# Patient Record
Sex: FEMALE | Race: OTHER | HISPANIC OR LATINO
[De-identification: names, ages, dates, MRNs, and addresses within clinical notes are randomized per-mention and may not be internally consistent; named-entity substitution may affect disease eponyms.]

---

## 2016-06-06 RX ORDER — LEVOTHYROXINE SODIUM 125 MCG
1 TABLET ORAL
Qty: 0 | Refills: 0 | DISCHARGE
Start: 2016-06-06

## 2017-03-02 ENCOUNTER — OTHER (OUTPATIENT)
Age: 77
End: 2017-03-02

## 2017-03-07 ENCOUNTER — APPOINTMENT (OUTPATIENT)
Dept: INTERNAL MEDICINE | Facility: CLINIC | Age: 77
End: 2017-03-07

## 2017-03-07 ENCOUNTER — LABORATORY RESULT (OUTPATIENT)
Age: 77
End: 2017-03-07

## 2017-03-07 VITALS
DIASTOLIC BLOOD PRESSURE: 73 MMHG | TEMPERATURE: 97.6 F | HEIGHT: 57 IN | OXYGEN SATURATION: 96 % | WEIGHT: 118 LBS | RESPIRATION RATE: 14 BRPM | HEART RATE: 68 BPM | BODY MASS INDEX: 25.46 KG/M2 | SYSTOLIC BLOOD PRESSURE: 129 MMHG

## 2017-03-07 DIAGNOSIS — Z23 ENCOUNTER FOR IMMUNIZATION: ICD-10-CM

## 2017-03-08 ENCOUNTER — OTHER (OUTPATIENT)
Age: 77
End: 2017-03-08

## 2017-03-08 LAB
CHOLEST SERPL-MCNC: 250 MG/DL
CHOLEST/HDLC SERPL: 4 RATIO
HDLC SERPL-MCNC: 63 MG/DL
LDLC SERPL CALC-MCNC: 159 MG/DL
TRIGL SERPL-MCNC: 142 MG/DL
TSH SERPL-ACNC: 4.77 UIU/ML

## 2017-03-21 ENCOUNTER — OTHER (OUTPATIENT)
Age: 77
End: 2017-03-21

## 2017-04-04 ENCOUNTER — OTHER (OUTPATIENT)
Age: 77
End: 2017-04-04

## 2017-05-09 ENCOUNTER — RX RENEWAL (OUTPATIENT)
Age: 77
End: 2017-05-09

## 2017-06-12 ENCOUNTER — APPOINTMENT (OUTPATIENT)
Dept: INTERNAL MEDICINE | Facility: CLINIC | Age: 77
End: 2017-06-12

## 2017-06-12 ENCOUNTER — LABORATORY RESULT (OUTPATIENT)
Age: 77
End: 2017-06-12

## 2017-06-12 ENCOUNTER — OTHER (OUTPATIENT)
Age: 77
End: 2017-06-12

## 2017-06-12 VITALS
TEMPERATURE: 97.2 F | BODY MASS INDEX: 26.32 KG/M2 | DIASTOLIC BLOOD PRESSURE: 71 MMHG | HEART RATE: 68 BPM | RESPIRATION RATE: 14 BRPM | OXYGEN SATURATION: 97 % | SYSTOLIC BLOOD PRESSURE: 114 MMHG | HEIGHT: 57 IN | WEIGHT: 122 LBS

## 2017-06-12 DIAGNOSIS — G47.00 INSOMNIA, UNSPECIFIED: ICD-10-CM

## 2017-06-14 LAB — TSH SERPL-ACNC: 7.5 UIU/ML

## 2017-11-21 ENCOUNTER — OTHER (OUTPATIENT)
Age: 77
End: 2017-11-21

## 2017-12-19 ENCOUNTER — RX RENEWAL (OUTPATIENT)
Age: 77
End: 2017-12-19

## 2018-04-18 ENCOUNTER — RX RENEWAL (OUTPATIENT)
Age: 78
End: 2018-04-18

## 2018-04-18 ENCOUNTER — MOBILE ON CALL (OUTPATIENT)
Age: 78
End: 2018-04-18

## 2018-05-22 ENCOUNTER — OTHER (OUTPATIENT)
Age: 78
End: 2018-05-22

## 2018-05-30 ENCOUNTER — APPOINTMENT (OUTPATIENT)
Dept: INTERNAL MEDICINE | Facility: CLINIC | Age: 78
End: 2018-05-30
Payer: MEDICARE

## 2018-05-30 VITALS — HEART RATE: 84 BPM | DIASTOLIC BLOOD PRESSURE: 70 MMHG | RESPIRATION RATE: 14 BRPM | SYSTOLIC BLOOD PRESSURE: 122 MMHG

## 2018-05-30 PROCEDURE — 99214 OFFICE O/P EST MOD 30 MIN: CPT | Mod: 25

## 2018-05-30 PROCEDURE — 36415 COLL VENOUS BLD VENIPUNCTURE: CPT

## 2018-05-31 ENCOUNTER — OTHER (OUTPATIENT)
Age: 78
End: 2018-05-31

## 2018-05-31 LAB
ANION GAP SERPL CALC-SCNC: 14 MMOL/L
BUN SERPL-MCNC: 15 MG/DL
CALCIUM SERPL-MCNC: 9.6 MG/DL
CHLORIDE SERPL-SCNC: 102 MMOL/L
CHOLEST SERPL-MCNC: 253 MG/DL
CHOLEST/HDLC SERPL: 3.7 RATIO
CO2 SERPL-SCNC: 24 MMOL/L
CREAT SERPL-MCNC: 0.55 MG/DL
GLUCOSE SERPL-MCNC: 94 MG/DL
HDLC SERPL-MCNC: 69 MG/DL
LDLC SERPL CALC-MCNC: 155 MG/DL
POTASSIUM SERPL-SCNC: 4.2 MMOL/L
SODIUM SERPL-SCNC: 140 MMOL/L
TRIGL SERPL-MCNC: 143 MG/DL
TSH SERPL-ACNC: 3.68 UIU/ML

## 2018-06-08 ENCOUNTER — RX RENEWAL (OUTPATIENT)
Age: 78
End: 2018-06-08

## 2018-07-19 ENCOUNTER — OTHER (OUTPATIENT)
Age: 78
End: 2018-07-19

## 2018-09-10 ENCOUNTER — RX RENEWAL (OUTPATIENT)
Age: 78
End: 2018-09-10

## 2018-10-04 ENCOUNTER — APPOINTMENT (OUTPATIENT)
Dept: INTERNAL MEDICINE | Facility: CLINIC | Age: 78
End: 2018-10-04
Payer: MEDICARE

## 2018-10-04 VITALS
SYSTOLIC BLOOD PRESSURE: 105 MMHG | BODY MASS INDEX: 26.32 KG/M2 | WEIGHT: 122 LBS | HEART RATE: 68 BPM | TEMPERATURE: 98 F | OXYGEN SATURATION: 94 % | DIASTOLIC BLOOD PRESSURE: 65 MMHG | HEIGHT: 57 IN

## 2018-10-04 PROCEDURE — 90662 IIV NO PRSV INCREASED AG IM: CPT

## 2018-10-04 PROCEDURE — 99214 OFFICE O/P EST MOD 30 MIN: CPT | Mod: 25

## 2018-10-04 PROCEDURE — 36415 COLL VENOUS BLD VENIPUNCTURE: CPT

## 2018-10-04 PROCEDURE — G0008: CPT

## 2018-10-05 LAB
ALBUMIN SERPL ELPH-MCNC: 4.4 G/DL
ALP BLD-CCNC: 69 U/L
ALT SERPL-CCNC: 19 U/L
ANION GAP SERPL CALC-SCNC: 14 MMOL/L
AST SERPL-CCNC: 21 U/L
BILIRUB SERPL-MCNC: 0.4 MG/DL
BUN SERPL-MCNC: 11 MG/DL
CALCIUM SERPL-MCNC: 10.1 MG/DL
CHLORIDE SERPL-SCNC: 101 MMOL/L
CK SERPL-CCNC: 41 U/L
CO2 SERPL-SCNC: 25 MMOL/L
CREAT SERPL-MCNC: 0.66 MG/DL
GLUCOSE SERPL-MCNC: 91 MG/DL
HBA1C MFR BLD HPLC: 5.6 %
MAGNESIUM SERPL-MCNC: 2.4 MG/DL
POTASSIUM SERPL-SCNC: 4.6 MMOL/L
PROT SERPL-MCNC: 6.4 G/DL
SODIUM SERPL-SCNC: 140 MMOL/L

## 2018-10-09 ENCOUNTER — OTHER (OUTPATIENT)
Age: 78
End: 2018-10-09

## 2018-11-30 ENCOUNTER — RX RENEWAL (OUTPATIENT)
Age: 78
End: 2018-11-30

## 2019-01-07 ENCOUNTER — APPOINTMENT (OUTPATIENT)
Dept: INTERNAL MEDICINE | Facility: CLINIC | Age: 79
End: 2019-01-07
Payer: MEDICARE

## 2019-02-08 ENCOUNTER — RX RENEWAL (OUTPATIENT)
Age: 79
End: 2019-02-08

## 2019-02-25 ENCOUNTER — APPOINTMENT (OUTPATIENT)
Dept: INTERNAL MEDICINE | Facility: CLINIC | Age: 79
End: 2019-02-25
Payer: MEDICARE

## 2019-02-25 ENCOUNTER — RX RENEWAL (OUTPATIENT)
Age: 79
End: 2019-02-25

## 2019-02-25 VITALS
SYSTOLIC BLOOD PRESSURE: 133 MMHG | HEART RATE: 78 BPM | OXYGEN SATURATION: 95 % | DIASTOLIC BLOOD PRESSURE: 71 MMHG | TEMPERATURE: 98 F | BODY MASS INDEX: 26.37 KG/M2 | WEIGHT: 122.25 LBS | HEIGHT: 57 IN

## 2019-02-25 PROCEDURE — 99214 OFFICE O/P EST MOD 30 MIN: CPT

## 2019-03-17 ENCOUNTER — FORM ENCOUNTER (OUTPATIENT)
Age: 79
End: 2019-03-17

## 2019-03-18 ENCOUNTER — OUTPATIENT (OUTPATIENT)
Dept: OUTPATIENT SERVICES | Facility: HOSPITAL | Age: 79
LOS: 1 days | End: 2019-03-18
Payer: MEDICARE

## 2019-03-18 ENCOUNTER — APPOINTMENT (OUTPATIENT)
Dept: MRI IMAGING | Facility: HOSPITAL | Age: 79
End: 2019-03-18
Payer: MEDICARE

## 2019-03-18 DIAGNOSIS — Z96.649 PRESENCE OF UNSPECIFIED ARTIFICIAL HIP JOINT: Chronic | ICD-10-CM

## 2019-03-18 PROCEDURE — 72148 MRI LUMBAR SPINE W/O DYE: CPT

## 2019-03-18 PROCEDURE — 72148 MRI LUMBAR SPINE W/O DYE: CPT | Mod: 26

## 2019-03-26 ENCOUNTER — APPOINTMENT (OUTPATIENT)
Dept: HEART AND VASCULAR | Facility: CLINIC | Age: 79
End: 2019-03-26
Payer: MEDICARE

## 2019-03-26 VITALS
DIASTOLIC BLOOD PRESSURE: 76 MMHG | BODY MASS INDEX: 25.89 KG/M2 | HEIGHT: 57 IN | SYSTOLIC BLOOD PRESSURE: 120 MMHG | HEART RATE: 77 BPM | WEIGHT: 120 LBS

## 2019-03-26 VITALS — DIASTOLIC BLOOD PRESSURE: 80 MMHG | SYSTOLIC BLOOD PRESSURE: 120 MMHG

## 2019-03-26 PROCEDURE — 93000 ELECTROCARDIOGRAM COMPLETE: CPT

## 2019-03-26 PROCEDURE — 99204 OFFICE O/P NEW MOD 45 MIN: CPT | Mod: 25

## 2019-03-26 PROCEDURE — 93306 TTE W/DOPPLER COMPLETE: CPT

## 2019-03-28 ENCOUNTER — FORM ENCOUNTER (OUTPATIENT)
Age: 79
End: 2019-03-28

## 2019-03-29 ENCOUNTER — APPOINTMENT (OUTPATIENT)
Dept: MAMMOGRAPHY | Facility: HOSPITAL | Age: 79
End: 2019-03-29
Payer: MEDICARE

## 2019-03-29 ENCOUNTER — OUTPATIENT (OUTPATIENT)
Dept: OUTPATIENT SERVICES | Facility: HOSPITAL | Age: 79
LOS: 1 days | End: 2019-03-29
Payer: MEDICARE

## 2019-03-29 DIAGNOSIS — Z96.649 PRESENCE OF UNSPECIFIED ARTIFICIAL HIP JOINT: Chronic | ICD-10-CM

## 2019-03-29 PROCEDURE — 77063 BREAST TOMOSYNTHESIS BI: CPT | Mod: 26

## 2019-03-29 PROCEDURE — 77063 BREAST TOMOSYNTHESIS BI: CPT

## 2019-03-29 PROCEDURE — 77067 SCR MAMMO BI INCL CAD: CPT

## 2019-03-29 PROCEDURE — 77067 SCR MAMMO BI INCL CAD: CPT | Mod: 26

## 2019-04-10 ENCOUNTER — APPOINTMENT (OUTPATIENT)
Dept: HEART AND VASCULAR | Facility: CLINIC | Age: 79
End: 2019-04-10
Payer: MEDICARE

## 2019-04-10 VITALS
WEIGHT: 120 LBS | SYSTOLIC BLOOD PRESSURE: 126 MMHG | BODY MASS INDEX: 25.89 KG/M2 | HEIGHT: 57 IN | HEART RATE: 78 BPM | DIASTOLIC BLOOD PRESSURE: 78 MMHG | OXYGEN SATURATION: 96 %

## 2019-04-10 DIAGNOSIS — R51 HEADACHE: ICD-10-CM

## 2019-04-10 PROCEDURE — 93924 LWR XTR VASC STDY BILAT: CPT

## 2019-04-10 PROCEDURE — 99214 OFFICE O/P EST MOD 30 MIN: CPT | Mod: 25

## 2019-04-15 ENCOUNTER — FORM ENCOUNTER (OUTPATIENT)
Age: 79
End: 2019-04-15

## 2019-04-16 ENCOUNTER — APPOINTMENT (OUTPATIENT)
Dept: ULTRASOUND IMAGING | Facility: HOSPITAL | Age: 79
End: 2019-04-16
Payer: MEDICARE

## 2019-04-16 ENCOUNTER — OUTPATIENT (OUTPATIENT)
Dept: OUTPATIENT SERVICES | Facility: HOSPITAL | Age: 79
LOS: 1 days | End: 2019-04-16
Payer: COMMERCIAL

## 2019-04-16 DIAGNOSIS — Z12.31 ENCOUNTER FOR SCREENING MAMMOGRAM FOR MALIGNANT NEOPLASM OF BREAST: ICD-10-CM

## 2019-04-16 DIAGNOSIS — Z96.649 PRESENCE OF UNSPECIFIED ARTIFICIAL HIP JOINT: Chronic | ICD-10-CM

## 2019-04-16 PROCEDURE — 76641 ULTRASOUND BREAST COMPLETE: CPT | Mod: 26,50

## 2019-04-16 PROCEDURE — 76641 ULTRASOUND BREAST COMPLETE: CPT

## 2019-04-22 ENCOUNTER — RX RENEWAL (OUTPATIENT)
Age: 79
End: 2019-04-22

## 2019-05-01 ENCOUNTER — APPOINTMENT (OUTPATIENT)
Dept: INTERNAL MEDICINE | Facility: CLINIC | Age: 79
End: 2019-05-01
Payer: MEDICARE

## 2019-05-01 VITALS
HEIGHT: 57 IN | OXYGEN SATURATION: 95 % | HEART RATE: 76 BPM | WEIGHT: 120 LBS | TEMPERATURE: 98 F | DIASTOLIC BLOOD PRESSURE: 71 MMHG | SYSTOLIC BLOOD PRESSURE: 116 MMHG | BODY MASS INDEX: 25.89 KG/M2

## 2019-05-01 DIAGNOSIS — R74.8 ABNORMAL LEVELS OF OTHER SERUM ENZYMES: ICD-10-CM

## 2019-05-01 PROCEDURE — 99214 OFFICE O/P EST MOD 30 MIN: CPT

## 2019-05-20 ENCOUNTER — RX RENEWAL (OUTPATIENT)
Age: 79
End: 2019-05-20

## 2019-05-22 ENCOUNTER — FORM ENCOUNTER (OUTPATIENT)
Age: 79
End: 2019-05-22

## 2019-05-23 ENCOUNTER — OUTPATIENT (OUTPATIENT)
Dept: OUTPATIENT SERVICES | Facility: HOSPITAL | Age: 79
LOS: 1 days | End: 2019-05-23
Payer: MEDICARE

## 2019-05-23 ENCOUNTER — APPOINTMENT (OUTPATIENT)
Dept: NEUROSURGERY | Facility: CLINIC | Age: 79
End: 2019-05-23
Payer: MEDICARE

## 2019-05-23 VITALS
BODY MASS INDEX: 25.89 KG/M2 | HEART RATE: 76 BPM | SYSTOLIC BLOOD PRESSURE: 115 MMHG | HEIGHT: 57 IN | RESPIRATION RATE: 16 BRPM | WEIGHT: 120 LBS | OXYGEN SATURATION: 95 % | DIASTOLIC BLOOD PRESSURE: 73 MMHG

## 2019-05-23 DIAGNOSIS — Z96.649 PRESENCE OF UNSPECIFIED ARTIFICIAL HIP JOINT: Chronic | ICD-10-CM

## 2019-05-23 PROCEDURE — 99204 OFFICE O/P NEW MOD 45 MIN: CPT

## 2019-05-23 PROCEDURE — 72100 X-RAY EXAM L-S SPINE 2/3 VWS: CPT | Mod: 26

## 2019-05-23 PROCEDURE — 72100 X-RAY EXAM L-S SPINE 2/3 VWS: CPT

## 2019-05-29 NOTE — HISTORY OF PRESENT ILLNESS
[> 3 months] : more  than 3 months [FreeTextEntry1] : Chronic Lowerback and bilateral leg pain [de-identified] : Patient is being seen for a consult visit for c/o chronic radiating lower back pain down both legs to her feet L > R. Per patient, this pain has been persistent over the last year and has progressively gotten worse to the point where she can only ambulate 1-2 blocks with use of cane before needing to sit. She states she is starting PT next week, but has not seen anyone for pain management. She is being managed by her PCP, Dr. Anglin, and is only taking Tylenol 1000mg and Naproxen 500mg both PRN. Patient denies numbness/tingling in feet or toes, also denies B&B incontinence. Her hx is positive for Lumbar Radiculopathy, she has had a right hip revision for a fracture a few years ago as well as left TKR over 20yrs ago. Her L/S MRI done 3/18 shows multiple disc protrusions from L1-S1 with central canal stenosis at L3-4 and L4-5.\par \par She presents today to discuss surgical options.

## 2019-05-29 NOTE — DATA REVIEWED
[de-identified] : Patient Name: EMILY DORADO   Report Date: 19-Mar-2019 15:08.00 \par Patient ID: [id number] (00), [id number] (EPI)  Accession No.: [id number] \par Patient Birth Date: 1940  Report Status: F \par Referring Physician: [id number] RITESH RICHARDS   Reason For Study: lwr ext pain  \par \par \par \par \par \par \par EXAM: MR SPINE LUMBAR \par \par PROCEDURE DATE: 03/18/2019 \par \par \par \par INTERPRETATION: MRI OF THE LUMBAR SPINE WITHOUT CONTRAST \par \par HISTORY: Lumbar back pain radiating to lower extremity. \par \par TECHNIQUE: \par \par Sagittal T1, sagittal T2, sagittal STIR, axial T1, and axial T2 weighted \par imaging of the lumbar spine was obtained. No contrast was given. \par \par COMPARISON: None. \par \par FINDINGS: \par \par On the coronal localizer, there is blooming, ferromagnetic artifact likely \par related to right hip arthroplasty. There is curvature in the lumbar spine, \par convex to the left, apex at approximately the L3-L4 level. On the sagittal \par acquisitions, there is minimal anterolisthesis of L4 with respect to L5 and \par minimal anterolisthesis of L2 with respect to L1. No pars defects are seen. \par \par There is heterogeneity of marrow signal intensity on the short TR scans \par consistent with osteopenia on a background of predominant fatty replacement, \par which is within normal limits for patient age. No acute edema pattern is \par seen within the lumbar vertebral bodies. Conus medullaris ends normally at \par the T12-L1 level. Cauda equina is unremarkable. No spinal canal mass is seen. \par \par Multilevel degenerative disc disease is present. Findings include loss of \par normal disc space signal on the long TR scans and loss of disc space height. \par Multilevel degenerative osteoarthritis is present. Findings include \par ligamentum flavum thickening, facet arthropathy, and marginal endplate \par osteophytes. \par \par At the L1-L2 level, diffuse disc bulge is present. There is foraminal \par narrowing bilaterally and lateral recess stenosis bilaterally. No central \par canal stenosis is present. \par \par At the L2-L3 level, diffuse disc bulge is present with tiny right \par paracentral disc protrusion with cephalad extrusion seen behind L2 lumbar \par vertebral body. There is mild thecal sac effacement. There is no central \par canal stenosis. There is foraminal narrowing, right more than left, and \par lateral recess stenosis, right more than left. \par \par At the L3-L4 level, diffuse disc bulge is present eccentric to the right. \par There is moderate foraminal narrowing, right more than left, lateral recess \par stenosis, right more than left. There is mild central canal stenosis. \par \par At the L4-L5 level, on background of diffuse disc bulge, there is a left \par paracentral disc protrusion with cephalad extrusion of disc material seen \par behind L4 lumbar vertebral body. There is foraminal narrowing, left more \par than right, and lateral recess stenosis, left more than right, which may be \par affecting the traversing left L5 and/or foraminal left L4 nerve roots. There \par is thecal sac effacement and central canal stenosis related to ligamentum \par flavum thickening and facet degenerative change. \par \par At the L5-S1 level, diffuse disc bulge is present. There is no central canal \par stenosis. There is mild foraminal narrowing bilaterally. \par \par Incidental note is made of foci of hypointensity within the uterine \par myometrium consistent with fibroids, largest of which measures 1.7cm in \par length. \par \par IMPRESSION: \par \par 1. L4-L5 left paracentral disc protrusion with cephalad extrusion of disc \par material and associated left foraminal narrowing which may be affecting the \par traversing left L5 and/or foraminal left L4 nerve roots. \par 2. Tiny right paracentral disc protrusion L2-L3 level and diffuse disc \par bulges L1-L2, L3-L4, and L5-S1 levels. \par 3. Central canal stenosis L3-L4 and L4-L5 levels related to acquired \par etiologies from disc disease and facet arthropathy. \par 4. Multilevel foraminal narrowing due to facet arthropathy, as above. \par 5. levocurvature. \par \par \par \par \par \par "Thank you for the opportunity to participate in the care of this patient." \par \par \par \par BERTHA GAY M.D., ATTENDING RADIOLOGIST \par This document has been electronically signed. Mar 19 2019 3:08PM \par \par \par \par  \par

## 2019-05-29 NOTE — ASSESSMENT
[FreeTextEntry1] : No surgery recommended at this time.\par Pain Management referral recommended\par Cervical Xray Flex Ext.\par Education provided regarding plan of care.\par

## 2019-05-29 NOTE — ADDENDUM
[FreeTextEntry1] : May 23, 2019\par \par Jennyfer AnglinDO\par [address]\[place] [address]\par \par Re:	Rut Larkin \par \par Dear Jennyfer:\par \par I saw Rut in the office today.  As you know, she is a 79-year-old female who presents with severe low back and leg pain for over a year with inability to walk long distances.  Ms. Larkin reports that this pain has been ongoing and is worse in her back than in her legs.  She reports the lack of bowel or bladder incontinence but does have bilateral leg and feet pain, left greater than right.  She has had minimal treatments with no PT or pain management treatment.\par \par Ms. Larkin has a history of anxiety, osteopenia, hyperlipidemia, hypothyroidism, and prediabetes.  She has had a prior hip replacement following a fracture, as well as a total knee on the left.  She does not drink or smoke and she is a retired hotel worker.  Her current medications include Tylenol, levothyroxine, trazadone, and naproxen.  Review of systems is notable for difficulty walking and her pain but no real neurological deficit.  Additionally, she denies any cardiovascular, respiratory, or GI complaints. \par \par Here in the office, she is walking with a cane but has no gross neurological deficit.  Her MRI shows diffuse discogenic disease particularly on the left at L4-5 with a small amount of extruded material as well as a tiny paracentral disc and diffuse disc bulging at multiple levels.  There is no significant canal stenosis or lateral recess stenosis.  Overall, her MR looks nonoperative to me.\par \par At this point, I asked Ms. Larkin to meet with our pain group with the hopes of getting her by without any aggressive surgery.  I do not see an operation as a good solution for her and told her so.  I have asked her to see Dr. Nevarez, who is part of our orthopedic pain management team, and hopefully this will improve her significantly.\par \par Thanks so much for allowing me to contribute, and I very much appreciate your kind referral.\par \par Sincerely,\par \par \par \par Andrea Monsivais MD\par \par DL/ag [facility] #[id number]\par \par \par

## 2019-05-29 NOTE — PHYSICAL EXAM
[Oriented To Time, Place, And Person] : oriented to person, place, and time [Impaired Insight] : insight and judgment were intact [I: Normal Smell] : smell intact bilaterally [Cranial Nerves Optic (II)] : visual acuity intact bilaterally,  pupils equal round and reactive to light [Cranial Nerves Oculomotor (III)] : extraocular motion intact [Cranial Nerves Trigeminal (V)] : facial sensation intact symmetrically [Cranial Nerves Facial (VII)] : face symmetrical [Cranial Nerves Vestibulocochlear (VIII)] : hearing was intact bilaterally [Cranial Nerves Glossopharyngeal (IX)] : tongue and palate midline [Cranial Nerves Accessory (XI - Cranial And Spinal)] : head turning and shoulder shrug symmetric [Cranial Nerves Hypoglossal (XII)] : there was no tongue deviation with protrusion [Motor Tone] : muscle tone was normal in all four extremities [Motor Strength] : muscle strength was normal in all four extremities [Outer Ear] : the ears and nose were normal in appearance [Neck Appearance] : the appearance of the neck was normal [] : no respiratory distress [Exaggerated Use Of Accessory Muscles For Inspiration] : no accessory muscle use [Heart Rate And Rhythm] : heart rate was normal and rhythm regular [Abdomen Soft] : soft [Abnormal Walk] : normal gait [Skin Color & Pigmentation] : normal skin color and pigmentation [General Appearance - Alert] : alert [General Appearance - In No Acute Distress] : in no acute distress

## 2019-06-10 ENCOUNTER — APPOINTMENT (OUTPATIENT)
Dept: PHYSICAL MEDICINE AND REHAB | Facility: CLINIC | Age: 79
End: 2019-06-10
Payer: MEDICARE

## 2019-06-10 PROCEDURE — 99204 OFFICE O/P NEW MOD 45 MIN: CPT

## 2019-06-24 ENCOUNTER — RX RENEWAL (OUTPATIENT)
Age: 79
End: 2019-06-24

## 2019-06-26 ENCOUNTER — APPOINTMENT (OUTPATIENT)
Dept: INTERNAL MEDICINE | Facility: CLINIC | Age: 79
End: 2019-06-26

## 2019-08-15 ENCOUNTER — RX RENEWAL (OUTPATIENT)
Age: 79
End: 2019-08-15

## 2019-09-03 ENCOUNTER — RX RENEWAL (OUTPATIENT)
Age: 79
End: 2019-09-03

## 2019-09-10 ENCOUNTER — APPOINTMENT (OUTPATIENT)
Dept: HEART AND VASCULAR | Facility: CLINIC | Age: 79
End: 2019-09-10
Payer: MEDICARE

## 2019-09-10 ENCOUNTER — NON-APPOINTMENT (OUTPATIENT)
Age: 79
End: 2019-09-10

## 2019-09-10 VITALS
HEART RATE: 75 BPM | HEIGHT: 57 IN | WEIGHT: 125 LBS | OXYGEN SATURATION: 95 % | DIASTOLIC BLOOD PRESSURE: 74 MMHG | SYSTOLIC BLOOD PRESSURE: 122 MMHG | BODY MASS INDEX: 26.97 KG/M2

## 2019-09-10 VITALS — SYSTOLIC BLOOD PRESSURE: 134 MMHG | DIASTOLIC BLOOD PRESSURE: 78 MMHG

## 2019-09-10 PROCEDURE — 99214 OFFICE O/P EST MOD 30 MIN: CPT | Mod: 25

## 2019-09-10 PROCEDURE — 93000 ELECTROCARDIOGRAM COMPLETE: CPT

## 2019-09-24 ENCOUNTER — OTHER (OUTPATIENT)
Age: 79
End: 2019-09-24

## 2019-10-21 ENCOUNTER — APPOINTMENT (OUTPATIENT)
Dept: INTERNAL MEDICINE | Facility: CLINIC | Age: 79
End: 2019-10-21
Payer: MEDICARE

## 2019-10-21 VITALS
HEIGHT: 57 IN | HEART RATE: 78 BPM | DIASTOLIC BLOOD PRESSURE: 81 MMHG | WEIGHT: 125 LBS | OXYGEN SATURATION: 93 % | BODY MASS INDEX: 26.97 KG/M2 | TEMPERATURE: 97.5 F | SYSTOLIC BLOOD PRESSURE: 133 MMHG

## 2019-10-21 PROCEDURE — 36415 COLL VENOUS BLD VENIPUNCTURE: CPT

## 2019-10-21 PROCEDURE — 99214 OFFICE O/P EST MOD 30 MIN: CPT | Mod: 25

## 2019-10-21 NOTE — REVIEW OF SYSTEMS
[Negative] : Cardiovascular [Chest Pain] : no chest pain [Shortness Of Breath] : no shortness of breath [FreeTextEntry9] : see HPI

## 2019-10-21 NOTE — PHYSICAL EXAM
[Normal Sclera/Conjunctiva] : normal sclera/conjunctiva [No Acute Distress] : no acute distress [Well-Appearing] : well-appearing [EOMI] : extraocular movements intact [Normal Outer Ear/Nose] : the outer ears and nose were normal in appearance [Supple] : supple [No JVD] : no jugular venous distention [No Accessory Muscle Use] : no accessory muscle use [Clear to Auscultation] : lungs were clear to auscultation bilaterally [No Respiratory Distress] : no respiratory distress  [Normal Rate] : normal rate  [Regular Rhythm] : with a regular rhythm [Normal S1, S2] : normal S1 and S2 [No Edema] : there was no peripheral edema [Coordination Grossly Intact] : coordination grossly intact [No Rash] : no rash [Grossly Normal Strength/Tone] : grossly normal strength/tone [No Focal Deficits] : no focal deficits [Normal Affect] : the affect was normal [Normal Insight/Judgement] : insight and judgment were intact [Alert and Oriented x3] : oriented to person, place, and time

## 2019-10-21 NOTE — HISTORY OF PRESENT ILLNESS
[de-identified] : 80 yo female with hx hypothyroid, chronic back pain, HPL here for f/u.  REports she is feeling generally well.  Anxiety much improved as daughter and grandchildren have moved out of her home.  states she is compliant with all medication.  Reports back and leg pain improved on lyria, asking if she should increase to twice daily.  Reports physical therapy has been helping.   No other complaints.  Eating generally healthy balance diet.

## 2019-10-21 NOTE — ASSESSMENT
[FreeTextEntry1] : 78 yo female here for f/u\par \par 1) HPL - chronic, above goal, will repeat fasting today.  consider restart statin.  counseled diet/exercise\par 2) hypothyroid -c hronic, stable, reviewed labs from sept 2019, TSH normal, c/w synthroid 37 mcg daily\par 3) anxiety/depression -chronic, now improved with better living situation\par 4) back/leg pain - chronic, improved with lyrica, will increase to twice daily per instructions.  f/u pain management.

## 2019-10-24 ENCOUNTER — OTHER (OUTPATIENT)
Age: 79
End: 2019-10-24

## 2019-10-24 LAB
CHOLEST SERPL-MCNC: 273 MG/DL
CHOLEST/HDLC SERPL: 4.7 RATIO
HDLC SERPL-MCNC: 58 MG/DL
LDLC SERPL CALC-MCNC: 178 MG/DL
TRIGL SERPL-MCNC: 186 MG/DL

## 2019-11-04 ENCOUNTER — RX RENEWAL (OUTPATIENT)
Age: 79
End: 2019-11-04

## 2019-11-25 ENCOUNTER — OTHER (OUTPATIENT)
Age: 79
End: 2019-11-25

## 2019-11-26 ENCOUNTER — OTHER (OUTPATIENT)
Age: 79
End: 2019-11-26

## 2019-12-11 ENCOUNTER — APPOINTMENT (OUTPATIENT)
Dept: PHYSICAL MEDICINE AND REHAB | Facility: CLINIC | Age: 79
End: 2019-12-11
Payer: MEDICARE

## 2019-12-11 VITALS
WEIGHT: 125 LBS | OXYGEN SATURATION: 96 % | SYSTOLIC BLOOD PRESSURE: 130 MMHG | HEART RATE: 75 BPM | DIASTOLIC BLOOD PRESSURE: 70 MMHG | HEIGHT: 57 IN | BODY MASS INDEX: 26.97 KG/M2

## 2019-12-11 PROCEDURE — 99213 OFFICE O/P EST LOW 20 MIN: CPT

## 2019-12-11 NOTE — HISTORY OF PRESENT ILLNESS
[FreeTextEntry1] : Ms. ESTRADA DIAZ is a very pleasant 79 year female who comes in for follow up evaluation of back pain  after being referred to PT at her previous visit. The patient reports that since then she went for a second opinion and is now pursuing care with that physician. At present the symptoms are relatively unchanged. The pain is located primarily on her back radiating to bilateral legs intermittent in nature and described as sharp . The pain is rated as 8/10 during today's visit, and ranges from 7-9/10. The patient's symptoms are aggravated by walking or sitting for too long  and alleviated by ice/cold compress . The patient denies any night pain, numbness/tingling, weakness, or bowel/bladder dysfunction. The patient has no other complaints at this time.

## 2019-12-11 NOTE — PHYSICAL EXAM
[FreeTextEntry1] : GEN: AAOx3, NAD.\par PSYCH: Normal mood and affect. Responds appropriately to commands.\par EYES: Sclerae Anicteric. No discharge. EOMI.\par RESP: Breathing unlabored.\par CV: DP pulses 2+ and equal. No varicosities noted.\par EXT: No C/C/E.\par SKIN: No lesions noted.\par STRENGTH: 5/5 bilateral hip flexors, knee extensors, knee flexors, ankle dorsiflexors, long toe extensors, ankle plantar flexors, hip extensors, hip abductors.\par TONE: Normal, No clonus.\par REFLEXES: 2+ symmetric patella, medial hamstring, achilles. Plantars downgoing bilaterally.\par SENS: Grossly intact to light touch bilateral lower extremities.\par INSP: Spine alignment is midline, with no evidence of scoliosis.\par STANCE: Single leg stance deferred.\par GAIT: Non antalgic, normal reciprocating heel to toe\par LUMBAR ROM: Flexion, extension, side-bending, rotation, oblique extension all limited with axial LBP  \par HIP ROM: Full and pain free bilaterally.\par PALP: There is no tenderness over the midline spinous processes, paravertebral muscles, SIJ, or greater trochanters bilaterally.\par SPECIAL: SLR and Slump test negative bilaterally. FADIR, RIAN negative bilaterally.

## 2019-12-11 NOTE — DATA REVIEWED
[FreeTextEntry1] : MR LS 3/2019: L3-L4 diffuse disc bulge eccentric to the right with foraminal narrowing, right more than left, lateral recess stenosis, right more than left. L4-L5 diffuse disc bulge and left paracentral disc protrusion with cephalad extrusion of disc with foraminal narrowing, left more than right, and lateral recess stenosis, left more than right, affecting the left L5 and left L4 nerve roots. L5-S1 diffuse disc bulge. Multilevel bilateral facet arthrosis.

## 2019-12-11 NOTE — ASSESSMENT
[FreeTextEntry1] : Impression:\par 1. Lumbar Facet Disease\par \par Plan: Review of the history imaging and physical examination the patient's symptoms are consistent with Lumbar Facet Disease and underlying spinal stenosis. The imaging results and diagnosis were reviewed with the patient. We reviewed potential treatment options including physical therapy, oral medication, interventional spine procedures, and surgery; as well as alternative therapeutics such as acupuncture and massage. We also reviewed the importance of biomechanics, ergonomics, and posture in the long term management of the condition. The patient will continue her care with her new pain management physician and may return here as needed. The patient expressed verbal understanding and is in agreement with the plan of care. All of the patient's questions and concerns were addressed during today's visit.

## 2019-12-30 ENCOUNTER — RX RENEWAL (OUTPATIENT)
Age: 79
End: 2019-12-30

## 2020-01-13 ENCOUNTER — APPOINTMENT (OUTPATIENT)
Dept: INTERNAL MEDICINE | Facility: CLINIC | Age: 80
End: 2020-01-13
Payer: MEDICARE

## 2020-01-13 VITALS
HEIGHT: 57 IN | BODY MASS INDEX: 26.97 KG/M2 | TEMPERATURE: 96.4 F | OXYGEN SATURATION: 94 % | DIASTOLIC BLOOD PRESSURE: 71 MMHG | HEART RATE: 67 BPM | WEIGHT: 125 LBS | SYSTOLIC BLOOD PRESSURE: 125 MMHG

## 2020-01-13 PROCEDURE — 36415 COLL VENOUS BLD VENIPUNCTURE: CPT

## 2020-01-13 PROCEDURE — 99214 OFFICE O/P EST MOD 30 MIN: CPT | Mod: 25

## 2020-01-14 LAB
ALBUMIN SERPL ELPH-MCNC: 4.1 G/DL
ALP BLD-CCNC: 92 U/L
ALT SERPL-CCNC: 20 U/L
ANION GAP SERPL CALC-SCNC: 13 MMOL/L
AST SERPL-CCNC: 25 U/L
BASOPHILS # BLD AUTO: 0.03 K/UL
BASOPHILS NFR BLD AUTO: 0.6 %
BILIRUB SERPL-MCNC: 0.4 MG/DL
BUN SERPL-MCNC: 16 MG/DL
CALCIUM SERPL-MCNC: 9.7 MG/DL
CHLORIDE SERPL-SCNC: 105 MMOL/L
CHOLEST SERPL-MCNC: 175 MG/DL
CHOLEST/HDLC SERPL: 3 RATIO
CO2 SERPL-SCNC: 24 MMOL/L
CREAT SERPL-MCNC: 0.53 MG/DL
EOSINOPHIL # BLD AUTO: 0.02 K/UL
EOSINOPHIL NFR BLD AUTO: 0.4 %
GLUCOSE SERPL-MCNC: 89 MG/DL
HCT VFR BLD CALC: 38.5 %
HDLC SERPL-MCNC: 58 MG/DL
HGB BLD-MCNC: 12.1 G/DL
IMM GRANULOCYTES NFR BLD AUTO: 0.2 %
LDLC SERPL CALC-MCNC: 94 MG/DL
LYMPHOCYTES # BLD AUTO: 1.33 K/UL
LYMPHOCYTES NFR BLD AUTO: 28.7 %
MAN DIFF?: NORMAL
MCHC RBC-ENTMCNC: 29.9 PG
MCHC RBC-ENTMCNC: 31.4 GM/DL
MCV RBC AUTO: 95.1 FL
MONOCYTES # BLD AUTO: 0.29 K/UL
MONOCYTES NFR BLD AUTO: 6.3 %
NEUTROPHILS # BLD AUTO: 2.96 K/UL
NEUTROPHILS NFR BLD AUTO: 63.8 %
PLATELET # BLD AUTO: 235 K/UL
POTASSIUM SERPL-SCNC: 4 MMOL/L
PROT SERPL-MCNC: 6.5 G/DL
RBC # BLD: 4.05 M/UL
RBC # FLD: 13.6 %
SODIUM SERPL-SCNC: 142 MMOL/L
TRIGL SERPL-MCNC: 114 MG/DL
TSH SERPL-ACNC: 3.93 UIU/ML
VIT B12 SERPL-MCNC: 1142 PG/ML
WBC # FLD AUTO: 4.64 K/UL

## 2020-03-10 ENCOUNTER — APPOINTMENT (OUTPATIENT)
Dept: HEART AND VASCULAR | Facility: CLINIC | Age: 80
End: 2020-03-10
Payer: MEDICARE

## 2020-03-10 VITALS
DIASTOLIC BLOOD PRESSURE: 62 MMHG | HEIGHT: 57 IN | WEIGHT: 125 LBS | BODY MASS INDEX: 26.97 KG/M2 | SYSTOLIC BLOOD PRESSURE: 114 MMHG | HEART RATE: 71 BPM

## 2020-03-10 VITALS — DIASTOLIC BLOOD PRESSURE: 62 MMHG | SYSTOLIC BLOOD PRESSURE: 110 MMHG

## 2020-03-10 PROCEDURE — 99214 OFFICE O/P EST MOD 30 MIN: CPT | Mod: 25

## 2020-03-10 PROCEDURE — 93306 TTE W/DOPPLER COMPLETE: CPT

## 2020-03-16 ENCOUNTER — APPOINTMENT (OUTPATIENT)
Dept: INTERNAL MEDICINE | Facility: CLINIC | Age: 80
End: 2020-03-16
Payer: MEDICARE

## 2020-03-16 VITALS
DIASTOLIC BLOOD PRESSURE: 80 MMHG | OXYGEN SATURATION: 94 % | TEMPERATURE: 98 F | HEART RATE: 70 BPM | SYSTOLIC BLOOD PRESSURE: 132 MMHG | HEIGHT: 57 IN | WEIGHT: 125 LBS | BODY MASS INDEX: 26.97 KG/M2

## 2020-03-16 PROCEDURE — 99214 OFFICE O/P EST MOD 30 MIN: CPT

## 2020-05-25 ENCOUNTER — RX RENEWAL (OUTPATIENT)
Age: 80
End: 2020-05-25

## 2020-06-14 ENCOUNTER — RX RENEWAL (OUTPATIENT)
Age: 80
End: 2020-06-14

## 2020-06-17 ENCOUNTER — APPOINTMENT (OUTPATIENT)
Dept: INTERNAL MEDICINE | Facility: CLINIC | Age: 80
End: 2020-06-17
Payer: MEDICARE

## 2020-06-17 VITALS
BODY MASS INDEX: 28.91 KG/M2 | TEMPERATURE: 98 F | DIASTOLIC BLOOD PRESSURE: 73 MMHG | OXYGEN SATURATION: 96 % | WEIGHT: 134 LBS | HEART RATE: 69 BPM | SYSTOLIC BLOOD PRESSURE: 113 MMHG | HEIGHT: 57 IN

## 2020-06-17 DIAGNOSIS — M25.512 PAIN IN RIGHT SHOULDER: ICD-10-CM

## 2020-06-17 DIAGNOSIS — M25.511 PAIN IN RIGHT SHOULDER: ICD-10-CM

## 2020-06-17 PROCEDURE — 99214 OFFICE O/P EST MOD 30 MIN: CPT

## 2020-07-29 ENCOUNTER — APPOINTMENT (OUTPATIENT)
Dept: INTERNAL MEDICINE | Facility: CLINIC | Age: 80
End: 2020-07-29
Payer: MEDICARE

## 2020-07-29 VITALS
OXYGEN SATURATION: 95 % | TEMPERATURE: 97 F | WEIGHT: 134 LBS | SYSTOLIC BLOOD PRESSURE: 126 MMHG | DIASTOLIC BLOOD PRESSURE: 73 MMHG | HEIGHT: 57 IN | BODY MASS INDEX: 28.91 KG/M2 | HEART RATE: 72 BPM

## 2020-07-29 PROCEDURE — 99214 OFFICE O/P EST MOD 30 MIN: CPT

## 2020-08-13 ENCOUNTER — APPOINTMENT (OUTPATIENT)
Dept: SPINE | Facility: CLINIC | Age: 80
End: 2020-08-13
Payer: MEDICARE

## 2020-08-13 ENCOUNTER — OUTPATIENT (OUTPATIENT)
Dept: OUTPATIENT SERVICES | Facility: HOSPITAL | Age: 80
LOS: 1 days | End: 2020-08-13
Payer: MEDICARE

## 2020-08-13 ENCOUNTER — RESULT REVIEW (OUTPATIENT)
Age: 80
End: 2020-08-13

## 2020-08-13 VITALS
DIASTOLIC BLOOD PRESSURE: 80 MMHG | TEMPERATURE: 98.3 F | HEART RATE: 81 BPM | BODY MASS INDEX: 28.91 KG/M2 | HEIGHT: 57 IN | SYSTOLIC BLOOD PRESSURE: 120 MMHG | WEIGHT: 134 LBS | OXYGEN SATURATION: 98 % | RESPIRATION RATE: 18 BRPM

## 2020-08-13 DIAGNOSIS — Z86.39 PERSONAL HISTORY OF OTHER ENDOCRINE, NUTRITIONAL AND METABOLIC DISEASE: ICD-10-CM

## 2020-08-13 DIAGNOSIS — Z60.2 PROBLEMS RELATED TO LIVING ALONE: ICD-10-CM

## 2020-08-13 DIAGNOSIS — Z82.49 FAMILY HISTORY OF ISCHEMIC HEART DISEASE AND OTHER DISEASES OF THE CIRCULATORY SYSTEM: ICD-10-CM

## 2020-08-13 DIAGNOSIS — Z96.649 PRESENCE OF UNSPECIFIED ARTIFICIAL HIP JOINT: Chronic | ICD-10-CM

## 2020-08-13 DIAGNOSIS — Z72.3 LACK OF PHYSICAL EXERCISE: ICD-10-CM

## 2020-08-13 DIAGNOSIS — Q76.2 CONGENITAL SPONDYLOLISTHESIS: ICD-10-CM

## 2020-08-13 PROCEDURE — 99214 OFFICE O/P EST MOD 30 MIN: CPT

## 2020-08-13 PROCEDURE — 72114 X-RAY EXAM L-S SPINE BENDING: CPT | Mod: 26

## 2020-08-13 PROCEDURE — 72114 X-RAY EXAM L-S SPINE BENDING: CPT

## 2020-08-13 RX ORDER — IBUPROFEN 600 MG/1
600 TABLET, FILM COATED ORAL 3 TIMES DAILY
Qty: 90 | Refills: 1 | Status: COMPLETED | COMMUNITY
Start: 2019-06-10 | End: 2020-08-13

## 2020-08-13 RX ORDER — CYCLOBENZAPRINE HYDROCHLORIDE 5 MG/1
5 TABLET, FILM COATED ORAL 3 TIMES DAILY
Qty: 30 | Refills: 1 | Status: COMPLETED | COMMUNITY
Start: 2020-03-16 | End: 2020-08-13

## 2020-08-13 RX ORDER — TRAMADOL HYDROCHLORIDE 50 MG/1
50 TABLET, COATED ORAL
Qty: 60 | Refills: 0 | Status: COMPLETED | COMMUNITY
Start: 2019-09-10 | End: 2020-08-13

## 2020-08-13 SDOH — SOCIAL STABILITY - SOCIAL INSECURITY: PROBLEMS RELATED TO LIVING ALONE: Z60.2

## 2020-08-13 NOTE — ASSESSMENT
[FreeTextEntry1] : multilevel lumbar stenosis, spondylolisthesis, scoliosis. Patient is not interested in surgery. \par \par PLAN\par - continue to f/u with pain management\par - RTC for new/worsening symptoms

## 2020-08-13 NOTE — REASON FOR VISIT
[New Patient Visit] : a new patient visit [Referred By: _________] : Patient was referred by YOLANDA

## 2020-08-13 NOTE — END OF VISIT
[FreeTextEntry3] : It was wonderful meeting skyler today.  She is a patient of 1 of my colleagues in internal medicine.  Overall she is had some back pain and some radiating leg pain down her right leg.  I reviewed her MRI and x-rays which reveals several levels of degenerative disc disease but no overwhelming stenosis.  She has some nerve encroachment on the left-hand side but really the foramina on the right-hand side appear clear.  Overall she is adamantly opposed any surgical intervention and so I do not recommend proceeding with anything at this time.  I do recommend she follow-up with her hip surgeon as much of her pain is on the same side as her multiple and extensive hip surgeries.  I will remain available for her if there is any further question or concerns or any changes in her clinical condition moving forward.\par \michael Cota M.D., M.Sc.\michael \michael Department of Neurosurgery\par [facility]\par [facility]\par [facility]\par [place], [place]\michael [email or web address]\[email or web address] [phone] [>50% of the face to face encounter time was spent on counseling and/or coordination of care for ___] : Greater than 50% of the face to face encounter time was spent on counseling and/or coordination of care for [unfilled]

## 2020-08-13 NOTE — REVIEW OF SYSTEMS
[Difficulty Walking] : difficulty walking [As Noted in HPI] : as noted in HPI [Negative] : Endocrine

## 2020-08-13 NOTE — PHYSICAL EXAM
[General Appearance - Alert] : alert [General Appearance - In No Acute Distress] : in no acute distress [General Appearance - Well Nourished] : well nourished [General Appearance - Well-Appearing] : healthy appearing [Oriented To Time, Place, And Person] : oriented to person, place, and time [Impaired Insight] : insight and judgment were intact [Affect] : the affect was normal [Memory Recent] : recent memory was not impaired [Person] : oriented to person [Motor Tone] : muscle tone was normal in all four extremities [Time] : oriented to time [Place] : oriented to place [Motor Strength] : muscle strength was normal in all four extremities [Romberg's Sign] : a positive Romberg's sign was present [5] : S1 flexor hallucis longus 5/5 [Limited Balance] : the patient's balance was impaired [Abnormal Walk] : normal gait [4+] : Patella left 4+ [2+] : Ankle jerk left 2+ [Normal] : normal [FreeTextEntry1] : forgetful [Able to toe walk] : the patient was not able to toe walk [Able to heel walk] : the patient was not able to heel walk

## 2020-08-13 NOTE — HISTORY OF PRESENT ILLNESS
[de-identified] : Patient is a 79 yo female with PMH of osteopenia, HLD, depression presents neurosurgical evaluation.\par She reports chronic back pain to RLE pain for 2 years without significant injury/trauma. Pain got worsen for the past 2 months and describes as moderate intermittent dull/sharp pain on her right lower back radiating to RLE (Leg pain worse than back pain) without numbness/weakness. Pain is worsening by walking (cannot walk more than 2 blocks)/standing and alleviated by resting/lying down. She endorses balance problem and has been using cane for 2 years. Previous treatments include Flexeril/Naproxen/Tramadol with minimal relief and ESIs b5ryari (Dr. Davis) minimal to moderate relief. Currently she is taking Lyrica and going for physical therapy and reports moderate relief. She denies saddle anesthesia, BB dysfunction. \par Recent MRI L-spine was completed by PCP and referred to neurosx.

## 2020-09-08 ENCOUNTER — NON-APPOINTMENT (OUTPATIENT)
Age: 80
End: 2020-09-08

## 2020-09-08 ENCOUNTER — APPOINTMENT (OUTPATIENT)
Dept: HEART AND VASCULAR | Facility: CLINIC | Age: 80
End: 2020-09-08
Payer: MEDICARE

## 2020-09-08 VITALS — TEMPERATURE: 98.7 F

## 2020-09-08 VITALS
BODY MASS INDEX: 28.91 KG/M2 | SYSTOLIC BLOOD PRESSURE: 120 MMHG | HEIGHT: 57 IN | HEART RATE: 76 BPM | WEIGHT: 134 LBS | DIASTOLIC BLOOD PRESSURE: 80 MMHG

## 2020-09-08 VITALS — SYSTOLIC BLOOD PRESSURE: 120 MMHG | DIASTOLIC BLOOD PRESSURE: 70 MMHG

## 2020-09-08 DIAGNOSIS — Z91.81 HISTORY OF FALLING: ICD-10-CM

## 2020-09-08 PROCEDURE — 99214 OFFICE O/P EST MOD 30 MIN: CPT | Mod: 25

## 2020-09-08 PROCEDURE — 93000 ELECTROCARDIOGRAM COMPLETE: CPT

## 2020-09-09 ENCOUNTER — RX RENEWAL (OUTPATIENT)
Age: 80
End: 2020-09-09

## 2020-09-18 ENCOUNTER — EMERGENCY (EMERGENCY)
Facility: HOSPITAL | Age: 80
LOS: 1 days | Discharge: HOME | End: 2020-09-18
Attending: EMERGENCY MEDICINE | Admitting: EMERGENCY MEDICINE
Payer: MEDICARE

## 2020-09-18 VITALS
OXYGEN SATURATION: 91 % | WEIGHT: 130.07 LBS | RESPIRATION RATE: 19 BRPM | TEMPERATURE: 98 F | HEIGHT: 57 IN | SYSTOLIC BLOOD PRESSURE: 118 MMHG | DIASTOLIC BLOOD PRESSURE: 83 MMHG | HEART RATE: 82 BPM

## 2020-09-18 VITALS
RESPIRATION RATE: 18 BRPM | SYSTOLIC BLOOD PRESSURE: 138 MMHG | OXYGEN SATURATION: 96 % | DIASTOLIC BLOOD PRESSURE: 67 MMHG | TEMPERATURE: 98 F | HEART RATE: 97 BPM
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
## 2020-09-18 DIAGNOSIS — E78.5 HYPERLIPIDEMIA, UNSPECIFIED: ICD-10-CM

## 2020-09-18 DIAGNOSIS — E03.9 HYPOTHYROIDISM, UNSPECIFIED: ICD-10-CM

## 2020-09-18 DIAGNOSIS — R07.2 PRECORDIAL PAIN: ICD-10-CM
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
## 2020-09-18 DIAGNOSIS — S22.41XA MULTIPLE FRACTURES OF RIBS, RIGHT SIDE, INITIAL ENCOUNTER FOR CLOSED FRACTURE: ICD-10-CM

## 2020-09-18 DIAGNOSIS — Z79.891 LONG TERM (CURRENT) USE OF OPIATE ANALGESIC: ICD-10-CM

## 2020-09-18 DIAGNOSIS — Z79.82 LONG TERM (CURRENT) USE OF ASPIRIN: ICD-10-CM

## 2020-09-18 DIAGNOSIS — S22.20XA UNSPECIFIED FRACTURE OF STERNUM, INITIAL ENCOUNTER FOR CLOSED FRACTURE: ICD-10-CM
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
## 2020-09-18 DIAGNOSIS — Z79.899 OTHER LONG TERM (CURRENT) DRUG THERAPY: ICD-10-CM
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
## 2020-09-18 DIAGNOSIS — Z96.649 PRESENCE OF UNSPECIFIED ARTIFICIAL HIP JOINT: Chronic | ICD-10-CM

## 2020-09-18 DIAGNOSIS — Z20.828 CONTACT WITH AND (SUSPECTED) EXPOSURE TO OTHER VIRAL COMMUNICABLE DISEASES: ICD-10-CM

## 2020-09-18 LAB
ALBUMIN SERPL ELPH-MCNC: 4.4 G/DL — SIGNIFICANT CHANGE UP (ref 3.3–5)
ALP SERPL-CCNC: 153 U/L — HIGH (ref 40–120)
ALT FLD-CCNC: 32 U/L — SIGNIFICANT CHANGE UP (ref 10–45)
ANION GAP SERPL CALC-SCNC: 12 MMOL/L — SIGNIFICANT CHANGE UP (ref 5–17)
APTT BLD: 34.2 SEC — SIGNIFICANT CHANGE UP (ref 27.5–35.5)
AST SERPL-CCNC: 29 U/L — SIGNIFICANT CHANGE UP (ref 10–40)
BASOPHILS # BLD AUTO: 0.03 K/UL — SIGNIFICANT CHANGE UP (ref 0–0.2)
BASOPHILS NFR BLD AUTO: 0.5 % — SIGNIFICANT CHANGE UP (ref 0–2)
BILIRUB SERPL-MCNC: 0.5 MG/DL — SIGNIFICANT CHANGE UP (ref 0.2–1.2)
BUN SERPL-MCNC: 14 MG/DL — SIGNIFICANT CHANGE UP (ref 7–23)
CALCIUM SERPL-MCNC: 9.8 MG/DL — SIGNIFICANT CHANGE UP (ref 8.4–10.5)
CHLORIDE SERPL-SCNC: 103 MMOL/L — SIGNIFICANT CHANGE UP (ref 96–108)
CO2 SERPL-SCNC: 22 MMOL/L — SIGNIFICANT CHANGE UP (ref 22–31)
CREAT SERPL-MCNC: 0.51 MG/DL — SIGNIFICANT CHANGE UP (ref 0.5–1.3)
EOSINOPHIL # BLD AUTO: 0 K/UL — SIGNIFICANT CHANGE UP (ref 0–0.5)
EOSINOPHIL NFR BLD AUTO: 0 % — SIGNIFICANT CHANGE UP (ref 0–6)
GLUCOSE SERPL-MCNC: 110 MG/DL — HIGH (ref 70–99)
HCT VFR BLD CALC: 37.3 % — SIGNIFICANT CHANGE UP (ref 34.5–45)
HGB BLD-MCNC: 12.5 G/DL — SIGNIFICANT CHANGE UP (ref 11.5–15.5)
IMM GRANULOCYTES NFR BLD AUTO: 0.3 % — SIGNIFICANT CHANGE UP (ref 0–1.5)
INR BLD: 0.98 — SIGNIFICANT CHANGE UP (ref 0.88–1.16)
LYMPHOCYTES # BLD AUTO: 0.94 K/UL — LOW (ref 1–3.3)
LYMPHOCYTES # BLD AUTO: 14.8 % — SIGNIFICANT CHANGE UP (ref 13–44)
MCHC RBC-ENTMCNC: 30.3 PG — SIGNIFICANT CHANGE UP (ref 27–34)
MCHC RBC-ENTMCNC: 33.5 GM/DL — SIGNIFICANT CHANGE UP (ref 32–36)
MCV RBC AUTO: 90.5 FL — SIGNIFICANT CHANGE UP (ref 80–100)
MONOCYTES # BLD AUTO: 0.41 K/UL — SIGNIFICANT CHANGE UP (ref 0–0.9)
MONOCYTES NFR BLD AUTO: 6.5 % — SIGNIFICANT CHANGE UP (ref 2–14)
NEUTROPHILS # BLD AUTO: 4.93 K/UL — SIGNIFICANT CHANGE UP (ref 1.8–7.4)
NEUTROPHILS NFR BLD AUTO: 77.9 % — HIGH (ref 43–77)
NRBC # BLD: 0 /100 WBCS — SIGNIFICANT CHANGE UP (ref 0–0)
PLATELET # BLD AUTO: 302 K/UL — SIGNIFICANT CHANGE UP (ref 150–400)
POTASSIUM SERPL-MCNC: 4.5 MMOL/L — SIGNIFICANT CHANGE UP (ref 3.5–5.3)
POTASSIUM SERPL-SCNC: 4.5 MMOL/L — SIGNIFICANT CHANGE UP (ref 3.5–5.3)
PROT SERPL-MCNC: 7.1 G/DL — SIGNIFICANT CHANGE UP (ref 6–8.3)
PROTHROM AB SERPL-ACNC: 11.8 SEC — SIGNIFICANT CHANGE UP (ref 10.6–13.6)
RBC # BLD: 4.12 M/UL — SIGNIFICANT CHANGE UP (ref 3.8–5.2)
RBC # FLD: 13.3 % — SIGNIFICANT CHANGE UP (ref 10.3–14.5)
SARS-COV-2 RNA SPEC QL NAA+PROBE: SIGNIFICANT CHANGE UP
SODIUM SERPL-SCNC: 137 MMOL/L — SIGNIFICANT CHANGE UP (ref 135–145)
TROPONIN T SERPL-MCNC: <0.01 NG/ML — SIGNIFICANT CHANGE UP (ref 0–0.01)
WBC # BLD: 6.33 K/UL — SIGNIFICANT CHANGE UP (ref 3.8–10.5)
WBC # FLD AUTO: 6.33 K/UL — SIGNIFICANT CHANGE UP (ref 3.8–10.5)

## 2020-09-18 PROCEDURE — 36415 COLL VENOUS BLD VENIPUNCTURE: CPT

## 2020-09-18 PROCEDURE — 93010 ELECTROCARDIOGRAM REPORT: CPT

## 2020-09-18 PROCEDURE — 93005 ELECTROCARDIOGRAM TRACING: CPT

## 2020-09-18 PROCEDURE — 85610 PROTHROMBIN TIME: CPT

## 2020-09-18 PROCEDURE — 80053 COMPREHEN METABOLIC PANEL: CPT

## 2020-09-18 PROCEDURE — 99285 EMERGENCY DEPT VISIT HI MDM: CPT | Mod: 25

## 2020-09-18 PROCEDURE — 99285 EMERGENCY DEPT VISIT HI MDM: CPT

## 2020-09-18 PROCEDURE — 71045 X-RAY EXAM CHEST 1 VIEW: CPT

## 2020-09-18 PROCEDURE — U0003: CPT

## 2020-09-18 PROCEDURE — 71275 CT ANGIOGRAPHY CHEST: CPT | Mod: 26

## 2020-09-18 PROCEDURE — 83880 ASSAY OF NATRIURETIC PEPTIDE: CPT

## 2020-09-18 PROCEDURE — 85025 COMPLETE CBC W/AUTO DIFF WBC: CPT

## 2020-09-18 PROCEDURE — 71275 CT ANGIOGRAPHY CHEST: CPT

## 2020-09-18 PROCEDURE — 71045 X-RAY EXAM CHEST 1 VIEW: CPT | Mod: 26

## 2020-09-18 PROCEDURE — 85730 THROMBOPLASTIN TIME PARTIAL: CPT

## 2020-09-18 PROCEDURE — 84484 ASSAY OF TROPONIN QUANT: CPT

## 2020-09-18 RX ORDER — ACETAMINOPHEN 500 MG
650 TABLET ORAL ONCE
Refills: 0 | Status: COMPLETED | OUTPATIENT
Start: 2020-09-18 | End: 2020-09-18

## 2020-09-18 RX ADMIN — Medication 650 MILLIGRAM(S): at 18:18

## 2020-09-18 NOTE — ED PROVIDER NOTE - PHYSICAL EXAMINATION
GEN: Well appearing, well nourished, awake, alert, oriented to person, place, time/situation and in no apparent distress. NTAF  ENT: Airway patent, Nasal mucosa clear. Mouth with normal mucosa.  EYES: Clear bilaterally. PERRL, EOMI  RESPIRATORY: Breathing comfortably with normal RR. No W/C/R, no resp distress, O2 sat noted to be in high 80s, low 90s on room air at rest.   CARDIAC: Regular rate and rhythm, no M/R/G  ABDOMEN: Soft, nontender, +bowel sounds, no rebound, rigidity, or guarding.  MSK: Range of motion is not limited, no deformities noted.  NEURO: Alert and oriented, no focal deficits.  SKIN: Skin normal color for race, warm, dry and intact. No evidence of rash.  PSYCH: Alert and oriented to person, place, time/situation. normal mood and affect. no apparent risk to self or others. GEN: Well appearing, well nourished, awake, alert, oriented to person, place, time/situation and in no apparent distress. NTAF  ENT: Airway patent, Nasal mucosa clear. Mouth with normal mucosa.  EYES: Clear bilaterally. PERRL, EOMI  RESPIRATORY: Breathing comfortably with normal RR. No W/C/R, no resp distress, O2 sat noted to be in high 80s, low 90s on room air at rest.   CARDIAC: Regular rate and rhythm, no M/R/G  ABDOMEN: Soft, nontender, +bowel sounds, no rebound, rigidity, or guarding.  MSK: Range of motion is not limited, no deformities noted. Anterior chest wall TTP, no crepitus or flail, no chest wall contusion. Pain with ROM of right shoulder.  NEURO: Alert and oriented x 3. Cn 2-12 intact. Strength 5/5 and sensation intact in all 4 extremities.  Gait normal.   SKIN: Skin normal color for race, warm, dry and intact. No evidence of rash.  PSYCH: Alert and oriented to person, place, time/situation. normal mood and affect. no apparent risk to self or others.

## 2020-09-18 NOTE — ED PROVIDER NOTE - CARE PLAN
Principal Discharge DX:	Chest pain   Principal Discharge DX:	Chest pain  Secondary Diagnosis:	Rib fractures  Secondary Diagnosis:	Sternal fracture

## 2020-09-18 NOTE — ED ADULT NURSE NOTE - CHPI ED NUR SYMPTOMS NEG
no vomiting/no back pain/no syncope/no fever/no diaphoresis/no dizziness/no nausea/no congestion/no shortness of breath/no chills

## 2020-09-18 NOTE — ED ADULT NURSE NOTE - OBJECTIVE STATEMENT
80 y.o F A&ox4 walked in from triage c.o cp. x 1 day of midsternal CP, non- radiating. EKG complete. placed on CCM, NSR. saw cardiologist in May, had full workup with negative findings. reports   x 2 weeks ago and CP started after meeting with husbands friend yesterday. PMH of HTN, HLD, back pain. denies SOB, dizziness, n/v/d, cough,

## 2020-09-18 NOTE — ED PROVIDER NOTE - CLINICAL SUMMARY MEDICAL DECISION MAKING FREE TEXT BOX
80F with a h/o HLD, chronic msk pain, hypothyroidism, anxiety who p/w substernal chest pain radiating to her right arm since yesterday, started after she had to walk upstairs to her physical therapy appointment bc the elevator was broken. VS notable for hypoxia to high 80s, low 90s on RA at rest, otherwise VSS, pt well-appearing in no resp distress, lungs clear, EKG no ischemia, will check labs, CXR, CTA r/o PE, r/o acs. Pt placed on 3L NC with improvement of O2 sat. 80F with a h/o HLD, chronic msk pain, hypothyroidism, anxiety who p/w substernal chest pain radiating to her right arm since- patient admits that she fell 3 days ago after she went to physical therapy on tuesday. No prodromal symptoms, she had a mechanical fall forward hitting her left knee and chest, no head trauma or loc, no neck pain.   Labs and CT perform, +ribs and sternal fracture, no PTX, no ELLIOTT. Pt still requiring O2 for hypoxia, will require admission and will be transferred to [facility] for further trauma management. D/w trauma and ED attending, Dr Marquez who accepted the transfer.

## 2020-09-18 NOTE — ED PROVIDER NOTE - OBJECTIVE STATEMENT
80F with a h/o HLD, chronic msk pain, hypothyroidism, anxiety who p/w substernal chest pain radiating to her right arm since yesterday, started after she had to walk upstairs to her physical therapy appointment bc the elevator was broken. Pain has been constant, no relieved with aspirin taken at home PTA. No f/c, no sob, no n/v, no abd pain, no ha or neck pain, no dizziness or syncope, no n/t/w in ext, no leg swelling. Pt denies hx of VTE or CAD. No other complaints. 80F with a h/o HLD, chronic msk pain, hypothyroidism, anxiety who p/w substernal chest pain radiating to her right arm since yesterday, started after she had to walk upstairs to her physical therapy appointment bc the elevator was broken. Pain has been constant, no relieved with aspirin taken at home PTA. No f/c, no sob, no n/v, no abd pain, no ha or neck pain, no dizziness or syncope, no n/t/w in ext, no leg swelling. Pt denies hx of VTE or CAD. No other complaints.    Later the patient admits that she fell 3 days ago after she went to physical therapy on tuesday. No prodromal symptoms, she had a mechanical fall forward hitting her left knee and chest, no head trauma or loc, no neck pain.

## 2020-09-21 ENCOUNTER — APPOINTMENT (OUTPATIENT)
Dept: INTERNAL MEDICINE | Facility: CLINIC | Age: 80
End: 2020-09-21

## 2020-10-30 ENCOUNTER — RX RENEWAL (OUTPATIENT)
Age: 80
End: 2020-10-30

## 2020-11-09 ENCOUNTER — APPOINTMENT (OUTPATIENT)
Dept: INTERNAL MEDICINE | Facility: CLINIC | Age: 80
End: 2020-11-09
Payer: MEDICARE

## 2020-11-09 VITALS
BODY MASS INDEX: 28.05 KG/M2 | WEIGHT: 130 LBS | OXYGEN SATURATION: 97 % | SYSTOLIC BLOOD PRESSURE: 134 MMHG | TEMPERATURE: 97.3 F | HEART RATE: 70 BPM | HEIGHT: 57 IN | DIASTOLIC BLOOD PRESSURE: 77 MMHG

## 2020-11-09 DIAGNOSIS — M25.551 PAIN IN RIGHT HIP: ICD-10-CM

## 2020-11-09 DIAGNOSIS — S22.20XA UNSPECIFIED FRACTURE OF STERNUM, INITIAL ENCOUNTER FOR CLOSED FRACTURE: ICD-10-CM

## 2020-11-09 PROCEDURE — 99214 OFFICE O/P EST MOD 30 MIN: CPT

## 2020-11-30 ENCOUNTER — RX RENEWAL (OUTPATIENT)
Age: 80
End: 2020-11-30

## 2020-12-02 ENCOUNTER — APPOINTMENT (OUTPATIENT)
Dept: PHYSICAL MEDICINE AND REHAB | Facility: CLINIC | Age: 80
End: 2020-12-02
Payer: MEDICARE

## 2020-12-02 VITALS
TEMPERATURE: 97.3 F | OXYGEN SATURATION: 97 % | RESPIRATION RATE: 16 BRPM | BODY MASS INDEX: 28.05 KG/M2 | WEIGHT: 130 LBS | HEIGHT: 57 IN

## 2020-12-02 PROCEDURE — 99072 ADDL SUPL MATRL&STAF TM PHE: CPT

## 2020-12-02 PROCEDURE — 99213 OFFICE O/P EST LOW 20 MIN: CPT

## 2020-12-02 NOTE — DATA REVIEWED
[FreeTextEntry1] : MR LS 3/2020: L4-5 grade 1 spondylolisthesis and facet hypertrophy. Superimposed broad-based central disc herniation asymmetric to the left with left lateral recess stenosis and compression of the left L5 nerve root and right foraminal disc herniation with foraminal stenosis compressing the right L4 nerve root. L5/S1 Grade 1 spondylolisthesis facet hypertrophy and right facet joint effusion. Disc bulging asymmetric to the right compressing the right S1 nerve root and right foraminal stenosis including right foraminal disc herniation, compressing the right L5 nerve root.

## 2020-12-02 NOTE — HISTORY OF PRESENT ILLNESS
[FreeTextEntry1] : Ms. ESTRADA DIAZ is a very pleasant 79 year female who comes in for follow up evaluation of back pain  and now right leg pain. She has been managed by Dr. Davis pain management with spine injections, and seen by Dr. Cota for NeuroSx consultation which was deemed not indicated. At present the symptoms are back radiating to right leg intermittent in nature and described as sharp . The pain is rated as 8/10 during today's visit, and ranges from 7-9/10. The patient's symptoms are aggravated by walking or sitting for too long  and alleviated by ice/cold compress. The patient denies any night pain, numbness/tingling, weakness, or bowel/bladder dysfunction. The patient has no other complaints at this time.

## 2020-12-02 NOTE — ASSESSMENT
[FreeTextEntry1] : Impression:\par 1. Lumbar Facet Disease\par 2. Right Lumbar Radiculopathy\par \par Plan: Review of the history imaging and physical examination the patient's symptoms are consistent with Lumbar Facet Disease and Right L4/5 Radiculopathy. The imaging results and diagnosis were reviewed with the patient along with treatment options. Again the patient reports being pleased with her care with Dr. Davis and with regard to any further injections prefers to follow up with him. She may return here as needed. The patient expressed verbal understanding and is in agreement with the plan of care. All of the patient's questions and concerns were addressed during today's visit.

## 2020-12-02 NOTE — PHYSICAL EXAM
[FreeTextEntry1] : GEN: AAOx3, NAD.\par PSYCH: Normal mood and affect. Responds appropriately to commands.\par EYES: Sclerae Anicteric. No discharge. EOMI.\par RESP: Breathing unlabored.\par CV: DP pulses 2+ and equal. No varicosities noted.\par EXT: No C/C/E.\par SKIN: No lesions noted.\par STRENGTH: 5/5 bilateral hip flexors, knee extensors, knee flexors, ankle dorsiflexors, long toe extensors, ankle plantar flexors, hip extensors, hip abductors.\par TONE: Normal, No clonus.\par REFLEXES: Symmetric patella, achilles. Plantars downgoing bilaterally.\par SENS: Grossly intact to light touch bilateral lower extremities.\par INSP: Spine alignment is midline, with no evidence of scoliosis.\par STANCE: Single leg stance deferred.\par GAIT: (+) antalgic, normal reciprocating heel to toe\par LUMBAR ROM: Flexion limited (+) RLE Sx. Extension, side-bending, rotation, oblique extension limited (+) axial Sx.\par PALP: There is no tenderness over the midline spinous processes, paravertebral muscles, SIJ, or greater trochanters bilaterally.\par SPECIAL: SLR and Slump (+) Right.

## 2020-12-18 ENCOUNTER — RX RENEWAL (OUTPATIENT)
Age: 80
End: 2020-12-18

## 2021-01-25 ENCOUNTER — APPOINTMENT (OUTPATIENT)
Dept: INTERNAL MEDICINE | Facility: CLINIC | Age: 81
End: 2021-01-25
Payer: MEDICARE

## 2021-01-25 ENCOUNTER — LABORATORY RESULT (OUTPATIENT)
Age: 81
End: 2021-01-25

## 2021-01-25 VITALS
HEART RATE: 72 BPM | HEIGHT: 57 IN | DIASTOLIC BLOOD PRESSURE: 78 MMHG | BODY MASS INDEX: 28.05 KG/M2 | TEMPERATURE: 96.8 F | SYSTOLIC BLOOD PRESSURE: 142 MMHG | WEIGHT: 130 LBS | OXYGEN SATURATION: 94 %

## 2021-01-25 DIAGNOSIS — Z00.00 ENCOUNTER FOR GENERAL ADULT MEDICAL EXAMINATION W/OUT ABNORMAL FINDINGS: ICD-10-CM

## 2021-01-25 PROCEDURE — 99072 ADDL SUPL MATRL&STAF TM PHE: CPT

## 2021-01-25 PROCEDURE — 36415 COLL VENOUS BLD VENIPUNCTURE: CPT

## 2021-01-25 PROCEDURE — 99397 PER PM REEVAL EST PAT 65+ YR: CPT | Mod: 25

## 2021-01-26 DIAGNOSIS — R53.81 OTHER MALAISE: ICD-10-CM

## 2021-01-26 LAB
ALBUMIN SERPL ELPH-MCNC: 4.4 G/DL
ALP BLD-CCNC: 84 U/L
ALT SERPL-CCNC: 22 U/L
ANION GAP SERPL CALC-SCNC: 14 MMOL/L
AST SERPL-CCNC: 26 U/L
BASOPHILS # BLD AUTO: 0.02 K/UL
BASOPHILS NFR BLD AUTO: 0.5 %
BILIRUB SERPL-MCNC: 0.3 MG/DL
BUN SERPL-MCNC: 12 MG/DL
CALCIUM SERPL-MCNC: 9.6 MG/DL
CHLORIDE SERPL-SCNC: 99 MMOL/L
CHOLEST SERPL-MCNC: 179 MG/DL
CO2 SERPL-SCNC: 22 MMOL/L
CREAT SERPL-MCNC: 0.59 MG/DL
EOSINOPHIL # BLD AUTO: 0.04 K/UL
EOSINOPHIL NFR BLD AUTO: 0.9 %
ESTIMATED AVERAGE GLUCOSE: 111 MG/DL
GLUCOSE SERPL-MCNC: 85 MG/DL
HBA1C MFR BLD HPLC: 5.5 %
HCT VFR BLD CALC: 35.7 %
HDLC SERPL-MCNC: 52 MG/DL
HGB BLD-MCNC: 11.4 G/DL
IMM GRANULOCYTES NFR BLD AUTO: 0.2 %
LDLC SERPL CALC-MCNC: 93 MG/DL
LYMPHOCYTES # BLD AUTO: 0.84 K/UL
LYMPHOCYTES NFR BLD AUTO: 18.9 %
MAN DIFF?: NORMAL
MCHC RBC-ENTMCNC: 30.7 PG
MCHC RBC-ENTMCNC: 31.9 GM/DL
MCV RBC AUTO: 96.2 FL
MONOCYTES # BLD AUTO: 0.34 K/UL
MONOCYTES NFR BLD AUTO: 7.7 %
NEUTROPHILS # BLD AUTO: 3.19 K/UL
NEUTROPHILS NFR BLD AUTO: 71.8 %
NONHDLC SERPL-MCNC: 127 MG/DL
PLATELET # BLD AUTO: 271 K/UL
POTASSIUM SERPL-SCNC: 4.2 MMOL/L
PROT SERPL-MCNC: 6.5 G/DL
RBC # BLD: 3.71 M/UL
RBC # FLD: 13.6 %
SODIUM SERPL-SCNC: 135 MMOL/L
TRIGL SERPL-MCNC: 170 MG/DL
TSH SERPL-ACNC: 4.47 UIU/ML
WBC # FLD AUTO: 4.44 K/UL

## 2021-03-15 ENCOUNTER — RX RENEWAL (OUTPATIENT)
Age: 81
End: 2021-03-15

## 2021-03-16 ENCOUNTER — APPOINTMENT (OUTPATIENT)
Dept: HEART AND VASCULAR | Facility: CLINIC | Age: 81
End: 2021-03-16
Payer: MEDICARE

## 2021-03-16 ENCOUNTER — RX RENEWAL (OUTPATIENT)
Age: 81
End: 2021-03-16

## 2021-03-16 ENCOUNTER — NON-APPOINTMENT (OUTPATIENT)
Age: 81
End: 2021-03-16

## 2021-03-16 VITALS
HEART RATE: 71 BPM | WEIGHT: 140 LBS | DIASTOLIC BLOOD PRESSURE: 80 MMHG | SYSTOLIC BLOOD PRESSURE: 130 MMHG | HEIGHT: 57 IN | BODY MASS INDEX: 30.2 KG/M2

## 2021-03-16 VITALS — DIASTOLIC BLOOD PRESSURE: 70 MMHG | SYSTOLIC BLOOD PRESSURE: 120 MMHG

## 2021-03-16 VITALS — TEMPERATURE: 97.7 F

## 2021-03-16 DIAGNOSIS — F41.8 OTHER SPECIFIED ANXIETY DISORDERS: ICD-10-CM

## 2021-03-16 DIAGNOSIS — R73.03 PREDIABETES.: ICD-10-CM

## 2021-03-16 DIAGNOSIS — R32 UNSPECIFIED URINARY INCONTINENCE: ICD-10-CM

## 2021-03-16 PROCEDURE — 93000 ELECTROCARDIOGRAM COMPLETE: CPT

## 2021-03-16 PROCEDURE — 99072 ADDL SUPL MATRL&STAF TM PHE: CPT

## 2021-03-16 PROCEDURE — 99215 OFFICE O/P EST HI 40 MIN: CPT | Mod: 25

## 2021-03-16 PROCEDURE — 93306 TTE W/DOPPLER COMPLETE: CPT

## 2021-03-31 ENCOUNTER — APPOINTMENT (OUTPATIENT)
Dept: INTERNAL MEDICINE | Facility: CLINIC | Age: 81
End: 2021-03-31
Payer: MEDICARE

## 2021-03-31 VITALS
HEIGHT: 57 IN | BODY MASS INDEX: 29.12 KG/M2 | SYSTOLIC BLOOD PRESSURE: 133 MMHG | WEIGHT: 135 LBS | OXYGEN SATURATION: 96 % | TEMPERATURE: 97.6 F | DIASTOLIC BLOOD PRESSURE: 80 MMHG | HEART RATE: 70 BPM

## 2021-03-31 DIAGNOSIS — Z87.81 PERSONAL HISTORY OF (HEALED) TRAUMATIC FRACTURE: ICD-10-CM

## 2021-03-31 DIAGNOSIS — D64.9 ANEMIA, UNSPECIFIED: ICD-10-CM

## 2021-03-31 PROCEDURE — 36415 COLL VENOUS BLD VENIPUNCTURE: CPT

## 2021-03-31 PROCEDURE — 99072 ADDL SUPL MATRL&STAF TM PHE: CPT

## 2021-03-31 PROCEDURE — 99214 OFFICE O/P EST MOD 30 MIN: CPT | Mod: 25

## 2021-04-01 LAB
BASOPHILS # BLD AUTO: 0.02 K/UL
BASOPHILS NFR BLD AUTO: 0.4 %
EOSINOPHIL # BLD AUTO: 0 K/UL
EOSINOPHIL NFR BLD AUTO: 0 %
HCT VFR BLD CALC: 36 %
HGB BLD-MCNC: 11.6 G/DL
IMM GRANULOCYTES NFR BLD AUTO: 0.2 %
LYMPHOCYTES # BLD AUTO: 1 K/UL
LYMPHOCYTES NFR BLD AUTO: 18.5 %
MAN DIFF?: NORMAL
MCHC RBC-ENTMCNC: 30.4 PG
MCHC RBC-ENTMCNC: 32.2 GM/DL
MCV RBC AUTO: 94.2 FL
MONOCYTES # BLD AUTO: 0.34 K/UL
MONOCYTES NFR BLD AUTO: 6.3 %
NEUTROPHILS # BLD AUTO: 4.04 K/UL
NEUTROPHILS NFR BLD AUTO: 74.6 %
PLATELET # BLD AUTO: 351 K/UL
RBC # BLD: 3.82 M/UL
RBC # FLD: 13.1 %
TSH SERPL-ACNC: 2.81 UIU/ML
WBC # FLD AUTO: 5.41 K/UL

## 2021-04-22 ENCOUNTER — RX RENEWAL (OUTPATIENT)
Age: 81
End: 2021-04-22

## 2021-05-10 ENCOUNTER — RX RENEWAL (OUTPATIENT)
Age: 81
End: 2021-05-10

## 2021-06-10 ENCOUNTER — RX RENEWAL (OUTPATIENT)
Age: 81
End: 2021-06-10

## 2021-07-26 ENCOUNTER — APPOINTMENT (OUTPATIENT)
Dept: HEART AND VASCULAR | Facility: CLINIC | Age: 81
End: 2021-07-26

## 2021-08-02 ENCOUNTER — NON-APPOINTMENT (OUTPATIENT)
Age: 81
End: 2021-08-02

## 2021-10-05 ENCOUNTER — LABORATORY RESULT (OUTPATIENT)
Age: 81
End: 2021-10-05

## 2021-10-05 ENCOUNTER — APPOINTMENT (OUTPATIENT)
Dept: INTERNAL MEDICINE | Facility: CLINIC | Age: 81
End: 2021-10-05
Payer: MEDICARE

## 2021-10-05 VITALS
SYSTOLIC BLOOD PRESSURE: 112 MMHG | DIASTOLIC BLOOD PRESSURE: 56 MMHG | OXYGEN SATURATION: 98 % | HEART RATE: 75 BPM | HEIGHT: 57 IN | TEMPERATURE: 97.6 F

## 2021-10-05 DIAGNOSIS — I51.89 OTHER ILL-DEFINED HEART DISEASES: ICD-10-CM

## 2021-10-05 PROCEDURE — 99214 OFFICE O/P EST MOD 30 MIN: CPT | Mod: 25

## 2021-10-05 PROCEDURE — 36415 COLL VENOUS BLD VENIPUNCTURE: CPT

## 2021-10-07 ENCOUNTER — NON-APPOINTMENT (OUTPATIENT)
Age: 81
End: 2021-10-07

## 2021-10-07 LAB
ALBUMIN SERPL ELPH-MCNC: 4.3 G/DL
ALP BLD-CCNC: 115 U/L
ALT SERPL-CCNC: 17 U/L
ANION GAP SERPL CALC-SCNC: 11 MMOL/L
AST SERPL-CCNC: 24 U/L
BASOPHILS # BLD AUTO: 0.01 K/UL
BASOPHILS NFR BLD AUTO: 0.2 %
BILIRUB SERPL-MCNC: 0.2 MG/DL
BUN SERPL-MCNC: 13 MG/DL
CALCIUM SERPL-MCNC: 9.8 MG/DL
CHLORIDE SERPL-SCNC: 102 MMOL/L
CO2 SERPL-SCNC: 26 MMOL/L
CREAT SERPL-MCNC: 0.57 MG/DL
EOSINOPHIL # BLD AUTO: 0.12 K/UL
EOSINOPHIL NFR BLD AUTO: 2.8 %
GLUCOSE SERPL-MCNC: 90 MG/DL
HCT VFR BLD CALC: 35.3 %
HGB BLD-MCNC: 10.7 G/DL
IMM GRANULOCYTES NFR BLD AUTO: 0.2 %
LYMPHOCYTES # BLD AUTO: 0.88 K/UL
LYMPHOCYTES NFR BLD AUTO: 20.5 %
MAN DIFF?: NORMAL
MCHC RBC-ENTMCNC: 30.3 GM/DL
MCHC RBC-ENTMCNC: 30.5 PG
MCV RBC AUTO: 100.6 FL
MONOCYTES # BLD AUTO: 0.34 K/UL
MONOCYTES NFR BLD AUTO: 7.9 %
NEUTROPHILS # BLD AUTO: 2.94 K/UL
NEUTROPHILS NFR BLD AUTO: 68.4 %
PLATELET # BLD AUTO: 301 K/UL
POTASSIUM SERPL-SCNC: 4.5 MMOL/L
PROT SERPL-MCNC: 6.2 G/DL
RBC # BLD: 3.51 M/UL
RBC # FLD: 14.1 %
SODIUM SERPL-SCNC: 139 MMOL/L
TSH SERPL-ACNC: 1.38 UIU/ML
WBC # FLD AUTO: 4.3 K/UL

## 2021-10-11 ENCOUNTER — APPOINTMENT (OUTPATIENT)
Dept: INTERNAL MEDICINE | Facility: CLINIC | Age: 81
End: 2021-10-11

## 2021-10-25 ENCOUNTER — APPOINTMENT (OUTPATIENT)
Dept: INTERNAL MEDICINE | Facility: CLINIC | Age: 81
End: 2021-10-25
Payer: MEDICARE

## 2021-10-25 VITALS
DIASTOLIC BLOOD PRESSURE: 74 MMHG | OXYGEN SATURATION: 97 % | HEART RATE: 76 BPM | TEMPERATURE: 97.3 F | HEIGHT: 57 IN | BODY MASS INDEX: 25.89 KG/M2 | WEIGHT: 120 LBS | SYSTOLIC BLOOD PRESSURE: 114 MMHG

## 2021-10-25 PROCEDURE — 99213 OFFICE O/P EST LOW 20 MIN: CPT

## 2021-10-26 ENCOUNTER — APPOINTMENT (OUTPATIENT)
Dept: HEART AND VASCULAR | Facility: CLINIC | Age: 81
End: 2021-10-26
Payer: MEDICARE

## 2021-10-26 VITALS — SYSTOLIC BLOOD PRESSURE: 115 MMHG | DIASTOLIC BLOOD PRESSURE: 75 MMHG

## 2021-10-26 VITALS
HEIGHT: 57 IN | BODY MASS INDEX: 28.05 KG/M2 | TEMPERATURE: 98 F | DIASTOLIC BLOOD PRESSURE: 70 MMHG | HEART RATE: 64 BPM | WEIGHT: 130 LBS | SYSTOLIC BLOOD PRESSURE: 110 MMHG

## 2021-10-26 DIAGNOSIS — R09.02 HYPOXEMIA: ICD-10-CM

## 2021-10-26 PROCEDURE — 93000 ELECTROCARDIOGRAM COMPLETE: CPT

## 2021-10-26 PROCEDURE — 99215 OFFICE O/P EST HI 40 MIN: CPT | Mod: 25

## 2021-11-04 ENCOUNTER — NON-APPOINTMENT (OUTPATIENT)
Age: 81
End: 2021-11-04

## 2021-11-08 ENCOUNTER — RX RENEWAL (OUTPATIENT)
Age: 81
End: 2021-11-08

## 2021-11-09 ENCOUNTER — NON-APPOINTMENT (OUTPATIENT)
Age: 81
End: 2021-11-09

## 2021-11-09 ENCOUNTER — OUTPATIENT (OUTPATIENT)
Dept: OUTPATIENT SERVICES | Facility: HOSPITAL | Age: 81
LOS: 1 days | End: 2021-11-09
Payer: MEDICARE

## 2021-11-09 ENCOUNTER — RESULT REVIEW (OUTPATIENT)
Age: 81
End: 2021-11-09

## 2021-11-09 ENCOUNTER — APPOINTMENT (OUTPATIENT)
Dept: MAMMOGRAPHY | Facility: HOSPITAL | Age: 81
End: 2021-11-09
Payer: MEDICARE

## 2021-11-09 DIAGNOSIS — Z96.649 PRESENCE OF UNSPECIFIED ARTIFICIAL HIP JOINT: Chronic | ICD-10-CM

## 2021-11-09 PROCEDURE — 77063 BREAST TOMOSYNTHESIS BI: CPT

## 2021-11-09 PROCEDURE — 77067 SCR MAMMO BI INCL CAD: CPT

## 2021-11-09 PROCEDURE — 77067 SCR MAMMO BI INCL CAD: CPT | Mod: 26

## 2021-11-09 PROCEDURE — 77063 BREAST TOMOSYNTHESIS BI: CPT | Mod: 26

## 2021-11-24 ENCOUNTER — APPOINTMENT (OUTPATIENT)
Dept: INTERNAL MEDICINE | Facility: CLINIC | Age: 81
End: 2021-11-24
Payer: MEDICARE

## 2021-11-24 VITALS
HEIGHT: 57 IN | WEIGHT: 124 LBS | SYSTOLIC BLOOD PRESSURE: 112 MMHG | OXYGEN SATURATION: 96 % | BODY MASS INDEX: 26.75 KG/M2 | TEMPERATURE: 97.8 F | HEART RATE: 71 BPM | DIASTOLIC BLOOD PRESSURE: 79 MMHG

## 2021-11-24 DIAGNOSIS — S01.319A LACERATION W/OUT FOREIGN BODY OF UNSPECIFIED EAR, INITIAL ENCOUNTER: ICD-10-CM

## 2021-11-24 PROCEDURE — 99214 OFFICE O/P EST MOD 30 MIN: CPT

## 2021-12-20 ENCOUNTER — EMERGENCY (EMERGENCY)
Facility: HOSPITAL | Age: 81
LOS: 1 days | Discharge: HOME | End: 2021-12-20
Attending: EMERGENCY MEDICINE | Admitting: EMERGENCY MEDICINE
Payer: MEDICARE

## 2021-12-20 ENCOUNTER — APPOINTMENT (OUTPATIENT)
Dept: INTERNAL MEDICINE | Facility: CLINIC | Age: 81
End: 2021-12-20
Payer: MEDICARE

## 2021-12-20 VITALS
HEART RATE: 100 BPM | SYSTOLIC BLOOD PRESSURE: 106 MMHG | TEMPERATURE: 98 F | HEIGHT: 57 IN | RESPIRATION RATE: 18 BRPM | WEIGHT: 125 LBS | DIASTOLIC BLOOD PRESSURE: 70 MMHG | OXYGEN SATURATION: 95 %

## 2021-12-20 VITALS
SYSTOLIC BLOOD PRESSURE: 114 MMHG | TEMPERATURE: 97.3 F | OXYGEN SATURATION: 98 % | HEART RATE: 110 BPM | DIASTOLIC BLOOD PRESSURE: 66 MMHG

## 2021-12-20 DIAGNOSIS — Z79.82 LONG TERM (CURRENT) USE OF ASPIRIN: ICD-10-CM

## 2021-12-20 DIAGNOSIS — N39.0 URINARY TRACT INFECTION, SITE NOT SPECIFIED: ICD-10-CM

## 2021-12-20 DIAGNOSIS — R53.1 WEAKNESS: ICD-10-CM

## 2021-12-20 DIAGNOSIS — E07.9 DISORDER OF THYROID, UNSPECIFIED: ICD-10-CM

## 2021-12-20 DIAGNOSIS — Z96.649 PRESENCE OF UNSPECIFIED ARTIFICIAL HIP JOINT: Chronic | ICD-10-CM

## 2021-12-20 DIAGNOSIS — Z20.822 CONTACT WITH AND (SUSPECTED) EXPOSURE TO COVID-19: ICD-10-CM

## 2021-12-20 DIAGNOSIS — Z96.641 PRESENCE OF RIGHT ARTIFICIAL HIP JOINT: ICD-10-CM

## 2021-12-20 LAB
ALBUMIN SERPL ELPH-MCNC: 4.3 G/DL — SIGNIFICANT CHANGE UP (ref 3.3–5)
ALBUMIN SERPL ELPH-MCNC: 4.3 G/DL — SIGNIFICANT CHANGE UP (ref 3.3–5)
ALP SERPL-CCNC: 131 U/L — HIGH (ref 40–120)
ALP SERPL-CCNC: 132 U/L — HIGH (ref 40–120)
ALT FLD-CCNC: 40 U/L — SIGNIFICANT CHANGE UP (ref 10–45)
ALT FLD-CCNC: SIGNIFICANT CHANGE UP (ref 10–45)
ANION GAP SERPL CALC-SCNC: 11 MMOL/L — SIGNIFICANT CHANGE UP (ref 5–17)
ANION GAP SERPL CALC-SCNC: 11 MMOL/L — SIGNIFICANT CHANGE UP (ref 5–17)
APPEARANCE UR: ABNORMAL
APTT BLD: 39.6 SEC — HIGH (ref 27.5–35.5)
AST SERPL-CCNC: 45 U/L — HIGH (ref 10–40)
AST SERPL-CCNC: SIGNIFICANT CHANGE UP (ref 10–40)
BACTERIA # UR AUTO: PRESENT /HPF
BASE EXCESS BLDV CALC-SCNC: 0.6 MMOL/L — SIGNIFICANT CHANGE UP (ref -2–3)
BASOPHILS # BLD AUTO: 0.02 K/UL — SIGNIFICANT CHANGE UP (ref 0–0.2)
BASOPHILS NFR BLD AUTO: 0.3 % — SIGNIFICANT CHANGE UP (ref 0–2)
BILIRUB SERPL-MCNC: 0.3 MG/DL — SIGNIFICANT CHANGE UP (ref 0.2–1.2)
BILIRUB SERPL-MCNC: 0.4 MG/DL — SIGNIFICANT CHANGE UP (ref 0.2–1.2)
BILIRUB UR-MCNC: ABNORMAL
BUN SERPL-MCNC: 29 MG/DL — HIGH (ref 7–23)
BUN SERPL-MCNC: 31 MG/DL — HIGH (ref 7–23)
CA-I SERPL-SCNC: 1.35 MMOL/L — HIGH (ref 1.15–1.33)
CALCIUM SERPL-MCNC: 10 MG/DL — SIGNIFICANT CHANGE UP (ref 8.4–10.5)
CALCIUM SERPL-MCNC: 10.5 MG/DL — SIGNIFICANT CHANGE UP (ref 8.4–10.5)
CHLORIDE SERPL-SCNC: 102 MMOL/L — SIGNIFICANT CHANGE UP (ref 96–108)
CHLORIDE SERPL-SCNC: 106 MMOL/L — SIGNIFICANT CHANGE UP (ref 96–108)
CO2 BLDV-SCNC: 28 MMOL/L — HIGH (ref 22–26)
CO2 SERPL-SCNC: 22 MMOL/L — SIGNIFICANT CHANGE UP (ref 22–31)
CO2 SERPL-SCNC: 22 MMOL/L — SIGNIFICANT CHANGE UP (ref 22–31)
COLOR SPEC: YELLOW — SIGNIFICANT CHANGE UP
CREAT SERPL-MCNC: 0.71 MG/DL — SIGNIFICANT CHANGE UP (ref 0.5–1.3)
CREAT SERPL-MCNC: 0.74 MG/DL — SIGNIFICANT CHANGE UP (ref 0.5–1.3)
DIFF PNL FLD: ABNORMAL
EOSINOPHIL # BLD AUTO: 0 K/UL — SIGNIFICANT CHANGE UP (ref 0–0.5)
EOSINOPHIL NFR BLD AUTO: 0 % — SIGNIFICANT CHANGE UP (ref 0–6)
EPI CELLS # UR: SIGNIFICANT CHANGE UP /HPF (ref 0–5)
GAS PNL BLDV: 137 MMOL/L — SIGNIFICANT CHANGE UP (ref 136–145)
GAS PNL BLDV: SIGNIFICANT CHANGE UP
GAS PNL BLDV: SIGNIFICANT CHANGE UP
GLUCOSE SERPL-MCNC: 135 MG/DL — HIGH (ref 70–99)
GLUCOSE SERPL-MCNC: 136 MG/DL — HIGH (ref 70–99)
GLUCOSE UR QL: NEGATIVE — SIGNIFICANT CHANGE UP
HCO3 BLDV-SCNC: 27 MMOL/L — SIGNIFICANT CHANGE UP (ref 22–29)
HCT VFR BLD CALC: 37.1 % — SIGNIFICANT CHANGE UP (ref 34.5–45)
HGB BLD-MCNC: 12.2 G/DL — SIGNIFICANT CHANGE UP (ref 11.5–15.5)
IMM GRANULOCYTES NFR BLD AUTO: 0.4 % — SIGNIFICANT CHANGE UP (ref 0–1.5)
INR BLD: 1.18 — HIGH (ref 0.88–1.16)
KETONES UR-MCNC: ABNORMAL MG/DL
LACTATE SERPL-SCNC: 1.7 MMOL/L — SIGNIFICANT CHANGE UP (ref 0.5–2)
LEUKOCYTE ESTERASE UR-ACNC: ABNORMAL
LYMPHOCYTES # BLD AUTO: 0.85 K/UL — LOW (ref 1–3.3)
LYMPHOCYTES # BLD AUTO: 12.5 % — LOW (ref 13–44)
MAGNESIUM SERPL-MCNC: 2 MG/DL — SIGNIFICANT CHANGE UP (ref 1.6–2.6)
MCHC RBC-ENTMCNC: 29.3 PG — SIGNIFICANT CHANGE UP (ref 27–34)
MCHC RBC-ENTMCNC: 32.9 GM/DL — SIGNIFICANT CHANGE UP (ref 32–36)
MCV RBC AUTO: 89 FL — SIGNIFICANT CHANGE UP (ref 80–100)
MONOCYTES # BLD AUTO: 0.59 K/UL — SIGNIFICANT CHANGE UP (ref 0–0.9)
MONOCYTES NFR BLD AUTO: 8.7 % — SIGNIFICANT CHANGE UP (ref 2–14)
NEUTROPHILS # BLD AUTO: 5.33 K/UL — SIGNIFICANT CHANGE UP (ref 1.8–7.4)
NEUTROPHILS NFR BLD AUTO: 78.1 % — HIGH (ref 43–77)
NITRITE UR-MCNC: NEGATIVE — SIGNIFICANT CHANGE UP
NRBC # BLD: 0 /100 WBCS — SIGNIFICANT CHANGE UP (ref 0–0)
NT-PROBNP SERPL-SCNC: 194 PG/ML — SIGNIFICANT CHANGE UP (ref 0–300)
PCO2 BLDV: 47 MMHG — HIGH (ref 39–42)
PH BLDV: 7.36 — SIGNIFICANT CHANGE UP (ref 7.32–7.43)
PH UR: 6 — SIGNIFICANT CHANGE UP (ref 5–8)
PLATELET # BLD AUTO: 349 K/UL — SIGNIFICANT CHANGE UP (ref 150–400)
PO2 BLDV: <29 MMHG — LOW (ref 25–45)
POTASSIUM BLDV-SCNC: 3.3 MMOL/L — LOW (ref 3.5–5.1)
POTASSIUM SERPL-MCNC: 3.4 MMOL/L — LOW (ref 3.5–5.3)
POTASSIUM SERPL-MCNC: SIGNIFICANT CHANGE UP (ref 3.5–5.3)
POTASSIUM SERPL-SCNC: 3.4 MMOL/L — LOW (ref 3.5–5.3)
POTASSIUM SERPL-SCNC: SIGNIFICANT CHANGE UP (ref 3.5–5.3)
PROT SERPL-MCNC: 7 G/DL — SIGNIFICANT CHANGE UP (ref 6–8.3)
PROT SERPL-MCNC: 7.2 G/DL — SIGNIFICANT CHANGE UP (ref 6–8.3)
PROT UR-MCNC: 30 MG/DL
PROTHROM AB SERPL-ACNC: 14.1 SEC — HIGH (ref 10.6–13.6)
RBC # BLD: 4.17 M/UL — SIGNIFICANT CHANGE UP (ref 3.8–5.2)
RBC # FLD: 13.2 % — SIGNIFICANT CHANGE UP (ref 10.3–14.5)
RBC CASTS # UR COMP ASSIST: < 5 /HPF — SIGNIFICANT CHANGE UP
SAO2 % BLDV: 36.4 % — LOW (ref 67–88)
SARS-COV-2 RNA SPEC QL NAA+PROBE: NEGATIVE — SIGNIFICANT CHANGE UP
SODIUM SERPL-SCNC: 135 MMOL/L — SIGNIFICANT CHANGE UP (ref 135–145)
SODIUM SERPL-SCNC: 139 MMOL/L — SIGNIFICANT CHANGE UP (ref 135–145)
SP GR SPEC: 1.02 — SIGNIFICANT CHANGE UP (ref 1–1.03)
TROPONIN T SERPL-MCNC: <0.01 NG/ML — SIGNIFICANT CHANGE UP (ref 0–0.01)
UROBILINOGEN FLD QL: 0.2 E.U./DL — SIGNIFICANT CHANGE UP
WBC # BLD: 6.82 K/UL — SIGNIFICANT CHANGE UP (ref 3.8–10.5)
WBC # FLD AUTO: 6.82 K/UL — SIGNIFICANT CHANGE UP (ref 3.8–10.5)
WBC UR QL: ABNORMAL /HPF

## 2021-12-20 PROCEDURE — 99285 EMERGENCY DEPT VISIT HI MDM: CPT

## 2021-12-20 PROCEDURE — 71045 X-RAY EXAM CHEST 1 VIEW: CPT | Mod: 26

## 2021-12-20 PROCEDURE — 99215 OFFICE O/P EST HI 40 MIN: CPT

## 2021-12-20 PROCEDURE — 93010 ELECTROCARDIOGRAM REPORT: CPT

## 2021-12-20 RX ORDER — LIDOCAINE 5% 700 MG/1
5 PATCH TOPICAL
Qty: 1 | Refills: 2 | Status: DISCONTINUED | COMMUNITY
Start: 2020-11-09 | End: 2021-12-20

## 2021-12-20 RX ORDER — POTASSIUM CHLORIDE 20 MEQ
40 PACKET (EA) ORAL ONCE
Refills: 0 | Status: COMPLETED | OUTPATIENT
Start: 2021-12-20 | End: 2021-12-20

## 2021-12-20 RX ORDER — DICLOFENAC SODIUM 1% 10 MG/G
1 GEL TOPICAL DAILY
Qty: 1 | Refills: 0 | Status: DISCONTINUED | COMMUNITY
Start: 2021-03-16 | End: 2021-12-20

## 2021-12-20 RX ORDER — SODIUM CHLORIDE 9 MG/ML
500 INJECTION INTRAMUSCULAR; INTRAVENOUS; SUBCUTANEOUS ONCE
Refills: 0 | Status: COMPLETED | OUTPATIENT
Start: 2021-12-20 | End: 2021-12-20

## 2021-12-20 RX ORDER — CEFTRIAXONE 500 MG/1
1000 INJECTION, POWDER, FOR SOLUTION INTRAMUSCULAR; INTRAVENOUS ONCE
Refills: 0 | Status: COMPLETED | OUTPATIENT
Start: 2021-12-20 | End: 2021-12-20

## 2021-12-20 RX ADMIN — Medication 40 MILLIEQUIVALENT(S): at 23:20

## 2021-12-20 RX ADMIN — CEFTRIAXONE 100 MILLIGRAM(S): 500 INJECTION, POWDER, FOR SOLUTION INTRAMUSCULAR; INTRAVENOUS at 23:39

## 2021-12-20 RX ADMIN — SODIUM CHLORIDE 1000 MILLILITER(S): 9 INJECTION INTRAMUSCULAR; INTRAVENOUS; SUBCUTANEOUS at 23:39

## 2021-12-20 RX ADMIN — SODIUM CHLORIDE 1000 MILLILITER(S): 9 INJECTION INTRAMUSCULAR; INTRAVENOUS; SUBCUTANEOUS at 20:12

## 2021-12-20 NOTE — ED PROVIDER NOTE - PHYSICAL EXAMINATION
CONST: overweight nontoxic NAD speaking in full sentences  HEAD: atraumatic  EYES: conjunctivae clear, PERRL, EOMI  ENT: mmm  NECK: supple/FROM, nttp, no jvd  CARD: rrr no murmurs  CHEST: ctab no r/r/w, no stridor/retractions/tripoding  ABD: soft, nd, nttp, no rebound/guarding  EXT: FROM, symmetric distal pulses intact  SKIN: warm, dry, no rash, no pedal edema/ttp/rash, cap refill <2sec  NEURO: a+ox3, 5/5 strength x4, gross sensation intact x4

## 2021-12-20 NOTE — ED PROVIDER NOTE - OBJECTIVE STATEMENT
79F nonsmoker, overweight, afib, ?pfo s/p closure, hypothyroidism, sternal fx, referred in by dr james for evaluation. pt c/o 2w generalized weakness worsened in the past 3d after pt ran out of eliquis and started taking old amiodarone Rx instead. no fever/chills, no ha/dizziness, no uri/cough, no cp/sob, no abd pain/n/v, no diarrhea, no hematochezia/melena, no change in appetite/po intake, no dysuria, no pedal edema/pain/rash, no trauma, no etoh-dpt/ivdu.     at baseline, a+ox3, some ADLs, ambulates w/ walker, lives at home w/ hha.    pcp: jacob

## 2021-12-20 NOTE — ED ADULT NURSE NOTE - NSIMPLEMENTINTERV_GEN_ALL_ED
Implemented All Fall Risk Interventions:  [place] to call system. Call bell, personal items and telephone within reach. Instruct patient to call for assistance. Room bathroom lighting operational. Non-slip footwear when patient is off stretcher. Physically safe environment: no spills, clutter or unnecessary equipment. Stretcher in lowest position, wheels locked, appropriate side rails in place. Provide visual cue, wrist band, yellow gown, etc. Monitor gait and stability. Monitor for mental status changes and reorient to person, place, and time. Review medications for side effects contributing to fall risk. Reinforce activity limits and safety measures with patient and family.

## 2021-12-20 NOTE — ED PROVIDER NOTE - PATIENT PORTAL LINK FT
You can access the FollowMyHealth Patient Portal offered by [facility] by registering at the following website: [email or web address]. By joining [employer]’s FollowMyHealth portal, you will also be able to view your health information using other applications (apps) compatible with our system.

## 2021-12-20 NOTE — ED PROVIDER NOTE - NSFOLLOWUPINSTRUCTIONS_ED_ALL_ED_FT
PLEASE FOLLOW-UP WITH DR AWAD IN 1-2 DAYS.    CONTINUE ELIQUIS AS PRESCRIBED. DO NOT TAKE AMIODARONE. ANTIBIOTICS AS PRESCRIBED.    ANY XRAY IMAGING RESULTS GIVEN IN THE EMERGENCY DEPARTMENT ARE PRELIMINARY UNTIL FORMALLY READ BY A RADIOLOGIST. YOU WILL BE CONTACTED IF THERE ARE ANY SIGNIFICANT CHANGES IN THE FINAL READ.    PLEASE RETURN TO THE EMERGENCY DEPARTMENT IF GENERALIZED WEAKNESS, FEVER, CHEST PAIN, SHORTNESS OF BREATH, ABDOMINAL PAIN, VOMITING, OTHER CONCERNING SYMPTOMS.    PLEASE CONTACT SYLWIA KHOURY ([facility] EMERGENCY DEPARTMENT CLINICAL REFERRAL COORDINATOR) TO ASSIST IN SCHEDULING YOUR FOLLOW-UP APPOINTMENT.    Monday - Friday 11am-7pm  [phone]  [email or web address]

## 2021-12-20 NOTE — ED PROVIDER NOTE - QRS
Physical Exam:  Gen: ill appearing  Head: normal appearing  HEENT: normal conjunctiva, oral mucosa dry  Lung: tachypnea and moderate respiratory distress, speaking in partial sentences, bibasilar crackles  CV: tachycardic  Abd: soft, ND, NT  MSK: no visible deformities  Neuro: No focal deficits  Skin: Warm  Psych: anxious  ~Bebeto Marina D.O. -Resident 74

## 2021-12-20 NOTE — ED PROVIDER NOTE - CARE PLAN
Principal Discharge DX:	Weakness generalized  Secondary Diagnosis:	Acute UTI  Secondary Diagnosis:	Not taking medication as directed   1

## 2021-12-20 NOTE — ED PROVIDER NOTE - PROGRESS NOTE DETAILS
pt reports resolved weakness s/p ivf. ambulating at baseline w/ walker to bathroom. feels safe going home however states hha does not arrive until 8a this morning. silvia ram, niece, contacted ([phone]). updated and agrees w/ plan. states she usually cares for pt but had recent knee surgery and has been unable to do so which is why the medication was likely incorrectly taken. states hha instructed to come to ED at 8a this morning to  pt. also reports eliquis Rx has been picked up and will be administered correctly going forward. they have also removed amiodarone. pt reassessed. no interim change. food given per pt request. no sx complaint. pending arrival of hha in AM as arranged by maty. hha will have keys.

## 2021-12-20 NOTE — ED ADULT NURSE REASSESSMENT NOTE - NS ED NURSE REASSESS COMMENT FT1
pt not in any acute distress. receiving IVF and ABX as ordered. pt pending dispo. Care assumed by RN Iman, report given

## 2021-12-21 VITALS
HEART RATE: 73 BPM | DIASTOLIC BLOOD PRESSURE: 76 MMHG | OXYGEN SATURATION: 98 % | TEMPERATURE: 99 F | SYSTOLIC BLOOD PRESSURE: 156 MMHG | RESPIRATION RATE: 18 BRPM

## 2021-12-21 PROCEDURE — 81001 URINALYSIS AUTO W/SCOPE: CPT

## 2021-12-21 PROCEDURE — 71045 X-RAY EXAM CHEST 1 VIEW: CPT

## 2021-12-21 PROCEDURE — 96374 THER/PROPH/DIAG INJ IV PUSH: CPT

## 2021-12-21 PROCEDURE — 80053 COMPREHEN METABOLIC PANEL: CPT

## 2021-12-21 PROCEDURE — 84484 ASSAY OF TROPONIN QUANT: CPT

## 2021-12-21 PROCEDURE — 85730 THROMBOPLASTIN TIME PARTIAL: CPT

## 2021-12-21 PROCEDURE — 84132 ASSAY OF SERUM POTASSIUM: CPT

## 2021-12-21 PROCEDURE — 93005 ELECTROCARDIOGRAM TRACING: CPT

## 2021-12-21 PROCEDURE — 83605 ASSAY OF LACTIC ACID: CPT

## 2021-12-21 PROCEDURE — 83735 ASSAY OF MAGNESIUM: CPT

## 2021-12-21 PROCEDURE — 83880 ASSAY OF NATRIURETIC PEPTIDE: CPT

## 2021-12-21 PROCEDURE — 85025 COMPLETE CBC W/AUTO DIFF WBC: CPT

## 2021-12-21 PROCEDURE — 36415 COLL VENOUS BLD VENIPUNCTURE: CPT

## 2021-12-21 PROCEDURE — 84295 ASSAY OF SERUM SODIUM: CPT

## 2021-12-21 PROCEDURE — 82330 ASSAY OF CALCIUM: CPT

## 2021-12-21 PROCEDURE — 82803 BLOOD GASES ANY COMBINATION: CPT

## 2021-12-21 PROCEDURE — 99284 EMERGENCY DEPT VISIT MOD MDM: CPT | Mod: 25

## 2021-12-21 PROCEDURE — 87086 URINE CULTURE/COLONY COUNT: CPT

## 2021-12-21 PROCEDURE — 85610 PROTHROMBIN TIME: CPT

## 2021-12-21 PROCEDURE — 87635 SARS-COV-2 COVID-19 AMP PRB: CPT

## 2021-12-21 RX ORDER — APIXABAN 2.5 MG/1
5 TABLET, FILM COATED ORAL ONCE
Refills: 0 | Status: COMPLETED | OUTPATIENT
Start: 2021-12-21 | End: 2021-12-21

## 2021-12-21 RX ORDER — CEFPODOXIME PROXETIL 100 MG
1 TABLET ORAL
Qty: 14 | Refills: 0
Start: 2021-12-21 | End: 2021-12-27

## 2021-12-21 RX ADMIN — APIXABAN 5 MILLIGRAM(S): 2.5 TABLET, FILM COATED ORAL at 02:29

## 2021-12-23 LAB
CULTURE RESULTS: SIGNIFICANT CHANGE UP
SPECIMEN SOURCE: SIGNIFICANT CHANGE UP

## 2021-12-27 ENCOUNTER — APPOINTMENT (OUTPATIENT)
Dept: INTERNAL MEDICINE | Facility: CLINIC | Age: 81
End: 2021-12-27

## 2021-12-29 ENCOUNTER — APPOINTMENT (OUTPATIENT)
Dept: INTERNAL MEDICINE | Facility: CLINIC | Age: 81
End: 2021-12-29
Payer: MEDICARE

## 2021-12-29 VITALS
HEIGHT: 57 IN | WEIGHT: 132 LBS | HEART RATE: 72 BPM | OXYGEN SATURATION: 98 % | SYSTOLIC BLOOD PRESSURE: 132 MMHG | TEMPERATURE: 97.8 F | BODY MASS INDEX: 28.48 KG/M2 | DIASTOLIC BLOOD PRESSURE: 80 MMHG

## 2021-12-29 DIAGNOSIS — R00.0 TACHYCARDIA, UNSPECIFIED: ICD-10-CM

## 2021-12-29 DIAGNOSIS — N39.0 URINARY TRACT INFECTION, SITE NOT SPECIFIED: ICD-10-CM

## 2021-12-29 DIAGNOSIS — E87.6 HYPOKALEMIA: ICD-10-CM

## 2021-12-29 PROCEDURE — 99214 OFFICE O/P EST MOD 30 MIN: CPT

## 2022-01-18 ENCOUNTER — RX RENEWAL (OUTPATIENT)
Age: 82
End: 2022-01-18

## 2022-01-26 ENCOUNTER — RESULT REVIEW (OUTPATIENT)
Age: 82
End: 2022-01-26

## 2022-01-26 ENCOUNTER — APPOINTMENT (OUTPATIENT)
Dept: HEART AND VASCULAR | Facility: CLINIC | Age: 82
End: 2022-01-26
Payer: MEDICARE

## 2022-01-26 ENCOUNTER — APPOINTMENT (OUTPATIENT)
Dept: INTERNAL MEDICINE | Facility: CLINIC | Age: 82
End: 2022-01-26
Payer: MEDICARE

## 2022-01-26 ENCOUNTER — OUTPATIENT (OUTPATIENT)
Dept: OUTPATIENT SERVICES | Facility: HOSPITAL | Age: 82
LOS: 1 days | End: 2022-01-26
Payer: MEDICARE

## 2022-01-26 VITALS
BODY MASS INDEX: 28.48 KG/M2 | OXYGEN SATURATION: 95 % | TEMPERATURE: 98.3 F | HEART RATE: 79 BPM | WEIGHT: 132 LBS | HEIGHT: 57 IN | SYSTOLIC BLOOD PRESSURE: 123 MMHG | DIASTOLIC BLOOD PRESSURE: 80 MMHG

## 2022-01-26 VITALS
TEMPERATURE: 97.5 F | DIASTOLIC BLOOD PRESSURE: 70 MMHG | HEART RATE: 79 BPM | OXYGEN SATURATION: 94 % | WEIGHT: 125 LBS | HEIGHT: 57 IN | BODY MASS INDEX: 26.97 KG/M2 | SYSTOLIC BLOOD PRESSURE: 123 MMHG

## 2022-01-26 DIAGNOSIS — Z96.649 PRESENCE OF UNSPECIFIED ARTIFICIAL HIP JOINT: Chronic | ICD-10-CM

## 2022-01-26 DIAGNOSIS — Q24.8 OTHER SPECIFIED CONGENITAL MALFORMATIONS OF HEART: ICD-10-CM

## 2022-01-26 DIAGNOSIS — M25.511 PAIN IN RIGHT SHOULDER: ICD-10-CM
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
## 2022-01-26 DIAGNOSIS — R53.83 OTHER FATIGUE: ICD-10-CM

## 2022-01-26 DIAGNOSIS — R22.30 LOCALIZED SWELLING, MASS AND LUMP, UNSPECIFIED UPPER LIMB: ICD-10-CM

## 2022-01-26 DIAGNOSIS — Z87.74 PERSONAL HISTORY OF (CORRECTED) CONGENITAL MALFORMATIONS OF HEART AND CIRCULATORY SYSTEM: ICD-10-CM

## 2022-01-26 PROCEDURE — 93923 UPR/LXTR ART STDY 3+ LVLS: CPT

## 2022-01-26 PROCEDURE — 73010 X-RAY EXAM OF SHOULDER BLADE: CPT | Mod: 26,RT

## 2022-01-26 PROCEDURE — 76882 US LMTD JT/FCL EVL NVASC XTR: CPT | Mod: 26,RT

## 2022-01-26 PROCEDURE — 99214 OFFICE O/P EST MOD 30 MIN: CPT | Mod: 25

## 2022-01-26 PROCEDURE — 99214 OFFICE O/P EST MOD 30 MIN: CPT

## 2022-01-26 PROCEDURE — 73030 X-RAY EXAM OF SHOULDER: CPT

## 2022-01-26 PROCEDURE — 76882 US LMTD JT/FCL EVL NVASC XTR: CPT

## 2022-01-26 PROCEDURE — 73010 X-RAY EXAM OF SHOULDER BLADE: CPT

## 2022-01-26 PROCEDURE — 73030 X-RAY EXAM OF SHOULDER: CPT | Mod: 26,RT

## 2022-01-26 RX ORDER — MULTIVITAMIN
TABLET ORAL DAILY
Qty: 90 | Refills: 3 | Status: ACTIVE | COMMUNITY
Start: 2022-01-26 | End: 1900-01-01

## 2022-01-26 RX ORDER — DICLOFENAC SODIUM 1% 10 MG/G
1 GEL TOPICAL
Qty: 1 | Refills: 0 | Status: ACTIVE | COMMUNITY
Start: 2022-01-26 | End: 1900-01-01

## 2022-01-27 PROBLEM — Z87.74 STATUS POST DEVICE CLOSURE OF ASD: Status: ACTIVE | Noted: 2021-10-26

## 2022-01-27 PROBLEM — Q24.8 INTERATRIAL CARDIAC SHUNT: Status: ACTIVE | Noted: 2021-03-16
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
## 2022-01-27 PROBLEM — R53.83 FATIGUE: Status: ACTIVE | Noted: 2020-01-13

## 2022-01-28 ENCOUNTER — NON-APPOINTMENT (OUTPATIENT)
Age: 82
End: 2022-01-28

## 2022-01-31 ENCOUNTER — APPOINTMENT (OUTPATIENT)
Dept: INTERNAL MEDICINE | Facility: CLINIC | Age: 82
End: 2022-01-31

## 2022-04-18 ENCOUNTER — RX RENEWAL (OUTPATIENT)
Age: 82
End: 2022-04-18

## 2022-04-19 ENCOUNTER — RX RENEWAL (OUTPATIENT)
Age: 82
End: 2022-04-19

## 2022-05-11 NOTE — ED ADULT NURSE NOTE - CCCP TRG CHIEF CMPLNT
Subjective:       Patient ID: Rosie Garcia is a 24 y.o. female.    Chief Complaint: STD CHECK (Cramping after sex and spotting. )    HPI  Pt. Reports seeing some blood when she wipes and having some lower abdominal pain, denies dysuria or increased frequency.   She wants to get pregnant and it has been almost one year since the Nexplanon was removed.  She is HSV positive and has occasional outbreaks.   She is worried that she might have chlamydia.   Review of Systems   Constitutional: Negative.    HENT: Negative.    Respiratory: Negative.    Cardiovascular: Negative.    Gastrointestinal: Negative.    Endocrine: Negative.    Genitourinary: Positive for pelvic pain and vaginal bleeding. Negative for dysuria, flank pain and frequency.   Integumentary:  Negative.   Neurological: Negative.    Psychiatric/Behavioral: Negative.          Objective:      Physical Exam  Constitutional:       General: She is not in acute distress.     Appearance: She is normal weight.   HENT:      Head: Normocephalic.      Nose: Nose normal.   Eyes:      Extraocular Movements: Extraocular movements intact.   Cardiovascular:      Rate and Rhythm: Normal rate.   Pulmonary:      Effort: Pulmonary effort is normal.   Abdominal:      General: Abdomen is flat. There is no distension.      Palpations: Abdomen is soft. There is no mass.      Tenderness: There is no abdominal tenderness.   Genitourinary:     Comments: Affirm self collected.  Skin:     General: Skin is warm and dry.   Neurological:      Mental Status: She is alert and oriented to person, place, and time.   Psychiatric:         Mood and Affect: Mood normal.         Behavior: Behavior normal.         Assessment:       Problem List Items Addressed This Visit    None     Visit Diagnoses     Herpes simplex vulvovaginitis    -  Primary    Relevant Medications    valACYclovir (VALTREX) 500 MG tablet    High risk sexual behavior, unspecified type        Relevant Orders    Bacterial Vaginosis     Chlamydia/GC, PCR    Pelvic pain        Relevant Orders    POCT URINE PREGNANCY (Completed)          Plan:     Call result of testing    Buy ovulation predictor kit to time intercourse.    F/u six months.    Refill on Valtrex.    Start a prenatal vitamin with folic acid.       chest pain

## 2022-06-14 ENCOUNTER — RX RENEWAL (OUTPATIENT)
Age: 82
End: 2022-06-14

## 2022-07-20 ENCOUNTER — APPOINTMENT (OUTPATIENT)
Dept: INTERNAL MEDICINE | Facility: CLINIC | Age: 82
End: 2022-07-20

## 2022-07-20 VITALS
BODY MASS INDEX: 26.97 KG/M2 | DIASTOLIC BLOOD PRESSURE: 77 MMHG | SYSTOLIC BLOOD PRESSURE: 126 MMHG | TEMPERATURE: 97.3 F | WEIGHT: 125 LBS | HEIGHT: 57 IN | HEART RATE: 78 BPM | OXYGEN SATURATION: 96 %

## 2022-07-20 DIAGNOSIS — I63.9 CEREBRAL INFARCTION, UNSPECIFIED: ICD-10-CM

## 2022-07-20 PROCEDURE — 36415 COLL VENOUS BLD VENIPUNCTURE: CPT

## 2022-07-20 PROCEDURE — 99214 OFFICE O/P EST MOD 30 MIN: CPT | Mod: 25

## 2022-07-21 LAB
ALBUMIN SERPL ELPH-MCNC: 4.3 G/DL
ALP BLD-CCNC: 105 U/L
ALT SERPL-CCNC: 22 U/L
ANION GAP SERPL CALC-SCNC: 11 MMOL/L
AST SERPL-CCNC: 24 U/L
BASOPHILS # BLD AUTO: 0.01 K/UL
BASOPHILS NFR BLD AUTO: 0.2 %
BILIRUB SERPL-MCNC: 0.4 MG/DL
BUN SERPL-MCNC: 13 MG/DL
CALCIUM SERPL-MCNC: 10.2 MG/DL
CHLORIDE SERPL-SCNC: 100 MMOL/L
CO2 SERPL-SCNC: 24 MMOL/L
CREAT SERPL-MCNC: 0.47 MG/DL
EGFR: 96 ML/MIN/1.73M2
EOSINOPHIL # BLD AUTO: 0 K/UL
EOSINOPHIL NFR BLD AUTO: 0 %
GLUCOSE SERPL-MCNC: 97 MG/DL
HCT VFR BLD CALC: 36.6 %
HGB BLD-MCNC: 11 G/DL
IMM GRANULOCYTES NFR BLD AUTO: 0.4 %
LYMPHOCYTES # BLD AUTO: 1 K/UL
LYMPHOCYTES NFR BLD AUTO: 20.7 %
MAN DIFF?: NORMAL
MCHC RBC-ENTMCNC: 29.6 PG
MCHC RBC-ENTMCNC: 30.1 GM/DL
MCV RBC AUTO: 98.4 FL
MONOCYTES # BLD AUTO: 0.43 K/UL
MONOCYTES NFR BLD AUTO: 8.9 %
NEUTROPHILS # BLD AUTO: 3.37 K/UL
NEUTROPHILS NFR BLD AUTO: 69.8 %
PLATELET # BLD AUTO: 311 K/UL
POTASSIUM SERPL-SCNC: 5.2 MMOL/L
PROT SERPL-MCNC: 6.4 G/DL
RBC # BLD: 3.72 M/UL
RBC # FLD: 14.1 %
SODIUM SERPL-SCNC: 135 MMOL/L
TSH SERPL-ACNC: 0.91 UIU/ML
WBC # FLD AUTO: 4.83 K/UL

## 2022-08-04 ENCOUNTER — RX RENEWAL (OUTPATIENT)
Age: 82
End: 2022-08-04

## 2022-08-10 ENCOUNTER — APPOINTMENT (OUTPATIENT)
Dept: HEART AND VASCULAR | Facility: CLINIC | Age: 82
End: 2022-08-10

## 2022-08-10 VITALS
DIASTOLIC BLOOD PRESSURE: 66 MMHG | SYSTOLIC BLOOD PRESSURE: 124 MMHG | WEIGHT: 122 LBS | HEIGHT: 57 IN | BODY MASS INDEX: 26.32 KG/M2 | TEMPERATURE: 98.2 F | HEART RATE: 78 BPM

## 2022-08-10 DIAGNOSIS — I63.9 CEREBRAL INFARCTION, UNSPECIFIED: ICD-10-CM

## 2022-08-10 PROCEDURE — 99214 OFFICE O/P EST MOD 30 MIN: CPT | Mod: 25

## 2022-08-10 PROCEDURE — 93970 EXTREMITY STUDY: CPT

## 2022-08-17 ENCOUNTER — APPOINTMENT (OUTPATIENT)
Dept: HEART AND VASCULAR | Facility: CLINIC | Age: 82
End: 2022-08-17

## 2022-08-17 VITALS
DIASTOLIC BLOOD PRESSURE: 77 MMHG | HEART RATE: 73 BPM | HEIGHT: 57 IN | TEMPERATURE: 98 F | SYSTOLIC BLOOD PRESSURE: 138 MMHG | BODY MASS INDEX: 26.75 KG/M2 | WEIGHT: 124 LBS

## 2022-08-17 PROCEDURE — 93925 LOWER EXTREMITY STUDY: CPT

## 2022-08-17 PROCEDURE — 99212 OFFICE O/P EST SF 10 MIN: CPT | Mod: 25

## 2022-08-18 ENCOUNTER — APPOINTMENT (OUTPATIENT)
Dept: HEART AND VASCULAR | Facility: CLINIC | Age: 82
End: 2022-08-18

## 2022-08-18 PROCEDURE — 99443: CPT

## 2022-09-14 ENCOUNTER — APPOINTMENT (OUTPATIENT)
Dept: PHYSICAL MEDICINE AND REHAB | Facility: CLINIC | Age: 82
End: 2022-09-14

## 2022-09-14 VITALS — BODY MASS INDEX: 28.7 KG/M2 | WEIGHT: 124 LBS | HEIGHT: 55 IN

## 2022-09-14 PROCEDURE — 99213 OFFICE O/P EST LOW 20 MIN: CPT

## 2022-09-14 NOTE — DATA REVIEWED
[MRI] : MRI [FreeTextEntry1] : MR LS 3/2020: L4-5 grade 1 spondylolisthesis and facet hypertrophy. Superimposed broad-based central disc herniation asymmetric to the left with left lateral recess stenosis and compression of the left L5 nerve root and right foraminal disc herniation with foraminal stenosis compressing the right L4 nerve root. L5/S1 Grade 1 spondylolisthesis facet hypertrophy and right facet joint effusion. Disc bulging asymmetric to the right compressing the right S1 nerve root and right foraminal stenosis including right foraminal disc herniation, compressing the right L5 nerve root.

## 2022-09-14 NOTE — ASSESSMENT
[FreeTextEntry1] : Impression:\par 1. Lumbar Facet Disease\par 2. Right Lumbar Radiculopathy\par \par Plan: The history, physical examination and imaging were reviewed. The imaging findings and diagnosis were discussed with the patient along with treatment options including physical therapy, oral medication, interventional spine procedures, and surgery; as well as alternative therapeutics such as acupuncture and massage. The patient has had a myriad of spine injections none of which have provided relief. We discussed surgery as an option, however she has been evaluated by NeuroSx and deemed not a candidate and also herself does not wish to consider surgery. She is now inquiring about chronic pain medication. I explained to her that this is not the scope of my practice. I provided her with a referral to pain management for further evaluation and treatment. The patient expressed verbal understanding and is in agreement with the plan of care. All of the patient's questions and concerns were addressed during today's visit.

## 2022-09-14 NOTE — HISTORY OF PRESENT ILLNESS
[FreeTextEntry1] : Ms. ESTRADA DIAZ is a very pleasant 79 year female who comes in for follow up evaluation of back and right leg pain. The patient has been under the care of another pain management physician and has undergone several spine injections including CARLOS, Facet CSI/MBB/RFA, SIJ CSI, none of which have provided relief. At present the symptoms are back radiating to right leg intermittent in nature and described as sharp . The pain is rated as 8/10 during today's visit, and ranges from 7-9/10. The patient's symptoms are aggravated by walking or sitting for too long  and alleviated by ice/cold compress. The patient denies any night pain, numbness/tingling, weakness, or bowel/bladder dysfunction. The patient has no other complaints at this time.

## 2022-10-19 ENCOUNTER — APPOINTMENT (OUTPATIENT)
Dept: INTERNAL MEDICINE | Facility: CLINIC | Age: 82
End: 2022-10-19

## 2022-10-19 VITALS
TEMPERATURE: 98.2 F | SYSTOLIC BLOOD PRESSURE: 119 MMHG | OXYGEN SATURATION: 99 % | HEIGHT: 55 IN | DIASTOLIC BLOOD PRESSURE: 75 MMHG | WEIGHT: 119 LBS | HEART RATE: 66 BPM | BODY MASS INDEX: 27.54 KG/M2

## 2022-10-19 DIAGNOSIS — Z23 ENCOUNTER FOR IMMUNIZATION: ICD-10-CM

## 2022-10-19 PROCEDURE — 99214 OFFICE O/P EST MOD 30 MIN: CPT | Mod: 25

## 2022-10-19 PROCEDURE — 36415 COLL VENOUS BLD VENIPUNCTURE: CPT

## 2022-10-19 PROCEDURE — G0008: CPT

## 2022-10-19 PROCEDURE — 90662 IIV NO PRSV INCREASED AG IM: CPT

## 2022-10-20 ENCOUNTER — NON-APPOINTMENT (OUTPATIENT)
Age: 82
End: 2022-10-20

## 2022-10-20 ENCOUNTER — INPATIENT (INPATIENT)
Facility: HOSPITAL | Age: 82
LOS: 11 days | Discharge: NURSING, REHAB OR LONG-TERM CARE | DRG: 417 | End: 2022-11-01
Attending: SURGERY | Admitting: SURGERY
Payer: MEDICARE

## 2022-10-20 VITALS
HEIGHT: 57 IN | SYSTOLIC BLOOD PRESSURE: 136 MMHG | HEART RATE: 82 BPM | WEIGHT: 115.08 LBS | TEMPERATURE: 98 F | DIASTOLIC BLOOD PRESSURE: 77 MMHG | OXYGEN SATURATION: 96 % | RESPIRATION RATE: 18 BRPM

## 2022-10-20 DIAGNOSIS — Z96.649 PRESENCE OF UNSPECIFIED ARTIFICIAL HIP JOINT: Chronic | ICD-10-CM

## 2022-10-20 LAB
ALBUMIN SERPL ELPH-MCNC: 3.7 G/DL — SIGNIFICANT CHANGE UP (ref 3.3–5)
ALBUMIN SERPL ELPH-MCNC: 4.1 G/DL
ALP BLD-CCNC: 548 U/L
ALP SERPL-CCNC: 477 U/L — HIGH (ref 40–120)
ALT FLD-CCNC: 184 U/L — HIGH (ref 10–45)
ALT SERPL-CCNC: 314 U/L
ANION GAP SERPL CALC-SCNC: 13 MMOL/L
ANION GAP SERPL CALC-SCNC: 13 MMOL/L — SIGNIFICANT CHANGE UP (ref 5–17)
APPEARANCE UR: ABNORMAL
AST SERPL-CCNC: 252 U/L
AST SERPL-CCNC: 82 U/L — HIGH (ref 10–40)
BACTERIA # UR AUTO: PRESENT /HPF
BASOPHILS # BLD AUTO: 0.01 K/UL — SIGNIFICANT CHANGE UP (ref 0–0.2)
BASOPHILS # BLD AUTO: 0.02 K/UL
BASOPHILS NFR BLD AUTO: 0.2 %
BASOPHILS NFR BLD AUTO: 0.2 % — SIGNIFICANT CHANGE UP (ref 0–2)
BILIRUB SERPL-MCNC: 0.7 MG/DL — SIGNIFICANT CHANGE UP (ref 0.2–1.2)
BILIRUB SERPL-MCNC: 0.9 MG/DL
BILIRUB UR-MCNC: NEGATIVE — SIGNIFICANT CHANGE UP
BUN SERPL-MCNC: 12 MG/DL — SIGNIFICANT CHANGE UP (ref 7–23)
BUN SERPL-MCNC: 13 MG/DL
CALCIUM SERPL-MCNC: 9.6 MG/DL — SIGNIFICANT CHANGE UP (ref 8.4–10.5)
CALCIUM SERPL-MCNC: 9.8 MG/DL
CHLORIDE SERPL-SCNC: 102 MMOL/L
CHLORIDE SERPL-SCNC: 106 MMOL/L — SIGNIFICANT CHANGE UP (ref 96–108)
CO2 SERPL-SCNC: 22 MMOL/L — SIGNIFICANT CHANGE UP (ref 22–31)
CO2 SERPL-SCNC: 23 MMOL/L
COLOR SPEC: YELLOW — SIGNIFICANT CHANGE UP
CREAT SERPL-MCNC: 0.42 MG/DL — LOW (ref 0.5–1.3)
CREAT SERPL-MCNC: 0.47 MG/DL
DIFF PNL FLD: NEGATIVE — SIGNIFICANT CHANGE UP
EGFR: 96 ML/MIN/1.73M2
EGFR: 99 ML/MIN/1.73M2 — SIGNIFICANT CHANGE UP
EOSINOPHIL # BLD AUTO: 0 K/UL
EOSINOPHIL # BLD AUTO: 0 K/UL — SIGNIFICANT CHANGE UP (ref 0–0.5)
EOSINOPHIL NFR BLD AUTO: 0 %
EOSINOPHIL NFR BLD AUTO: 0 % — SIGNIFICANT CHANGE UP (ref 0–6)
EPI CELLS # UR: SIGNIFICANT CHANGE UP /HPF (ref 0–5)
GLUCOSE SERPL-MCNC: 106 MG/DL
GLUCOSE SERPL-MCNC: 97 MG/DL — SIGNIFICANT CHANGE UP (ref 70–99)
GLUCOSE UR QL: NEGATIVE — SIGNIFICANT CHANGE UP
HCT VFR BLD CALC: 32.5 % — LOW (ref 34.5–45)
HCT VFR BLD CALC: 34.9 %
HGB BLD-MCNC: 11 G/DL — LOW (ref 11.5–15.5)
HGB BLD-MCNC: 11.2 G/DL
IMM GRANULOCYTES NFR BLD AUTO: 0.2 %
IMM GRANULOCYTES NFR BLD AUTO: 0.5 % — SIGNIFICANT CHANGE UP (ref 0–0.9)
KETONES UR-MCNC: 15 MG/DL
LEUKOCYTE ESTERASE UR-ACNC: NEGATIVE — SIGNIFICANT CHANGE UP
LIDOCAIN IGE QN: 55 U/L — SIGNIFICANT CHANGE UP (ref 7–60)
LYMPHOCYTES # BLD AUTO: 0.73 K/UL
LYMPHOCYTES # BLD AUTO: 0.79 K/UL — LOW (ref 1–3.3)
LYMPHOCYTES # BLD AUTO: 12 % — LOW (ref 13–44)
LYMPHOCYTES NFR BLD AUTO: 8.9 %
MAN DIFF?: NORMAL
MCHC RBC-ENTMCNC: 30.6 PG
MCHC RBC-ENTMCNC: 30.9 PG — SIGNIFICANT CHANGE UP (ref 27–34)
MCHC RBC-ENTMCNC: 32.1 GM/DL
MCHC RBC-ENTMCNC: 33.8 GM/DL — SIGNIFICANT CHANGE UP (ref 32–36)
MCV RBC AUTO: 91.3 FL — SIGNIFICANT CHANGE UP (ref 80–100)
MCV RBC AUTO: 95.4 FL
MONOCYTES # BLD AUTO: 0.39 K/UL — SIGNIFICANT CHANGE UP (ref 0–0.9)
MONOCYTES # BLD AUTO: 0.47 K/UL
MONOCYTES NFR BLD AUTO: 5.7 %
MONOCYTES NFR BLD AUTO: 5.9 % — SIGNIFICANT CHANGE UP (ref 2–14)
NEUTROPHILS # BLD AUTO: 5.35 K/UL — SIGNIFICANT CHANGE UP (ref 1.8–7.4)
NEUTROPHILS # BLD AUTO: 6.97 K/UL
NEUTROPHILS NFR BLD AUTO: 81.4 % — HIGH (ref 43–77)
NEUTROPHILS NFR BLD AUTO: 85 %
NITRITE UR-MCNC: NEGATIVE — SIGNIFICANT CHANGE UP
NRBC # BLD: 0 /100 WBCS — SIGNIFICANT CHANGE UP (ref 0–0)
PH UR: 6.5 — SIGNIFICANT CHANGE UP (ref 5–8)
PLATELET # BLD AUTO: 282 K/UL — SIGNIFICANT CHANGE UP (ref 150–400)
PLATELET # BLD AUTO: 286 K/UL
POTASSIUM SERPL-MCNC: 4 MMOL/L — SIGNIFICANT CHANGE UP (ref 3.5–5.3)
POTASSIUM SERPL-SCNC: 3.8 MMOL/L
POTASSIUM SERPL-SCNC: 4 MMOL/L — SIGNIFICANT CHANGE UP (ref 3.5–5.3)
PROT SERPL-MCNC: 6.3 G/DL
PROT SERPL-MCNC: 6.8 G/DL — SIGNIFICANT CHANGE UP (ref 6–8.3)
PROT UR-MCNC: 30 MG/DL
RBC # BLD: 3.56 M/UL — LOW (ref 3.8–5.2)
RBC # BLD: 3.66 M/UL
RBC # FLD: 13.7 % — SIGNIFICANT CHANGE UP (ref 10.3–14.5)
RBC # FLD: 14.2 %
RBC CASTS # UR COMP ASSIST: < 5 /HPF — SIGNIFICANT CHANGE UP
SARS-COV-2 RNA SPEC QL NAA+PROBE: NEGATIVE — SIGNIFICANT CHANGE UP
SODIUM SERPL-SCNC: 137 MMOL/L
SODIUM SERPL-SCNC: 141 MMOL/L — SIGNIFICANT CHANGE UP (ref 135–145)
SP GR SPEC: 1.01 — SIGNIFICANT CHANGE UP (ref 1–1.03)
UROBILINOGEN FLD QL: 1 E.U./DL — SIGNIFICANT CHANGE UP
WBC # BLD: 6.57 K/UL — SIGNIFICANT CHANGE UP (ref 3.8–10.5)
WBC # FLD AUTO: 6.57 K/UL — SIGNIFICANT CHANGE UP (ref 3.8–10.5)
WBC # FLD AUTO: 8.21 K/UL
WBC UR QL: < 5 /HPF — SIGNIFICANT CHANGE UP

## 2022-10-20 PROCEDURE — 99285 EMERGENCY DEPT VISIT HI MDM: CPT

## 2022-10-20 PROCEDURE — 76705 ECHO EXAM OF ABDOMEN: CPT | Mod: 26

## 2022-10-20 PROCEDURE — G1004: CPT

## 2022-10-20 PROCEDURE — 74177 CT ABD & PELVIS W/CONTRAST: CPT | Mod: 26,ME

## 2022-10-20 RX ORDER — DIATRIZOATE MEGLUMINE 180 MG/ML
30 INJECTION, SOLUTION INTRAVESICAL ONCE
Refills: 0 | Status: COMPLETED | OUTPATIENT
Start: 2022-10-20 | End: 2022-10-20

## 2022-10-20 RX ORDER — CEFTRIAXONE 500 MG/1
1000 INJECTION, POWDER, FOR SOLUTION INTRAMUSCULAR; INTRAVENOUS EVERY 24 HOURS
Refills: 0 | Status: DISCONTINUED | OUTPATIENT
Start: 2022-10-21 | End: 2022-10-25

## 2022-10-20 RX ORDER — MORPHINE SULFATE 50 MG/1
4 CAPSULE, EXTENDED RELEASE ORAL ONCE
Refills: 0 | Status: DISCONTINUED | OUTPATIENT
Start: 2022-10-20 | End: 2022-10-20

## 2022-10-20 RX ORDER — LEVOTHYROXINE SODIUM 125 MCG
75 TABLET ORAL DAILY
Refills: 0 | Status: DISCONTINUED | OUTPATIENT
Start: 2022-10-20 | End: 2022-10-25

## 2022-10-20 RX ORDER — ONDANSETRON 8 MG/1
4 TABLET, FILM COATED ORAL EVERY 6 HOURS
Refills: 0 | Status: DISCONTINUED | OUTPATIENT
Start: 2022-10-20 | End: 2022-10-25

## 2022-10-20 RX ORDER — METOPROLOL TARTRATE 50 MG
25 TABLET ORAL DAILY
Refills: 0 | Status: DISCONTINUED | OUTPATIENT
Start: 2022-10-20 | End: 2022-10-25

## 2022-10-20 RX ORDER — ACETAMINOPHEN 500 MG
1000 TABLET ORAL ONCE
Refills: 0 | Status: COMPLETED | OUTPATIENT
Start: 2022-10-20 | End: 2022-10-20

## 2022-10-20 RX ORDER — SODIUM CHLORIDE 9 MG/ML
1000 INJECTION, SOLUTION INTRAVENOUS ONCE
Refills: 0 | Status: COMPLETED | OUTPATIENT
Start: 2022-10-20 | End: 2022-10-20

## 2022-10-20 RX ORDER — METRONIDAZOLE 500 MG
500 TABLET ORAL ONCE
Refills: 0 | Status: COMPLETED | OUTPATIENT
Start: 2022-10-20 | End: 2022-10-20

## 2022-10-20 RX ORDER — CEFTRIAXONE 500 MG/1
1000 INJECTION, POWDER, FOR SOLUTION INTRAMUSCULAR; INTRAVENOUS ONCE
Refills: 0 | Status: COMPLETED | OUTPATIENT
Start: 2022-10-20 | End: 2022-10-20

## 2022-10-20 RX ORDER — HEPARIN SODIUM 5000 [USP'U]/ML
5000 INJECTION INTRAVENOUS; SUBCUTANEOUS EVERY 8 HOURS
Refills: 0 | Status: DISCONTINUED | OUTPATIENT
Start: 2022-10-20 | End: 2022-10-21

## 2022-10-20 RX ORDER — METRONIDAZOLE 500 MG
500 TABLET ORAL EVERY 8 HOURS
Refills: 0 | Status: DISCONTINUED | OUTPATIENT
Start: 2022-10-21 | End: 2022-10-25

## 2022-10-20 RX ORDER — ONDANSETRON 8 MG/1
4 TABLET, FILM COATED ORAL ONCE
Refills: 0 | Status: COMPLETED | OUTPATIENT
Start: 2022-10-20 | End: 2022-10-20

## 2022-10-20 RX ORDER — ATORVASTATIN CALCIUM 80 MG/1
80 TABLET, FILM COATED ORAL AT BEDTIME
Refills: 0 | Status: DISCONTINUED | OUTPATIENT
Start: 2022-10-20 | End: 2022-10-25

## 2022-10-20 RX ORDER — SODIUM CHLORIDE 9 MG/ML
1000 INJECTION, SOLUTION INTRAVENOUS
Refills: 0 | Status: DISCONTINUED | OUTPATIENT
Start: 2022-10-20 | End: 2022-10-21

## 2022-10-20 RX ADMIN — Medication 1000 MILLIGRAM(S): at 19:17

## 2022-10-20 RX ADMIN — Medication 100 MILLIGRAM(S): at 19:23

## 2022-10-20 RX ADMIN — SODIUM CHLORIDE 1000 MILLILITER(S): 9 INJECTION, SOLUTION INTRAVENOUS at 14:56

## 2022-10-20 RX ADMIN — DIATRIZOATE MEGLUMINE 30 MILLILITER(S): 180 INJECTION, SOLUTION INTRAVESICAL at 14:57

## 2022-10-20 RX ADMIN — Medication 400 MILLIGRAM(S): at 14:57

## 2022-10-20 RX ADMIN — ONDANSETRON 4 MILLIGRAM(S): 8 TABLET, FILM COATED ORAL at 14:57

## 2022-10-20 RX ADMIN — CEFTRIAXONE 100 MILLIGRAM(S): 500 INJECTION, POWDER, FOR SOLUTION INTRAMUSCULAR; INTRAVENOUS at 16:45

## 2022-10-20 NOTE — ED PROVIDER NOTE - OBJECTIVE STATEMENT
81 yo female with a hx of depression, hypothyroidism, overweight, Afib on Eliquis, sternal fracture presenting with 2 days of abdominal pain, nausea and vomiting. Pt started having nausea and multiple episodes of NBNB emesis 2 days ago. Also developed LUQ and epigastric sharp, non radiating abdominal pain without aggravating/alleviating factors. No constipation or diarrhea- last BM this morning was normal and is passing gas. Pt was advised to go to the ED by her PCP but deferred ED visit. Has not vomited today but reports mild nausea and decreased appetite. Did not take medications for her pain. Blood work done as outpatient showed transaminitis with elevated ALP and positive UA. Denies fevers, chills, dysuria, hematuria, cough, rhinorrhea, congestion, chest pain, shortness of breath, dizziness, syncope, headache, BRBPR, urinary urgency/frequency.

## 2022-10-20 NOTE — ED PROVIDER NOTE - CLINICAL SUMMARY MEDICAL DECISION MAKING FREE TEXT BOX
83 yo female with a hx of depression, hypothyroidism, overweight, Afib on Eliquis, sternal fracture presenting with 2 days of abdominal pain, nausea and vomiting. VS wnl. +LUQ and epigastric ttp on exam. Concern for pancreatitis, cholecystitis, biliary colic, obstruction, diverticulitis, colitis, UTI. No concern for cholangitis at this time. Will obtain imaging. Pain control, antiemetics and IVF. IV antibx for outpatient positive UA.

## 2022-10-20 NOTE — H&P ADULT - ASSESSMENT
81yo Female pt with PMH depression, hypothyroidism, Afib on Eliquis (last dose today) and PSH of L hip surgery presenting with 4 days of intermittent abdominal pain. In the ED pt is afebrile, VSS. WBC 6.57, Hb 11.0, Cr 0.42,  TBili  0.7, AST  82, ALT  184, AlkPhos  477, lipase 55. US: CBD 0.6 cm. Gallbladder - Cholelithiasis, wall thickening (0.6 cm), positive sonographic Aldana's sign, no pericholecystic fluid. Pancreas is enlarged and echogenic with heterogeneous echotexture. Patient's findings and exam are consistent with acute cholecystitis, differential included pancreatits, however less likely as lipase levels are normal.    Admit to regional   NPO/IVF  Pain/Nausea control PRN  Ceft/Flagyl  HSQ/SCD  IS/OOBA  AM labs  Home meds, hold eliquis  OR  Plans discussed with chief resident and attending     83yo Female pt with PMH depression, hypothyroidism, Afib on Eliquis (last dose today) and PSH of L hip surgery presenting with 4 days of intermittent abdominal pain. In the ED pt is afebrile, VSS. WBC 6.57, Hb 11.0, Cr 0.42,  TBili  0.7, AST  82, ALT  184, AlkPhos  477, lipase 55. US: CBD 0.6 cm. Gallbladder - Cholelithiasis, wall thickening (0.6 cm), positive sonographic Aldana's sign, no pericholecystic fluid. Pancreas is enlarged and echogenic with heterogeneous echotexture. Patient's findings and exam are consistent with acute cholecystitis, differential includes pancreatits, however less likely as lipase levels are normal.    Admit to tele  NPO/IVF  Pain/Nausea control PRN  Ceft/Flagyl  HSQ/SCD  IS/OOBA  AM labs  Home meds, hold eliquis  OR  Plans discussed with chief resident and attending

## 2022-10-20 NOTE — ED PROVIDER NOTE - PHYSICAL EXAMINATION
VITAL SIGNS: I have reviewed nursing notes and confirm.  CONSTITUTIONAL: Well appearing, in no acute distress.   SKIN:  warm and dry, no acute rash.   HEAD:  normocephalic, atraumatic.  EYES: EOM intact; conjunctiva and sclera clear.  ENT: No nasal discharge; airway clear.   NECK: Supple; non tender.  CARD: S1, S2 normal; no murmurs, gallops, or rubs. Regular rate and rhythm.   RESP:  Clear to auscultation b/l, no wheezes, rales or rhonchi.  ABD: Normal bowel sounds; soft; non-distended; no guarding/ rebound. +epigastric and LUQ ttp. No CVA tenderness   EXT: Normal ROM. No clubbing, cyanosis or edema. 2+ pulses to b/l ue/le.  NEURO: Alert, oriented, grossly unremarkable  PSYCH: Cooperative, mood and affect appropriate.

## 2022-10-20 NOTE — H&P ADULT - HISTORY OF PRESENT ILLNESS
81yo Female pt with PMH depression, hypothyroidism, Afib on Eliquis (last dose today) and PSH of L hip surgery presenting with 4 days of intermittent abdominal pain. Pt started having nausea and 2 episodes of NBNB emesis 2 days ago. Also developed LUQ and epigastric sharp, non radiating abdominal pain without aggravating/alleviating factors. No constipation or diarrhea- last BM this morning was normal and is passing gas. Pt was advised to go to the ED by her PCP but deferred ED visit. Has not vomited today but reports mild nausea and decreased appetite. Did not take medications for her pain. Blood work done as outpatient showed transaminitis with elevated ALP and positive UA. Denies fevers/chills, CP/SOB, dizziness, syncope, headache, BRBPR, urinary urgency/frequency. Reports that in the ED her pain is improved, denies nause or vomiting.    Last colonoscopy - 7 years ago normal  EGD - 30 years ago normal      ED: afebrile, VSS. WBC 6.57, Hb 11.0, Cr 0.42,  TBili  0.7, AST  82, ALT  184, AlkPhos  477, lipase 55. US: CBD 0.6 cm. Gallbladder - Cholelithiasis, wall thickening (0.6 cm), positive sonographic Aldana's sign, no pericholecystic fluid. Pancreas is enlarged and echogenic with heterogeneous echotexture. CT: fat stranding anterior to the body, no ductal dictation suugestive of acute pancreatitis    PMH: depression, hypothyroidism, Afib on Eliquis (last dose today)   PSH: L hip surgery   Social Hx: no ETOH, no smoking, no recreational drug use  Family Hx: Denies family hx of IBS, Crohn's, UC, or colon cancer.    Meds: eliquis 5BID, atorvastatin 80, metop succ 25, Levothyroxine 75

## 2022-10-20 NOTE — ED ADULT NURSE NOTE - OBJECTIVE STATEMENT
pt is an 83 y/o F, presenting to ED for c/o abd pain. PMH HTN, HLD, Hypothyroid. PSH pt is an 83 y/o F, presenting to ED for c/o abd pain. PMH HTN, HLD, Hypothyroid. PSH s/p PFO closure, on Eliquis. per pt, c/o lower abd pain for ~4 days with several episodes of vomiting. Pt states she was seen by PMD yesterday, had labs drawn and told to report to ED for "virus in the stomach". pt denies vomiting today, no diarrhea, fever, chills, cough, dysuria, cp or sob

## 2022-10-20 NOTE — ED ADULT NURSE REASSESSMENT NOTE - NS ED NURSE REASSESS COMMENT FT1
pt. was placed on t/p monitor, awaiting t/p arrival for t/p, pt. aware, with understanding verbalized

## 2022-10-20 NOTE — H&P ADULT - NSHPLABSRESULTS_GEN_ALL_CORE
.  LABS:                         11.0   6.57  )-----------( 282      ( 20 Oct 2022 14:27 )             32.5     10-20    141  |  106  |  12  ----------------------------<  97  4.0   |  22  |  0.42<L>    Ca    9.6      20 Oct 2022 14:27    TPro  6.8  /  Alb  3.7  /  TBili  0.7  /  DBili  x   /  AST  82<H>  /  ALT  184<H>  /  AlkPhos  477<H>  10-20      Urinalysis Basic - ( 20 Oct 2022 17:11 )    Color: Yellow / Appearance: SL Cloudy / S.015 / pH: x  Gluc: x / Ketone: 15 mg/dL  / Bili: Negative / Urobili: 1.0 E.U./dL   Blood: x / Protein: 30 mg/dL / Nitrite: NEGATIVE   Leuk Esterase: NEGATIVE / RBC: < 5 /HPF / WBC < 5 /HPF   Sq Epi: x / Non Sq Epi: 0-5 /HPF / Bacteria: Present /HPF            RADIOLOGY, EKG & ADDITIONAL TESTS: Reviewed.   FINDINGS:    Liver: Within normal limits.  Bile ducts: Normal caliber. Common bile duct measures 0.6 cm.  Gallbladder: Cholelithiasis. Gallbladder wall thickening (0.6 cm). Positive sonographic Aldana's sign. No pericholecystic fluid.  Pancreas: The pancreas is enlarged and echogenic with heterogeneous echotexture.  Right kidney: 10.4 cm. No hydronephrosis.  Ascites: None.  Aorta and IVC: Visualized portions are within normal limits.    IMPRESSION:    1. Enlarged, echogenic pancreas with heterogeneous echotexture, consistent with acute pancreatitis, similar to findings on CT 10/20/2022 of the abdomen and pelvis from the same day.    2. Cholelithiasis and mild common bile duct dilatation. Gallstone pancreatitis in the proper clinical setting cannot be excluded.    3. Diffuse gallbladder wall thickening and positive sonographic Aldana's sign, which may be in part reactive from adjacent pancreatitis however acute cholecystitis cannot be excluded and as clinically indicated further imaging to include HIDA scintigraphy may be of value.      PROCEDURE:  CT of the Abdomen and Pelvis was performed.  Sagittal and coronal reformats were performed.    FINDINGS:  LOWER CHEST: Within normal limits.    LIVER: Subcentimeter cyst.  BILE DUCTS: Normal caliber.  GALLBLADDER: Within normal limits.  SPLEEN: Within normal limits.  PANCREAS: Fat stranding anterior to the body. No ductal dictation.  ADRENALS: Within normal limits.  KIDNEYS/URETERS: Within normal limits.    BLADDER: Within normal limits.  REPRODUCTIVE ORGANS: No pelvic masses.    BOWEL: No bowel obstruction. Appendix is not visualized. No evidence of inflammation in the pericecal region. Extensive colonic diverticulosis.  PERITONEUM: No ascites.  VESSELS: Within normal limits.  RETROPERITONEUM/LYMPH NODES: No lymphadenopathy.  ABDOMINAL WALL: Within normal limits.  BONES: Within normal limits.    IMPRESSION:  Acute interstitial pancreatitis. Follow-up 2-4 weeks to ensure resolution.

## 2022-10-20 NOTE — ED ADULT TRIAGE NOTE - CHIEF COMPLAINT QUOTE
"Yesterday I went to the doctor and I told them I was vomiting for a few days and have pain in my stomach. They did a test and today they called that I have a virus."

## 2022-10-20 NOTE — ED PROVIDER NOTE - PROGRESS NOTE DETAILS
L shift on CBC, +transaminitis. UA negative here but obtained after dose of antibx. Will treat based on outpatient UA. Imaging significant for acute pancreatitis + acute cholecystitis. Added on flagyl to ceftriaxone. Surgery consulted. More pain meds ordered. Admit to surgery

## 2022-10-21 DIAGNOSIS — K81.0 ACUTE CHOLECYSTITIS: ICD-10-CM

## 2022-10-21 DIAGNOSIS — Z01.818 ENCOUNTER FOR OTHER PREPROCEDURAL EXAMINATION: ICD-10-CM

## 2022-10-21 DIAGNOSIS — I10 ESSENTIAL (PRIMARY) HYPERTENSION: ICD-10-CM

## 2022-10-21 DIAGNOSIS — I48.91 UNSPECIFIED ATRIAL FIBRILLATION: ICD-10-CM

## 2022-10-21 DIAGNOSIS — E87.20 ACIDOSIS, UNSPECIFIED: ICD-10-CM

## 2022-10-21 DIAGNOSIS — E03.9 HYPOTHYROIDISM, UNSPECIFIED: ICD-10-CM

## 2022-10-21 DIAGNOSIS — E78.5 HYPERLIPIDEMIA, UNSPECIFIED: ICD-10-CM

## 2022-10-21 LAB
ALBUMIN SERPL ELPH-MCNC: 3.9 G/DL — SIGNIFICANT CHANGE UP (ref 3.3–5)
ALP SERPL-CCNC: 482 U/L — HIGH (ref 40–120)
ALT FLD-CCNC: 136 U/L — HIGH (ref 10–45)
ANION GAP SERPL CALC-SCNC: 21 MMOL/L — HIGH (ref 5–17)
APTT BLD: 106.2 SEC — HIGH (ref 27.5–35.5)
APTT BLD: 33.1 SEC — SIGNIFICANT CHANGE UP (ref 27.5–35.5)
AST SERPL-CCNC: 48 U/L — HIGH (ref 10–40)
BILIRUB SERPL-MCNC: 0.7 MG/DL — SIGNIFICANT CHANGE UP (ref 0.2–1.2)
BUN SERPL-MCNC: 10 MG/DL — SIGNIFICANT CHANGE UP (ref 7–23)
CALCIUM SERPL-MCNC: 9.2 MG/DL — SIGNIFICANT CHANGE UP (ref 8.4–10.5)
CHLORIDE SERPL-SCNC: 98 MMOL/L — SIGNIFICANT CHANGE UP (ref 96–108)
CO2 SERPL-SCNC: 15 MMOL/L — LOW (ref 22–31)
CREAT SERPL-MCNC: 0.45 MG/DL — LOW (ref 0.5–1.3)
EGFR: 97 ML/MIN/1.73M2 — SIGNIFICANT CHANGE UP
GGT SERPL-CCNC: 694 U/L — HIGH (ref 8–40)
GLUCOSE BLDC GLUCOMTR-MCNC: 103 MG/DL — HIGH (ref 70–99)
GLUCOSE BLDC GLUCOMTR-MCNC: 134 MG/DL — HIGH (ref 70–99)
GLUCOSE BLDC GLUCOMTR-MCNC: 73 MG/DL — SIGNIFICANT CHANGE UP (ref 70–99)
GLUCOSE SERPL-MCNC: 67 MG/DL — LOW (ref 70–99)
HCT VFR BLD CALC: 34.6 % — SIGNIFICANT CHANGE UP (ref 34.5–45)
HGB BLD-MCNC: 11 G/DL — LOW (ref 11.5–15.5)
INR BLD: 1.37 — HIGH (ref 0.88–1.16)
MAGNESIUM SERPL-MCNC: 2 MG/DL — SIGNIFICANT CHANGE UP (ref 1.6–2.6)
MCHC RBC-ENTMCNC: 30.4 PG — SIGNIFICANT CHANGE UP (ref 27–34)
MCHC RBC-ENTMCNC: 31.8 GM/DL — LOW (ref 32–36)
MCV RBC AUTO: 95.6 FL — SIGNIFICANT CHANGE UP (ref 80–100)
NRBC # BLD: 0 /100 WBCS — SIGNIFICANT CHANGE UP (ref 0–0)
PHOSPHATE SERPL-MCNC: 4 MG/DL — SIGNIFICANT CHANGE UP (ref 2.5–4.5)
PLATELET # BLD AUTO: 255 K/UL — SIGNIFICANT CHANGE UP (ref 150–400)
POTASSIUM SERPL-MCNC: 3.7 MMOL/L — SIGNIFICANT CHANGE UP (ref 3.5–5.3)
POTASSIUM SERPL-SCNC: 3.7 MMOL/L — SIGNIFICANT CHANGE UP (ref 3.5–5.3)
PROT SERPL-MCNC: 7 G/DL — SIGNIFICANT CHANGE UP (ref 6–8.3)
PROTHROM AB SERPL-ACNC: 16.4 SEC — HIGH (ref 10.5–13.4)
RBC # BLD: 3.62 M/UL — LOW (ref 3.8–5.2)
RBC # FLD: 13.5 % — SIGNIFICANT CHANGE UP (ref 10.3–14.5)
SODIUM SERPL-SCNC: 134 MMOL/L — LOW (ref 135–145)
WBC # BLD: 8.33 K/UL — SIGNIFICANT CHANGE UP (ref 3.8–10.5)
WBC # FLD AUTO: 8.33 K/UL — SIGNIFICANT CHANGE UP (ref 3.8–10.5)

## 2022-10-21 PROCEDURE — 99233 SBSQ HOSP IP/OBS HIGH 50: CPT

## 2022-10-21 PROCEDURE — 71045 X-RAY EXAM CHEST 1 VIEW: CPT | Mod: 26

## 2022-10-21 PROCEDURE — 93306 TTE W/DOPPLER COMPLETE: CPT | Mod: 26

## 2022-10-21 PROCEDURE — 99221 1ST HOSP IP/OBS SF/LOW 40: CPT

## 2022-10-21 PROCEDURE — 99222 1ST HOSP IP/OBS MODERATE 55: CPT

## 2022-10-21 RX ORDER — ACETAMINOPHEN 500 MG
1000 TABLET ORAL ONCE
Refills: 0 | Status: COMPLETED | OUTPATIENT
Start: 2022-10-21 | End: 2022-10-21

## 2022-10-21 RX ORDER — SODIUM CHLORIDE 9 MG/ML
1000 INJECTION, SOLUTION INTRAVENOUS
Refills: 0 | Status: DISCONTINUED | OUTPATIENT
Start: 2022-10-21 | End: 2022-10-25

## 2022-10-21 RX ORDER — POTASSIUM CHLORIDE 20 MEQ
10 PACKET (EA) ORAL
Refills: 0 | Status: DISCONTINUED | OUTPATIENT
Start: 2022-10-21 | End: 2022-10-22

## 2022-10-21 RX ORDER — HEPARIN SODIUM 5000 [USP'U]/ML
800 INJECTION INTRAVENOUS; SUBCUTANEOUS
Qty: 25000 | Refills: 0 | Status: DISCONTINUED | OUTPATIENT
Start: 2022-10-21 | End: 2022-10-23

## 2022-10-21 RX ORDER — DEXTROSE 50 % IN WATER 50 %
25 SYRINGE (ML) INTRAVENOUS ONCE
Refills: 0 | Status: COMPLETED | OUTPATIENT
Start: 2022-10-21 | End: 2022-10-21

## 2022-10-21 RX ORDER — POTASSIUM CHLORIDE 20 MEQ
10 PACKET (EA) ORAL
Refills: 0 | Status: DISCONTINUED | OUTPATIENT
Start: 2022-10-21 | End: 2022-10-21

## 2022-10-21 RX ADMIN — SODIUM CHLORIDE 90 MILLILITER(S): 9 INJECTION, SOLUTION INTRAVENOUS at 12:28

## 2022-10-21 RX ADMIN — Medication 100 MILLIGRAM(S): at 03:47

## 2022-10-21 RX ADMIN — CEFTRIAXONE 100 MILLIGRAM(S): 500 INJECTION, POWDER, FOR SOLUTION INTRAMUSCULAR; INTRAVENOUS at 22:46

## 2022-10-21 RX ADMIN — Medication 100 MILLIGRAM(S): at 22:46

## 2022-10-21 RX ADMIN — HEPARIN SODIUM 8 UNIT(S)/HR: 5000 INJECTION INTRAVENOUS; SUBCUTANEOUS at 13:31

## 2022-10-21 RX ADMIN — Medication 25 MILLIGRAM(S): at 06:55

## 2022-10-21 RX ADMIN — Medication 400 MILLIGRAM(S): at 09:45

## 2022-10-21 RX ADMIN — Medication 25 MILLILITER(S): at 09:45

## 2022-10-21 RX ADMIN — Medication 400 MILLIGRAM(S): at 21:30

## 2022-10-21 RX ADMIN — Medication 100 MILLIEQUIVALENT(S): at 09:46

## 2022-10-21 RX ADMIN — Medication 1000 MILLIGRAM(S): at 10:00

## 2022-10-21 RX ADMIN — Medication 75 MICROGRAM(S): at 06:55

## 2022-10-21 RX ADMIN — ATORVASTATIN CALCIUM 80 MILLIGRAM(S): 80 TABLET, FILM COATED ORAL at 22:46

## 2022-10-21 RX ADMIN — HEPARIN SODIUM 5000 UNIT(S): 5000 INJECTION INTRAVENOUS; SUBCUTANEOUS at 06:57

## 2022-10-21 RX ADMIN — SODIUM CHLORIDE 90 MILLILITER(S): 9 INJECTION, SOLUTION INTRAVENOUS at 09:45

## 2022-10-21 RX ADMIN — Medication 100 MILLIGRAM(S): at 12:28

## 2022-10-21 NOTE — PROGRESS NOTE ADULT - ASSESSMENT
83yo Female pt with PMH depression, hypothyroidism, Afib on Eliquis (last dose today) and PSH of L hip surgery presenting with 4 days of intermittent abdominal pain. In the ED pt is afebrile, VSS. WBC 6.57, Hb 11.0, Cr 0.42,  TBili  0.7, AST  82, ALT  184, AlkPhos  477, lipase 55. US: CBD 0.6 cm. Gallbladder - Cholelithiasis, wall thickening (0.6 cm), positive sonographic Aldana's sign, no pericholecystic fluid. Pancreas is enlarged and echogenic with heterogeneous echotexture. Patient's findings and exam are consistent with acute cholecystitis, differential included pancreatitis however less likely as lipase levels are normal.    NPO/IVF  Pain/Nausea control PRN  Ceft/Flagyl  HSQ/SCD  IS/OOBA  AM labs  Home meds, hold eliquis  ALkphos elevated, will send GGT

## 2022-10-21 NOTE — CONSULT NOTE ADULT - PROBLEM SELECTOR RECOMMENDATION 7
RCRI score 1 for elevated risk surgery, Fox 0.3% PIPE risk  - METs > 4 by DASI  - no further cardiac work up indicated  - medically optimized for OR

## 2022-10-21 NOTE — CONSULT NOTE ADULT - ASSESSMENT
81yo Female pt with PMH depression, hypothyroidism, Afib on Eliquis (last dose today) and PSH of L hip surgery presenting with 4 days of intermittent abdominal pain, found to have cholecystitis.      #Pre op  EKG:   TTE in 2021: EF 60%   83yo Female pt with PMH depression, hypothyroidism, Afib on Eliquis (last dose today) and PSH of L hip surgery presenting with 4 days of intermittent abdominal pain, found to have cholecystitis.      #Pre op  EKG nonischemic  TTE in 2021: EF 60%  METs > 4  -Intermediate risk for intermediate risk procedure  -No further cardiac workup  -Continue statin and beta blocker  -No indication for heparin bridge given CHADSVASC 3. Resume eliquis post procedure

## 2022-10-21 NOTE — CONSULT NOTE ADULT - PROBLEM SELECTOR RECOMMENDATION 2
- CHADsVASC score 4 - on Eliquis at home.  Would start hep gtt pending OR  - c/w Toprol 25mg for rate control

## 2022-10-21 NOTE — CONSULT NOTE ADULT - ATTENDING COMMENTS
Initial attending contact date 10/21/22      . See fellow note written above for details. I reviewed the fellow documentation. I have personally seen and examined this patient. I reviewed vitals, labs, medications, cardiac studies, and additional imaging. I agree with the above fellow's findings and plans as written above with the following additions/statements.    81yo F depression, hypothyroidism, P Afib on Eliquis (last dose today), recent PFO closure? and PSH of L hip surgery presenting with 4 days of intermittent abdominal pain, found to have cholecystitis.  -Cardiology consulted for pre-op risk assessment   -Prior to admission, pt able to walk 4 METS without anginal symptoms   -EKG NSR nonischemic   -Last ECHO 2021 with normal LVEF  -No need for further work up  -No need for bridging while holding eliquis, chadsvasc = 3, restart post OR  -Pt considered intermediate risk for int risk procedure

## 2022-10-21 NOTE — CONSULT NOTE ADULT - PROBLEM SELECTOR RECOMMENDATION 9
- abx per surgery team  - Pain control   - c/w IVF while NPO  - holding Eliquis for possible OR  - f/u MRCP

## 2022-10-21 NOTE — CONSULT NOTE ADULT - ASSESSMENT
81 yo F with hx of HTN, depression, hypothyroidism, Afib on Eliquis (last dose 10/20) who presented with abdominal pain and exam and imaging findings consistent with acute cholecystitis

## 2022-10-21 NOTE — CONSULT NOTE ADULT - PROBLEM SELECTOR RECOMMENDATION 6
AG 21, ketones in urine.  Likely starvation ketosis in setting of no PO intake in 3 days  - would start D5 while NPO and monitor FSG q6

## 2022-10-21 NOTE — CONSULT NOTE ADULT - SUBJECTIVE AND OBJECTIVE BOX
HPI:  83yo Female pt with PMH depression, hypothyroidism, Afib on Eliquis (last dose today) and PSH of L hip surgery presenting with 4 days of intermittent abdominal pain. Pt started having nausea and 2 episodes of NBNB emesis 2 days ago. Also developed LUQ and epigastric sharp, non radiating abdominal pain without aggravating/alleviating factors. No constipation or diarrhea- last BM this morning was normal and is passing gas. Pt was advised to go to the ED by her PCP but deferred ED visit. Has not vomited today but reports mild nausea and decreased appetite. Did not take medications for her pain. Blood work done as outpatient showed transaminitis with elevated ALP and positive UA. Denies fevers/chills, CP/SOB, dizziness, syncope, headache, BRBPR, urinary urgency/frequency. Reports that in the ED her pain is improved, denies nause or vomiting.    Last colonoscopy - 7 years ago normal  EGD - 30 years ago normal      ED: afebrile, VSS. WBC 6.57, Hb 11.0, Cr 0.42,  TBili  0.7, AST  82, ALT  184, AlkPhos  477, lipase 55. US: CBD 0.6 cm. Gallbladder - Cholelithiasis, wall thickening (0.6 cm), positive sonographic Aldana's sign, no pericholecystic fluid. Pancreas is enlarged and echogenic with heterogeneous echotexture. CT: fat stranding anterior to the body, no ductal dictation suugestive of acute pancreatitis    PMH: depression, hypothyroidism, Afib on Eliquis (last dose today)   PSH: L hip surgery   Social Hx: no ETOH, no smoking, no recreational drug use  Family Hx: Denies family hx of IBS, Crohn's, UC, or colon cancer.    Meds: eliquis 5BID, atorvastatin 80, metop succ 25, Levothyroxine 75     (20 Oct 2022 23:10)    Pt follows with Dr. Styles outpatient.     ROS: A 10-point review of systems was otherwise negative.    PAST MEDICAL & SURGICAL HISTORY:  Depression      Thyroid disorder      Afib      HLD (hyperlipidemia)      History of hip replacement  R, for degenerative pain          SOCIAL HISTORY:  FAMILY HISTORY:  FH: prostate cancer        ALLERGIES: 	  No Known Allergies            MEDICATIONS:  atorvastatin 80 milliGRAM(s) Oral at bedtime  cefTRIAXone   IVPB 1000 milliGRAM(s) IV Intermittent every 24 hours  dextrose 5% + sodium chloride 0.45%. 1000 milliLiter(s) IV Continuous <Continuous>  heparin  Infusion 800 Unit(s)/Hr IV Continuous <Continuous>  levothyroxine 75 MICROGram(s) Oral daily  metoprolol succinate ER 25 milliGRAM(s) Oral daily  metroNIDAZOLE  IVPB 500 milliGRAM(s) IV Intermittent every 8 hours  ondansetron Injectable 4 milliGRAM(s) IV Push every 6 hours PRN  potassium chloride  10 mEq/100 mL IVPB 10 milliEquivalent(s) IV Intermittent every 1 hour      HOME MEDICATIONS:  atorvastatin 80 mg oral tablet: 1 tab(s) orally once a day  Eliquis 5 mg oral tablet: 1 tab(s) orally 2 times a day  levothyroxine 75 mcg (0.075 mg) oral tablet: 1 tab(s) orally once a day  metoprolol succinate 25 mg oral tablet, extended release: 1 tab(s) orally once a day      PHYSICAL EXAM:      I/O Summary 24H    IN: 820 mL / OUT: 150 mL / NET: 670 mL        T(F): 99.2 (10-21-22 @ 09:48), Max: 99.2 (10-21-22 @ 09:48)  HR: 72 (10-21-22 @ 12:25) (72 - 108)  BP: 113/58 (10-21-22 @ 12:25) (113/58 - 169/79)  BP(mean): 81 (10-21-22 @ 12:25) (81 - 116)  ABP: --  ABP(mean): --  RR: 16 (10-21-22 @ 12:25) (16 - 17)  SpO2: 95% (10-21-22 @ 12:25) (92% - 97%)    GEN: Awake, comfortable. NAD.   HEENT: NCAT, PERRL, EOMI. Mucosa moist. No JVD.   RESP: CTA b/l  CV: RRR, normal s1/s2. No m/r/g.  ABD: Soft, NTND. BS+  EXT: Warm. No edema, clubbing, or cyanosis.   NEURO: AAOx3. No focal deficits.      	  LABS:	 	    CARDIAC MARKERS:              CBC 10-21-22 @ 07:11                        11.0   8.33  )-----------( 255                   34.6       Hgb trend: 11.0 <-- , 11.0 <--   WBC trend: 8.33 <-- , 6.57 <--     CMP 10-21-22 @ 07:11    134<L>  |  98  |  10  ----------------------------<  67<L>  3.7   |  15<L>  |  0.45<L>    Ca    9.2      10-21-22 @ 07:11  Phos  4.0     10-21  Mg     2.0     10-21    TPro  7.0  /  Alb  3.9  /  TBili  0.7  /  DBili  x   /  AST  48<H>  /  ALT  136<H>  /  AlkPhos  482<H>  10-21      Serum Cr trend: 0.45 <-- , 0.42 <--   proBNP:   Lipid Profile:   HgA1c:   TSH:     TELEMETRY: 	    ECG:  	  RADIOLOGY:   ECHO:   - ECHO (03/26/2019): EF 60%, mild diastolic dysfx (I), trival MR, mild TR (PAP 25)   - ECHO (03/10/2020): EF 60%, mild diastolic dysfx (I), trival MR, mild TR (PAP )    - ECHO (3/16/2021): EF 60%, mild diastolic dysfx (I), normal LA/RA/RV size and function, mild AV/MV thickening, trivial MR/TR (PAP 26).   STRESS:  CATH:

## 2022-10-21 NOTE — PROGRESS NOTE ADULT - SUBJECTIVE AND OBJECTIVE BOX
SUBJECTIVE: Pt seen and examined by chief resident. Pt is doing well, resting comfortably on bed. Reporting some pain . No nausea or vomiting. .      Vital Signs Last 24 Hrs  T(C): 37.2 (21 Oct 2022 00:11), Max: 37.2 (21 Oct 2022 00:11)  T(F): 98.9 (21 Oct 2022 00:11), Max: 98.9 (21 Oct 2022 00:11)  HR: 108 (21 Oct 2022 03:46) (76 - 108)  BP: 159/70 (21 Oct 2022 03:46) (136/77 - 169/79)  BP(mean): 95 (21 Oct 2022 03:46) (95 - 116)  RR: 16 (21 Oct 2022 03:46) (16 - 18)  SpO2: 92% (21 Oct 2022 03:46) (92% - 97%)    Parameters below as of 21 Oct 2022 03:46  Patient On (Oxygen Delivery Method): room air        I&O's Summary    20 Oct 2022 07:01  -  21 Oct 2022 07:00  --------------------------------------------------------  IN: 550 mL / OUT: 150 mL / NET: 400 mL    Physical Exam:  General Appearance: Appears well, NAD  Pulmonary: Nonlabored breathing, no respiratory distress  Abdomen: Soft, nondisteded, mild tenderness in the RUQ  Extremities: WWP, SCD's in place     LABS:                        11.0   6.57  )-----------( 282      ( 20 Oct 2022 14:27 )             32.5     10-20    141  |  106  |  12  ----------------------------<  97  4.0   |  22  |  0.42<L>    Ca    9.6      20 Oct 2022 14:27    TPro  6.8  /  Alb  3.7  /  TBili  0.7  /  DBili  x   /  AST  82<H>  /  ALT  184<H>  /  AlkPhos  477<H>  10-20      Urinalysis Basic - ( 20 Oct 2022 17:11 )    Color: Yellow / Appearance: SL Cloudy / S.015 / pH: x  Gluc: x / Ketone: 15 mg/dL  / Bili: Negative / Urobili: 1.0 E.U./dL   Blood: x / Protein: 30 mg/dL / Nitrite: NEGATIVE   Leuk Esterase: NEGATIVE / RBC: < 5 /HPF / WBC < 5 /HPF   Sq Epi: x / Non Sq Epi: 0-5 /HPF / Bacteria: Present /HPF

## 2022-10-21 NOTE — CONSULT NOTE ADULT - SUBJECTIVE AND OBJECTIVE BOX
Patient is a 82y old  Female who presents with a chief complaint of     HPI:  83yo Female pt with PMH depression, hypothyroidism, Afib on Eliquis (last dose today) and PSH of L hip surgery presenting with 4 days of intermittent abdominal pain. Pt started having nausea and 2 episodes of NBNB emesis 2 days ago. Also developed LUQ and epigastric sharp, non radiating abdominal pain without aggravating/alleviating factors. No constipation or diarrhea- last BM this morning was normal and is passing gas. Pt was advised to go to the ED by her PCP but deferred ED visit. Has not vomited today but reports mild nausea and decreased appetite. Did not take medications for her pain. Blood work done as outpatient showed transaminitis with elevated ALP and positive UA. Denies fevers/chills, CP/SOB, dizziness, syncope, headache, BRBPR, urinary urgency/frequency. Reports that in the ED her pain is improved, denies nause or vomiting.    Last colonoscopy - 7 years ago normal  EGD - 30 years ago normal      ED: afebrile, VSS. WBC 6.57, Hb 11.0, Cr 0.42,  TBili  0.7, AST  82, ALT  184, AlkPhos  477, lipase 55. US: CBD 0.6 cm. Gallbladder - Cholelithiasis, wall thickening (0.6 cm), positive sonographic Aldana's sign, no pericholecystic fluid. Pancreas is enlarged and echogenic with heterogeneous echotexture. CT: fat stranding anterior to the body, no ductal dictation suugestive of acute pancreatitis    PMH: depression, hypothyroidism, Afib on Eliquis (last dose today)   PSH: L hip surgery   Social Hx: no ETOH, no smoking, no recreational drug use  Family Hx: Denies family hx of IBS, Crohn's, UC, or colon cancer.    Meds: eliquis 5BID, atorvastatin 80, metop succ 25, Levothyroxine 75     (20 Oct 2022 23:10)    Patient seen and examined at bedside.  Reports that abd pain improved since admission.  Last PO intake 3 days ago.  Last dose of Eliquis was yesterday prior to admission.  Denies fever, chills, CP, SOB, headache, N/V, diarrhea.  Last BM yesterday.    Has had L hip surgery without complication.  Walks with rollator but able to walk > 2 city blocks, up 1 flight of stairs w/o chest pain.  No hx of CHF, MI, DM.       REVIEW OF SYSTEMS: see HPI    PAST MEDICAL & SURGICAL HISTORY:  Depression      Thyroid disorder      Afib      HLD (hyperlipidemia)      History of hip replacement  R, for degenerative pain          FAMILY HISTORY:  Prostate cancer in brother.  No family hx of cardiac disease    SOCIAL HISTORY:  Tobacco use: None  EtOH use: None   Recreational drug use: None    MEDICATIONS:  MEDICATIONS  (STANDING):  atorvastatin 80 milliGRAM(s) Oral at bedtime  cefTRIAXone   IVPB 1000 milliGRAM(s) IV Intermittent every 24 hours  dextrose 5% + sodium chloride 0.45%. 1000 milliLiter(s) (90 mL/Hr) IV Continuous <Continuous>  heparin   Injectable 5000 Unit(s) SubCutaneous every 8 hours  levothyroxine 75 MICROGram(s) Oral daily  metoprolol succinate ER 25 milliGRAM(s) Oral daily  metroNIDAZOLE  IVPB 500 milliGRAM(s) IV Intermittent every 8 hours  potassium chloride  10 mEq/100 mL IVPB 10 milliEquivalent(s) IV Intermittent every 1 hour    MEDICATIONS  (PRN):  ondansetron Injectable 4 milliGRAM(s) IV Push every 6 hours PRN Nausea      ALLERGIES:  Allergies    No Known Allergies    Intolerances        VITAL SIGNS:  Vital Signs Last 24 Hrs  T(C): 37.3 (21 Oct 2022 09:48), Max: 37.3 (21 Oct 2022 09:48)  T(F): 99.2 (21 Oct 2022 09:48), Max: 99.2 (21 Oct 2022 09:48)  HR: 80 (21 Oct 2022 08:50) (76 - 108)  BP: 124/57 (21 Oct 2022 08:50) (124/57 - 169/79)  BP(mean): 82 (21 Oct 2022 08:50) (82 - 116)  RR: 17 (21 Oct 2022 08:50) (16 - 18)  SpO2: 94% (21 Oct 2022 08:50) (92% - 97%)    Parameters below as of 21 Oct 2022 08:50  Patient On (Oxygen Delivery Method): room air        10-20-22 @ 07:01  -  10-21-22 @ 07:00  --------------------------------------------------------  IN:    IV PiggyBack: 100 mL    Lactated Ringers: 720 mL  Total IN: 820 mL    OUT:    Voided (mL): 150 mL  Total OUT: 150 mL    Total NET: 670 mL          PHYSICAL EXAM:  Constitutional: resting comfortably in bed; NAD  Head: NC/AT  Eyes: PERRL, EOMI, anicteric sclera  ENT: no nasal discharge; uvula midline, no oropharyngeal erythema or exudates; MMM  Neck: supple; no JVD  Respiratory: CTA B/L; no W/R/R, no retractions  Cardiac: +S1/S2; RRR; no M/R/G;  Gastrointestinal: abdomen soft, NT/ND; no rebound or guarding; +BSx4  Extremities: WWP, no clubbing or cyanosis; no peripheral edema  Musculoskeletal: NROM x4; no joint swelling, tenderness or erythema  Vascular: 2+ radial, femoral, DP/PT pulses B/L  Dermatologic: skin warm, dry and intact; no rashes, wounds, or scars  Lymphatic: no submandibular or cervical LAD  Neurologic: AAOx3; CNII-XII grossly intact; no focal deficits  Psychiatric: affect and characteristics of appearance, verbalizations, behaviors are appropriate    LABS:                        11.0   8.33  )-----------( 255      ( 21 Oct 2022 07:11 )             34.6     10-    134<L>  |  98  |  10  ----------------------------<  67<L>  3.7   |  15<L>  |  0.45<L>    Ca    9.2      21 Oct 2022 07:11  Phos  4.0     10-  Mg     2.0     10-    TPro  7.0  /  Alb  3.9  /  TBili  0.7  /  DBili  x   /  AST  48<H>  /  ALT  136<H>  /  AlkPhos  482<H>  10-21    PT/INR - ( 21 Oct 2022 07:11 )   PT: 16.4 sec;   INR: 1.37          PTT - ( 21 Oct 2022 07:11 )  PTT:33.1 sec  Urinalysis Basic - ( 20 Oct 2022 17:11 )    Color: Yellow / Appearance: SL Cloudy / S.015 / pH: x  Gluc: x / Ketone: 15 mg/dL  / Bili: Negative / Urobili: 1.0 E.U./dL   Blood: x / Protein: 30 mg/dL / Nitrite: NEGATIVE   Leuk Esterase: NEGATIVE / RBC: < 5 /HPF / WBC < 5 /HPF   Sq Epi: x / Non Sq Epi: 0-5 /HPF / Bacteria: Present /HPF          CAPILLARY BLOOD GLUCOSE      POCT Blood Glucose.: 134 mg/dL (21 Oct 2022 11:04)  POCT Blood Glucose.: 73 mg/dL (21 Oct 2022 09:46)          RADIOLOGY & ADDITIONAL TESTS: Reviewed.  < from: US Abdomen Limited (10.20.22 @ 18:18) >  IMPRESSION:    1. Enlarged, echogenic pancreas with heterogeneous echotexture,   consistent with acute pancreatitis, similar to findings on CT 10/20/2022   of the abdomen and pelvis from the same day.    2. Cholelithiasis and mild common bile duct dilatation. Gallstone   pancreatitis in the proper clinical setting cannot be excluded.    3. Diffuse gallbladder wall thickening and positive sonographic Aldana's   sign, which may be in part reactive from adjacent pancreatitis however   acute cholecystitis cannot be excluded and as clinically indicated   further imaging to include HIDA scintigraphy may be of value.    < end of copied text >    < from: Xray Chest 1 View- PORTABLE-Urgent (Xray Chest 1 View- PORTABLE-Urgent .) (10.21.22 @ 08:02) >  Findings/  impression: Left loop recorder. Cardiac device Cardiomegaly. [Cardiac   device]. Lungs and mediastinum are unremarkable. Stable bony structures.   Scoliosis.    < end of copied text >

## 2022-10-22 LAB
ALBUMIN SERPL ELPH-MCNC: 3.2 G/DL — LOW (ref 3.3–5)
ALP SERPL-CCNC: 382 U/L — HIGH (ref 40–120)
ALT FLD-CCNC: 94 U/L — HIGH (ref 10–45)
ANION GAP SERPL CALC-SCNC: 16 MMOL/L — SIGNIFICANT CHANGE UP (ref 5–17)
APTT BLD: 131.3 SEC — CRITICAL HIGH (ref 27.5–35.5)
APTT BLD: 62 SEC — HIGH (ref 27.5–35.5)
APTT BLD: 81 SEC — HIGH (ref 27.5–35.5)
APTT BLD: 99 SEC — HIGH (ref 27.5–35.5)
AST SERPL-CCNC: 29 U/L — SIGNIFICANT CHANGE UP (ref 10–40)
BILIRUB SERPL-MCNC: 0.6 MG/DL — SIGNIFICANT CHANGE UP (ref 0.2–1.2)
BUN SERPL-MCNC: 8 MG/DL — SIGNIFICANT CHANGE UP (ref 7–23)
CALCIUM SERPL-MCNC: 9.3 MG/DL — SIGNIFICANT CHANGE UP (ref 8.4–10.5)
CHLORIDE SERPL-SCNC: 102 MMOL/L — SIGNIFICANT CHANGE UP (ref 96–108)
CO2 SERPL-SCNC: 18 MMOL/L — LOW (ref 22–31)
CREAT SERPL-MCNC: 0.46 MG/DL — LOW (ref 0.5–1.3)
EGFR: 97 ML/MIN/1.73M2 — SIGNIFICANT CHANGE UP
GLUCOSE SERPL-MCNC: 95 MG/DL — SIGNIFICANT CHANGE UP (ref 70–99)
HCT VFR BLD CALC: 31.3 % — LOW (ref 34.5–45)
HGB BLD-MCNC: 10.5 G/DL — LOW (ref 11.5–15.5)
MAGNESIUM SERPL-MCNC: 2.3 MG/DL — SIGNIFICANT CHANGE UP (ref 1.6–2.6)
MCHC RBC-ENTMCNC: 30.8 PG — SIGNIFICANT CHANGE UP (ref 27–34)
MCHC RBC-ENTMCNC: 33.5 GM/DL — SIGNIFICANT CHANGE UP (ref 32–36)
MCV RBC AUTO: 91.8 FL — SIGNIFICANT CHANGE UP (ref 80–100)
NRBC # BLD: 0 /100 WBCS — SIGNIFICANT CHANGE UP (ref 0–0)
PHOSPHATE SERPL-MCNC: 3.3 MG/DL — SIGNIFICANT CHANGE UP (ref 2.5–4.5)
PLATELET # BLD AUTO: 247 K/UL — SIGNIFICANT CHANGE UP (ref 150–400)
POTASSIUM SERPL-MCNC: 3.4 MMOL/L — LOW (ref 3.5–5.3)
POTASSIUM SERPL-SCNC: 3.4 MMOL/L — LOW (ref 3.5–5.3)
PROT SERPL-MCNC: 6.2 G/DL — SIGNIFICANT CHANGE UP (ref 6–8.3)
RBC # BLD: 3.41 M/UL — LOW (ref 3.8–5.2)
RBC # FLD: 13.5 % — SIGNIFICANT CHANGE UP (ref 10.3–14.5)
SODIUM SERPL-SCNC: 136 MMOL/L — SIGNIFICANT CHANGE UP (ref 135–145)
WBC # BLD: 5.27 K/UL — SIGNIFICANT CHANGE UP (ref 3.8–10.5)
WBC # FLD AUTO: 5.27 K/UL — SIGNIFICANT CHANGE UP (ref 3.8–10.5)

## 2022-10-22 RX ORDER — LANOLIN ALCOHOL/MO/W.PET/CERES
3 CREAM (GRAM) TOPICAL AT BEDTIME
Refills: 0 | Status: DISCONTINUED | OUTPATIENT
Start: 2022-10-22 | End: 2022-10-25

## 2022-10-22 RX ORDER — POTASSIUM CHLORIDE 20 MEQ
40 PACKET (EA) ORAL ONCE
Refills: 0 | Status: COMPLETED | OUTPATIENT
Start: 2022-10-22 | End: 2022-10-22

## 2022-10-22 RX ORDER — ACETAMINOPHEN 500 MG
1000 TABLET ORAL EVERY 6 HOURS
Refills: 0 | Status: DISCONTINUED | OUTPATIENT
Start: 2022-10-22 | End: 2022-10-25

## 2022-10-22 RX ADMIN — ATORVASTATIN CALCIUM 80 MILLIGRAM(S): 80 TABLET, FILM COATED ORAL at 22:29

## 2022-10-22 RX ADMIN — HEPARIN SODIUM 5 UNIT(S)/HR: 5000 INJECTION INTRAVENOUS; SUBCUTANEOUS at 18:32

## 2022-10-22 RX ADMIN — Medication 100 MILLIGRAM(S): at 14:50

## 2022-10-22 RX ADMIN — Medication 1000 MILLIGRAM(S): at 11:59

## 2022-10-22 RX ADMIN — Medication 75 MICROGRAM(S): at 06:11

## 2022-10-22 RX ADMIN — Medication 100 MILLIGRAM(S): at 06:13

## 2022-10-22 RX ADMIN — SODIUM CHLORIDE 90 MILLILITER(S): 9 INJECTION, SOLUTION INTRAVENOUS at 14:50

## 2022-10-22 RX ADMIN — Medication 1000 MILLIGRAM(S): at 12:48

## 2022-10-22 RX ADMIN — CEFTRIAXONE 100 MILLIGRAM(S): 500 INJECTION, POWDER, FOR SOLUTION INTRAMUSCULAR; INTRAVENOUS at 16:53

## 2022-10-22 RX ADMIN — Medication 1000 MILLIGRAM(S): at 19:21

## 2022-10-22 RX ADMIN — Medication 1000 MILLIGRAM(S): at 18:31

## 2022-10-22 RX ADMIN — Medication 40 MILLIEQUIVALENT(S): at 09:53

## 2022-10-22 RX ADMIN — Medication 100 MILLIGRAM(S): at 22:29

## 2022-10-22 RX ADMIN — Medication 25 MILLIGRAM(S): at 06:13

## 2022-10-22 RX ADMIN — Medication 3 MILLIGRAM(S): at 22:32

## 2022-10-22 NOTE — PROVIDER CONTACT NOTE (CRITICAL VALUE NOTIFICATION) - BACKGROUND
Pt has a hx of afib, admitted with pancreatitis and acute cholecystitis, no surgical intervention done yet, currently npo for pending ERCP.

## 2022-10-22 NOTE — PROGRESS NOTE ADULT - SUBJECTIVE AND OBJECTIVE BOX
SURGERY DAILY PROGRESS NOTE:       SUBJECTIVE/ROS: No acute overnight events. Patient seen and examined at bedside during morning rounds. Doing well, no complaints.    OBJECTIVE:    Vital Signs Last 24 Hrs  T(C): 36.9 (22 Oct 2022 21:32), Max: 37.1 (22 Oct 2022 04:44)  T(F): 98.5 (22 Oct 2022 21:32), Max: 98.7 (22 Oct 2022 04:44)  HR: 70 (22 Oct 2022 20:36) (66 - 76)  BP: 157/74 (22 Oct 2022 20:36) (133/69 - 157/74)  BP(mean): 106 (22 Oct 2022 20:36) (91 - 106)  RR: 17 (22 Oct 2022 20:36) (16 - 18)  SpO2: 94% (22 Oct 2022 20:36) (94% - 99%)    Parameters below as of 22 Oct 2022 20:36  Patient On (Oxygen Delivery Method): room air        I&O's Detail    21 Oct 2022 07:01  -  22 Oct 2022 07:00  --------------------------------------------------------  IN:    dextrose 5% + sodium chloride 0.45%: 1710 mL    Heparin: 104 mL  Total IN: 1814 mL    OUT:    Oral Fluid: 0 mL    Voided (mL): 1100 mL  Total OUT: 1100 mL    Total NET: 714 mL      22 Oct 2022 07:01  -  22 Oct 2022 22:26  --------------------------------------------------------  IN:    dextrose 5% + sodium chloride 0.45%: 1080 mL    Heparin: 70 mL  Total IN: 1150 mL    OUT:    Voided (mL): 350 mL  Total OUT: 350 mL    Total NET: 800 mL    Physical Exam:  General Appearance: Appears well, NAD  Pulmonary: Nonlabored breathing, no respiratory distress  Abdomen: Soft, nondisteded, mild tenderness in the RUQ  Extremities: WWP, SCD's in place     LABS:                        10.5   5.27  )-----------( 247      ( 22 Oct 2022 05:30 )             31.3     10-22    136  |  102  |  8   ----------------------------<  95  3.4<L>   |  18<L>  |  0.46<L>    Ca    9.3      22 Oct 2022 05:30  Phos  3.3     10-22  Mg     2.3     10-22    TPro  6.2  /  Alb  3.2<L>  /  TBili  0.6  /  DBili  x   /  AST  29  /  ALT  94<H>  /  AlkPhos  382<H>  10-22    PT/INR - ( 21 Oct 2022 07:11 )   PT: 16.4 sec;   INR: 1.37          PTT - ( 22 Oct 2022 16:00 )  PTT:99.0 sec

## 2022-10-22 NOTE — PROGRESS NOTE ADULT - ASSESSMENT
81yo Female pt with PMH depression, hypothyroidism, Afib on Eliquis (last dose today) and PSH of L hip surgery presenting with 4 days of intermittent abdominal pain. In the ED pt is afebrile, VSS. WBC 6.57, Hb 11.0, Cr 0.42,  TBili  0.7, AST  82, ALT  184, AlkPhos  477, lipase 55. US: CBD 0.6 cm. Gallbladder - Cholelithiasis, wall thickening (0.6 cm), positive sonographic Aldana's sign, no pericholecystic fluid. Pancreas is enlarged and echogenic with heterogeneous echotexture. Patient's findings and exam are consistent with acute cholecystitis, differential included pancreatitis however less likely as lipase levels are normal.    FLD  Pain/Nausea control PRN  Ceft/Flagyl  HSQ/SCD  IS/OOBA  AM labs  Home meds, hold eliquis -> hep gtt, f/u PTT  ALkphos elevated, will send GGT  F/u AM labs

## 2022-10-23 LAB
ALBUMIN SERPL ELPH-MCNC: 3.4 G/DL — SIGNIFICANT CHANGE UP (ref 3.3–5)
ALP SERPL-CCNC: 340 U/L — HIGH (ref 40–120)
ALT FLD-CCNC: 68 U/L — HIGH (ref 10–45)
ANION GAP SERPL CALC-SCNC: 10 MMOL/L — SIGNIFICANT CHANGE UP (ref 5–17)
APTT BLD: 50.4 SEC — HIGH (ref 27.5–35.5)
APTT BLD: 59.9 SEC — HIGH (ref 27.5–35.5)
AST SERPL-CCNC: 24 U/L — SIGNIFICANT CHANGE UP (ref 10–40)
BILIRUB DIRECT SERPL-MCNC: <0.2 MG/DL — SIGNIFICANT CHANGE UP (ref 0–0.3)
BILIRUB INDIRECT FLD-MCNC: SIGNIFICANT CHANGE UP MG/DL (ref 0.2–1)
BILIRUB SERPL-MCNC: 0.3 MG/DL — SIGNIFICANT CHANGE UP (ref 0.2–1.2)
BUN SERPL-MCNC: 6 MG/DL — LOW (ref 7–23)
CALCIUM SERPL-MCNC: 9.2 MG/DL — SIGNIFICANT CHANGE UP (ref 8.4–10.5)
CHLORIDE SERPL-SCNC: 104 MMOL/L — SIGNIFICANT CHANGE UP (ref 96–108)
CO2 SERPL-SCNC: 24 MMOL/L — SIGNIFICANT CHANGE UP (ref 22–31)
CREAT SERPL-MCNC: 0.44 MG/DL — LOW (ref 0.5–1.3)
EGFR: 98 ML/MIN/1.73M2 — SIGNIFICANT CHANGE UP
GLUCOSE BLDC GLUCOMTR-MCNC: 128 MG/DL — HIGH (ref 70–99)
GLUCOSE SERPL-MCNC: 144 MG/DL — HIGH (ref 70–99)
HCT VFR BLD CALC: 31.3 % — LOW (ref 34.5–45)
HGB BLD-MCNC: 10.3 G/DL — LOW (ref 11.5–15.5)
INR BLD: 1.13 — SIGNIFICANT CHANGE UP (ref 0.88–1.16)
INR BLD: 1.15 — SIGNIFICANT CHANGE UP (ref 0.88–1.16)
MAGNESIUM SERPL-MCNC: 1.9 MG/DL — SIGNIFICANT CHANGE UP (ref 1.6–2.6)
MCHC RBC-ENTMCNC: 29.9 PG — SIGNIFICANT CHANGE UP (ref 27–34)
MCHC RBC-ENTMCNC: 32.9 GM/DL — SIGNIFICANT CHANGE UP (ref 32–36)
MCV RBC AUTO: 90.7 FL — SIGNIFICANT CHANGE UP (ref 80–100)
NRBC # BLD: 0 /100 WBCS — SIGNIFICANT CHANGE UP (ref 0–0)
PHOSPHATE SERPL-MCNC: 2.7 MG/DL — SIGNIFICANT CHANGE UP (ref 2.5–4.5)
PLATELET # BLD AUTO: 301 K/UL — SIGNIFICANT CHANGE UP (ref 150–400)
POTASSIUM SERPL-MCNC: 3.3 MMOL/L — LOW (ref 3.5–5.3)
POTASSIUM SERPL-SCNC: 3.3 MMOL/L — LOW (ref 3.5–5.3)
PROT SERPL-MCNC: 6.2 G/DL — SIGNIFICANT CHANGE UP (ref 6–8.3)
PROTHROM AB SERPL-ACNC: 13.5 SEC — HIGH (ref 10.5–13.4)
PROTHROM AB SERPL-ACNC: 13.7 SEC — HIGH (ref 10.5–13.4)
RBC # BLD: 3.45 M/UL — LOW (ref 3.8–5.2)
RBC # FLD: 13.6 % — SIGNIFICANT CHANGE UP (ref 10.3–14.5)
SODIUM SERPL-SCNC: 138 MMOL/L — SIGNIFICANT CHANGE UP (ref 135–145)
WBC # BLD: 4.39 K/UL — SIGNIFICANT CHANGE UP (ref 3.8–10.5)
WBC # FLD AUTO: 4.39 K/UL — SIGNIFICANT CHANGE UP (ref 3.8–10.5)

## 2022-10-23 PROCEDURE — 74183 MRI ABD W/O CNTR FLWD CNTR: CPT | Mod: 26

## 2022-10-23 PROCEDURE — 99232 SBSQ HOSP IP/OBS MODERATE 35: CPT

## 2022-10-23 RX ORDER — POTASSIUM CHLORIDE 20 MEQ
40 PACKET (EA) ORAL EVERY 4 HOURS
Refills: 0 | Status: COMPLETED | OUTPATIENT
Start: 2022-10-23 | End: 2022-10-23

## 2022-10-23 RX ORDER — MAGNESIUM SULFATE 500 MG/ML
1 VIAL (ML) INJECTION ONCE
Refills: 0 | Status: COMPLETED | OUTPATIENT
Start: 2022-10-23 | End: 2022-10-23

## 2022-10-23 RX ORDER — HEPARIN SODIUM 5000 [USP'U]/ML
400 INJECTION INTRAVENOUS; SUBCUTANEOUS
Qty: 25000 | Refills: 0 | Status: DISCONTINUED | OUTPATIENT
Start: 2022-10-23 | End: 2022-10-24

## 2022-10-23 RX ADMIN — ONDANSETRON 4 MILLIGRAM(S): 8 TABLET, FILM COATED ORAL at 17:39

## 2022-10-23 RX ADMIN — Medication 1000 MILLIGRAM(S): at 11:37

## 2022-10-23 RX ADMIN — Medication 25 MILLIGRAM(S): at 05:50

## 2022-10-23 RX ADMIN — Medication 1000 MILLIGRAM(S): at 17:07

## 2022-10-23 RX ADMIN — Medication 1000 MILLIGRAM(S): at 02:02

## 2022-10-23 RX ADMIN — Medication 100 MILLIGRAM(S): at 16:48

## 2022-10-23 RX ADMIN — CEFTRIAXONE 100 MILLIGRAM(S): 500 INJECTION, POWDER, FOR SOLUTION INTRAMUSCULAR; INTRAVENOUS at 16:49

## 2022-10-23 RX ADMIN — Medication 75 MICROGRAM(S): at 05:50

## 2022-10-23 RX ADMIN — Medication 100 GRAM(S): at 14:35

## 2022-10-23 RX ADMIN — Medication 1000 MILLIGRAM(S): at 06:20

## 2022-10-23 RX ADMIN — Medication 100 MILLIGRAM(S): at 05:49

## 2022-10-23 RX ADMIN — Medication 40 MILLIEQUIVALENT(S): at 17:07

## 2022-10-23 RX ADMIN — Medication 1000 MILLIGRAM(S): at 05:49

## 2022-10-23 RX ADMIN — Medication 40 MILLIEQUIVALENT(S): at 14:35

## 2022-10-23 RX ADMIN — ATORVASTATIN CALCIUM 80 MILLIGRAM(S): 80 TABLET, FILM COATED ORAL at 22:08

## 2022-10-23 RX ADMIN — Medication 1000 MILLIGRAM(S): at 17:53

## 2022-10-23 RX ADMIN — Medication 1000 MILLIGRAM(S): at 01:32

## 2022-10-23 RX ADMIN — Medication 1000 MILLIGRAM(S): at 12:21

## 2022-10-23 NOTE — PROVIDER CONTACT NOTE (OTHER) - RECOMMENDATIONS
Contacted MD Sandhu regarding the activated partial thromboplastin level but was told that she knew about it and that the result was ok

## 2022-10-23 NOTE — PROGRESS NOTE ADULT - ASSESSMENT
83yo Female pt with PMH depression, hypothyroidism, ASD closure (6/2021 [facility]), Afib on Eliquis (last dose today), spinal stenosis with lumar radiculopathy,  and PSH of L hip surgery presenting with 4 days of intermittent abdominal pain. In the ED pt is afebrile, VSS. WBC 6.57, Hb 11.0, Cr 0.42,  TBili  0.7, AST  82, ALT  184, AlkPhos  477, lipase 55. US: CBD 0.6 cm. Gallbladder - Cholelithiasis, wall thickening (0.6 cm), positive sonographic Aldana's sign, no pericholecystic fluid. Pancreas is enlarged and echogenic with heterogeneous echotexture. Patient's findings and exam are consistent with acute cholecystitis, differential included pancreatits, however less likely as lipase levels are normal.    FLD/IVF  Pain/Nausea control PRN  Ceft/Flagyl  home meds (holding eliquis)  Hgtt  HSQ/SCD  IS/OOBA  AM labs

## 2022-10-23 NOTE — PROGRESS NOTE ADULT - SUBJECTIVE AND OBJECTIVE BOX
24 Hour Events: Patient seen and examined at bedside. No complaints. Denies N/v, f/c, dyspnea, abdominal pain    ON: hep gtt restarted,   10/23: aptt 50.2, increased heparin drip rate by 1, pending 6pm next draw. IV infiltrated causing right arm hematoma, heparin drip stopped x2 hrs. Restart at 5 pm @ 4ml/hr. Emesisx1  10/22: 9AM PTT WNL, 4PM PTT 99, rate decreased to 5. 10PM PTT 62. On FLD, tolerating. Tyl x 1 given.         PAST MEDICAL & SURGICAL HISTORY:  Depression      Thyroid disorder      Afib      HLD (hyperlipidemia)      History of hip replacement  R, for degenerative pain        Allergies    No Known Allergies    Intolerances      MEDICATIONS  (STANDING):  acetaminophen     Tablet .. 1000 milliGRAM(s) Oral every 6 hours  atorvastatin 80 milliGRAM(s) Oral at bedtime  cefTRIAXone   IVPB 1000 milliGRAM(s) IV Intermittent every 24 hours  dextrose 5% + sodium chloride 0.45%. 1000 milliLiter(s) (90 mL/Hr) IV Continuous <Continuous>  heparin  Infusion 400 Unit(s)/Hr (4 mL/Hr) IV Continuous <Continuous>  levothyroxine 75 MICROGram(s) Oral daily  metoprolol succinate ER 25 milliGRAM(s) Oral daily  metroNIDAZOLE  IVPB 500 milliGRAM(s) IV Intermittent every 8 hours    MEDICATIONS  (PRN):  melatonin 3 milliGRAM(s) Oral at bedtime PRN Insomnia  ondansetron Injectable 4 milliGRAM(s) IV Push every 6 hours PRN Nausea        Physical Exam:  General Appearance: Appears well, NAD  Pulmonary: Nonlabored breathing, no respiratory distress  Abdomen: Soft, nondisteded, mild tenderness in the RUQ  Extremities: WWP, SCD's in place       Labs:                         10.3   4.39  )-----------( 301      ( 23 Oct 2022 05:30 )             31.3     10-23    138  |  104  |  6<L>  ----------------------------<  144<H>  3.3<L>   |  24  |  0.44<L>    Ca    9.2      23 Oct 2022 05:30  Phos  2.7     10-23  Mg     1.9     10-23    TPro  6.2  /  Alb  3.4  /  TBili  0.3  /  DBili  <0.2  /  AST  24  /  ALT  68<H>  /  AlkPhos  340<H>  10-23    CAPILLARY BLOOD GLUCOSE      POCT Blood Glucose.: 128 mg/dL (23 Oct 2022 11:36)        PT/INR - ( 23 Oct 2022 12:00 )   PT: 13.5 sec;   INR: 1.13          PTT - ( 23 Oct 2022 12:00 )  PTT:50.4 sec  COVID-19 PCR: Negative (20 Oct 2022 14:27)          Vital Signs:   Vital Signs Last 24 Hrs  T(C): 37.1 (23 Oct 2022 18:08), Max: 37.1 (23 Oct 2022 18:08)  T(F): 98.8 (23 Oct 2022 18:08), Max: 98.8 (23 Oct 2022 18:08)  HR: 66 (23 Oct 2022 15:28) (64 - 76)  BP: 123/58 (23 Oct 2022 15:28) (120/64 - 141/63)  BP(mean): 83 (23 Oct 2022 15:28) (83 - 90)  RR: 18 (23 Oct 2022 15:28) (16 - 18)  SpO2: 97% (23 Oct 2022 15:28) (96% - 98%)    Parameters below as of 23 Oct 2022 15:28  Patient On (Oxygen Delivery Method): room air          Input/Output:   I&O's Detail    22 Oct 2022 07:01  -  23 Oct 2022 07:00  --------------------------------------------------------  IN:    dextrose 5% + sodium chloride 0.45%: 2160 mL    Heparin: 130 mL  Total IN: 2290 mL    OUT:    Voided (mL): 1000 mL  Total OUT: 1000 mL    Total NET: 1290 mL      23 Oct 2022 07:01  -  23 Oct 2022 20:42  --------------------------------------------------------  IN:    dextrose 5% + sodium chloride 0.45%: 1080 mL    Oral Fluid: 240 mL  Total IN: 1320 mL    OUT:    Voided (mL): 100 mL  Total OUT: 100 mL    Total NET: 1220 mL        Daily     Daily

## 2022-10-23 NOTE — PROVIDER CONTACT NOTE (OTHER) - ASSESSMENT
Pt is in bed, A&ox4, asymptomatic, RA, no complaint of pain
Pt was laying in bed and showed me the vomit that she caught in a towel. Pt showed no signs of distress and advised she feels nauseous.

## 2022-10-23 NOTE — PROGRESS NOTE ADULT - SUBJECTIVE AND OBJECTIVE BOX
Pt seen and examined by me this AM  sitting in chair, states pain improved    VSS  comfortable   irregular   CTA  +bs/nt/nd  no LEs edema     labs reviewed     a/p:  1. Acute cholecystitis   2. Afib   3. HTN     -pending MRCP  -ok to hold off heparin gtt, restart NOAC post-op when cleared by surgery  -HR controlled, on toprol XL  -on statin  -cards/GI recs noted.

## 2022-10-24 ENCOUNTER — TRANSCRIPTION ENCOUNTER (OUTPATIENT)
Age: 82
End: 2022-10-24

## 2022-10-24 LAB
ALBUMIN SERPL ELPH-MCNC: 3.4 G/DL — SIGNIFICANT CHANGE UP (ref 3.3–5)
ALP SERPL-CCNC: 318 U/L — HIGH (ref 40–120)
ALT FLD-CCNC: 63 U/L — HIGH (ref 10–45)
ANION GAP SERPL CALC-SCNC: 10 MMOL/L — SIGNIFICANT CHANGE UP (ref 5–17)
ANION GAP SERPL CALC-SCNC: 11 MMOL/L — SIGNIFICANT CHANGE UP (ref 5–17)
APTT BLD: 49.7 SEC — HIGH (ref 27.5–35.5)
APTT BLD: 72.5 SEC — HIGH (ref 27.5–35.5)
AST SERPL-CCNC: 36 U/L — SIGNIFICANT CHANGE UP (ref 10–40)
BILIRUB SERPL-MCNC: 0.3 MG/DL — SIGNIFICANT CHANGE UP (ref 0.2–1.2)
BLD GP AB SCN SERPL QL: NEGATIVE — SIGNIFICANT CHANGE UP
BUN SERPL-MCNC: 5 MG/DL — LOW (ref 7–23)
BUN SERPL-MCNC: 5 MG/DL — LOW (ref 7–23)
CALCIUM SERPL-MCNC: 8.9 MG/DL — SIGNIFICANT CHANGE UP (ref 8.4–10.5)
CALCIUM SERPL-MCNC: 9 MG/DL — SIGNIFICANT CHANGE UP (ref 8.4–10.5)
CHLORIDE SERPL-SCNC: 102 MMOL/L — SIGNIFICANT CHANGE UP (ref 96–108)
CHLORIDE SERPL-SCNC: 103 MMOL/L — SIGNIFICANT CHANGE UP (ref 96–108)
CO2 SERPL-SCNC: 21 MMOL/L — LOW (ref 22–31)
CO2 SERPL-SCNC: 23 MMOL/L — SIGNIFICANT CHANGE UP (ref 22–31)
CREAT SERPL-MCNC: 0.4 MG/DL — LOW (ref 0.5–1.3)
CREAT SERPL-MCNC: 0.4 MG/DL — LOW (ref 0.5–1.3)
EGFR: 100 ML/MIN/1.73M2 — SIGNIFICANT CHANGE UP
EGFR: 100 ML/MIN/1.73M2 — SIGNIFICANT CHANGE UP
GLUCOSE SERPL-MCNC: 122 MG/DL — HIGH (ref 70–99)
GLUCOSE SERPL-MCNC: 128 MG/DL — HIGH (ref 70–99)
HCT VFR BLD CALC: 32.9 % — LOW (ref 34.5–45)
HCT VFR BLD CALC: 37 % — SIGNIFICANT CHANGE UP (ref 34.5–45)
HGB BLD-MCNC: 10.8 G/DL — LOW (ref 11.5–15.5)
HGB BLD-MCNC: 11.6 G/DL — SIGNIFICANT CHANGE UP (ref 11.5–15.5)
INR BLD: 1.17 — HIGH (ref 0.88–1.16)
MAGNESIUM SERPL-MCNC: 2.1 MG/DL — SIGNIFICANT CHANGE UP (ref 1.6–2.6)
MAGNESIUM SERPL-MCNC: 2.2 MG/DL — SIGNIFICANT CHANGE UP (ref 1.6–2.6)
MCHC RBC-ENTMCNC: 29.7 PG — SIGNIFICANT CHANGE UP (ref 27–34)
MCHC RBC-ENTMCNC: 29.7 PG — SIGNIFICANT CHANGE UP (ref 27–34)
MCHC RBC-ENTMCNC: 31.4 GM/DL — LOW (ref 32–36)
MCHC RBC-ENTMCNC: 32.8 GM/DL — SIGNIFICANT CHANGE UP (ref 32–36)
MCV RBC AUTO: 90.4 FL — SIGNIFICANT CHANGE UP (ref 80–100)
MCV RBC AUTO: 94.6 FL — SIGNIFICANT CHANGE UP (ref 80–100)
NRBC # BLD: 0 /100 WBCS — SIGNIFICANT CHANGE UP (ref 0–0)
NRBC # BLD: 0 /100 WBCS — SIGNIFICANT CHANGE UP (ref 0–0)
PHOSPHATE SERPL-MCNC: 2.2 MG/DL — LOW (ref 2.5–4.5)
PHOSPHATE SERPL-MCNC: 2.3 MG/DL — LOW (ref 2.5–4.5)
PLATELET # BLD AUTO: 329 K/UL — SIGNIFICANT CHANGE UP (ref 150–400)
PLATELET # BLD AUTO: 340 K/UL — SIGNIFICANT CHANGE UP (ref 150–400)
POTASSIUM SERPL-MCNC: 3.7 MMOL/L — SIGNIFICANT CHANGE UP (ref 3.5–5.3)
POTASSIUM SERPL-MCNC: 3.8 MMOL/L — SIGNIFICANT CHANGE UP (ref 3.5–5.3)
POTASSIUM SERPL-SCNC: 3.7 MMOL/L — SIGNIFICANT CHANGE UP (ref 3.5–5.3)
POTASSIUM SERPL-SCNC: 3.8 MMOL/L — SIGNIFICANT CHANGE UP (ref 3.5–5.3)
PROT SERPL-MCNC: 6.3 G/DL — SIGNIFICANT CHANGE UP (ref 6–8.3)
PROTHROM AB SERPL-ACNC: 13.9 SEC — HIGH (ref 10.5–13.4)
RBC # BLD: 3.64 M/UL — LOW (ref 3.8–5.2)
RBC # BLD: 3.91 M/UL — SIGNIFICANT CHANGE UP (ref 3.8–5.2)
RBC # FLD: 13.7 % — SIGNIFICANT CHANGE UP (ref 10.3–14.5)
RBC # FLD: 13.9 % — SIGNIFICANT CHANGE UP (ref 10.3–14.5)
RH IG SCN BLD-IMP: POSITIVE — SIGNIFICANT CHANGE UP
SARS-COV-2 RNA SPEC QL NAA+PROBE: SIGNIFICANT CHANGE UP
SODIUM SERPL-SCNC: 135 MMOL/L — SIGNIFICANT CHANGE UP (ref 135–145)
SODIUM SERPL-SCNC: 135 MMOL/L — SIGNIFICANT CHANGE UP (ref 135–145)
WBC # BLD: 6.29 K/UL — SIGNIFICANT CHANGE UP (ref 3.8–10.5)
WBC # BLD: 6.87 K/UL — SIGNIFICANT CHANGE UP (ref 3.8–10.5)
WBC # FLD AUTO: 6.29 K/UL — SIGNIFICANT CHANGE UP (ref 3.8–10.5)
WBC # FLD AUTO: 6.87 K/UL — SIGNIFICANT CHANGE UP (ref 3.8–10.5)

## 2022-10-24 PROCEDURE — 99232 SBSQ HOSP IP/OBS MODERATE 35: CPT

## 2022-10-24 PROCEDURE — 71045 X-RAY EXAM CHEST 1 VIEW: CPT | Mod: 26

## 2022-10-24 PROCEDURE — 99222 1ST HOSP IP/OBS MODERATE 55: CPT

## 2022-10-24 RX ADMIN — Medication 100 MILLIGRAM(S): at 01:48

## 2022-10-24 RX ADMIN — Medication 1000 MILLIGRAM(S): at 12:27

## 2022-10-24 RX ADMIN — ATORVASTATIN CALCIUM 80 MILLIGRAM(S): 80 TABLET, FILM COATED ORAL at 21:48

## 2022-10-24 RX ADMIN — Medication 100 MILLIGRAM(S): at 09:27

## 2022-10-24 RX ADMIN — CEFTRIAXONE 100 MILLIGRAM(S): 500 INJECTION, POWDER, FOR SOLUTION INTRAMUSCULAR; INTRAVENOUS at 15:03

## 2022-10-24 RX ADMIN — Medication 1000 MILLIGRAM(S): at 06:02

## 2022-10-24 RX ADMIN — Medication 75 MICROGRAM(S): at 06:02

## 2022-10-24 RX ADMIN — Medication 1000 MILLIGRAM(S): at 13:21

## 2022-10-24 RX ADMIN — Medication 1000 MILLIGRAM(S): at 06:45

## 2022-10-24 RX ADMIN — Medication 25 MILLIGRAM(S): at 06:02

## 2022-10-24 RX ADMIN — Medication 100 MILLIGRAM(S): at 16:49

## 2022-10-24 RX ADMIN — Medication 1000 MILLIGRAM(S): at 18:52

## 2022-10-24 RX ADMIN — Medication 1000 MILLIGRAM(S): at 23:47

## 2022-10-24 RX ADMIN — SODIUM CHLORIDE 90 MILLILITER(S): 9 INJECTION, SOLUTION INTRAVENOUS at 15:03

## 2022-10-24 RX ADMIN — HEPARIN SODIUM 4 UNIT(S)/HR: 5000 INJECTION INTRAVENOUS; SUBCUTANEOUS at 15:02

## 2022-10-24 RX ADMIN — Medication 100 MILLIGRAM(S): at 21:48

## 2022-10-24 RX ADMIN — Medication 1000 MILLIGRAM(S): at 19:33

## 2022-10-24 NOTE — PROGRESS NOTE ADULT - SUBJECTIVE AND OBJECTIVE BOX
SUBJECTIVE: Patient seen and examined at bedside. She continues to report abdominal pain. She denies N/V.    cefTRIAXone   IVPB 1000 milliGRAM(s) IV Intermittent every 24 hours  heparin  Infusion 400 Unit(s)/Hr IV Continuous <Continuous>  metoprolol succinate ER 25 milliGRAM(s) Oral daily  metroNIDAZOLE  IVPB 500 milliGRAM(s) IV Intermittent every 8 hours    MEDICATIONS  (PRN):  melatonin 3 milliGRAM(s) Oral at bedtime PRN Insomnia  ondansetron Injectable 4 milliGRAM(s) IV Push every 6 hours PRN Nausea      I&O's Detail    23 Oct 2022 07:01  -  24 Oct 2022 07:00  --------------------------------------------------------  IN:    dextrose 5% + sodium chloride 0.45%: 1800 mL    Heparin: 40 mL    Oral Fluid: 240 mL  Total IN: 2080 mL    OUT:    Voided (mL): 450 mL  Total OUT: 450 mL    Total NET: 1630 mL      24 Oct 2022 07:01  -  24 Oct 2022 14:29  --------------------------------------------------------  IN:    dextrose 5% + sodium chloride 0.45%: 540 mL    Heparin: 24 mL    Oral Fluid: 320 mL  Total IN: 884 mL    OUT:    Voided (mL): 350 mL  Total OUT: 350 mL    Total NET: 534 mL          T(C): 36.5 (10-24-22 @ 05:03), Max: 37.1 (10-23-22 @ 18:08)  HR: 66 (10-24-22 @ 12:37) (64 - 82)  BP: 129/68 (10-24-22 @ 12:37) (123/58 - 153/68)  RR: 18 (10-24-22 @ 12:37) (16 - 18)  SpO2: 97% (10-24-22 @ 12:37) (96% - 98%)    GENERAL: NAD, Resting comfortably in bed, awake, opens eyes spontaneously  HEENT: NCAT, MMM, Normal conjunctiva, PERRL  RESP: Nonlabored breathing, No respiratory distress  CARD: Normal rate, Normal peripheral perfusion  GI: Soft, ND, moderate RUQ tenderness, No guarding, No rebound tenderness  EXTREM: WWP, No edema, No gross deformity of extremities  SKIN: No rashes, no lesions  NEURO: AAOx3, No focal motor or sensory deficits  PSYCH: Affect and characteristics of appearance, verbalizations, and behaviors are appropriate    LABS:                        10.8   6.29  )-----------( 329      ( 24 Oct 2022 11:25 )             32.9     10-24    135  |  102  |  5<L>  ----------------------------<  128<H>  3.7   |  23  |  0.40<L>    Ca    8.9      24 Oct 2022 11:25  Phos  2.3     10-24  Mg     2.1     10-24    TPro  6.3  /  Alb  3.4  /  TBili  0.3  /  DBili  x   /  AST  36  /  ALT  63<H>  /  AlkPhos  318<H>  10-24    PT/INR - ( 23 Oct 2022 12:00 )   PT: 13.5 sec;   INR: 1.13          PTT - ( 24 Oct 2022 11:25 )  PTT:72.5 sec      RADIOLOGY & ADDITIONAL STUDIES:      Urinalysis with Rflx Culture (collected 10-20-22 @ 17:11)

## 2022-10-24 NOTE — CONSULT NOTE ADULT - ASSESSMENT
83yo Female pt with PMH depression, hypothyroidism, ASD closure (6/2021 [facility]), Afib on Eliquis, spinal stenosis with lumar radiculopathy,  and PSH of L hip surgery presenting with episodes of intermittent abdominal pain after having meals. Based on imaging finding patient was admitted fort further management for Cholecystitis, Pancreatitis and possible choledocholithiasis management and GI was consulted for concerns for Choledocholithiasis     #Acute Abdominal pain possibly due to Cholecystitis; r/o Choledocholithiases  less likely pancreatitis.   US: CBD 0.6 cm. Gallbladder - Cholelithiasis, wall thickening (0.6 cm), positive sonographic Aldana's sign, no pericholecystic fluid. Pancreas is enlarged and echogenic with heterogeneous echotexture.  - Pending MRCP final read will f/u   - Plan for Cholecystectomy as per the primary team   - Serial abdominal exam, pain control, OOB, IS use   - IV abx per primary team   - will continue to follow  81yo Female pt with PMH depression, hypothyroidism, ASD closure (6/2021 [facility]), Afib on Eliquis, spinal stenosis with lumar radiculopathy,  and PSH of L hip surgery presenting with episodes of intermittent abdominal pain after having meals. Based on imaging finding patient was admitted fort further management for Cholecystitis, Pancreatitis and possible choledocholithiasis management and GI was consulted for concerns for Choledocholithiasis     #Acute Abdominal pain possibly due to Cholecystitis; r/o Choledocholithiases  less likely pancreatitis.   US: CBD 0.6 cm. Gallbladder - Cholelithiasis, wall thickening (0.6 cm), positive sonographic Aldana's sign, no pericholecystic fluid. Pancreas is enlarged and echogenic with heterogeneous echotexture.  - Pending MRCP final read will f/u   - Plan for Cholecystectomy as per the primary team   - Serial abdominal exam, pain control, OOB, IS use   - IV abx per primary team   - will continue to follow     Patient seen and examined. Discussed with Dr. Horan.   f/u MRCP final read    Thank you for the courtesy of this consult. We will follow along with you.    Boo Viera M.D.  Gastroenterology Fellow  Weekday Pager: [phone]  Weekend Coverage: Please Call the  for contact info

## 2022-10-24 NOTE — PROGRESS NOTE ADULT - ASSESSMENT
83yo Female pt with PMH depression, hypothyroidism, ASD closure (6/2021 [facility]), Afib on Eliquis (last dose today), spinal stenosis with lumar radiculopathy,  and PSH of L hip surgery presenting with 4 days of intermittent abdominal pain. In the ED pt is afebrile, VSS. WBC 6.57, Hb 11.0, Cr 0.42,  TBili  0.7, AST  82, ALT  184, AlkPhos  477, lipase 55. US: CBD 0.6 cm. Gallbladder - Cholelithiasis, wall thickening (0.6 cm), positive sonographic Aldana's sign, no pericholecystic fluid. Pancreas is enlarged and echogenic with heterogeneous echotexture. Patient's findings and exam are consistent with acute cholecystitis, differential included pancreatits, however less likely as lipase levels are normal.    FLD/IVF  Pain/Nausea control PRN  Ceft/Flagyl  home meds (holding eliquis)  Hgtt/SCD  IS/OOBA  AM labs  GI consult  Cardiac consult

## 2022-10-24 NOTE — CONSULT NOTE ADULT - SUBJECTIVE AND OBJECTIVE BOX
HPI:  81yo Female pt with PMH depression, hypothyroidism, Afib on Eliquis (last dose today) and PSH of L hip surgery presenting with intermittent abdominal pain. Pt started having nausea and 2 episodes of NBNB emesis last week. Also developed LUQ and epigastric sharp, non radiating abdominal pain associated with meals. Patient stated that she was in her usual state of health when last week she started to feel generalized abdominal pain when she was having meal and pain was alleviated on its own after patient had stopped eating food, also the pain was associated with NBNB emesis whenever she ate something and has been avoiding eating food or fluids due to concern of abdominal pain. Patient on Wednesday went to PCPs office for abdominal pain concerns and was diagnosed to have "stones" on imaging as per the patient. No constipation or diarrhea- last BM this morning was normal and is passing gas. Pt was advised to go to the ED by her PCP but deferred ED visit. Has not vomited today but reports mild nausea and decreased appetite. Did not take medications for her pain. Blood work done as outpatient showed transaminitis with elevated ALP and positive UA. Denies fevers/chills, CP/SOB, dizziness, syncope, headache, BRBPR, urinary urgency/frequency. Reports that in the ED her pain is improved, denies nause or vomiting.    Last colonoscopy - 7 years ago normal  EGD - 30 years ago normal      ED: afebrile, VSS. WBC 6.57, Hb 11.0, Cr 0.42,  TBili  0.7, AST  82, ALT  184, AlkPhos  477, lipase 55. US: CBD 0.6 cm. Gallbladder - Cholelithiasis, wall thickening (0.6 cm), positive sonographic Aldana's sign, no pericholecystic fluid. Pancreas is enlarged and echogenic with heterogeneous echotexture. CT: fat stranding anterior to the body, no ductal dictation suugestive of acute pancreatitis    PMH: depression, hypothyroidism, Afib on Eliquis (last dose today)   PSH: L hip surgery   Social Hx: no ETOH, no smoking, no recreational drug use  Family Hx: Denies family hx of IBS, Crohn's, UC, or colon cancer.    Meds: eliquis 5BID, atorvastatin 80, metop succ 25, Levothyroxine 75     (20 Oct 2022 23:10)    Allergies    No Known Allergies    Intolerances      Home Medications:  atorvastatin 80 mg oral tablet: 1 tab(s) orally once a day (20 Oct 2022 23:07)  Eliquis 5 mg oral tablet: 1 tab(s) orally 2 times a day (20 Oct 2022 23:06)  levothyroxine 75 mcg (0.075 mg) oral tablet: 1 tab(s) orally once a day (20 Oct 2022 23:07)  metoprolol succinate 25 mg oral tablet, extended release: 1 tab(s) orally once a day (20 Oct 2022 23:06)    MEDICATIONS:  MEDICATIONS  (STANDING):  acetaminophen     Tablet .. 1000 milliGRAM(s) Oral every 6 hours  atorvastatin 80 milliGRAM(s) Oral at bedtime  cefTRIAXone   IVPB 1000 milliGRAM(s) IV Intermittent every 24 hours  dextrose 5% + sodium chloride 0.45%. 1000 milliLiter(s) (90 mL/Hr) IV Continuous <Continuous>  heparin  Infusion 400 Unit(s)/Hr (4 mL/Hr) IV Continuous <Continuous>  levothyroxine 75 MICROGram(s) Oral daily  metoprolol succinate ER 25 milliGRAM(s) Oral daily  metroNIDAZOLE  IVPB 500 milliGRAM(s) IV Intermittent every 8 hours    MEDICATIONS  (PRN):  melatonin 3 milliGRAM(s) Oral at bedtime PRN Insomnia  ondansetron Injectable 4 milliGRAM(s) IV Push every 6 hours PRN Nausea    PAST MEDICAL & SURGICAL HISTORY:  Depression      Thyroid disorder      Afib      HLD (hyperlipidemia)      History of hip replacement  R, for degenerative pain        FAMILY HISTORY:  FH: prostate cancer      SOCIAL HISTORY:  [place]: [ ] Current, [ ] Former, [ ] Never; Pack Years:  Alcohol:  Illicit Drugs:    REVIEW OF SYSTEMS:  All other 10 review of systems is negative unless indicated above.    Vital Signs Last 24 Hrs  T(C): 36.5 (24 Oct 2022 05:03), Max: 37.1 (23 Oct 2022 18:08)  T(F): 97.7 (24 Oct 2022 05:03), Max: 98.8 (23 Oct 2022 18:08)  HR: 77 (24 Oct 2022 04:26) (66 - 82)  BP: 153/68 (24 Oct 2022 04:26) (120/64 - 153/68)  BP(mean): 98 (24 Oct 2022 04:26) (83 - 98)  RR: 17 (24 Oct 2022 04:26) (17 - 18)  SpO2: 98% (24 Oct 2022 04:26) (96% - 98%)    Parameters below as of 24 Oct 2022 04:26  Patient On (Oxygen Delivery Method): room air        10-23 @ 07:01  -  10-24 @ 07:00  --------------------------------------------------------  IN: 2080 mL / OUT: 450 mL / NET: 1630 mL        PHYSICAL EXAM:    General: Well developed; well nourished; in no acute distress  Eyes: Anicteric sclerae, moist conjunctivae  HENT: Moist mucous membranes  Neck: Trachea midline, supple  Lungs: Normal respiratory effort and no intercostal retractions  Cardiovascular: RRR  Abdomen: Soft, mildly TTP around the epigastric region, No rebound or guarding  Extremities: Normal range of motion, No clubbing, cyanosis or edema  Neurological: Alert and oriented x3  Skin: Warm and dry. No obvious rash    LABS:                        10.3   4.39  )-----------( 301      ( 23 Oct 2022 05:30 )             31.3     10-23    138  |  104  |  6<L>  ----------------------------<  144<H>  3.3<L>   |  24  |  0.44<L>    Ca    9.2      23 Oct 2022 05:30  Phos  2.7     10-23  Mg     1.9     10-23    TPro  6.2  /  Alb  3.4  /  TBili  0.3  /  DBili  <0.2  /  AST  24  /  ALT  68<H>  /  AlkPhos  340<H>  10-23        PT/INR - ( 23 Oct 2022 12:00 )   PT: 13.5 sec;   INR: 1.13          PTT - ( 23 Oct 2022 12:00 )  PTT:50.4 sec    RADIOLOGY & ADDITIONAL STUDIES:    HPI:  83yo Female pt with PMH depression, hypothyroidism, Afib on Eliquis (last dose today) and PSH of L hip surgery presenting with intermittent abdominal pain. Patient stated that she was in her usual state of health when last week she started to feel generalized abdominal pain when she was having meal and pain was alleviated on its own after patient had stopped eating food, also the pain was associated with NBNB emesis whenever she ate something and has been avoiding eating food or fluids due to concern of abdominal pain. Patient on Wednesday went to PCPs office for abdominal pain concerns and was diagnosed to have "stones" on imaging as per the patient. No constipation or diarrhea- last BM this morning was normal and is passing gas. Pt was advised to go to the ED by her PCP but deferred ED visit. Has not vomited today but reports mild nausea and decreased appetite. Did not take medications for her pain. Blood work done as outpatient showed transaminitis with elevated ALP and positive UA. Denies fevers/chills, CP/SOB, dizziness, syncope, headache, BRBPR, urinary urgency/frequency. Reports that in the ED her pain is improved, denies nause or vomiting.    Last colonoscopy - 7 years ago normal  EGD - 30 years ago normal      ED: afebrile, VSS. WBC 6.57, Hb 11.0, Cr 0.42,  TBili  0.7, AST  82, ALT  184, AlkPhos  477, lipase 55. US: CBD 0.6 cm. Gallbladder - Cholelithiasis, wall thickening (0.6 cm), positive sonographic Aldana's sign, no pericholecystic fluid. Pancreas is enlarged and echogenic with heterogeneous echotexture. CT: fat stranding anterior to the body, no ductal dictation suugestive of acute pancreatitis    PMH: depression, hypothyroidism, Afib on Eliquis (last dose today)   PSH: L hip surgery   Social Hx: no ETOH, no smoking, no recreational drug use  Family Hx: Denies family hx of IBS, Crohn's, UC, or colon cancer.    Meds: eliquis 5BID, atorvastatin 80, metop succ 25, Levothyroxine 75     (20 Oct 2022 23:10)    Allergies    No Known Allergies    Intolerances      Home Medications:  atorvastatin 80 mg oral tablet: 1 tab(s) orally once a day (20 Oct 2022 23:07)  Eliquis 5 mg oral tablet: 1 tab(s) orally 2 times a day (20 Oct 2022 23:06)  levothyroxine 75 mcg (0.075 mg) oral tablet: 1 tab(s) orally once a day (20 Oct 2022 23:07)  metoprolol succinate 25 mg oral tablet, extended release: 1 tab(s) orally once a day (20 Oct 2022 23:06)    MEDICATIONS:  MEDICATIONS  (STANDING):  acetaminophen     Tablet .. 1000 milliGRAM(s) Oral every 6 hours  atorvastatin 80 milliGRAM(s) Oral at bedtime  cefTRIAXone   IVPB 1000 milliGRAM(s) IV Intermittent every 24 hours  dextrose 5% + sodium chloride 0.45%. 1000 milliLiter(s) (90 mL/Hr) IV Continuous <Continuous>  heparin  Infusion 400 Unit(s)/Hr (4 mL/Hr) IV Continuous <Continuous>  levothyroxine 75 MICROGram(s) Oral daily  metoprolol succinate ER 25 milliGRAM(s) Oral daily  metroNIDAZOLE  IVPB 500 milliGRAM(s) IV Intermittent every 8 hours    MEDICATIONS  (PRN):  melatonin 3 milliGRAM(s) Oral at bedtime PRN Insomnia  ondansetron Injectable 4 milliGRAM(s) IV Push every 6 hours PRN Nausea    PAST MEDICAL & SURGICAL HISTORY:  Depression      Thyroid disorder      Afib      HLD (hyperlipidemia)      History of hip replacement  R, for degenerative pain        FAMILY HISTORY:  FH: prostate cancer      SOCIAL HISTORY:  [place]: [ ] Current, [ ] Former, [ ] Never; Pack Years:  Alcohol:  Illicit Drugs:    REVIEW OF SYSTEMS:  All other 10 review of systems is negative unless indicated above.    Vital Signs Last 24 Hrs  T(C): 36.5 (24 Oct 2022 05:03), Max: 37.1 (23 Oct 2022 18:08)  T(F): 97.7 (24 Oct 2022 05:03), Max: 98.8 (23 Oct 2022 18:08)  HR: 77 (24 Oct 2022 04:26) (66 - 82)  BP: 153/68 (24 Oct 2022 04:26) (120/64 - 153/68)  BP(mean): 98 (24 Oct 2022 04:26) (83 - 98)  RR: 17 (24 Oct 2022 04:26) (17 - 18)  SpO2: 98% (24 Oct 2022 04:26) (96% - 98%)    Parameters below as of 24 Oct 2022 04:26  Patient On (Oxygen Delivery Method): room air        10-23 @ 07:01  -  10-24 @ 07:00  --------------------------------------------------------  IN: 2080 mL / OUT: 450 mL / NET: 1630 mL        PHYSICAL EXAM:    General: Well developed; well nourished; in no acute distress  Eyes: Anicteric sclerae, moist conjunctivae  HENT: Moist mucous membranes  Neck: Trachea midline, supple  Lungs: Normal respiratory effort and no intercostal retractions  Cardiovascular: RRR  Abdomen: Soft, mildly TTP around the epigastric region, No rebound or guarding  Extremities: Normal range of motion, No clubbing, cyanosis or edema  Neurological: Alert and oriented x3  Skin: Warm and dry. No obvious rash    LABS:                        10.3   4.39  )-----------( 301      ( 23 Oct 2022 05:30 )             31.3     10-23    138  |  104  |  6<L>  ----------------------------<  144<H>  3.3<L>   |  24  |  0.44<L>    Ca    9.2      23 Oct 2022 05:30  Phos  2.7     10-23  Mg     1.9     10-23    TPro  6.2  /  Alb  3.4  /  TBili  0.3  /  DBili  <0.2  /  AST  24  /  ALT  68<H>  /  AlkPhos  340<H>  10-23        PT/INR - ( 23 Oct 2022 12:00 )   PT: 13.5 sec;   INR: 1.13          PTT - ( 23 Oct 2022 12:00 )  PTT:50.4 sec    RADIOLOGY & ADDITIONAL STUDIES:

## 2022-10-24 NOTE — PROGRESS NOTE ADULT - SUBJECTIVE AND OBJECTIVE BOX
Pt seen and examined by me this AM. Feels okay, still having some abdominal discomfort especially after taking PO. Otherwise no difficult breathing, CP, palpitations, weakness, fevers etc.     PE  Gen: age appropriate female in NAD  HEENT: EOMI, NCAT  Neck: enlarged neck, no obvious JVD  Lungs: non labored no adventitious breath souds  CV: RRR S1S2  GI: minimal tenderess, soft, +BS  LE: no edema  Psych: appropriate, cooperative  Neuro: AAOx3      VS/labs reviewed

## 2022-10-24 NOTE — PROGRESS NOTE ADULT - ASSESSMENT
1. Acute cholecystitis   -Management a/p surgery. Also seeing GI today  -Encourage IS. Ambulate/OOB to chair when tolerating. DVT ppx will be with heparin gtt or NOAC when resumed.     2. Afib   -On heparin GTT. Cardiology following. Currently rate controlled.     3. HTN   - stable. c/w toprol    4. HLD  -on statin    5. Pulm HTN  -unclear etiology. Large neck, may have component of RANDELL/OHV  -Will need outpatient f/u and management    6. Elevated LFTs  -likely 2/2 estrella. Continue to monitor.     7. Asx bacteruria  -Do not recommend treatment    8. Patent foramen ovale  -Cardio following. Likely nothing to do at this time.     9. Anemia, unclear etiology  -Remains stable ~11. Continue to monitor.     10. hypokalemia  -continue to monitor.       DVT ppx: on heparin GTT. CHADSVASC 3; could hold that for now and resume NOAC when appropriate  DIet: a/p surgery when tolerating.   General

## 2022-10-25 ENCOUNTER — RESULT REVIEW (OUTPATIENT)
Age: 82
End: 2022-10-25

## 2022-10-25 ENCOUNTER — TRANSCRIPTION ENCOUNTER (OUTPATIENT)
Age: 82
End: 2022-10-25

## 2022-10-25 LAB
ALBUMIN SERPL ELPH-MCNC: 3 G/DL — LOW (ref 3.3–5)
ALP SERPL-CCNC: 275 U/L — HIGH (ref 40–120)
ALT FLD-CCNC: 71 U/L — HIGH (ref 10–45)
ANION GAP SERPL CALC-SCNC: 10 MMOL/L — SIGNIFICANT CHANGE UP (ref 5–17)
AST SERPL-CCNC: 73 U/L — HIGH (ref 10–40)
BILIRUB DIRECT SERPL-MCNC: 0.2 MG/DL — SIGNIFICANT CHANGE UP (ref 0–0.3)
BILIRUB INDIRECT FLD-MCNC: SIGNIFICANT CHANGE UP (ref 0.2–1)
BILIRUB SERPL-MCNC: 0.2 MG/DL — SIGNIFICANT CHANGE UP (ref 0.2–1.2)
BUN SERPL-MCNC: 5 MG/DL — LOW (ref 7–23)
CALCIUM SERPL-MCNC: 8.8 MG/DL — SIGNIFICANT CHANGE UP (ref 8.4–10.5)
CHLORIDE SERPL-SCNC: 106 MMOL/L — SIGNIFICANT CHANGE UP (ref 96–108)
CO2 SERPL-SCNC: 23 MMOL/L — SIGNIFICANT CHANGE UP (ref 22–31)
CREAT SERPL-MCNC: 0.42 MG/DL — LOW (ref 0.5–1.3)
EGFR: 99 ML/MIN/1.73M2 — SIGNIFICANT CHANGE UP
GLUCOSE SERPL-MCNC: 107 MG/DL — HIGH (ref 70–99)
HCT VFR BLD CALC: 31.9 % — LOW (ref 34.5–45)
HGB BLD-MCNC: 10.5 G/DL — LOW (ref 11.5–15.5)
MAGNESIUM SERPL-MCNC: 2 MG/DL — SIGNIFICANT CHANGE UP (ref 1.6–2.6)
MCHC RBC-ENTMCNC: 29.9 PG — SIGNIFICANT CHANGE UP (ref 27–34)
MCHC RBC-ENTMCNC: 32.9 GM/DL — SIGNIFICANT CHANGE UP (ref 32–36)
MCV RBC AUTO: 90.9 FL — SIGNIFICANT CHANGE UP (ref 80–100)
NRBC # BLD: 0 /100 WBCS — SIGNIFICANT CHANGE UP (ref 0–0)
PHOSPHATE SERPL-MCNC: 2.8 MG/DL — SIGNIFICANT CHANGE UP (ref 2.5–4.5)
PLATELET # BLD AUTO: 356 K/UL — SIGNIFICANT CHANGE UP (ref 150–400)
POTASSIUM SERPL-MCNC: 3.6 MMOL/L — SIGNIFICANT CHANGE UP (ref 3.5–5.3)
POTASSIUM SERPL-SCNC: 3.6 MMOL/L — SIGNIFICANT CHANGE UP (ref 3.5–5.3)
PROT SERPL-MCNC: 5.6 G/DL — LOW (ref 6–8.3)
RBC # BLD: 3.51 M/UL — LOW (ref 3.8–5.2)
RBC # FLD: 14 % — SIGNIFICANT CHANGE UP (ref 10.3–14.5)
SODIUM SERPL-SCNC: 139 MMOL/L — SIGNIFICANT CHANGE UP (ref 135–145)
WBC # BLD: 4.91 K/UL — SIGNIFICANT CHANGE UP (ref 3.8–10.5)
WBC # FLD AUTO: 4.91 K/UL — SIGNIFICANT CHANGE UP (ref 3.8–10.5)

## 2022-10-25 PROCEDURE — 99232 SBSQ HOSP IP/OBS MODERATE 35: CPT

## 2022-10-25 PROCEDURE — 88304 TISSUE EXAM BY PATHOLOGIST: CPT | Mod: 26

## 2022-10-25 DEVICE — LIGATING CLIPS WECK HEMOLOK POLYMER MEDIUM-LARGE (GREEN) 6: Type: IMPLANTABLE DEVICE | Status: FUNCTIONAL

## 2022-10-25 DEVICE — CLIP APPLIER ETHICON LIGAMAX 5MM: Type: IMPLANTABLE DEVICE | Status: FUNCTIONAL

## 2022-10-25 DEVICE — LIGATING CLIPS WECK HEMOLOK POLYMER LARGE (PURPLE) 6: Type: IMPLANTABLE DEVICE | Status: FUNCTIONAL

## 2022-10-25 RX ORDER — POTASSIUM CHLORIDE 20 MEQ
10 PACKET (EA) ORAL
Refills: 0 | Status: COMPLETED | OUTPATIENT
Start: 2022-10-25 | End: 2022-10-25

## 2022-10-25 RX ORDER — HYDROMORPHONE HYDROCHLORIDE 2 MG/ML
0.5 INJECTION INTRAMUSCULAR; INTRAVENOUS; SUBCUTANEOUS EVERY 8 HOURS
Refills: 0 | Status: DISCONTINUED | OUTPATIENT
Start: 2022-10-25 | End: 2022-10-25

## 2022-10-25 RX ORDER — OXYCODONE AND ACETAMINOPHEN 5; 325 MG/1; MG/1
1 TABLET ORAL EVERY 4 HOURS
Refills: 0 | Status: DISCONTINUED | OUTPATIENT
Start: 2022-10-25 | End: 2022-10-25

## 2022-10-25 RX ORDER — OXYCODONE HYDROCHLORIDE 5 MG/1
5 TABLET ORAL EVERY 6 HOURS
Refills: 0 | Status: DISCONTINUED | OUTPATIENT
Start: 2022-10-25 | End: 2022-10-28

## 2022-10-25 RX ORDER — HYDROMORPHONE HYDROCHLORIDE 2 MG/ML
0.25 INJECTION INTRAMUSCULAR; INTRAVENOUS; SUBCUTANEOUS ONCE
Refills: 0 | Status: DISCONTINUED | OUTPATIENT
Start: 2022-10-25 | End: 2022-10-25

## 2022-10-25 RX ORDER — HYDROMORPHONE HYDROCHLORIDE 2 MG/ML
0.5 INJECTION INTRAMUSCULAR; INTRAVENOUS; SUBCUTANEOUS ONCE
Refills: 0 | Status: DISCONTINUED | OUTPATIENT
Start: 2022-10-25 | End: 2022-10-25

## 2022-10-25 RX ORDER — ACETAMINOPHEN 500 MG
1000 TABLET ORAL EVERY 6 HOURS
Refills: 0 | Status: DISCONTINUED | OUTPATIENT
Start: 2022-10-25 | End: 2022-11-01

## 2022-10-25 RX ORDER — LEVOTHYROXINE SODIUM 125 MCG
75 TABLET ORAL DAILY
Refills: 0 | Status: DISCONTINUED | OUTPATIENT
Start: 2022-10-25 | End: 2022-11-01

## 2022-10-25 RX ORDER — HEPARIN SODIUM 5000 [USP'U]/ML
5000 INJECTION INTRAVENOUS; SUBCUTANEOUS EVERY 8 HOURS
Refills: 0 | Status: DISCONTINUED | OUTPATIENT
Start: 2022-10-25 | End: 2022-10-26

## 2022-10-25 RX ORDER — ACETAMINOPHEN 500 MG
1000 TABLET ORAL ONCE
Refills: 0 | Status: COMPLETED | OUTPATIENT
Start: 2022-10-25 | End: 2022-10-25

## 2022-10-25 RX ORDER — ONDANSETRON 8 MG/1
8 TABLET, FILM COATED ORAL EVERY 8 HOURS
Refills: 0 | Status: DISCONTINUED | OUTPATIENT
Start: 2022-10-25 | End: 2022-10-25

## 2022-10-25 RX ORDER — ONDANSETRON 8 MG/1
4 TABLET, FILM COATED ORAL EVERY 6 HOURS
Refills: 0 | Status: DISCONTINUED | OUTPATIENT
Start: 2022-10-25 | End: 2022-11-01

## 2022-10-25 RX ORDER — ATORVASTATIN CALCIUM 80 MG/1
80 TABLET, FILM COATED ORAL AT BEDTIME
Refills: 0 | Status: DISCONTINUED | OUTPATIENT
Start: 2022-10-25 | End: 2022-11-01

## 2022-10-25 RX ORDER — METOPROLOL TARTRATE 50 MG
25 TABLET ORAL EVERY 24 HOURS
Refills: 0 | Status: DISCONTINUED | OUTPATIENT
Start: 2022-10-25 | End: 2022-11-01

## 2022-10-25 RX ORDER — ONDANSETRON 8 MG/1
4 TABLET, FILM COATED ORAL ONCE
Refills: 0 | Status: COMPLETED | OUTPATIENT
Start: 2022-10-25 | End: 2022-10-25

## 2022-10-25 RX ADMIN — Medication 75 MICROGRAM(S): at 06:48

## 2022-10-25 RX ADMIN — Medication 400 MILLIGRAM(S): at 15:02

## 2022-10-25 RX ADMIN — HYDROMORPHONE HYDROCHLORIDE 0.5 MILLIGRAM(S): 2 INJECTION INTRAMUSCULAR; INTRAVENOUS; SUBCUTANEOUS at 20:17

## 2022-10-25 RX ADMIN — Medication 25 MILLIGRAM(S): at 20:01

## 2022-10-25 RX ADMIN — Medication 100 MILLIGRAM(S): at 06:47

## 2022-10-25 RX ADMIN — HYDROMORPHONE HYDROCHLORIDE 0.25 MILLIGRAM(S): 2 INJECTION INTRAMUSCULAR; INTRAVENOUS; SUBCUTANEOUS at 16:27

## 2022-10-25 RX ADMIN — Medication 100 MILLIEQUIVALENT(S): at 09:52

## 2022-10-25 RX ADMIN — ONDANSETRON 4 MILLIGRAM(S): 8 TABLET, FILM COATED ORAL at 14:32

## 2022-10-25 RX ADMIN — Medication 1000 MILLIGRAM(S): at 00:59

## 2022-10-25 RX ADMIN — Medication 1000 MILLIGRAM(S): at 15:32

## 2022-10-25 RX ADMIN — Medication 1000 MILLIGRAM(S): at 06:48

## 2022-10-25 RX ADMIN — SODIUM CHLORIDE 90 MILLILITER(S): 9 INJECTION, SOLUTION INTRAVENOUS at 05:18

## 2022-10-25 RX ADMIN — ATORVASTATIN CALCIUM 80 MILLIGRAM(S): 80 TABLET, FILM COATED ORAL at 22:33

## 2022-10-25 RX ADMIN — HEPARIN SODIUM 5000 UNIT(S): 5000 INJECTION INTRAVENOUS; SUBCUTANEOUS at 20:14

## 2022-10-25 RX ADMIN — HYDROMORPHONE HYDROCHLORIDE 0.5 MILLIGRAM(S): 2 INJECTION INTRAMUSCULAR; INTRAVENOUS; SUBCUTANEOUS at 18:55

## 2022-10-25 RX ADMIN — HYDROMORPHONE HYDROCHLORIDE 0.25 MILLIGRAM(S): 2 INJECTION INTRAMUSCULAR; INTRAVENOUS; SUBCUTANEOUS at 16:42

## 2022-10-25 NOTE — DIETITIAN INITIAL EVALUATION ADULT - OTHER CALCULATIONS
IBW used for calculations as pt >120% of IBW (152%). Needs adjusted for wound healing post-op, advanced age.

## 2022-10-25 NOTE — PROGRESS NOTE ADULT - ASSESSMENT
83yo Female pt with PMH depression, hypothyroidism, ASD closure (6/2021 [facility]), Afib on Eliquis (last dose today), spinal stenosis with lumar radiculopathy,  and PSH of L hip surgery presenting with 4 days of intermittent abdominal pain. In the ED pt is afebrile, VSS. WBC 6.57, Hb 11.0, Cr 0.42,  TBili  0.7, AST  82, ALT  184, AlkPhos  477, lipase 55. US: CBD 0.6 cm. Gallbladder - Cholelithiasis, wall thickening (0.6 cm), positive sonographic Aldana's sign, no pericholecystic fluid. Pancreas is enlarged and echogenic with heterogeneous echotexture. Patient's findings and exam are consistent with acute cholecystitis, differential included pancreatitis however less likely as lipase levels are normal.    NPO/IVF  Pain/Nausea control PRN  Ceft/Flagyl  home meds (holding eliquis)  hold Hgtt for OR/SCD  IS/OOBA  AM labs  OR today

## 2022-10-25 NOTE — PRE-OP CHECKLIST - BMI (KG/M2)
conducted an initial consultation and Spiritual Assessment for Annette Wise, who is a 41 y.o.,male. Patients Primary Language is: [place]. According to the patients EMR Caodaism Affiliation is: Non Jehovah's witness.     [address] 91/23/1052    Alcoholic gastritis 76/10/1684    70/74/1341    Alcoholic cirrhosis ([address] 02/79/1471    Alcohol withdrawal ([address] 02/12/2021    Hypomagnesemia 12/27/2020    Hyperammonemia ([address] 12/27/2020    Thrombocytopenia ([address] 12/27/2020    Scalp contusion 12/27/2020    Spontaneous bacterial peritonitis ([address] 37/37/6354    Alcoholic cirrhosis of liver with ascites ([address] 10/03/2020    Sepsis ([address] [address]  [phone] 24.9

## 2022-10-25 NOTE — PROGRESS NOTE ADULT - ASSESSMENT
83 yo F, PMHx MDD, hypothyroidism, ASD closure (6/2021 [facility]), Afib on Eliquis, spinal stenosis with lumbar radiculopathy,  L hip surgery   PW pos prandial abdominal pain, admitted for Cholecystitis, Pancreatitis    GI was consulted to evaluate for Choledocholithiasis     #Abd Pain  #Cholecystitis  #Gallstone  #?Pancreatitis  #Normal Bilirubin    US: CBD 0.6 cm. Gallbladder - Cholelithiasis, wall thickening (0.6 cm), positive sonographic Aldana's sign, no pericholecystic fluid. Pancreas is enlarged and echogenic with heterogeneous echotexture.  CT AP: AIP  MRCP: 10mm CBD, no choledocho, improved panc body edema, without panc mass/dilation    Recommendations:  After review of imaging, low suspicion for CBD stone or process requiring endoscopic drainage  Would kindly defer management of primary process to Surgical Team  Please call back with further concerns or clinical questions    Thank you for allowing us to participate in the care of this patient. GI will sign off the case.  Please call us if you have any further questions or concerns    DW Dr. Surendra Viera M.D.  Gastroenterology Fellow  Weekday Pager: [phone]  Weekend Coverage: Please Call the  for contact info

## 2022-10-25 NOTE — PROVIDER CONTACT NOTE (OTHER) - BACKGROUND
pt admitted diagnosis of pancreatitis, no surgical intervention done yet, being treated with heparin infusion at this time
n/a
s/p Lap estrella

## 2022-10-25 NOTE — PROGRESS NOTE ADULT - ASSESSMENT
1. Acute cholecystitis   -Management a/p surgery.  -Encourage IS. Ambulate/OOB to chair when tolerating. DVT ppx will be with heparin gtt or NOAC when resumed.     2. Afib   -On heparin GTT. Cardiology following. Currently rate controlled.     3. HTN   - stable. c/w toprol    4. HLD  -on statin    5. Pulm HTN  -unclear etiology. Large neck, may have component of RANDELL/OHV  -Will need outpatient f/u and management    6. Elevated LFTs  -likely 2/2 estrella. Continue to monitor.     7. Asx bacteruria  -Do not recommend treatment    8. Patent foramen ovale  -Cardio following. Likely nothing to do at this time.     9. Anemia, unclear etiology  -Remains stable ~11. Continue to monitor.     10. hypokalemia  -continue to monitor.       DVT ppx: on heparin GTT. CHADSVASC 3; could hold that for now and resume NOAC when appropriate  DIet: a/p surgery when tolerating.

## 2022-10-25 NOTE — PROVIDER CONTACT NOTE (OTHER) - SITUATION
patient received IV tylenol and Dilaudid. States pain is still being maintained at a 7/10 throughout abdomen.
Pt vomited x1
Pt activated partial thromboplastin time was 62.0

## 2022-10-25 NOTE — PROGRESS NOTE ADULT - SUBJECTIVE AND OBJECTIVE BOX
SUBJECTIVE: Patient seen and examined at bedside. Patient resting comfortably in bed. She denies abdominal pain. She denies CP/SOB.       MEDICATIONS  (PRN):      I&O's Detail    24 Oct 2022 07:01  -  25 Oct 2022 07:00  --------------------------------------------------------  IN:    dextrose 5% + sodium chloride 0.45%: 1530 mL    Heparin: 70 mL    IV PiggyBack: 150 mL    Oral Fluid: 560 mL  Total IN: 2310 mL    OUT:    Voided (mL): 700 mL  Total OUT: 700 mL    Total NET: 1610 mL      25 Oct 2022 07:01  -  25 Oct 2022 12:54  --------------------------------------------------------  IN:    dextrose 5% + sodium chloride 0.45%: 180 mL  Total IN: 180 mL    OUT:  Total OUT: 0 mL    Total NET: 180 mL          T(C): 37.1 (10-25-22 @ 10:29), Max: 37.6 (10-24-22 @ 18:07)  HR: 88 (10-25-22 @ 10:29) (66 - 88)  BP: 140/63 (10-25-22 @ 10:29) (108/58 - 143/64)  RR: 19 (10-25-22 @ 10:29) (17 - 19)  SpO2: 96% (10-25-22 @ 10:29) (95% - 97%)    GENERAL: NAD, Resting comfortably in bed, awake, opens eyes spontaneously  RESP: Nonlabored breathing, No respiratory distress  CARD: Normal rate, Normal peripheral perfusion  GI: Soft, minimally distended, NT, No guarding, No rebound tenderness  EXTREM: [facility]    LABS:                        10.5   4.91  )-----------( 356      ( 25 Oct 2022 05:30 )             31.9     10-25    139  |  106  |  5<L>  ----------------------------<  107<H>  3.6   |  23  |  0.42<L>    Ca    8.8      25 Oct 2022 05:30  Phos  2.8     10-25  Mg     2.0     10-25    TPro  5.6<L>  /  Alb  3.0<L>  /  TBili  0.2  /  DBili  0.2  /  AST  73<H>  /  ALT  71<H>  /  AlkPhos  275<H>  10-25    PT/INR - ( 24 Oct 2022 18:08 )   PT: 13.9 sec;   INR: 1.17          PTT - ( 24 Oct 2022 18:08 )  PTT:49.7 sec      RADIOLOGY & ADDITIONAL STUDIES:      Urinalysis with Rflx Culture (collected 10-20-22 @ 17:11)

## 2022-10-25 NOTE — CHART NOTE - NSCHARTNOTEFT_GEN_A_CORE
Surgery Post op Note     Patient seen and examined at bedside. Complains of moderate abdominal pain. x1 0.5 Dilaudid, x1 1g IV Tylenol. x1 episode emesis, bilious. x1 episode watery stool. Pt denies f/c, dyspnea.    Procedure laparoscopic cholecystectomy    SUBJECTIVE: Pt seen  SOB:  [ ] YES [ ] NO  Chest Discomfort: [ ] YES [ ] NO    Nausea: [ ] YES [ ] NO           Vomiting: [ ] YES [ ] NO  Flatus: [ ] YES [ ] NO             Bowel Movement: [ ] YES [ ] NO  Void: [ ]YES [ ]No         Pain Control Adequate: [ ] YES [ ] NO    Neuro: Alert and Oriented X 3  Respiratory: CTA b/l. no respiratory distress  Cardiovascular: NSR, RRR  Gastrointestinal:                 Incision: c/d/i. x3 portal site incisions  Extremities: (-) edema b/l      Vital Signs Last 24 Hrs  T(C): 36.8 (25 Oct 2022 13:43), Max: 37.6 (24 Oct 2022 18:07)  T(F): 98.2 (25 Oct 2022 13:43), Max: 99.6 (24 Oct 2022 18:07)  HR: 80 (25 Oct 2022 16:43) (60 - 88)  BP: 161/85 (25 Oct 2022 16:43) (108/58 - 161/85)  BP(mean): 111 (25 Oct 2022 16:43) (78 - 111)  RR: 20 (25 Oct 2022 16:43) (16 - 20)  SpO2: 97% (25 Oct 2022 16:43) (94% - 97%)    Parameters below as of 25 Oct 2022 16:43  Patient On (Oxygen Delivery Method): nasal cannula  O2 Flow (L/min): 2    I&O's Summary    24 Oct 2022 07:01  -  25 Oct 2022 07:00  --------------------------------------------------------  IN: 2310 mL / OUT: 700 mL / NET: 1610 mL    25 Oct 2022 07:01  -  25 Oct 2022 16:54  --------------------------------------------------------  IN: 180 mL / OUT: 0 mL / NET: 180 mL      I&O's Detail    24 Oct 2022 07:01  -  25 Oct 2022 07:00  --------------------------------------------------------  IN:    dextrose 5% + sodium chloride 0.45%: 1530 mL    Heparin: 70 mL    IV PiggyBack: 150 mL    Oral Fluid: 560 mL  Total IN: 2310 mL    OUT:    Voided (mL): 700 mL  Total OUT: 700 mL    Total NET: 1610 mL      25 Oct 2022 07:01  -  25 Oct 2022 16:54  --------------------------------------------------------  IN:    dextrose 5% + sodium chloride 0.45%: 180 mL  Total IN: 180 mL    OUT:  Total OUT: 0 mL    Total NET: 180 mL          MEDICATIONS  (STANDING):  potassium chloride  10 mEq/100 mL IVPB 10 milliEquivalent(s) IV Intermittent every 1 hour    MEDICATIONS  (PRN):  acetaminophen     Tablet .. 1000 milliGRAM(s) Oral every 6 hours PRN Moderate Pain (4 - 6)      LABS:                        10.5   4.91  )-----------( 356      ( 25 Oct 2022 05:30 )             31.9     10-25    139  |  106  |  5<L>  ----------------------------<  107<H>  3.6   |  23  |  0.42<L>    Ca    8.8      25 Oct 2022 05:30  Phos  2.8     10-25  Mg     2.0     10-25    TPro  5.6<L>  /  Alb  3.0<L>  /  TBili  0.2  /  DBili  0.2  /  AST  73<H>  /  ALT  71<H>  /  AlkPhos  275<H>  10-25    PT/INR - ( 24 Oct 2022 18:08 )   PT: 13.9 sec;   INR: 1.17          PTT - ( 24 Oct 2022 18:08 )  PTT:49.7 sec          Physical exam  General  Abdomen     A/P: 82y Female  s/p   - Diet:   - Activity- OOB ambulate   - Labs:   - Pain medication  - DVT ppx  - incentive spiromtry     Bessie BLAKELY

## 2022-10-25 NOTE — DIETITIAN INITIAL EVALUATION ADULT - PERTINENT LABORATORY DATA
10-25    139  |  106  |  5<L>  ----------------------------<  107<H>  3.6   |  23  |  0.42<L>    Ca    8.8      25 Oct 2022 05:30  Phos  2.8     10-25  Mg     2.0     10-25    TPro  5.6<L>  /  Alb  3.0<L>  /  TBili  0.2  /  DBili  0.2  /  AST  73<H>  /  ALT  71<H>  /  AlkPhos  275<H>  10-25

## 2022-10-25 NOTE — DIETITIAN INITIAL EVALUATION ADULT - PERTINENT MEDS FT
MEDICATIONS  (STANDING):  potassium chloride  10 mEq/100 mL IVPB 10 milliEquivalent(s) IV Intermittent every 1 hour    MEDICATIONS  (PRN):  acetaminophen     Tablet .. 1000 milliGRAM(s) Oral every 6 hours PRN Moderate Pain (4 - 6)

## 2022-10-25 NOTE — PROGRESS NOTE ADULT - SUBJECTIVE AND OBJECTIVE BOX
Pt seen and examined by me this AM. No abd pain as she only feels pain with taking PO. Otherwise no difficult breathing, CP, palpitations, weakness, fevers etc.     PE  Gen: age appropriate female in NAD  HEENT: EOMI, NCAT  Neck: enlarged neck, no obvious JVD  Lungs: non labored no adventitious breath souds  CV: RRR S1S2  GI: minimal tenderess, soft, +BS  LE: no edema  Psych: appropriate, cooperative  Neuro: AAOx3      VS/labs reviewed

## 2022-10-25 NOTE — PROGRESS NOTE ADULT - SUBJECTIVE AND OBJECTIVE BOX
GASTROENTEROLOGY PROGRESS NOTE  Patient seen and examined at bedside. Notes she feels ok, believes she might be headed for surgery today.    PERTINENT REVIEW OF SYSTEMS:  CONSTITUTIONAL: No weakness, fevers or chills  HEENT: No visual changes; No vertigo or throat pain   GASTROINTESTINAL: As above.  NEUROLOGICAL: No numbness or weakness  SKIN: No itching, burning, rashes, or lesions     Allergies    No Known Allergies    Intolerances      MEDICATIONS:  MEDICATIONS  (STANDING):  acetaminophen     Tablet .. 1000 milliGRAM(s) Oral every 6 hours  atorvastatin 80 milliGRAM(s) Oral at bedtime  cefTRIAXone   IVPB 1000 milliGRAM(s) IV Intermittent every 24 hours  dextrose 5% + sodium chloride 0.45%. 1000 milliLiter(s) (90 mL/Hr) IV Continuous <Continuous>  levothyroxine 75 MICROGram(s) Oral daily  metoprolol succinate ER 25 milliGRAM(s) Oral daily  metroNIDAZOLE  IVPB 500 milliGRAM(s) IV Intermittent every 8 hours    MEDICATIONS  (PRN):  melatonin 3 milliGRAM(s) Oral at bedtime PRN Insomnia  ondansetron Injectable 4 milliGRAM(s) IV Push every 6 hours PRN Nausea    Vital Signs Last 24 Hrs  T(C): 37.1 (25 Oct 2022 05:10), Max: 37.6 (24 Oct 2022 18:07)  T(F): 98.7 (25 Oct 2022 05:10), Max: 99.6 (24 Oct 2022 18:07)  HR: 72 (25 Oct 2022 03:45) (64 - 72)  BP: 108/58 (25 Oct 2022 03:45) (108/58 - 143/64)  BP(mean): 78 (25 Oct 2022 03:45) (68 - 92)  RR: 17 (25 Oct 2022 03:45) (16 - 19)  SpO2: 95% (25 Oct 2022 03:45) (95% - 97%)    Parameters below as of 25 Oct 2022 03:45  Patient On (Oxygen Delivery Method): room air        10-24 @ 07:01  -  10-25 @ 07:00  --------------------------------------------------------  IN: 2310 mL / OUT: 700 mL / NET: 1610 mL      PHYSICAL EXAM:    General: lying in bed, in no acute distress  HEENT: MMM, conjunctiva and sclera clear  Gastrointestinal: Soft non-tender non-distended; No rebound or guarding  Skin: Warm and dry. No obvious rash    LABS:                        10.5   4.91  )-----------( 356      ( 25 Oct 2022 05:30 )             31.9     10-25    139  |  106  |  x   ----------------------------<  107<H>  3.6   |  23  |  x     Ca    8.8      25 Oct 2022 05:30  Phos  2.8     10-25  Mg     2.0     10-25    TPro  6.3  /  Alb  3.4  /  TBili  0.3  /  DBili  x   /  AST  36  /  ALT  63<H>  /  AlkPhos  318<H>  10-24    PT/INR - ( 24 Oct 2022 18:08 )   PT: 13.9 sec;   INR: 1.17          PTT - ( 24 Oct 2022 18:08 )  PTT:49.7 sec                  RADIOLOGY & ADDITIONAL STUDIES:  Reviewed

## 2022-10-25 NOTE — PROVIDER CONTACT NOTE (OTHER) - ACTION/TREATMENT ORDERED:
Dr. Armstrong will escalate pain needs to senior team members.
MD Buck advised to provide the PRN and to continue to monitor the patient.
no action at this time

## 2022-10-25 NOTE — DIETITIAN INITIAL EVALUATION ADULT - OTHER INFO
83yo Female pt with PMH depression, hypothyroidism, ASD closure (6/2021 [facility]), Afib on Eliquis (last dose today), spinal stenosis with lumar radiculopathy,  and PSH of L hip surgery presenting with 4 days of intermittent abdominal pain. In the ED pt is afebrile, VSS. WBC 6.57, Hb 11.0, Cr 0.42,  TBili  0.7, AST  82, ALT  184, AlkPhos  477, lipase 55. US: CBD 0.6 cm. Gallbladder - Cholelithiasis, wall thickening (0.6 cm), positive sonographic Aldana's sign, no pericholecystic fluid. Pancreas is enlarged and echogenic with heterogeneous echotexture. Patient's findings and exam are consistent with acute cholecystitis, differential included pancreatitis however less likely as lipase levels are normal. Pt now planned for lap estrella 10/25. RD attempted to complete assessment, but was out of room likely headed to OR. Currently NPO. No n/v/d/c. No abd distention or discomfort. Last BM 10/24. Skin surgical incision, ecchymotic, edward score 18. No pain noted at this time. Unable to assess UBW nor appetite PTA. [facility]. Edu deferred. Please see nutr recs below.

## 2022-10-26 ENCOUNTER — TRANSCRIPTION ENCOUNTER (OUTPATIENT)
Age: 82
End: 2022-10-26

## 2022-10-26 LAB
ALBUMIN SERPL ELPH-MCNC: 3.3 G/DL — SIGNIFICANT CHANGE UP (ref 3.3–5)
ALP SERPL-CCNC: 319 U/L — HIGH (ref 40–120)
ALT FLD-CCNC: 86 U/L — HIGH (ref 10–45)
ANION GAP SERPL CALC-SCNC: 18 MMOL/L — HIGH (ref 5–17)
APTT BLD: 49.9 SEC — HIGH (ref 27.5–35.5)
AST SERPL-CCNC: 88 U/L — HIGH (ref 10–40)
BILIRUB SERPL-MCNC: 0.4 MG/DL — SIGNIFICANT CHANGE UP (ref 0.2–1.2)
BUN SERPL-MCNC: 4 MG/DL — LOW (ref 7–23)
CALCIUM SERPL-MCNC: 9.3 MG/DL — SIGNIFICANT CHANGE UP (ref 8.4–10.5)
CHLORIDE SERPL-SCNC: 97 MMOL/L — SIGNIFICANT CHANGE UP (ref 96–108)
CO2 SERPL-SCNC: 21 MMOL/L — LOW (ref 22–31)
CREAT SERPL-MCNC: 0.39 MG/DL — LOW (ref 0.5–1.3)
EGFR: 101 ML/MIN/1.73M2 — SIGNIFICANT CHANGE UP
GLUCOSE SERPL-MCNC: 122 MG/DL — HIGH (ref 70–99)
HCT VFR BLD CALC: 34.6 % — SIGNIFICANT CHANGE UP (ref 34.5–45)
HCT VFR BLD CALC: 36.2 % — SIGNIFICANT CHANGE UP (ref 34.5–45)
HGB BLD-MCNC: 11.5 G/DL — SIGNIFICANT CHANGE UP (ref 11.5–15.5)
HGB BLD-MCNC: 11.7 G/DL — SIGNIFICANT CHANGE UP (ref 11.5–15.5)
INR BLD: 1.12 — SIGNIFICANT CHANGE UP (ref 0.88–1.16)
MAGNESIUM SERPL-MCNC: 1.9 MG/DL — SIGNIFICANT CHANGE UP (ref 1.6–2.6)
MCHC RBC-ENTMCNC: 29.6 PG — SIGNIFICANT CHANGE UP (ref 27–34)
MCHC RBC-ENTMCNC: 31 PG — SIGNIFICANT CHANGE UP (ref 27–34)
MCHC RBC-ENTMCNC: 32.3 GM/DL — SIGNIFICANT CHANGE UP (ref 32–36)
MCHC RBC-ENTMCNC: 33.2 GM/DL — SIGNIFICANT CHANGE UP (ref 32–36)
MCV RBC AUTO: 89.2 FL — SIGNIFICANT CHANGE UP (ref 80–100)
MCV RBC AUTO: 95.8 FL — SIGNIFICANT CHANGE UP (ref 80–100)
NRBC # BLD: 0 /100 WBCS — SIGNIFICANT CHANGE UP (ref 0–0)
NRBC # BLD: 0 /100 WBCS — SIGNIFICANT CHANGE UP (ref 0–0)
PHOSPHATE SERPL-MCNC: 2.7 MG/DL — SIGNIFICANT CHANGE UP (ref 2.5–4.5)
PLATELET # BLD AUTO: 418 K/UL — HIGH (ref 150–400)
PLATELET # BLD AUTO: 455 K/UL — HIGH (ref 150–400)
POTASSIUM SERPL-MCNC: 3.8 MMOL/L — SIGNIFICANT CHANGE UP (ref 3.5–5.3)
POTASSIUM SERPL-SCNC: 3.8 MMOL/L — SIGNIFICANT CHANGE UP (ref 3.5–5.3)
PROT SERPL-MCNC: 6.5 G/DL — SIGNIFICANT CHANGE UP (ref 6–8.3)
PROTHROM AB SERPL-ACNC: 13.3 SEC — SIGNIFICANT CHANGE UP (ref 10.5–13.4)
RBC # BLD: 3.78 M/UL — LOW (ref 3.8–5.2)
RBC # BLD: 3.88 M/UL — SIGNIFICANT CHANGE UP (ref 3.8–5.2)
RBC # FLD: 13.7 % — SIGNIFICANT CHANGE UP (ref 10.3–14.5)
RBC # FLD: 13.8 % — SIGNIFICANT CHANGE UP (ref 10.3–14.5)
SODIUM SERPL-SCNC: 136 MMOL/L — SIGNIFICANT CHANGE UP (ref 135–145)
WBC # BLD: 11.8 K/UL — HIGH (ref 3.8–10.5)
WBC # BLD: 12.14 K/UL — HIGH (ref 3.8–10.5)
WBC # FLD AUTO: 11.8 K/UL — HIGH (ref 3.8–10.5)
WBC # FLD AUTO: 12.14 K/UL — HIGH (ref 3.8–10.5)

## 2022-10-26 PROCEDURE — 99232 SBSQ HOSP IP/OBS MODERATE 35: CPT

## 2022-10-26 PROCEDURE — 99233 SBSQ HOSP IP/OBS HIGH 50: CPT

## 2022-10-26 RX ORDER — APIXABAN 2.5 MG/1
5 TABLET, FILM COATED ORAL EVERY 12 HOURS
Refills: 0 | Status: DISCONTINUED | OUTPATIENT
Start: 2022-10-26 | End: 2022-11-01

## 2022-10-26 RX ORDER — POTASSIUM CHLORIDE 20 MEQ
20 PACKET (EA) ORAL ONCE
Refills: 0 | Status: COMPLETED | OUTPATIENT
Start: 2022-10-26 | End: 2022-10-26

## 2022-10-26 RX ORDER — OXYCODONE HYDROCHLORIDE 5 MG/1
5 TABLET ORAL ONCE
Refills: 0 | Status: DISCONTINUED | OUTPATIENT
Start: 2022-10-26 | End: 2022-10-26

## 2022-10-26 RX ORDER — MAGNESIUM SULFATE 500 MG/ML
1 VIAL (ML) INJECTION ONCE
Refills: 0 | Status: COMPLETED | OUTPATIENT
Start: 2022-10-26 | End: 2022-10-26

## 2022-10-26 RX ADMIN — Medication 100 GRAM(S): at 10:59

## 2022-10-26 RX ADMIN — Medication 25 MILLIGRAM(S): at 19:02

## 2022-10-26 RX ADMIN — OXYCODONE HYDROCHLORIDE 5 MILLIGRAM(S): 5 TABLET ORAL at 06:09

## 2022-10-26 RX ADMIN — APIXABAN 5 MILLIGRAM(S): 2.5 TABLET, FILM COATED ORAL at 10:59

## 2022-10-26 RX ADMIN — Medication 1000 MILLIGRAM(S): at 09:12

## 2022-10-26 RX ADMIN — Medication 1000 MILLIGRAM(S): at 10:41

## 2022-10-26 RX ADMIN — Medication 75 MICROGRAM(S): at 05:56

## 2022-10-26 RX ADMIN — OXYCODONE HYDROCHLORIDE 5 MILLIGRAM(S): 5 TABLET ORAL at 05:56

## 2022-10-26 RX ADMIN — OXYCODONE HYDROCHLORIDE 5 MILLIGRAM(S): 5 TABLET ORAL at 13:29

## 2022-10-26 RX ADMIN — OXYCODONE HYDROCHLORIDE 5 MILLIGRAM(S): 5 TABLET ORAL at 13:13

## 2022-10-26 RX ADMIN — ONDANSETRON 4 MILLIGRAM(S): 8 TABLET, FILM COATED ORAL at 18:18

## 2022-10-26 RX ADMIN — OXYCODONE HYDROCHLORIDE 5 MILLIGRAM(S): 5 TABLET ORAL at 11:52

## 2022-10-26 RX ADMIN — APIXABAN 5 MILLIGRAM(S): 2.5 TABLET, FILM COATED ORAL at 22:18

## 2022-10-26 RX ADMIN — ATORVASTATIN CALCIUM 80 MILLIGRAM(S): 80 TABLET, FILM COATED ORAL at 22:18

## 2022-10-26 RX ADMIN — Medication 20 MILLIEQUIVALENT(S): at 10:59

## 2022-10-26 RX ADMIN — HEPARIN SODIUM 5000 UNIT(S): 5000 INJECTION INTRAVENOUS; SUBCUTANEOUS at 05:40

## 2022-10-26 RX ADMIN — OXYCODONE HYDROCHLORIDE 5 MILLIGRAM(S): 5 TABLET ORAL at 14:09

## 2022-10-26 NOTE — PROGRESS NOTE ADULT - SUBJECTIVE AND OBJECTIVE BOX
Pt seen and examined by me this AM. Having some discomfort at surgical site but not having post prandial pain any longer. Otherwise no difficult breathing, CP, palpitations, weakness, fevers etc.     PE  Gen: age appropriate female in NAD  HEENT: EOMI, NCAT  Neck: enlarged neck, no obvious JVD  Lungs: non labored no adventitious breath souds  CV: RRR S1S2  GI: minimal tenderess, soft, +BS  LE: no edema  Psych: appropriate, cooperative  Neuro: AAOx3      VS/labs reviewed

## 2022-10-26 NOTE — DISCHARGE NOTE PROVIDER - CARE PROVIDERS DIRECT ADDRESSES
,DirectAddress_Unknown ,DirectAddress_Unknown,[email or web address]

## 2022-10-26 NOTE — DISCHARGE NOTE PROVIDER - HOSPITAL COURSE
81yo Female pt with PMH depression, hypothyroidism, ASD closure (6/2021 [facility]), Afib on Eliquis (last dose 10/20) spinal stenosis with lumar radiculopathy,  and PSH of L hip surgery presented to the ED with 4 days of intermittent abdominal pain. In the ED pt is afebrile, VSS. WBC 6.57, Hb 11.0, Cr 0.42,  TBili  0.7, AST  82, ALT  184, AlkPhos  477, lipase 55. US: CBD 0.6 cm. Gallbladder - Cholelithiasis, wall thickening (0.6 cm), positive sonographic Aldana's sign, no pericholecystic fluid. Pancreas is enlarged and echogenic with heterogeneous echotexture. Patient's findings and exam are consistent with acute cholecystitis, differential included pancreatits, however less likely as lipase levels are normal. GI was consulted  for choledoco, however MRCP showed 10mm CBD, no choledocho, improved panc body edema, without panc mass/dilation so GI rec'd no endoscopic intervention needed and on 10/25 pt was taken to the OR for laparoscopic cholecystectomy and ventral hernia repair (10/25). Her postoperative course was unremarkable with advancement of diet, passing trial of void, and pain control. On day of discharge patient was stable to be d/c'd home.   83yo Female pt with PMH depression, hypothyroidism, ASD closure (6/2021 [facility]), Afib on Eliquis (last dose 10/20) spinal stenosis with lumar radiculopathy, and PSH of L hip surgery presented to the ED with 4 days of intermittent abdominal pain. In the ED pt is afebrile, VSS. WBC 6.57, Hb 11.0, Cr 0.42,  TBili  0.7, AST  82, ALT  184, AlkPhos  477, lipase 55. US: CBD 0.6 cm. Gallbladder - Cholelithiasis, wall thickening (0.6 cm), positive sonographic Aldana's sign, no pericholecystic fluid. Pancreas is enlarged and echogenic with heterogeneous echotexture. Patient's findings and exam are consistent with acute cholecystitis, differential included pancreatits, however less likely as lipase levels are normal. GI was consulted for choledocho, however MRCP showed 10mm CBD, no choledocho, improved panc body edema, without panc mass/dilation so GI rec'd no endoscopic intervention needed and on 10/25 pt was taken to the OR for laparoscopic cholecystectomy and ventral hernia repair (10/25). Her postoperative course was unremarkable with advancement of diet, passing trial of void, and pain control. On day of discharge patient was stable to be d/c'd to [facility].

## 2022-10-26 NOTE — PROGRESS NOTE ADULT - SUBJECTIVE AND OBJECTIVE BOX
INTERVAL HPI/OVERNIGHT EVENTS: MONICA    STATUS POST:  lap estrella and ventral hernia repair    POST OPERATIVE DAY #: 1    SUBJECTIVE:  pt seen at bedside, feeling fine, denies nausea/vomiting, headache improving    MEDICATIONS  (STANDING):  atorvastatin 80 milliGRAM(s) Oral at bedtime  heparin   Injectable 5000 Unit(s) SubCutaneous every 8 hours  levothyroxine 75 MICROGram(s) Oral daily  metoprolol succinate ER 25 milliGRAM(s) Oral every 24 hours  potassium chloride  10 mEq/100 mL IVPB 10 milliEquivalent(s) IV Intermittent every 1 hour    MEDICATIONS  (PRN):  acetaminophen     Tablet .. 1000 milliGRAM(s) Oral every 6 hours PRN Moderate Pain (4 - 6)  ondansetron Injectable 4 milliGRAM(s) IV Push every 6 hours PRN Nausea and/or Vomiting  oxyCODONE    IR 5 milliGRAM(s) Oral every 6 hours PRN Severe Pain (7 - 10)      Vital Signs Last 24 Hrs  T(C): 37.4 (26 Oct 2022 05:12), Max: 37.4 (26 Oct 2022 05:12)  T(F): 99.3 (26 Oct 2022 05:12), Max: 99.3 (26 Oct 2022 05:12)  HR: 76 (26 Oct 2022 04:21) (60 - 102)  BP: 148/68 (26 Oct 2022 04:21) (120/57 - 176/83)  BP(mean): 97 (26 Oct 2022 04:21) (82 - 117)  RR: 17 (26 Oct 2022 04:21) (16 - 20)  SpO2: 95% (26 Oct 2022 04:21) (94% - 97%)    Parameters below as of 26 Oct 2022 04:21  Patient On (Oxygen Delivery Method): room air        PHYSICAL EXAM:      Constitutional: A&Ox3    Respiratory: non labored breathing, no respiratory distress    Cardiovascular: NSR, RRR    Gastrointestinal: soft, nontender, nondistended    Extremities: (-) edema                  I&O's Detail    25 Oct 2022 07:01  -  26 Oct 2022 07:00  --------------------------------------------------------  IN:    dextrose 5% + sodium chloride 0.45%: 180 mL    Oral Fluid: 120 mL  Total IN: 300 mL    OUT:    Voided (mL): 1050 mL  Total OUT: 1050 mL    Total NET: -750 mL          LABS:                        11.7   12.14 )-----------( 418      ( 26 Oct 2022 06:42 )             36.2     10-26    136  |  97  |  4<L>  ----------------------------<  122<H>  3.8   |  21<L>  |  0.39<L>    Ca    9.3      26 Oct 2022 06:42  Phos  2.7     10-26  Mg     1.9     10-26    TPro  6.5  /  Alb  3.3  /  TBili  0.4  /  DBili  x   /  AST  88<H>  /  ALT  86<H>  /  AlkPhos  319<H>  10-26    PT/INR - ( 24 Oct 2022 18:08 )   PT: 13.9 sec;   INR: 1.17          PTT - ( 24 Oct 2022 18:08 )  PTT:49.7 sec      RADIOLOGY & ADDITIONAL STUDIES:

## 2022-10-26 NOTE — PHYSICAL THERAPY INITIAL EVALUATION ADULT - PERTINENT HX OF CURRENT PROBLEM, REHAB EVAL
83yo Female pt with PMH depression, hypothyroidism, Afib on Eliquis (last dose today) and PSH of L hip surgery presenting with 4 days of intermittent abdominal pain. Pt started having nausea and 2 episodes of NBNB emesis 2 days ago. Also developed LUQ and epigastric sharp, non radiating abdominal pain without aggravating/alleviating factors. No constipation or diarrhea- last BM this morning was normal and is passing gas. Pt was advised to go to the ED by her PCP but deferred ED visit. Has not vomited today but reports mild nausea and decreased appetite. Did not take medications for her pain. Blood work done as outpatient showed transaminitis with elevated ALP and positive UA. Denies fevers/chills, CP/SOB, dizziness, syncope, headache, BRBPR, urinary urgency/frequency. Reports that in the ED her pain is improved, denies nause or vomiting.

## 2022-10-26 NOTE — PROGRESS NOTE ADULT - ASSESSMENT
81yo Female pt with PMH depression, hypothyroidism, ASD closure (6/2021 [facility]), Afib on Eliquis (last dose today), spinal stenosis with lumar radiculopathy,  and PSH of L hip surgery presenting with 4 days of intermittent abdominal pain. In the ED pt is afebrile, VSS. WBC 6.57, Hb 11.0, Cr 0.42,  TBili  0.7, AST  82, ALT  184, AlkPhos  477, lipase 55. US: CBD 0.6 cm. Gallbladder - Cholelithiasis, wall thickening (0.6 cm), positive sonographic Aldana's sign, no pericholecystic fluid. Pancreas is enlarged and echogenic with heterogeneous echotexture. Patient's findings and exam are consistent with acute cholecystitis, differential included pancreatits, however less likely as lipase levels are normal. Now s/p cholecystectomy and ventral hernia repair (10/25).     CLD  AM labs  Pain/Nausea control PRN  home meds (holding eliquis)  hold Hgtt until 10/26/SCD, restart vs eliquis  IS/OOBA  AM labs

## 2022-10-26 NOTE — DISCHARGE NOTE PROVIDER - NSDCFUADDINST_GEN_ALL_CORE_FT
Please follow up with Dr. Pimentel in one week; you may call the office to make an appointment at your earliest convenience at [phone].  You may take tylenol every 6 hours as needed for pain, do not exceed 4,000mg of tylenol in 24 hours.    General Discharge Instructions:  Please resume all regular home medications unless specifically advised not to take a particular medication. Also, please take any new medications as prescribed.  Please get plenty of rest, continue to ambulate several times per day, and drink adequate amounts of fluids. Avoid lifting weights greater than 5-10 lbs until you follow-up with your surgeon, who will instruct you further regarding activity restrictions.  Avoid driving or operating heavy machinery while taking pain medications.  Please follow-up with your surgeon and Primary Care Provider (PCP) as advised.  Incision Care:  *Please call your doctor if you have increased pain, swelling, redness, or drainage from the incision site.  *Avoid swimming and baths until your follow-up appointment.  *You may shower, and wash surgical incisions with a mild soap and warm water. Gently pat the area dry.    Warning Signs:  Please call your doctor if you experience the following:  *You experience new chest pain, pressure, squeezing or tightness.  *New or worsening cough, shortness of breath, or wheeze.  *If you are vomiting and cannot keep down fluids or your medications.  *You are getting dehydrated due to continued vomiting, diarrhea, or other reasons. Signs of dehydration include dry mouth, rapid heartbeat, or feeling dizzy or faint when standing.  *You see blood or dark/black material when you vomit or have a bowel movement.  *You experience burning when you urinate, have blood in your urine, or experience a discharge.  *Your pain is not improving within 8-12 hours or is not gone within 24 hours. Call or return immediately if your pain is getting worse, changes location, or moves to your chest or back.  *You have shaking chills, or fever greater than 101.5 degrees Fahrenheit or 38 degrees Celsius.  *Any change in your symptoms, or any new symptoms that concern you.   Please follow up with Dr. Pimentel in one week; you may call the office to make an appointment at your earliest convenience at [phone].  You may take tylenol every 6 hours as needed for pain, do not exceed 4,000mg of tylenol in 24 hours.    Pulmonology Follow Up:  Please follow up with Dr. Salguero in one week for further management of your pulmonary hypertension and possible sleep apnea; you may call the office to make an appointment at your earliest convenience.      General Discharge Instructions:  Please resume all regular home medications unless specifically advised not to take a particular medication. Also, please take any new medications as prescribed.  Please get plenty of rest, continue to ambulate several times per day, and drink adequate amounts of fluids. Avoid lifting weights greater than 5-10 lbs until you follow-up with your surgeon, who will instruct you further regarding activity restrictions.  Avoid driving or operating heavy machinery while taking pain medications.  Please follow-up with your surgeon and Primary Care Provider (PCP) as advised.  Incision Care:  *Please call your doctor if you have increased pain, swelling, redness, or drainage from the incision site.  *Avoid swimming and baths until your follow-up appointment.  *You may shower, and wash surgical incisions with a mild soap and warm water. Gently pat the area dry.    Warning Signs:  Please call your doctor if you experience the following:  *You experience new chest pain, pressure, squeezing or tightness.  *New or worsening cough, shortness of breath, or wheeze.  *If you are vomiting and cannot keep down fluids or your medications.  *You are getting dehydrated due to continued vomiting, diarrhea, or other reasons. Signs of dehydration include dry mouth, rapid heartbeat, or feeling dizzy or faint when standing.  *You see blood or dark/black material when you vomit or have a bowel movement.  *You experience burning when you urinate, have blood in your urine, or experience a discharge.  *Your pain is not improving within 8-12 hours or is not gone within 24 hours. Call or return immediately if your pain is getting worse, changes location, or moves to your chest or back.  *You have shaking chills, or fever greater than 101.5 degrees Fahrenheit or 38 degrees Celsius.  *Any change in your symptoms, or any new symptoms that concern you.

## 2022-10-26 NOTE — DISCHARGE NOTE PROVIDER - NSDCMRMEDTOKEN_GEN_ALL_CORE_FT
atorvastatin 80 mg oral tablet: 1 tab(s) orally once a day  Eliquis 5 mg oral tablet: 1 tab(s) orally 2 times a day  levothyroxine 75 mcg (0.075 mg) oral tablet: 1 tab(s) orally once a day  metoprolol succinate 25 mg oral tablet, extended release: 1 tab(s) orally once a day   atorvastatin 80 mg oral tablet: 1 tab(s) orally once a day  bisacodyl 10 mg rectal suppository: 1 suppository(ies) rectal once, As needed, Constipation  Eliquis 5 mg oral tablet: 1 tab(s) orally 2 times a day  levothyroxine 75 mcg (0.075 mg) oral tablet: 1 tab(s) orally once a day  metoprolol succinate 25 mg oral tablet, extended release: 1 tab(s) orally once a day

## 2022-10-26 NOTE — PROGRESS NOTE ADULT - TIME BILLING
review of labs and imaging, discussion with patient and daughter regarding etiology of acute cholecystitis as well as benefits and risks associated with non-operative vs. operative management in setting of significant medical co-morbidities and currently on blood thinners
as noted above

## 2022-10-26 NOTE — DISCHARGE NOTE PROVIDER - CARE PROVIDER_API CALL
Ulysses Pimentel)  Surgery  [address] [place] [address], [address]  [place] [address]  Phone: [phone]  Fax: [phone]  Follow Up Time:    Ulysses Pimentel)  Surgery  [address], [address]  [place] [address]  Phone: [phone]  Fax: [phone]  Follow Up Time:     Brooklyn Salguero)  Critical Care Medicine; Internal Medicine; Pulmonary Disease  [address] [place] [address]  Phone: [phone]  Fax: [phone]  Follow Up Time:

## 2022-10-26 NOTE — DISCHARGE NOTE PROVIDER - PROVIDER TOKENS
PROVIDER:[TOKEN:[[id number]]] PROVIDER:[TOKEN:[[id number]]],PROVIDER:[TOKEN:[[id number]]]

## 2022-10-26 NOTE — PROGRESS NOTE ADULT - ASSESSMENT
83yo Female pt with PMH depression, hypothyroidism, Afib on Eliquis and PSH of L hip surgery presenting with 4 days of intermittent abdominal pain, found to have cholecystitis.    #Pre op  EKG nonischemic  TTE in 2021: EF 60%  METs > 4  -Intermediate risk for intermediate risk procedure  -No further cardiac workup  -Continue statin and beta blocker  -No indication for heparin bridge given CHADSVASC 3.   - patient now post-op s/p lap estrella and ventral hernia; please resume home eliquis when safe from bleeding perspective    *incomplete 81yo Female pt with PMH depression, hypothyroidism, Afib on Eliquis and PSH of L hip surgery presenting with 4 days of intermittent abdominal pain, found to have cholecystitis.    #Pre op  EKG nonischemic  TTE in 2021: EF 60%  METs > 4  -Intermediate risk for intermediate risk procedure  -No further cardiac workup  -Continue statin and beta blocker  -No indication for heparin bridge given CHADSVASC 3.   - patient now post-op s/p lap estrella and ventral hernia; please resume home eliquis when safe from bleeding perspective    Cardiology will sign off. Please call us with any questions.

## 2022-10-26 NOTE — PROGRESS NOTE ADULT - ASSESSMENT
1. Acute cholecystitis   -Management a/p surgery. s/p cholecystectomy 10/25  -Encourage IS. Ambulate/OOB to chair when tolerating. DVT ppx will be with heparin gtt or NOAC when resumed.     2. Afib   -On heparin GTT. Cardiology following. Currently rate controlled.   -Resume noac when safe from bleeding perspective    3. HTN   - stable. c/w toprol    4. HLD  -on statin    5. Pulm HTN  -unclear etiology. Large neck, may have component of RANDELL/OHV  -Will need outpatient f/u and management    6. Elevated LFTs  -likely 2/2 estrella. Continue to monitor.     7. Asx bacteruria  -Do not recommend treatment    8. Patent foramen ovale  -Cardio following. Likely nothing to do at this time.     9. Anemia, unclear etiology  -Remains stable ~11. Continue to monitor.     10. hypokalemia  -continue to monitor.       DVT ppx: on heparin GTT. CHADSVASC 3; could hold that for now and resume NOAC when appropriate  DIet: a/p surgery when tolerating.

## 2022-10-26 NOTE — PROGRESS NOTE ADULT - ATTENDING COMMENTS
Seen/discussed with fellow at bedside  Agree with above
Initial attending contact date 10/21/22      . See fellow note written above for details. I reviewed the fellow documentation. I have personally seen and examined this patient. I reviewed vitals, labs, medications, cardiac studies, and additional imaging. I agree with the above fellow's findings and plans as written above with the following additions/statements.    83yo F depression, hypothyroidism, P Afib on Eliquis (last dose today), recent PFO closure? and PSH of L hip surgery presenting with 4 days of intermittent abdominal pain, found to have cholecystitis.  -now s/p OR without active cardiac issues   -EKG NSR nonischemic   -Last ECHO 2021 with normal LVEF  -No need for further work up  -Resume eliquis when able
Patient is an 82 year old woman with significant medical history including A Fib on Eliquis, depression, hypothyroidism now admitted with concern for acute cholecystitis. At time of evaluation, afebrile, hemodynamically stable, tender to palpation RUQ, no rebound or guarding, not peritonitic.    Continue bowel rest, IV fluid rehydration, IV antibiotics. Currently holding Eliquis, will discuss non-operative management vs. consideration of cholecystectomy/percutaneous cholecystostomy.    Late entry, date of service 10-21-22

## 2022-10-26 NOTE — PROGRESS NOTE ADULT - SUBJECTIVE AND OBJECTIVE BOX
INTERVAL EVENTS:    PAST MEDICAL & SURGICAL HISTORY:  Depression    Thyroid disorder    Afib    HLD (hyperlipidemia)    History of hip replacement  R, for degenerative pain        MEDICATIONS  (STANDING):  atorvastatin 80 milliGRAM(s) Oral at bedtime  heparin   Injectable 5000 Unit(s) SubCutaneous every 8 hours  levothyroxine 75 MICROGram(s) Oral daily  magnesium sulfate  IVPB 1 Gram(s) IV Intermittent once  metoprolol succinate ER 25 milliGRAM(s) Oral every 24 hours  potassium chloride   Powder 20 milliEquivalent(s) Oral once    MEDICATIONS  (PRN):  acetaminophen     Tablet .. 1000 milliGRAM(s) Oral every 6 hours PRN Moderate Pain (4 - 6)  ondansetron Injectable 4 milliGRAM(s) IV Push every 6 hours PRN Nausea and/or Vomiting  oxyCODONE    IR 5 milliGRAM(s) Oral every 6 hours PRN Severe Pain (7 - 10)    Vital Signs Last 24 Hrs  T(C): 37.4 (26 Oct 2022 05:12), Max: 37.4 (26 Oct 2022 05:12)  T(F): 99.3 (26 Oct 2022 05:12), Max: 99.3 (26 Oct 2022 05:12)  HR: 76 (26 Oct 2022 04:21) (60 - 102)  BP: 148/68 (26 Oct 2022 04:21) (120/57 - 176/83)  BP(mean): 97 (26 Oct 2022 04:21) (82 - 117)  RR: 17 (26 Oct 2022 04:21) (16 - 20)  SpO2: 95% (26 Oct 2022 04:21) (94% - 97%)    Parameters below as of 26 Oct 2022 04:21  Patient On (Oxygen Delivery Method): room air        PHYSICAL EXAM:  GEN: NAD  HEENT: EOMI   RESP: CTA b/l  CV: RRR. Normal S1/S2. No m/r/g.  ABD: soft, non-distended  EXT: No edema   NEURO: alert and attentive    LABS:                        11.7   12.14 )-----------( 418      ( 26 Oct 2022 06:42 )             36.2     10-26    136  |  97  |  4<L>  ----------------------------<  122<H>  3.8   |  21<L>  |  0.39<L>    Ca    9.3      26 Oct 2022 06:42  Phos  2.7     10-26  Mg     1.9     10-26    TPro  6.5  /  Alb  3.3  /  TBili  0.4  /  DBili  x   /  AST  88<H>  /  ALT  86<H>  /  AlkPhos  319<H>  10-26        PT/INR - ( 24 Oct 2022 18:08 )   PT: 13.9 sec;   INR: 1.17          PTT - ( 24 Oct 2022 18:08 )  PTT:49.7 sec    I&O's Summary    25 Oct 2022 07:01  -  26 Oct 2022 07:00  --------------------------------------------------------  IN: 300 mL / OUT: 1050 mL / NET: -750 mL               INTERVAL EVENTS: felicia    PAST MEDICAL & SURGICAL HISTORY:  Depression    Thyroid disorder    Afib    HLD (hyperlipidemia)    History of hip replacement  R, for degenerative pain        MEDICATIONS  (STANDING):  atorvastatin 80 milliGRAM(s) Oral at bedtime  heparin   Injectable 5000 Unit(s) SubCutaneous every 8 hours  levothyroxine 75 MICROGram(s) Oral daily  magnesium sulfate  IVPB 1 Gram(s) IV Intermittent once  metoprolol succinate ER 25 milliGRAM(s) Oral every 24 hours  potassium chloride   Powder 20 milliEquivalent(s) Oral once    MEDICATIONS  (PRN):  acetaminophen     Tablet .. 1000 milliGRAM(s) Oral every 6 hours PRN Moderate Pain (4 - 6)  ondansetron Injectable 4 milliGRAM(s) IV Push every 6 hours PRN Nausea and/or Vomiting  oxyCODONE    IR 5 milliGRAM(s) Oral every 6 hours PRN Severe Pain (7 - 10)    Vital Signs Last 24 Hrs  T(C): 37.4 (26 Oct 2022 05:12), Max: 37.4 (26 Oct 2022 05:12)  T(F): 99.3 (26 Oct 2022 05:12), Max: 99.3 (26 Oct 2022 05:12)  HR: 76 (26 Oct 2022 04:21) (60 - 102)  BP: 148/68 (26 Oct 2022 04:21) (120/57 - 176/83)  BP(mean): 97 (26 Oct 2022 04:21) (82 - 117)  RR: 17 (26 Oct 2022 04:21) (16 - 20)  SpO2: 95% (26 Oct 2022 04:21) (94% - 97%)    Parameters below as of 26 Oct 2022 04:21  Patient On (Oxygen Delivery Method): room air        PHYSICAL EXAM:  GEN: NAD  HEENT: EOMI   RESP: CTA b/l  CV: RRR. Normal S1/S2. No m/r/g.  ABD: soft, non-distended  EXT: No edema   NEURO: alert and attentive    LABS:                        11.7   12.14 )-----------( 418      ( 26 Oct 2022 06:42 )             36.2     10-26    136  |  97  |  4<L>  ----------------------------<  122<H>  3.8   |  21<L>  |  0.39<L>    Ca    9.3      26 Oct 2022 06:42  Phos  2.7     10-26  Mg     1.9     10-26    TPro  6.5  /  Alb  3.3  /  TBili  0.4  /  DBili  x   /  AST  88<H>  /  ALT  86<H>  /  AlkPhos  319<H>  10-26        PT/INR - ( 24 Oct 2022 18:08 )   PT: 13.9 sec;   INR: 1.17          PTT - ( 24 Oct 2022 18:08 )  PTT:49.7 sec    I&O's Summary    25 Oct 2022 07:01  -  26 Oct 2022 07:00  --------------------------------------------------------  IN: 300 mL / OUT: 1050 mL / NET: -750 mL

## 2022-10-26 NOTE — PHYSICAL THERAPY INITIAL EVALUATION ADULT - ADDITIONAL COMMENTS
Pt states she uses her rollator in home and out. Pt states she hires a HHA 3 days 5 hours/day. Pt requires HHA to shower, grocery shop, and meal prep.

## 2022-10-27 LAB
ANION GAP SERPL CALC-SCNC: 11 MMOL/L — SIGNIFICANT CHANGE UP (ref 5–17)
APTT BLD: 34.2 SEC — SIGNIFICANT CHANGE UP (ref 27.5–35.5)
BUN SERPL-MCNC: 8 MG/DL — SIGNIFICANT CHANGE UP (ref 7–23)
CALCIUM SERPL-MCNC: 9.4 MG/DL — SIGNIFICANT CHANGE UP (ref 8.4–10.5)
CHLORIDE SERPL-SCNC: 102 MMOL/L — SIGNIFICANT CHANGE UP (ref 96–108)
CO2 SERPL-SCNC: 25 MMOL/L — SIGNIFICANT CHANGE UP (ref 22–31)
CREAT SERPL-MCNC: 0.53 MG/DL — SIGNIFICANT CHANGE UP (ref 0.5–1.3)
EGFR: 93 ML/MIN/1.73M2 — SIGNIFICANT CHANGE UP
GLUCOSE SERPL-MCNC: 115 MG/DL — HIGH (ref 70–99)
HCT VFR BLD CALC: 33.9 % — LOW (ref 34.5–45)
HGB BLD-MCNC: 10.9 G/DL — LOW (ref 11.5–15.5)
MAGNESIUM SERPL-MCNC: 2.2 MG/DL — SIGNIFICANT CHANGE UP (ref 1.6–2.6)
MCHC RBC-ENTMCNC: 29.6 PG — SIGNIFICANT CHANGE UP (ref 27–34)
MCHC RBC-ENTMCNC: 32.2 GM/DL — SIGNIFICANT CHANGE UP (ref 32–36)
MCV RBC AUTO: 92.1 FL — SIGNIFICANT CHANGE UP (ref 80–100)
NRBC # BLD: 0 /100 WBCS — SIGNIFICANT CHANGE UP (ref 0–0)
PHOSPHATE SERPL-MCNC: 2.7 MG/DL — SIGNIFICANT CHANGE UP (ref 2.5–4.5)
PLATELET # BLD AUTO: 427 K/UL — HIGH (ref 150–400)
POTASSIUM SERPL-MCNC: 4.2 MMOL/L — SIGNIFICANT CHANGE UP (ref 3.5–5.3)
POTASSIUM SERPL-SCNC: 4.2 MMOL/L — SIGNIFICANT CHANGE UP (ref 3.5–5.3)
RBC # BLD: 3.68 M/UL — LOW (ref 3.8–5.2)
RBC # FLD: 14 % — SIGNIFICANT CHANGE UP (ref 10.3–14.5)
SODIUM SERPL-SCNC: 138 MMOL/L — SIGNIFICANT CHANGE UP (ref 135–145)
WBC # BLD: 8.34 K/UL — SIGNIFICANT CHANGE UP (ref 3.8–10.5)
WBC # FLD AUTO: 8.34 K/UL — SIGNIFICANT CHANGE UP (ref 3.8–10.5)

## 2022-10-27 PROCEDURE — 99232 SBSQ HOSP IP/OBS MODERATE 35: CPT

## 2022-10-27 RX ADMIN — Medication 1000 MILLIGRAM(S): at 13:54

## 2022-10-27 RX ADMIN — Medication 75 MICROGRAM(S): at 06:20

## 2022-10-27 RX ADMIN — APIXABAN 5 MILLIGRAM(S): 2.5 TABLET, FILM COATED ORAL at 09:47

## 2022-10-27 RX ADMIN — APIXABAN 5 MILLIGRAM(S): 2.5 TABLET, FILM COATED ORAL at 22:30

## 2022-10-27 RX ADMIN — Medication 25 MILLIGRAM(S): at 17:23

## 2022-10-27 RX ADMIN — ATORVASTATIN CALCIUM 80 MILLIGRAM(S): 80 TABLET, FILM COATED ORAL at 22:30

## 2022-10-27 NOTE — PROGRESS NOTE ADULT - SUBJECTIVE AND OBJECTIVE BOX
Pt seen and examined by me this AM. Feels well. No acute complaints no overnight events. Otherwise no difficult breathing, CP, palpitations, weakness, fevers etc.     PE  Gen: age appropriate female in NAD  HEENT: EOMI, NCAT  Neck: enlarged neck, no obvious JVD  Lungs: non labored no adventitious breath sounds  CV: RRR S1S2  GI: minimal tenderess, soft, +BS  LE: no edema  Psych: appropriate, cooperative  Neuro: AAOx3      VS/labs reviewed

## 2022-10-27 NOTE — PROGRESS NOTE ADULT - ASSESSMENT
81yo Female pt with PMH depression, hypothyroidism, ASD closure (6/2021 [facility]), Afib on Eliquis (last dose today), spinal stenosis with lumar radiculopathy,  and PSH of L hip surgery presenting with 4 days of intermittent abdominal pain. In the ED pt is afebrile, VSS. WBC 6.57, Hb 11.0, Cr 0.42,  TBili  0.7, AST  82, ALT  184, AlkPhos  477, lipase 55. US: CBD 0.6 cm. Gallbladder - Cholelithiasis, wall thickening (0.6 cm), positive sonographic Aldana's sign, no pericholecystic fluid. Pancreas is enlarged and echogenic with heterogeneous echotexture. Patient's findings and exam are consistent with acute cholecystitis, differential included pancreatits, however less likely as lipase levels are normal. Now s/p cholecystectomy and ventral hernia repair (10/25).     Low fat  AM labs  Pain/Nausea control PRN  home meds  home  eliquis  IS/OOBA/SCD  AM labs  pending DAMARIS placement

## 2022-10-27 NOTE — PROGRESS NOTE ADULT - SUBJECTIVE AND OBJECTIVE BOX
STATUS POST:  Laparoscopic cholecystectomy  POST OPERATIVE DAY #: 2    SUBJECTIVE: Pt seen and examined by chief resident. Pt is doing well, resting comfortably on bed. Pain controlled. Diet tolerated. Ambulating out of bed. +F/+BM. No nausea or vomiting. No complaints at this time.    Vital Signs Last 24 Hrs  T(C): 37.2 (27 Oct 2022 05:10), Max: 37.2 (27 Oct 2022 05:10)  T(F): 99 (27 Oct 2022 05:10), Max: 99 (27 Oct 2022 05:10)  HR: 78 (27 Oct 2022 05:10) (72 - 80)  BP: 116/72 (27 Oct 2022 05:10) (114/59 - 137/63)  BP(mean): 80 (26 Oct 2022 16:01) (80 - 90)  RR: 18 (27 Oct 2022 05:10) (17 - 18)  SpO2: 94% (27 Oct 2022 05:10) (94% - 96%)    Parameters below as of 27 Oct 2022 05:10  Patient On (Oxygen Delivery Method): room air        I&O's Summary    26 Oct 2022 07:01  -  27 Oct 2022 07:00  --------------------------------------------------------  IN: 480 mL / OUT: 2250 mL / NET: -1770 mL        Physical Exam:  General Appearance: Appears well, NAD  Pulmonary: Nonlabored breathing, no respiratory distress  Abdomen: Soft, nondisteded, nontender, incisions clean dry and intact  Extremities: WWP, SCD's in place     LABS:                        11.5   11.80 )-----------( 455      ( 26 Oct 2022 14:31 )             34.6     10-26    136  |  97  |  4<L>  ----------------------------<  122<H>  3.8   |  21<L>  |  0.39<L>    Ca    9.3      26 Oct 2022 06:42  Phos  2.7     10-26  Mg     1.9     10-26    TPro  6.5  /  Alb  3.3  /  TBili  0.4  /  DBili  x   /  AST  88<H>  /  ALT  86<H>  /  AlkPhos  319<H>  10-26    PT/INR - ( 26 Oct 2022 12:00 )   PT: 13.3 sec;   INR: 1.12          PTT - ( 26 Oct 2022 12:00 )  PTT:49.9 sec

## 2022-10-27 NOTE — PROGRESS NOTE ADULT - ASSESSMENT
Eye Problem(s):glasses or contacts  ENT Problem(s):negative  Cardiovascular problem(s):negative  Respiratory problem(s):negative  Gastro-intestinal problem(s):negative GI  Genito-urinary problem(s):negative  Musculoskeletal problem(s):arthritis  Integumentary problem(s):negative  Neurological problem(s):negative  Psychiatric problem(s):negative  Endocrine problem(s):negative  Hematologic and/or Lymphatic problem(s):easy bruising while on Coumadin     1. Acute cholecystitis   -Management a/p surgery. s/p cholecystectomy 10/25  -Encourage IS. Ambulate/OOB to chair when tolerating. DVT ppx->on NOAC     2. Afib   -On heparin GTT. Cardiology following. Currently rate controlled.   -on eliquis    3. HTN   - stable. c/w toprol    4. HLD  -on statin    5. Pulm HTN  -unclear etiology. Large neck, may have component of RANDELL/OHV  -Will need outpatient f/u and management    6. Elevated LFTs  -likely 2/2 estrella. Continue to monitor.     7. Asx bacteruria  -Do not recommend treatment    8. Patent foramen ovale  -Cardio following. Likely nothing to do at this time.     9. Anemia, unclear etiology  -Remains stable. Some component of blood loss anemia 2/2 surgery.     10. hypokalemia  -continue to monitor.       DVT ppx: on eliquis  DIet: a/p surgery

## 2022-10-28 LAB
ANION GAP SERPL CALC-SCNC: 12 MMOL/L — SIGNIFICANT CHANGE UP (ref 5–17)
BUN SERPL-MCNC: 15 MG/DL — SIGNIFICANT CHANGE UP (ref 7–23)
CALCIUM SERPL-MCNC: 9.7 MG/DL — SIGNIFICANT CHANGE UP (ref 8.4–10.5)
CHLORIDE SERPL-SCNC: 102 MMOL/L — SIGNIFICANT CHANGE UP (ref 96–108)
CO2 SERPL-SCNC: 25 MMOL/L — SIGNIFICANT CHANGE UP (ref 22–31)
CREAT SERPL-MCNC: 0.5 MG/DL — SIGNIFICANT CHANGE UP (ref 0.5–1.3)
EGFR: 95 ML/MIN/1.73M2 — SIGNIFICANT CHANGE UP
GLUCOSE SERPL-MCNC: 112 MG/DL — HIGH (ref 70–99)
HCT VFR BLD CALC: 35 % — SIGNIFICANT CHANGE UP (ref 34.5–45)
HGB BLD-MCNC: 11.2 G/DL — LOW (ref 11.5–15.5)
MAGNESIUM SERPL-MCNC: 2.2 MG/DL — SIGNIFICANT CHANGE UP (ref 1.6–2.6)
MCHC RBC-ENTMCNC: 29.9 PG — SIGNIFICANT CHANGE UP (ref 27–34)
MCHC RBC-ENTMCNC: 32 GM/DL — SIGNIFICANT CHANGE UP (ref 32–36)
MCV RBC AUTO: 93.3 FL — SIGNIFICANT CHANGE UP (ref 80–100)
NRBC # BLD: 0 /100 WBCS — SIGNIFICANT CHANGE UP (ref 0–0)
PHOSPHATE SERPL-MCNC: 3.5 MG/DL — SIGNIFICANT CHANGE UP (ref 2.5–4.5)
PLATELET # BLD AUTO: 446 K/UL — HIGH (ref 150–400)
POTASSIUM SERPL-MCNC: 3.8 MMOL/L — SIGNIFICANT CHANGE UP (ref 3.5–5.3)
POTASSIUM SERPL-SCNC: 3.8 MMOL/L — SIGNIFICANT CHANGE UP (ref 3.5–5.3)
RBC # BLD: 3.75 M/UL — LOW (ref 3.8–5.2)
RBC # FLD: 14.1 % — SIGNIFICANT CHANGE UP (ref 10.3–14.5)
SODIUM SERPL-SCNC: 139 MMOL/L — SIGNIFICANT CHANGE UP (ref 135–145)
WBC # BLD: 6.95 K/UL — SIGNIFICANT CHANGE UP (ref 3.8–10.5)
WBC # FLD AUTO: 6.95 K/UL — SIGNIFICANT CHANGE UP (ref 3.8–10.5)

## 2022-10-28 PROCEDURE — 99232 SBSQ HOSP IP/OBS MODERATE 35: CPT

## 2022-10-28 RX ORDER — POTASSIUM CHLORIDE 20 MEQ
40 PACKET (EA) ORAL ONCE
Refills: 0 | Status: COMPLETED | OUTPATIENT
Start: 2022-10-28 | End: 2022-10-28

## 2022-10-28 RX ORDER — SODIUM CHLORIDE 9 MG/ML
500 INJECTION, SOLUTION INTRAVENOUS ONCE
Refills: 0 | Status: COMPLETED | OUTPATIENT
Start: 2022-10-28 | End: 2022-10-28

## 2022-10-28 RX ORDER — POLYETHYLENE GLYCOL 3350 17 G/17G
17 POWDER, FOR SOLUTION ORAL ONCE
Refills: 0 | Status: COMPLETED | OUTPATIENT
Start: 2022-10-28 | End: 2022-10-28

## 2022-10-28 RX ADMIN — Medication 75 MICROGRAM(S): at 06:04

## 2022-10-28 RX ADMIN — APIXABAN 5 MILLIGRAM(S): 2.5 TABLET, FILM COATED ORAL at 21:27

## 2022-10-28 RX ADMIN — ATORVASTATIN CALCIUM 80 MILLIGRAM(S): 80 TABLET, FILM COATED ORAL at 21:27

## 2022-10-28 RX ADMIN — POLYETHYLENE GLYCOL 3350 17 GRAM(S): 17 POWDER, FOR SOLUTION ORAL at 07:38

## 2022-10-28 RX ADMIN — Medication 25 MILLIGRAM(S): at 17:22

## 2022-10-28 RX ADMIN — Medication 40 MILLIEQUIVALENT(S): at 10:29

## 2022-10-28 RX ADMIN — OXYCODONE HYDROCHLORIDE 5 MILLIGRAM(S): 5 TABLET ORAL at 21:50

## 2022-10-28 RX ADMIN — OXYCODONE HYDROCHLORIDE 5 MILLIGRAM(S): 5 TABLET ORAL at 21:27

## 2022-10-28 RX ADMIN — APIXABAN 5 MILLIGRAM(S): 2.5 TABLET, FILM COATED ORAL at 10:29

## 2022-10-28 RX ADMIN — SODIUM CHLORIDE 500 MILLILITER(S): 9 INJECTION, SOLUTION INTRAVENOUS at 06:42

## 2022-10-28 NOTE — CHART NOTE - NSCHARTNOTEFT_GEN_A_CORE
Admitting Diagnosis:   Patient is a 82y old  Female who presents with a chief complaint of abd pain (28 Oct 2022 11:16)      PAST MEDICAL & SURGICAL HISTORY:  Depression      Thyroid disorder      Afib      HLD (hyperlipidemia)      History of hip replacement  R, for degenerative pain          Current Nutrition Order:   Low fat diet    PO Intake: Good (%) [   ]  Fair (50-75%) [   ] Poor (<25%) [   ] -- pending PO intake    GI Issues: No n/v/d/c. Last BM 10/25  Denies abd discomfort or distention    Pain: Denies pain    Skin Integrity: Fermin 19, surgical incision noted  No PU or edema    Labs:   10-28    139  |  102  |  15  ----------------------------<  112<H>  3.8   |  25  |  0.50    Ca    9.7      28 Oct 2022 07:43  Phos  3.5     10-28  Mg     2.2     10-28      CAPILLARY BLOOD GLUCOSE          Medications:  MEDICATIONS  (STANDING):  apixaban 5 milliGRAM(s) Oral every 12 hours  atorvastatin 80 milliGRAM(s) Oral at bedtime  levothyroxine 75 MICROGram(s) Oral daily  metoprolol succinate ER 25 milliGRAM(s) Oral every 24 hours    MEDICATIONS  (PRN):  acetaminophen     Tablet .. 1000 milliGRAM(s) Oral every 6 hours PRN Moderate Pain (4 - 6)  ondansetron Injectable 4 milliGRAM(s) IV Push every 6 hours PRN Nausea and/or Vomiting  oxyCODONE    IR 5 milliGRAM(s) Oral every 6 hours PRN Severe Pain (7 - 10)    Adm Anthropometrics:  Height for BMI (FEET)	4 Feet  Height for BMI (INCHES)	9 Inch(s)  Height for BMI (CENTIMETERS)	144.78 Centimeter(s)  Weight for BMI (lbs)	143 lb  Weight for BMI (kg)	64.9 kg  Body Mass Index	30.9    Weight Change:  10/24 65 kg/143 lbs  10/25 52.2 kg/115 lbs   Remains consistent with adm wt. Though noted pt to be documented at 115 lbs. Please continue to trend wts to assess.    Estimated energy needs:  IBW used for calculations as pt >120% of IBW (152%). Needs adjusted for wound healing post-op, advanced age.  5149-6281 kcals (25-30 kcals/kg, IBW)  51.12-59.64 g protein (1.2-1.4 g/kg, IBW)  1117-6341 mls (30-35 mls/kg, IBW)    Subjective:   83yo Female pt with PMH depression, hypothyroidism, ASD closure (6/2021 [facility]), Afib on Eliquis (last dose today), spinal stenosis with lumar radiculopathy,  and PSH of L hip surgery presenting with 4 days of intermittent abdominal pain. In the ED pt is afebrile, VSS. WBC 6.57, Hb 11.0, Cr 0.42,  TBili  0.7, AST  82, ALT  184, AlkPhos  477, lipase 55. US: CBD 0.6 cm. Gallbladder - Cholelithiasis, wall thickening (0.6 cm), positive sonographic Aldana's sign, no pericholecystic fluid. Pancreas is enlarged and echogenic with heterogeneous echotexture. Patient's findings and exam are consistent with acute cholecystitis, differential included pancreatitis however less likely as lipase levels are normal. s/p lap estrella 10/25.    Pt seen in bed. Breakfast tray on table, pt about to eat. Pending PO intake. Denies n/v/d. Last BM 10/25. She denies pain at this time. Denies appetite changes at home, no changes in wt per pt report. Per wt hx in EMR, weights between 125-130 lbs.     Previous Nutrition Diagnosis:    Active [   ]  Resolved [   ]    If resolved, new PES:     Goal:    Recommendations:    Education:     Risk Level: High [   ] Moderate [   ] Low [   ] Admitting Diagnosis:   Patient is a 82y old  Female who presents with a chief complaint of abd pain (28 Oct 2022 11:16)      PAST MEDICAL & SURGICAL HISTORY:  Depression      Thyroid disorder      Afib      HLD (hyperlipidemia)      History of hip replacement  R, for degenerative pain          Current Nutrition Order:   Low fat diet    PO Intake: Good (%) [   ]  Fair (50-75%) [   ] Poor (<25%) [ x  ]    GI Issues: Denies n/v/d. Last BM 10/25  No abd discomfort and distention    Pain: Denies pain    Skin Integrity: Fermin 19, surgical incision noted  No PU or edema noted    Labs:   10-28    139  |  102  |  15  ----------------------------<  112<H>  3.8   |  25  |  0.50    Ca    9.7      28 Oct 2022 07:43  Phos  3.5     10-28  Mg     2.2     10-28      CAPILLARY BLOOD GLUCOSE          Medications:  MEDICATIONS  (STANDING):  apixaban 5 milliGRAM(s) Oral every 12 hours  atorvastatin 80 milliGRAM(s) Oral at bedtime  levothyroxine 75 MICROGram(s) Oral daily  metoprolol succinate ER 25 milliGRAM(s) Oral every 24 hours    MEDICATIONS  (PRN):  acetaminophen     Tablet .. 1000 milliGRAM(s) Oral every 6 hours PRN Moderate Pain (4 - 6)  ondansetron Injectable 4 milliGRAM(s) IV Push every 6 hours PRN Nausea and/or Vomiting  oxyCODONE    IR 5 milliGRAM(s) Oral every 6 hours PRN Severe Pain (7 - 10)    Adm Anthropometrics:  Height for BMI (FEET)	4 Feet  Height for BMI (INCHES)	9 Inch(s)  Height for BMI (CENTIMETERS)	144.78 Centimeter(s)  Weight for BMI (lbs)	143 lb  Weight for BMI (kg)	64.9 kg  Body Mass Index	30.9    Weight Change:  10/25 52.2 kg/115 lbs  10/24 65 kg/143.3 lbs  Please obtain new wts to assess for accuracy. Conflicting wts in chart.    Estimated energy needs:  IBW used for calculations as pt >120% of IBW (152%). Needs adjusted for wound healing post-op, advanced age.  5509-4513 kcals (25-30 kcals/kg, IBW)  51.12-59.64 g protein (1.2-1.4 g/kg, IBW)  5178-3903 mls (30-35 mls/kg, IBW)    Subjective:   83yo Female pt with PMH depression, hypothyroidism, ASD closure (6/2021 [facility]), Afib on Eliquis (last dose today), spinal stenosis with lumar radiculopathy,  and PSH of L hip surgery presenting with 4 days of intermittent abdominal pain. In the ED pt is afebrile, VSS. WBC 6.57, Hb 11.0, Cr 0.42,  TBili  0.7, AST  82, ALT  184, AlkPhos  477, lipase 55. US: CBD 0.6 cm. Gallbladder - Cholelithiasis, wall thickening (0.6 cm), positive sonographic Aldana's sign, no pericholecystic fluid. Pancreas is enlarged and echogenic with heterogeneous echotexture. Patient's findings and exam are consistent with acute cholecystitis, differential included pancreatitis however less likely as lipase levels are normal. s/p lap estrella 10/25.     Pt seen in bed, meal tray by bedside table. Pending PO intake at this time. She denies n/v/d. Last BM 10/25. Denies pain. She reports weight of 125 lbs, denies changes in wt PTA. Denies any changes in appetite prior. Per wt hx in EMR, usually weighing at 125-130 lbs since Sept 2022. If wts obtained are accurate and CBW of 115 lbs, indicates wt change of 10 lbs/8% in 1 month despite no changes PO. Deferred NFPE at this time d/t pt eating, no observed signs at this time. Reviewed low fat diet, pt reports understanding, receptive to education. RD to follow.    Previous Nutrition Diagnosis:  Inadeq[employer] Energy Intake  RT inability to meet est needs on current diet  AEB NPO, meeting 0% of est needs at this time    Active [   ]  Resolved [ x  ]    If resolved, new PES:   Unintended wt loss RT suspect PO<EER with acute cholecystitis AEB 10 lb/8% in 1 month    Goal: Consistently meet >75% est needs during hospital stay     Recommendations:  1. Continue with low fat diet   >> honor pt food preferences as able  2. BM and pain regimen per team  3. Monitor BMP, BG, renal indices, LFTs, lytes, replete prn  4. Diet edu prn    Education: Low fat diet edu provided    Risk Level: High [  x ] Moderate [   ] Low [   ]

## 2022-10-28 NOTE — PROGRESS NOTE ADULT - SUBJECTIVE AND OBJECTIVE BOX
STATUS POST:  Laparoscopic cholecystectomy    POST OPERATIVE DAY #: 3    SUBJECTIVE: Pt seen and examined by chief resident. Pt is doing well, resting comfortably on bed. Pain controlled. Diet tolerated. Passing gas. Hasnt had BM since surgery. No nausea or vomiting. No complaints at this time.    Vital Signs Last 24 Hrs  T(C): 36.7 (28 Oct 2022 04:54), Max: 37.4 (27 Oct 2022 08:36)  T(F): 98.1 (28 Oct 2022 04:54), Max: 99.3 (27 Oct 2022 08:36)  HR: 81 (28 Oct 2022 04:54) (73 - 88)  BP: 116/67 (28 Oct 2022 04:54) (110/70 - 123/76)  BP(mean): --  RR: 17 (28 Oct 2022 04:54) (16 - 17)  SpO2: 94% (28 Oct 2022 04:54) (94% - 97%)    Parameters below as of 28 Oct 2022 04:54  Patient On (Oxygen Delivery Method): room air        I&O's Summary    27 Oct 2022 07:01  -  28 Oct 2022 07:00  --------------------------------------------------------  IN: 950 mL / OUT: 540 mL / NET: 410 mL        Physical Exam:  General Appearance: Appears well, NAD  Pulmonary: Nonlabored breathing, no respiratory distress  Abdomen: Soft, nondisteded, nontender incisions clean and dry and intact  Extremities: WWP, SCD's in place     LABS:                        10.9   8.34  )-----------( 427      ( 27 Oct 2022 06:57 )             33.9     10-27    138  |  102  |  8   ----------------------------<  115<H>  4.2   |  25  |  0.53    Ca    9.4      27 Oct 2022 06:57  Phos  2.7     10-27  Mg     2.2     10-27      PT/INR - ( 26 Oct 2022 12:00 )   PT: 13.3 sec;   INR: 1.12          PTT - ( 27 Oct 2022 06:57 )  PTT:34.2 sec

## 2022-10-28 NOTE — PROGRESS NOTE ADULT - ASSESSMENT
83yo Female pt with PMH depression, hypothyroidism, ASD closure (6/2021 [facility]), Afib on Eliquis (last dose today), spinal stenosis with lumar radiculopathy,  and PSH of L hip surgery presenting with 4 days of intermittent abdominal pain. In the ED pt is afebrile, VSS. WBC 6.57, Hb 11.0, Cr 0.42,  TBili  0.7, AST  82, ALT  184, AlkPhos  477, lipase 55. US: CBD 0.6 cm. Gallbladder - Cholelithiasis, wall thickening (0.6 cm), positive sonographic Aldana's sign, no pericholecystic fluid. Pancreas is enlarged and echogenic with heterogeneous echotexture. Patient's findings and exam are consistent with acute cholecystitis, differential included pancreatits, however less likely as lipase levels are normal. Now s/p cholecystectomy and ventral hernia repair (10/25).     Low fat  AM labs  Pain/Nausea control PRN  home meds  home  eliquis  IS/OOBA/SCD  AM labs  Miralax

## 2022-10-28 NOTE — PROGRESS NOTE ADULT - ASSESSMENT
1. Acute cholecystitis   -Management a/p surgery. s/p cholecystectomy 10/25  -Encourage IS. Ambulate/OOB to chair when tolerating. DVT ppx->on NOAC     2. Afib   -On heparin GTT. Cardiology following. Currently rate controlled.   -on eliquis    3. HTN   - stable. c/w toprol    4. HLD  -on statin    5. Pulm HTN  -unclear etiology. Large neck, may have component of RANDELL/OHV  -Will need outpatient f/u and management    6. Elevated LFTs  -likely 2/2 estrella. Continue to monitor.     7. Asx bacteruria  -Do not recommend treatment    8. Patent foramen ovale  -Cardio following. Likely nothing to do at this time.     9. Anemia, unclear etiology  -Remains stable. Some component of blood loss anemia 2/2 surgery.     10. hypokalemia  -continue to monitor.       DVT ppx: on eliquis  DIet: a/p surgery

## 2022-10-29 LAB
ANION GAP SERPL CALC-SCNC: 10 MMOL/L — SIGNIFICANT CHANGE UP (ref 5–17)
BUN SERPL-MCNC: 12 MG/DL — SIGNIFICANT CHANGE UP (ref 7–23)
CALCIUM SERPL-MCNC: 9.8 MG/DL — SIGNIFICANT CHANGE UP (ref 8.4–10.5)
CHLORIDE SERPL-SCNC: 99 MMOL/L — SIGNIFICANT CHANGE UP (ref 96–108)
CO2 SERPL-SCNC: 25 MMOL/L — SIGNIFICANT CHANGE UP (ref 22–31)
CREAT SERPL-MCNC: 0.47 MG/DL — LOW (ref 0.5–1.3)
EGFR: 96 ML/MIN/1.73M2 — SIGNIFICANT CHANGE UP
GLUCOSE SERPL-MCNC: 101 MG/DL — HIGH (ref 70–99)
HCT VFR BLD CALC: 33.9 % — LOW (ref 34.5–45)
HGB BLD-MCNC: 10.9 G/DL — LOW (ref 11.5–15.5)
MAGNESIUM SERPL-MCNC: 2.2 MG/DL — SIGNIFICANT CHANGE UP (ref 1.6–2.6)
MCHC RBC-ENTMCNC: 29.9 PG — SIGNIFICANT CHANGE UP (ref 27–34)
MCHC RBC-ENTMCNC: 32.2 GM/DL — SIGNIFICANT CHANGE UP (ref 32–36)
MCV RBC AUTO: 93.1 FL — SIGNIFICANT CHANGE UP (ref 80–100)
NRBC # BLD: 0 /100 WBCS — SIGNIFICANT CHANGE UP (ref 0–0)
PHOSPHATE SERPL-MCNC: 4.5 MG/DL — SIGNIFICANT CHANGE UP (ref 2.5–4.5)
PLATELET # BLD AUTO: 474 K/UL — HIGH (ref 150–400)
POTASSIUM SERPL-MCNC: 4.4 MMOL/L — SIGNIFICANT CHANGE UP (ref 3.5–5.3)
POTASSIUM SERPL-SCNC: 4.4 MMOL/L — SIGNIFICANT CHANGE UP (ref 3.5–5.3)
RBC # BLD: 3.64 M/UL — LOW (ref 3.8–5.2)
RBC # FLD: 14.1 % — SIGNIFICANT CHANGE UP (ref 10.3–14.5)
SODIUM SERPL-SCNC: 134 MMOL/L — LOW (ref 135–145)
WBC # BLD: 6.15 K/UL — SIGNIFICANT CHANGE UP (ref 3.8–10.5)
WBC # FLD AUTO: 6.15 K/UL — SIGNIFICANT CHANGE UP (ref 3.8–10.5)

## 2022-10-29 PROCEDURE — 99232 SBSQ HOSP IP/OBS MODERATE 35: CPT

## 2022-10-29 RX ADMIN — APIXABAN 5 MILLIGRAM(S): 2.5 TABLET, FILM COATED ORAL at 21:45

## 2022-10-29 RX ADMIN — Medication 1000 MILLIGRAM(S): at 17:14

## 2022-10-29 RX ADMIN — Medication 25 MILLIGRAM(S): at 17:18

## 2022-10-29 RX ADMIN — ATORVASTATIN CALCIUM 80 MILLIGRAM(S): 80 TABLET, FILM COATED ORAL at 21:54

## 2022-10-29 RX ADMIN — Medication 1000 MILLIGRAM(S): at 18:00

## 2022-10-29 RX ADMIN — Medication 75 MICROGRAM(S): at 06:08

## 2022-10-29 RX ADMIN — APIXABAN 5 MILLIGRAM(S): 2.5 TABLET, FILM COATED ORAL at 10:20

## 2022-10-29 NOTE — PROGRESS NOTE ADULT - SUBJECTIVE AND OBJECTIVE BOX
STATUS POST:  10/25: lap estrella and ventral hernia repair    POST OPERATIVE DAY #: 4    SUBJECTIVE: Pt seen and examined at bedside this am by surgery team. Feels well. Pending DAMARIS monday. Tolerating diet without nausea or vomiting, passing gas, states no bowel movements. Pain well controlled. Denies f/n/v/cp/sob.     Vital Signs Last 24 Hrs  T(C): 36.3 (29 Oct 2022 04:36), Max: 36.9 (28 Oct 2022 12:40)  T(F): 97.4 (29 Oct 2022 04:36), Max: 98.5 (28 Oct 2022 12:40)  HR: 69 (29 Oct 2022 04:36) (68 - 74)  BP: 121/75 (29 Oct 2022 04:36) (106/68 - 121/75)  BP(mean): --  RR: 18 (29 Oct 2022 04:36) (17 - 18)  SpO2: 95% (29 Oct 2022 04:36) (94% - 97%)    Parameters below as of 29 Oct 2022 04:36  Patient On (Oxygen Delivery Method): room air        PHYSICAL EXAM:   Gen: Awake, alert, NAD, resting comfortably   CV: RRR  Pulm: no respiratory distress on RA  Abd: soft, ND, appropriately tender, no rebound or guarding, incisions c/d/i   Ext: WWP, no edema, SCDs in place     I&O's Detail    28 Oct 2022 07:01  -  29 Oct 2022 07:00  --------------------------------------------------------  IN:    Oral Fluid: 620 mL  Total IN: 620 mL    OUT:    Voided (mL): 925 mL  Total OUT: 925 mL    Total NET: -305 mL          LABS:                        10.9   6.15  )-----------( 474      ( 29 Oct 2022 08:20 )             33.9     10-28    139  |  102  |  15  ----------------------------<  112<H>  3.8   |  25  |  0.50    Ca    9.7      28 Oct 2022 07:43  Phos  3.5     10-28  Mg     2.2     10-28          CAPILLARY BLOOD GLUCOSE          RADIOLOGY & ADDITIONAL STUDIES:

## 2022-10-29 NOTE — PROGRESS NOTE ADULT - SUBJECTIVE AND OBJECTIVE BOX
INTERNAL MEDICINE PROGRESS NOTE    INTERVAL HPI/OVERNIGHT EVENTS:  Patient seen and examined at bedside. Reports feeling well overall. Denies any significant pain, SOB, cough, n/v/d. Has not moved her bowels in a few days but attributes this to reduced PO intake.     VITAL SIGNS:  T(F): 97.5 (10-29-22 @ 14:22)  HR: 78 (10-29-22 @ 14:22)  BP: 123/84 (10-29-22 @ 14:22)  RR: 18 (10-29-22 @ 14:22)  SpO2: 95% (10-29-22 @ 14:22)  Wt(kg): --    PHYSICAL EXAM:  Constitutional: NAD  Head: NC/AT  EENT: PERRL, anicteric sclera; oropharynx clear, MMM  Neck: supple, no appreciable JVD  Respiratory: CTA B/L; no W/R/R  Cardiovascular: +S1/S2, RRR  Gastrointestinal: soft, TTP  Extremities: WWP; no edema, clubbing or cyanosis  Neurological: awake and alert; BETANCUR    MEDICATIONS  (STANDING):  apixaban 5 milliGRAM(s) Oral every 12 hours  atorvastatin 80 milliGRAM(s) Oral at bedtime  levothyroxine 75 MICROGram(s) Oral daily  metoprolol succinate ER 25 milliGRAM(s) Oral every 24 hours    MEDICATIONS  (PRN):  acetaminophen     Tablet .. 1000 milliGRAM(s) Oral every 6 hours PRN Moderate Pain (4 - 6)  ondansetron Injectable 4 milliGRAM(s) IV Push every 6 hours PRN Nausea and/or Vomiting  oxyCODONE    IR 5 milliGRAM(s) Oral every 6 hours PRN Severe Pain (7 - 10)      Allergies    No Known Allergies    Intolerances        LABS:                        10.9   6.15  )-----------( 474      ( 29 Oct 2022 08:20 )             33.9     10-29    134<L>  |  99  |  12  ----------------------------<  101<H>  4.4   |  25  |  0.47<L>    Ca    9.8      29 Oct 2022 08:20  Phos  4.5     10-29  Mg     2.2     10-29            RADIOLOGY & ADDITIONAL TESTS: Reviewed

## 2022-10-29 NOTE — PROGRESS NOTE ADULT - ASSESSMENT
81 yo F with hx of HTN, depression, hypothyroidism, Afib on Eliquis (last dose 10/20) who presented with abdominal pain and exam and imaging findings consistent with acute cholecystitis, s/p cholecystectomy 10/25.       1. Acute cholecystitis   -Management a/p surgery. s/p cholecystectomy 10/25  -Encourage IS. Ambulate/OOB to chair when tolerating. DVT ppx->on NOAC   -pain control per primary team  -start bowel regimen, senna/mirilax     2. Afib   -On heparin GTT. Cardiology following. Currently rate controlled.   -on eliquis    3. HTN   - stable. c/w toprol    4. HLD  -on statin    5. Pulm HTN  -unclear etiology. Large neck, may have component of RANDELL/OHV  -Will need outpatient f/u and management    6. Elevated LFTs  -likely 2/2 estrella. Continue to monitor.     7. Asx bacteruria  -Do not recommend treatment    8. Patent foramen ovale  -Cardio following. Likely nothing to do at this time.     9. Anemia, unclear etiology  -Remains stable. Some component of blood loss anemia 2/2 surgery.     10. hypokalemia  -continue to monitor.       DVT ppx: on eliquis  DIet: a/p surgery

## 2022-10-29 NOTE — PROGRESS NOTE ADULT - ASSESSMENT
83yo Female pt with PMH depression, hypothyroidism, ASD closure (6/2021 [facility]), Afib on Eliquis (last dose today), spinal stenosis with lumar radiculopathy,  and PSH of L hip surgery presenting with 4 days of intermittent abdominal pain. In the ED pt is afebrile, VSS. WBC 6.57, Hb 11.0, Cr 0.42,  TBili  0.7, AST  82, ALT  184, AlkPhos  477, lipase 55. US: CBD 0.6 cm. Gallbladder - Cholelithiasis, wall thickening (0.6 cm), positive sonographic Aldana's sign, no pericholecystic fluid. Pancreas is enlarged and echogenic with heterogeneous echotexture. Patient's findings and exam are consistent with acute cholecystitis, differential included pancreatits, however less likely as lipase levels are normal. Now s/p cholecystectomy and ventral hernia repair (10/25).     Low fat  AM labs  Pain/Nausea control PRN  home meds  home  eliquis  IS/OOBA/SCD  dispo: DAMARIS

## 2022-10-30 PROCEDURE — 99232 SBSQ HOSP IP/OBS MODERATE 35: CPT

## 2022-10-30 RX ORDER — SENNA PLUS 8.6 MG/1
2 TABLET ORAL AT BEDTIME
Refills: 0 | Status: DISCONTINUED | OUTPATIENT
Start: 2022-10-30 | End: 2022-10-30

## 2022-10-30 RX ADMIN — Medication 1000 MILLIGRAM(S): at 07:17

## 2022-10-30 RX ADMIN — ATORVASTATIN CALCIUM 80 MILLIGRAM(S): 80 TABLET, FILM COATED ORAL at 22:51

## 2022-10-30 RX ADMIN — Medication 1000 MILLIGRAM(S): at 06:47

## 2022-10-30 RX ADMIN — APIXABAN 5 MILLIGRAM(S): 2.5 TABLET, FILM COATED ORAL at 10:53

## 2022-10-30 RX ADMIN — APIXABAN 5 MILLIGRAM(S): 2.5 TABLET, FILM COATED ORAL at 22:51

## 2022-10-30 RX ADMIN — SENNA PLUS 2 TABLET(S): 8.6 TABLET ORAL at 22:51

## 2022-10-30 RX ADMIN — Medication 75 MICROGRAM(S): at 05:55

## 2022-10-30 NOTE — PROGRESS NOTE ADULT - ASSESSMENT
81yo Female pt with PMH depression, hypothyroidism, Afib on Eliquis (last dose today) and PSH of L hip surgery presenting with 4 days of intermittent abdominal pain. In the ED pt is afebrile, VSS. WBC 6.57, Hb 11.0, Cr 0.42,  TBili  0.7, AST  82, ALT  184, AlkPhos  477, lipase 55. US: CBD 0.6 cm. Gallbladder - Cholelithiasis, wall thickening (0.6 cm), positive sonographic Aldana's sign, no pericholecystic fluid. Pancreas is enlarged and echogenic with heterogeneous echotexture. Patient's findings and exam are consistent with acute cholecystitis, differential includes pancreatits, however less likely as lipase levels are normal. POD5 from laparoscopic cholecystectomy and doing well.    Low fat diet  Pain/Nausea control PRN  home meds  home  eliquis  IS/OOBA/SCD  dispo: DAMARIS placement likely Monday

## 2022-10-30 NOTE — PROGRESS NOTE ADULT - ASSESSMENT
83 yo F with hx of HTN, depression, hypothyroidism, Afib on Eliquis (last dose 10/20) who presented with abdominal pain and exam and imaging findings consistent with acute cholecystitis, s/p cholecystectomy 10/25.       1. Acute cholecystitis   -Management a/p surgery. s/p cholecystectomy 10/25  -Encourage IS. Ambulate/OOB to chair when tolerating. DVT ppx->on NOAC   -pain control per primary team  -start bowel regimen, senna/mirilax     2. Afib   -On heparin GTT. Cardiology following. Currently rate controlled.   -on eliquis    3. HTN   - stable. c/w toprol    4. HLD  -on statin    5. Pulm HTN  -unclear etiology. Large neck, may have component of RANDELL/OHV  -Will need outpatient f/u and management including sleep study    6. Elevated LFTs  -likely 2/2 estrella. Continue to monitor.     7. Asx bacteruria  -Do not recommend treatment    8. Patent foramen ovale  -Cardio consulted    9. Anemia, unclear etiology  -Remains stable. Some component of blood loss anemia 2/2 surgery.     10. hypokalemia  -continue to monitor.       DVT ppx: on eliquis  DIet: a/p surgery

## 2022-10-30 NOTE — PROGRESS NOTE ADULT - SUBJECTIVE AND OBJECTIVE BOX
STATUS POST:  Laparoscopic cholecystectomy with ventral hernia repair    POST OPERATIVE DAY #: 5    SUBJECTIVE:   Patient seen and examined at bedside. No acute events noted overnight. Patient reports she has minimal pain this morning. States she has been tolerating PO diet without nausea or vomiting. Denies fevers or chills. Reports she has not been ambulating often. States she has been passing flatus, but did not have a bowel movement yesterday. No other acute complaints.     Vital Signs Last 24 Hrs  T(C): 36.6 (30 Oct 2022 05:00), Max: 36.9 (29 Oct 2022 20:50)  T(F): 97.9 (30 Oct 2022 05:00), Max: 98.4 (29 Oct 2022 20:50)  HR: 88 (30 Oct 2022 05:00) (74 - 88)  BP: 122/81 (30 Oct 2022 05:00) (108/71 - 123/84)  BP(mean): 94 (30 Oct 2022 05:00) (94 - 94)  RR: 17 (30 Oct 2022 05:00) (14 - 18)  SpO2: 93% (30 Oct 2022 05:00) (93% - 96%)    Parameters below as of 30 Oct 2022 05:00  Patient On (Oxygen Delivery Method): room air        I&O's Summary    29 Oct 2022 07:01  -  30 Oct 2022 07:00  --------------------------------------------------------  IN: 940 mL / OUT: 1000 mL / NET: -60 mL        Physical Exam:  General: Resting comfortably in bed, NAD  HEENT: ATNC  Pulmonary: Nonlabored breathing, no respiratory distress, no accessory muscle use noted  Cardiovascular: NSR  Abdomen: Soft, nondistended appropriate incisional tenderness  Extremities: WWP, SCDs in place, No significant edema appreciated    LABS:                        10.9   6.15  )-----------( 474      ( 29 Oct 2022 08:20 )             33.9     10-29    134<L>  |  99  |  12  ----------------------------<  101<H>  4.4   |  25  |  0.47<L>    Ca    9.8      29 Oct 2022 08:20  Phos  4.5     10-29  Mg     2.2     10-29            Plan:  -NPO/IVF - LR @ 100  -Antibiotics -  -SQH and SCDs  -OOB as tolerated/IS  -AM labs STATUS POST:  Laparoscopic cholecystectomy with ventral hernia repair    POST OPERATIVE DAY #: 5    SUBJECTIVE:   Patient seen and examined at bedside. No acute events noted overnight. Patient reports she has minimal pain this morning. States she has been tolerating PO diet without nausea or vomiting. Denies fevers or chills. Reports she has not been ambulating often. States she has been passing flatus, but did not have a bowel movement yesterday. No other acute complaints.     Vital Signs Last 24 Hrs  T(C): 36.6 (30 Oct 2022 05:00), Max: 36.9 (29 Oct 2022 20:50)  T(F): 97.9 (30 Oct 2022 05:00), Max: 98.4 (29 Oct 2022 20:50)  HR: 88 (30 Oct 2022 05:00) (74 - 88)  BP: 122/81 (30 Oct 2022 05:00) (108/71 - 123/84)  BP(mean): 94 (30 Oct 2022 05:00) (94 - 94)  RR: 17 (30 Oct 2022 05:00) (14 - 18)  SpO2: 93% (30 Oct 2022 05:00) (93% - 96%)    Parameters below as of 30 Oct 2022 05:00  Patient On (Oxygen Delivery Method): room air        I&O's Summary    29 Oct 2022 07:01  -  30 Oct 2022 07:00  --------------------------------------------------------  IN: 940 mL / OUT: 1000 mL / NET: -60 mL        Physical Exam:  General: Resting comfortably in bed, NAD  HEENT: ATNC  Pulmonary: Nonlabored breathing, no respiratory distress, no accessory muscle use noted  Cardiovascular: NSR  Abdomen: Soft, nondistended, nontender to palpation. Incisions clean, dry, intact without evidence of erythema or drainage  Extremities: WWP, SCDs in place, No significant edema appreciated    LABS:                        10.9   6.15  )-----------( 474      ( 29 Oct 2022 08:20 )             33.9     10-29    134<L>  |  99  |  12  ----------------------------<  101<H>  4.4   |  25  |  0.47<L>    Ca    9.8      29 Oct 2022 08:20  Phos  4.5     10-29  Mg     2.2     10-29

## 2022-10-30 NOTE — PROGRESS NOTE ADULT - SUBJECTIVE AND OBJECTIVE BOX
INTERNAL MEDICINE PROGRESS NOTE    INTERVAL HPI/OVERNIGHT EVENTS:  Patient seen and examined at bedside. Reports feeling well. Denies any abdominal pain. No acute complaints.     VITAL SIGNS:  T(F): 98.6 (10-30-22 @ 13:33)  HR: 96 (10-30-22 @ 13:33)  BP: 114/75 (10-30-22 @ 13:33)  RR: 18 (10-30-22 @ 13:33)  SpO2: 95% (10-30-22 @ 13:33)  Wt(kg): --    PHYSICAL EXAM:  Constitutional: NAD  Head: NC/AT  EENT: PERRL, anicteric sclera; oropharynx clear, MMM  Neck: supple, no appreciable JVD  Respiratory: CTA B/L; no W/R/R  Cardiovascular: +S1/S2, RRR  Gastrointestinal: soft, NT/ND  Extremities: WWP; no edema, clubbing or cyanosis  Neurological: awake and alert; BETANCUR    MEDICATIONS  (STANDING):  apixaban 5 milliGRAM(s) Oral every 12 hours  atorvastatin 80 milliGRAM(s) Oral at bedtime  levothyroxine 75 MICROGram(s) Oral daily  metoprolol succinate ER 25 milliGRAM(s) Oral every 24 hours    MEDICATIONS  (PRN):  acetaminophen     Tablet .. 1000 milliGRAM(s) Oral every 6 hours PRN Moderate Pain (4 - 6)  ondansetron Injectable 4 milliGRAM(s) IV Push every 6 hours PRN Nausea and/or Vomiting  oxyCODONE    IR 5 milliGRAM(s) Oral every 6 hours PRN Severe Pain (7 - 10)      Allergies    No Known Allergies    Intolerances        LABS:                        10.9   6.15  )-----------( 474      ( 29 Oct 2022 08:20 )             33.9     10-29    134<L>  |  99  |  12  ----------------------------<  101<H>  4.4   |  25  |  0.47<L>    Ca    9.8      29 Oct 2022 08:20  Phos  4.5     10-29  Mg     2.2     10-29            RADIOLOGY & ADDITIONAL TESTS: Reviewed

## 2022-10-31 LAB — SARS-COV-2 RNA SPEC QL NAA+PROBE: SIGNIFICANT CHANGE UP

## 2022-10-31 PROCEDURE — 99232 SBSQ HOSP IP/OBS MODERATE 35: CPT

## 2022-10-31 RX ORDER — SODIUM CHLORIDE 9 MG/ML
500 INJECTION, SOLUTION INTRAVENOUS ONCE
Refills: 0 | Status: DISCONTINUED | OUTPATIENT
Start: 2022-10-31 | End: 2022-10-31

## 2022-10-31 RX ADMIN — APIXABAN 5 MILLIGRAM(S): 2.5 TABLET, FILM COATED ORAL at 10:29

## 2022-10-31 RX ADMIN — APIXABAN 5 MILLIGRAM(S): 2.5 TABLET, FILM COATED ORAL at 22:43

## 2022-10-31 RX ADMIN — Medication 75 MICROGRAM(S): at 05:39

## 2022-10-31 RX ADMIN — Medication 1000 MILLIGRAM(S): at 19:14

## 2022-10-31 RX ADMIN — ATORVASTATIN CALCIUM 80 MILLIGRAM(S): 80 TABLET, FILM COATED ORAL at 22:43

## 2022-10-31 RX ADMIN — Medication 10 MILLIGRAM(S): at 17:31

## 2022-10-31 RX ADMIN — Medication 25 MILLIGRAM(S): at 18:14

## 2022-10-31 RX ADMIN — Medication 1000 MILLIGRAM(S): at 18:14

## 2022-10-31 NOTE — PROGRESS NOTE ADULT - SUBJECTIVE AND OBJECTIVE BOX
STATUS POST:  Laparoscopic cholecystectomy and ventral hernia repair    POST OPERATIVE DAY #: 6    SUBJECTIVE: Pt seen and examined at bedside this am by surgery team. Patient is lying comfortably in bed with no complaints. Tolerating diet, pain well controlled with current regimen. Patient denies fever, nausea, vomiting, chest pain, and shortness of breath. Patient is passing gas but complains of constipation.     MEDICATIONS  (STANDING):  apixaban 5 milliGRAM(s) Oral every 12 hours  atorvastatin 80 milliGRAM(s) Oral at bedtime  lactated ringers Bolus 500 milliLiter(s) IV Bolus once  levothyroxine 75 MICROGram(s) Oral daily  metoprolol succinate ER 25 milliGRAM(s) Oral every 24 hours    MEDICATIONS  (PRN):  acetaminophen     Tablet .. 1000 milliGRAM(s) Oral every 6 hours PRN Moderate Pain (4 - 6)  bisacodyl Suppository 10 milliGRAM(s) Rectal once PRN Constipation  ondansetron Injectable 4 milliGRAM(s) IV Push every 6 hours PRN Nausea and/or Vomiting  oxyCODONE    IR 5 milliGRAM(s) Oral every 6 hours PRN Severe Pain (7 - 10)      Vital Signs Last 24 Hrs  T(C): 37 (31 Oct 2022 04:25), Max: 37.2 (30 Oct 2022 09:00)  T(F): 98.6 (31 Oct 2022 04:25), Max: 98.9 (30 Oct 2022 09:00)  HR: 92 (31 Oct 2022 04:25) (81 - 96)  BP: 104/67 (31 Oct 2022 04:25) (99/65 - 114/75)  BP(mean): --  RR: 16 (31 Oct 2022 04:25) (16 - 18)  SpO2: 93% (31 Oct 2022 04:25) (93% - 95%)    Parameters below as of 31 Oct 2022 04:25  Patient On (Oxygen Delivery Method): room air        Physical Exam  General: Patient is doing well and lying in bed comfortably  Constitutional: alert and oriented   Pulm: Nonlabored breathing, no respiratory distress  CV: Regular rate and rhythm, normal sinus rhythm  Abd:  soft, nontender, nondistended. No rebound, no guarding.             Incisions: clean, dry, intact and healing well, no erythema/induration/edema  Extremities: warm, well perfused, no edema    I&O's Detail    30 Oct 2022 07:01  -  31 Oct 2022 07:00  --------------------------------------------------------  IN:    Oral Fluid: 600 mL  Total IN: 600 mL    OUT:    Voided (mL): 400 mL  Total OUT: 400 mL    Total NET: 200 mL        LABS:

## 2022-10-31 NOTE — PROGRESS NOTE ADULT - ASSESSMENT
81 yo F with hx of HTN, depression, hypothyroidism, Afib on Eliquis (last dose 10/20) who presented with abdominal pain and exam and imaging findings consistent with acute cholecystitis, s/p cholecystectomy 10/25.       1. Acute cholecystitis   -Management a/p surgery. s/p cholecystectomy 10/25  -Encourage IS. Ambulate/OOB to chair when tolerating. DVT ppx->on NOAC   -pain control per primary team  -start bowel regimen, senna/mirilax     2. Afib   -On heparin GTT. Cardiology following. Currently rate controlled.   -on eliquis    3. HTN   - stable. c/w toprol    4. HLD  -on statin    5. Pulm HTN  -unclear etiology. Large neck, may have component of RANDELL/OHV  -Will need outpatient f/u and management including sleep study    6. Elevated LFTs  -likely 2/2 estrella. Continue to monitor.     7. Asx bacteruria  -Do not recommend treatment    8. Patent foramen ovale  -Cardio consulted    9. Anemia, unclear etiology  -Remains stable. Some component of blood loss anemia 2/2 surgery.     10. hypokalemia  -continue to monitor.       DVT ppx: on eliquis  DIet: a/p surgery

## 2022-10-31 NOTE — PROGRESS NOTE ADULT - SUBJECTIVE AND OBJECTIVE BOX
Subjective: patient seen bedside. No acute complaints no overnight events    PHYSICAL EXAM:  Constitutional: NAD  Head: NC/AT  EENT: PERRL, anicteric sclera; oropharynx clear, MMM  Neck: supple, no appreciable JVD  Respiratory: CTA B/L; no W/R/R  Cardiovascular: +S1/S2, RRR  Gastrointestinal: soft, NT/ND  Extremities: no LE edema  Neurological: awake and alert; BETANCUR        VS/labs reviewed

## 2022-10-31 NOTE — PROGRESS NOTE ADULT - ASSESSMENT
81yo Female pt with PMH depression, hypothyroidism, ASD closure (6/2021 [facility]), Afib on Eliquis (last dose today), spinal stenosis with lumar radiculopathy,  and PSH of L hip surgery presenting with 4 days of intermittent abdominal pain. In the ED pt is afebrile, VSS. WBC 6.57, Hb 11.0, Cr 0.42,  TBili  0.7, AST  82, ALT  184, AlkPhos  477, lipase 55. US: CBD 0.6 cm. Gallbladder - Cholelithiasis, wall thickening (0.6 cm), positive sonographic Aldana's sign, no pericholecystic fluid. Pancreas is enlarged and echogenic with heterogeneous echotexture. Patient's findings and exam are consistent with acute cholecystitis, differential included pancreatits, however less likely as lipase levels are normal. Now s/p cholecystectomy and ventral hernia repair (10/25).     Low Fat Diet  Pain/nausea control PRN  Dulcolax  Home meds  Home Eliquis  SCDs/IS/OOBA  AM Labs  dispo: DAMARIS

## 2022-11-01 ENCOUNTER — TRANSCRIPTION ENCOUNTER (OUTPATIENT)
Age: 82
End: 2022-11-01

## 2022-11-01 VITALS
TEMPERATURE: 99 F | RESPIRATION RATE: 16 BRPM | SYSTOLIC BLOOD PRESSURE: 97 MMHG | HEART RATE: 83 BPM | OXYGEN SATURATION: 94 % | DIASTOLIC BLOOD PRESSURE: 63 MMHG

## 2022-11-01 PROCEDURE — 99232 SBSQ HOSP IP/OBS MODERATE 35: CPT

## 2022-11-01 RX ADMIN — Medication 1000 MILLIGRAM(S): at 20:28

## 2022-11-01 RX ADMIN — Medication 75 MICROGRAM(S): at 05:51

## 2022-11-01 RX ADMIN — Medication 1000 MILLIGRAM(S): at 21:31

## 2022-11-01 RX ADMIN — ATORVASTATIN CALCIUM 80 MILLIGRAM(S): 80 TABLET, FILM COATED ORAL at 21:52

## 2022-11-01 RX ADMIN — APIXABAN 5 MILLIGRAM(S): 2.5 TABLET, FILM COATED ORAL at 21:52

## 2022-11-01 RX ADMIN — APIXABAN 5 MILLIGRAM(S): 2.5 TABLET, FILM COATED ORAL at 09:20

## 2022-11-01 NOTE — PROGRESS NOTE ADULT - ASSESSMENT
83yo Female pt with PMH depression, hypothyroidism, ASD closure (6/2021 [facility]), Afib on Eliquis (last dose today), spinal stenosis with lumar radiculopathy,  and PSH of L hip surgery presenting with 4 days of intermittent abdominal pain. In the ED pt is afebrile, VSS. WBC 6.57, Hb 11.0, Cr 0.42,  TBili  0.7, AST  82, ALT  184, AlkPhos  477, lipase 55. US: CBD 0.6 cm. Gallbladder - Cholelithiasis, wall thickening (0.6 cm), positive sonographic Aldana's sign, no pericholecystic fluid. Pancreas is enlarged and echogenic with heterogeneous echotexture. Patient's findings and exam are consistent with acute cholecystitis, differential included pancreatitis however less likely as lipase levels are normal. Now s/p cholecystectomy and ventral hernia repair (10/25).     Low Fat Diet  Pain/nausea control PRN  Dulcolax  Home meds  Home Eliquis  SCDs/IS/OOBA  AM Labs  dispo: DAMARIS

## 2022-11-01 NOTE — PROGRESS NOTE ADULT - SUBJECTIVE AND OBJECTIVE BOX
STATUS POST:  Laparoscopic cholecystectomy and ventral hernia repair    POST OPERATIVE DAY #: 7    SUBJECTIVE:  Pt seen and examined at bedside this am by surgery team. Patient is lying comfortably in bed with no complaints. Tolerating diet, pain well controlled with current regimen. Patient denies fever, nausea, vomiting, chest pain, and shortness of breath. Patient is passing gas but complains of constipation.     MEDICATIONS  (STANDING):  apixaban 5 milliGRAM(s) Oral every 12 hours  atorvastatin 80 milliGRAM(s) Oral at bedtime  levothyroxine 75 MICROGram(s) Oral daily  metoprolol succinate ER 25 milliGRAM(s) Oral every 24 hours    MEDICATIONS  (PRN):  acetaminophen     Tablet .. 1000 milliGRAM(s) Oral every 6 hours PRN Moderate Pain (4 - 6)  ondansetron Injectable 4 milliGRAM(s) IV Push every 6 hours PRN Nausea and/or Vomiting  oxyCODONE    IR 5 milliGRAM(s) Oral every 6 hours PRN Severe Pain (7 - 10)      Vital Signs Last 24 Hrs  T(C): 37.8 (01 Nov 2022 05:00), Max: 37.8 (01 Nov 2022 05:00)  T(F): 100.1 (01 Nov 2022 05:00), Max: 100.1 (01 Nov 2022 05:00)  HR: 70 (01 Nov 2022 05:00) (70 - 94)  BP: 107/65 (01 Nov 2022 05:00) (106/71 - 124/76)  BP(mean): 79 (01 Nov 2022 05:00) (79 - 79)  RR: 17 (01 Nov 2022 05:00) (16 - 19)  SpO2: 94% (01 Nov 2022 05:00) (93% - 95%)    Parameters below as of 01 Nov 2022 05:00  Patient On (Oxygen Delivery Method): room air    Physical Exam:  General: Patient is doing well and lying in bed comfortably  Constitutional: alert and oriented   Pulm: Nonlabored breathing, no respiratory distress  CV: Regular rate and rhythm, normal sinus rhythm  Abd:  soft, nontender, nondistended. No rebound, no guarding.             Incisions: clean, dry, intact and healing well, no erythema/induration/edema  Extremities: warm, well perfused, no edema    I&O's Summary    31 Oct 2022 07:01  -  01 Nov 2022 07:00  --------------------------------------------------------  IN: 780 mL / OUT: 800 mL / NET: -20 mL

## 2022-11-01 NOTE — DISCHARGE NOTE NURSING/CASE MANAGEMENT/SOCIAL WORK - PATIENT PORTAL LINK FT
You can access the FollowMyHealth Patient Portal offered by [facility] by registering at the following website: [email or web address]. By joining [facility]’s FollowMyHealth portal, you will also be able to view your health information using other applications (apps) compatible with our system.

## 2022-11-01 NOTE — DISCHARGE NOTE NURSING/CASE MANAGEMENT/SOCIAL WORK - NSDCPEFALRISK_GEN_ALL_CORE
For information on Fall & Injury Prevention, visit: [email or web address] OR  [email or web address] OR  https://www.cdc.gov/steadi/patient.html

## 2022-11-01 NOTE — PROGRESS NOTE ADULT - PROVIDER SPECIALTY LIST ADULT
Hospitalist
Hospitalist
Internal Medicine
Internal Medicine
Surgery
Cardiology
Hospitalist
Internal Medicine
Internal Medicine
Surgery
Gastroenterology
Hospitalist
Internal Medicine
Surgery
Internal Medicine
Surgery

## 2022-11-03 LAB — SURGICAL PATHOLOGY STUDY: SIGNIFICANT CHANGE UP

## 2022-11-04 ENCOUNTER — NON-APPOINTMENT (OUTPATIENT)
Age: 82
End: 2022-11-04

## 2022-11-04 DIAGNOSIS — K85.10 BILIARY ACUTE PANCREATITIS WITHOUT NECROSIS OR INFECTION: ICD-10-CM

## 2022-11-04 DIAGNOSIS — Z96.641 PRESENCE OF RIGHT ARTIFICIAL HIP JOINT: ICD-10-CM

## 2022-11-04 DIAGNOSIS — K81.0 ACUTE CHOLECYSTITIS: ICD-10-CM

## 2022-11-04 DIAGNOSIS — E87.6 HYPOKALEMIA: ICD-10-CM

## 2022-11-04 DIAGNOSIS — I48.91 UNSPECIFIED ATRIAL FIBRILLATION: ICD-10-CM

## 2022-11-04 DIAGNOSIS — Q21.12 PATENT FORAMEN OVALE: ICD-10-CM

## 2022-11-04 DIAGNOSIS — Z79.01 LONG TERM (CURRENT) USE OF ANTICOAGULANTS: ICD-10-CM

## 2022-11-04 DIAGNOSIS — K42.9 UMBILICAL HERNIA WITHOUT OBSTRUCTION OR GANGRENE: ICD-10-CM

## 2022-11-04 DIAGNOSIS — E87.20 ACIDOSIS, UNSPECIFIED: ICD-10-CM

## 2022-11-04 DIAGNOSIS — I27.20 PULMONARY HYPERTENSION, UNSPECIFIED: ICD-10-CM

## 2022-11-04 DIAGNOSIS — R10.9 UNSPECIFIED ABDOMINAL PAIN: ICD-10-CM

## 2022-11-07 ENCOUNTER — RX RENEWAL (OUTPATIENT)
Age: 82
End: 2022-11-07

## 2022-11-08 PROCEDURE — C1889: CPT

## 2022-11-08 PROCEDURE — 82962 GLUCOSE BLOOD TEST: CPT

## 2022-11-08 PROCEDURE — 80048 BASIC METABOLIC PNL TOTAL CA: CPT

## 2022-11-08 PROCEDURE — 85730 THROMBOPLASTIN TIME PARTIAL: CPT

## 2022-11-08 PROCEDURE — 86850 RBC ANTIBODY SCREEN: CPT

## 2022-11-08 PROCEDURE — 80053 COMPREHEN METABOLIC PANEL: CPT

## 2022-11-08 PROCEDURE — 88304 TISSUE EXAM BY PATHOLOGIST: CPT

## 2022-11-08 PROCEDURE — 82977 ASSAY OF GGT: CPT

## 2022-11-08 PROCEDURE — 96375 TX/PRO/DX INJ NEW DRUG ADDON: CPT

## 2022-11-08 PROCEDURE — 74177 CT ABD & PELVIS W/CONTRAST: CPT | Mod: ME

## 2022-11-08 PROCEDURE — 80076 HEPATIC FUNCTION PANEL: CPT

## 2022-11-08 PROCEDURE — 97530 THERAPEUTIC ACTIVITIES: CPT

## 2022-11-08 PROCEDURE — 83690 ASSAY OF LIPASE: CPT

## 2022-11-08 PROCEDURE — 86901 BLOOD TYPING SEROLOGIC RH(D): CPT

## 2022-11-08 PROCEDURE — 74183 MRI ABD W/O CNTR FLWD CNTR: CPT

## 2022-11-08 PROCEDURE — 87635 SARS-COV-2 COVID-19 AMP PRB: CPT

## 2022-11-08 PROCEDURE — 81001 URINALYSIS AUTO W/SCOPE: CPT

## 2022-11-08 PROCEDURE — 85027 COMPLETE CBC AUTOMATED: CPT

## 2022-11-08 PROCEDURE — 83735 ASSAY OF MAGNESIUM: CPT

## 2022-11-08 PROCEDURE — 84100 ASSAY OF PHOSPHORUS: CPT

## 2022-11-08 PROCEDURE — 85025 COMPLETE CBC W/AUTO DIFF WBC: CPT

## 2022-11-08 PROCEDURE — 93307 TTE W/O DOPPLER COMPLETE: CPT

## 2022-11-08 PROCEDURE — A9585: CPT

## 2022-11-08 PROCEDURE — U0005: CPT

## 2022-11-08 PROCEDURE — U0003: CPT

## 2022-11-08 PROCEDURE — 99285 EMERGENCY DEPT VISIT HI MDM: CPT | Mod: 25

## 2022-11-08 PROCEDURE — G1004: CPT

## 2022-11-08 PROCEDURE — 96374 THER/PROPH/DIAG INJ IV PUSH: CPT

## 2022-11-08 PROCEDURE — 97161 PT EVAL LOW COMPLEX 20 MIN: CPT

## 2022-11-08 PROCEDURE — 76705 ECHO EXAM OF ABDOMEN: CPT

## 2022-11-08 PROCEDURE — 86900 BLOOD TYPING SEROLOGIC ABO: CPT

## 2022-11-08 PROCEDURE — 85610 PROTHROMBIN TIME: CPT

## 2022-11-08 PROCEDURE — 36415 COLL VENOUS BLD VENIPUNCTURE: CPT

## 2022-11-08 PROCEDURE — 71045 X-RAY EXAM CHEST 1 VIEW: CPT

## 2022-11-15 PROBLEM — I48.91 UNSPECIFIED ATRIAL FIBRILLATION: Chronic | Status: ACTIVE | Noted: 2022-10-20

## 2022-11-15 PROBLEM — E78.5 HYPERLIPIDEMIA, UNSPECIFIED: Chronic | Status: ACTIVE | Noted: 2022-10-20

## 2022-11-21 ENCOUNTER — APPOINTMENT (OUTPATIENT)
Dept: INTERNAL MEDICINE | Facility: CLINIC | Age: 82
End: 2022-11-21

## 2022-11-21 VITALS
DIASTOLIC BLOOD PRESSURE: 79 MMHG | TEMPERATURE: 97.7 F | BODY MASS INDEX: 27.54 KG/M2 | HEART RATE: 90 BPM | SYSTOLIC BLOOD PRESSURE: 137 MMHG | OXYGEN SATURATION: 99 % | HEIGHT: 55 IN | WEIGHT: 119 LBS

## 2022-11-21 DIAGNOSIS — K81.9 CHOLECYSTITIS, UNSPECIFIED: ICD-10-CM

## 2022-11-21 PROCEDURE — 99214 OFFICE O/P EST MOD 30 MIN: CPT

## 2022-12-28 ENCOUNTER — APPOINTMENT (OUTPATIENT)
Dept: INTERNAL MEDICINE | Facility: CLINIC | Age: 82
End: 2022-12-28

## 2022-12-28 VITALS
WEIGHT: 118 LBS | OXYGEN SATURATION: 97 % | DIASTOLIC BLOOD PRESSURE: 83 MMHG | HEART RATE: 67 BPM | HEIGHT: 57 IN | SYSTOLIC BLOOD PRESSURE: 139 MMHG | BODY MASS INDEX: 25.46 KG/M2 | TEMPERATURE: 97.3 F

## 2022-12-28 VITALS — DIASTOLIC BLOOD PRESSURE: 81 MMHG | SYSTOLIC BLOOD PRESSURE: 125 MMHG

## 2022-12-28 DIAGNOSIS — R10.9 UNSPECIFIED ABDOMINAL PAIN: ICD-10-CM

## 2022-12-28 PROCEDURE — 99214 OFFICE O/P EST MOD 30 MIN: CPT

## 2023-01-03 ENCOUNTER — RX RENEWAL (OUTPATIENT)
Age: 83
End: 2023-01-03

## 2023-01-18 ENCOUNTER — APPOINTMENT (OUTPATIENT)
Dept: GASTROENTEROLOGY | Facility: CLINIC | Age: 83
End: 2023-01-18
Payer: MEDICARE

## 2023-01-18 VITALS
DIASTOLIC BLOOD PRESSURE: 60 MMHG | HEIGHT: 57 IN | BODY MASS INDEX: 26.97 KG/M2 | WEIGHT: 125 LBS | OXYGEN SATURATION: 98 % | SYSTOLIC BLOOD PRESSURE: 120 MMHG | HEART RATE: 72 BPM | RESPIRATION RATE: 15 BRPM | TEMPERATURE: 98 F

## 2023-01-18 DIAGNOSIS — R79.89 OTHER SPECIFIED ABNORMAL FINDINGS OF BLOOD CHEMISTRY: ICD-10-CM

## 2023-01-18 PROCEDURE — 99214 OFFICE O/P EST MOD 30 MIN: CPT

## 2023-01-18 PROCEDURE — 99204 OFFICE O/P NEW MOD 45 MIN: CPT

## 2023-01-18 NOTE — HISTORY OF PRESENT ILLNESS
[FreeTextEntry1] : 82F w/ PMHx of depression, hypothyroidism, ASD closure (6/2021 [facility]), Afib on Eliquis, spinal stenosis with lumar radiculopathy,  and recent hospitalization 10/20/22 to 11/1/22 for acute cholecystitis s/p laparoscopic cholecystectomy w/ Dr. Torres 10/25/22 here for post hospitalization followup.\par \par Patient is accompanied by HHA. Phone .\par \par Patient reports doing well since recent discharge from rehab. She reports being in rehab for around 3 weeks before discharge. Since discharge, she noted some intermittent abd pain. Worse with movement, lasting from seconds and up to 10 minutes. She denies fever, chill, cp, sob, nausea, vomiting, diarrhea, constipation, or weight loss.\par \par Having 2 formed stool, denies melena or hematochezia. \par Review of recent lab notable for \par \par CMP 10/26/22 Cr 0.39, Bili 0.4, , AST 88, ALT 86

## 2023-01-18 NOTE — ASSESSMENT
[FreeTextEntry1] : 82F w/ PMHx of depression, hypothyroidism, ASD closure (6/2021 [facility]), Afib on Eliquis, spinal stenosis with lumar radiculopathy,  and recent hospitalization 10/20/22 to 11/1/22 for acute cholecystitis s/p laparoscopic cholecystectomy w/ Dr. Torres 10/25/22 here for post hospitalization followup.\par \par #RUQ Abdominal Pain\par Sx occurred following lap cholecystectomy. Examination with mild tenderness and well healed surgical sites. Patient denies nausea, vomiting, or jaundice. Will check CMP and refer to Dr. Torres. Further diagnostic studies pending lab.\par -Encourage evaluation with Dr. Torres, provided office number and office address\par -Obtain CBC and CMP\par -Warning signs to seek medical attention discussed\par \par #Elevated LFT\par Last lab in [facility] notable for elevated LFT. Patient denies jaundice. Will recheck today.\par -Obtain CBC and CMP\par \par

## 2023-01-18 NOTE — REASON FOR VISIT
[Post Hospitalization] : a post hospitalization visit [Other: _____] : [unfilled] [Source: ______] : History obtained from [unfilled] [FreeTextEntry1] : Acute cholecystitis s/p lap estrella

## 2023-01-18 NOTE — PHYSICAL EXAM
[Alert] : alert [Well Developed] : well developed [Sclera] : the sclera and conjunctiva were normal [Normal Appearance] : the appearance of the neck was normal [No Neck Mass] : no neck mass was observed [No Respiratory Distress] : no respiratory distress [No Acc Muscle Use] : no accessory muscle use [Abdomen Soft] : soft [Cranial Nerves Intact] : cranial nerves 2-12 were intact [Motor Exam] : the motor exam was normal [Oriented To Time, Place, And Person] : oriented to person, place, and time [Normal Mood] : the mood was normal [de-identified] : Minimal RUQ tenderness on palpation.  Well healed surgical scar, no erythema or fluctuance

## 2023-01-19 LAB
ALBUMIN SERPL ELPH-MCNC: 4.4 G/DL
ALP BLD-CCNC: 99 U/L
ALT SERPL-CCNC: 24 U/L
ANION GAP SERPL CALC-SCNC: 10 MMOL/L
AST SERPL-CCNC: 23 U/L
BASOPHILS # BLD AUTO: 0.02 K/UL
BASOPHILS NFR BLD AUTO: 0.5 %
BILIRUB SERPL-MCNC: 0.4 MG/DL
BUN SERPL-MCNC: 17 MG/DL
CALCIUM SERPL-MCNC: 10.1 MG/DL
CHLORIDE SERPL-SCNC: 103 MMOL/L
CO2 SERPL-SCNC: 25 MMOL/L
CREAT SERPL-MCNC: 0.5 MG/DL
EGFR: 95 ML/MIN/1.73M2
EOSINOPHIL # BLD AUTO: 0 K/UL
EOSINOPHIL NFR BLD AUTO: 0 %
GLUCOSE SERPL-MCNC: 124 MG/DL
HCT VFR BLD CALC: 35.7 %
HGB BLD-MCNC: 11.5 G/DL
IMM GRANULOCYTES NFR BLD AUTO: 0.2 %
LYMPHOCYTES # BLD AUTO: 1.11 K/UL
LYMPHOCYTES NFR BLD AUTO: 25.3 %
MAN DIFF?: NORMAL
MCHC RBC-ENTMCNC: 30.7 PG
MCHC RBC-ENTMCNC: 32.2 GM/DL
MCV RBC AUTO: 95.2 FL
MONOCYTES # BLD AUTO: 0.37 K/UL
MONOCYTES NFR BLD AUTO: 8.4 %
NEUTROPHILS # BLD AUTO: 2.87 K/UL
NEUTROPHILS NFR BLD AUTO: 65.6 %
PLATELET # BLD AUTO: 302 K/UL
POTASSIUM SERPL-SCNC: 4.4 MMOL/L
PROT SERPL-MCNC: 6.5 G/DL
RBC # BLD: 3.75 M/UL
RBC # FLD: 13.9 %
SODIUM SERPL-SCNC: 138 MMOL/L
WBC # FLD AUTO: 4.38 K/UL

## 2023-01-23 ENCOUNTER — APPOINTMENT (OUTPATIENT)
Dept: PHYSICAL MEDICINE AND REHAB | Facility: CLINIC | Age: 83
End: 2023-01-23
Payer: MEDICARE

## 2023-01-23 VITALS
WEIGHT: 125 LBS | HEIGHT: 57 IN | OXYGEN SATURATION: 97 % | HEART RATE: 74 BPM | SYSTOLIC BLOOD PRESSURE: 151 MMHG | BODY MASS INDEX: 26.97 KG/M2 | DIASTOLIC BLOOD PRESSURE: 66 MMHG

## 2023-01-23 PROCEDURE — 99215 OFFICE O/P EST HI 40 MIN: CPT

## 2023-01-23 NOTE — PHYSICAL EXAM
[FreeTextEntry1] : Gen: A+O x 3 in NAD\par Psych: Normal mood and affect. Responds appropriately to commands\par Eyes: Anicteric. No discharge. EOMI.\par Resp: Breathing unlabored\par CV: DP pulses 2+ and equal. No varicosities noted\par Ext: No c/c/e\par Skin: No lesions noted\par \par Gait:\par ++ thoracic kyphosis\par +antalgic \par +  reciprocating heel to toe\par able to stand on toes and heels WITH hand holding\par Tandem gait intact WITH hand holding\par Poor single leg standing balance R or L\par Romberg deferred\par \par Trendelenburg present with R>L stance leg\par \par Inspection: Spine alignment is midline.\par Palpation: There is + tenderness over the midline spinous processes, paravertebral muscles of the thoracolumbar region\par Lumbar ROM: Flexion, extension, side-bending, rotation, limited in most planes\par +with lateral flexion\par +with oblique extension\par +pain with lateral rotation \par \par Hip ROM: pain at terminal ROM bilaterally.\par FAIR, FABERE negative bilaterally.\par \par 	Hip Flex       Knee Ext      Ankle Dorsi           EHL        Ankle Plantar\par Right	4-/5	        5/5	                 5/5	          4/5	             5/5                           \par Left	4/5	        5/5	                 5/5	          4/5	             5/5                           \par \par Hip abduction R 4/5 L4 /5\par Hip adduction R 4/5 L 4/5\par Hip extension R 4/5 L 4/5\par Knee Flexion R 4/5 L 4/5\par \par Tone: Normal. No clonus.\par Sensation: Grossly intact to light touch bilateral lower limbs.\par Proprioception: Intact at big toes bilaterally.\par Reflexes: 2+ symmetric knee jerk, ankle jerk. \par Plantars downgoing bilaterally.\par SLR negative bilaterally\par Crossed SLR negative bilaterally.\par Slump Test + right S1

## 2023-01-23 NOTE — ASSESSMENT
[FreeTextEntry1] :                                                       Assessment/Plan:\par \par ESTRADA DIAZ is a 82 year female with back pain s/p > 10 lifetimes LESI (last in 2022) here for initial consultation.\par \par Facet degeneration of lumbosacral region\par Neurogenic claudication due to lumbar spinal stenosis\par Gait difficulty\par Chronic lumbar radiculopathy, L4, Right\par Tendinopathy of the gluteus medius \par Weakness of the hip, right\par \par - Tiers of treatment and management of above diagnosis(es) were discussed with patient\par - Optimal diet, weight, sleep, and lifestyle management to minimize stress and maximize well being counseling provided\par - Imaging reviewed and discussed with patient\par - Reviewed previous encounter notes from 9/14/2022 Dr. JOE Nevarez (Pain)\par - Patient was advised to start a structured, targeted therapy program 2-3x/wk for 8 wks with goal toward HEP\par - Patient was educated on an appropriate home exercise program, provided with exercise recommendations, all questions answered\par - Patient may trial acetaminophen 1000mg up to TID PRN moderate to severe pain and to decrease average consumption of NSAIDs\par - Patient may trail topical compound cream to the right low back no more than twice per day as needed for next 2-3 weeks, Rx entered via portal, written information provided to the patient in addition to verbal explanation regarding the medication\par - Patient will begin pregabalin 25mg qHS after one week may advance to 50mg qHS.  All questions were answered and the patient displayed a clear understanding of the plan of care, including titration of pregabalin. The patient was informed about not taking this medication at the same time as any sleeping or muscle relaxant pills. Pt was also notified to stop, and contact our office, if it is too sedating, ankle swelling occurs and/or they experience suicidal thinking.\par - patient relates h/o decreased bone mineral density and fall with multiple rib fractures, repeat DEXA, order placed\par - placed referral to Audiology given h/o falls and clear hard of hearing during encounter\par - Patient was advised to apply cool compresses or warm heat to affected regions PRN\par \par - Educated about red flag symptoms including (but not limited to) new, worsened, or persistent: fever greater than 100F, bowel or bladder incontinence, bowel obstipation, inability to void urine, urinary leakage, Severe nausea or vomiting, Worsening numbness, worsening tingling/paresthesias, and/or new or progressive motor weakness; advised to seek immediate medical attention at his nearest Emergency department should they experience any of the above\par \par - Patient relates having minimal interest in locally directed treatment of their condition at this time, they were counseled on the role for local treatment as part of the tiers of treatment for their condition, all questions answered\par \par - CT lumbar spine without contrast is indicated given that the pt has not improved with tylenol, ibuprofen, naproxen, meloxicam, they underwent non-diagnostic radiographic imaging of the region, and physical therapy/home exercise program>6 weeks. Patient's imaging is medically necessary to outline targets for locally (interventional) directed treatments and/or guide surgical management.\par \par - Follow up in 2-3 weeks after imaging, in person in 2 months to assess their progress\par \par I have personally spent a total of at least 65 minutes preparing, reviewing internal and external records, explaining, counseling, and coordinating care for this patient encounter.\par \par Thank you, CANDIDO AWAD, for allowing me to participate in the care of your patient. Please do not hesitate to contact me with questions/concerns.\par \par Renny Gilbert M.D.\par Sports and Interventional Spine\par Department of Physical Medicine and Rehabilitation \par [facility] \par Email: [email or web address]\par \par [facility]\par [facility] [address]\par [facility], 11th Floor\[place] [address]\[facility] \par Appointments: [phone]\par Fax: [phone]\[facility]

## 2023-01-23 NOTE — HISTORY OF PRESENT ILLNESS
[FreeTextEntry1] : Renny Gilbert M.D.\par Sports Medicine and Interventional Spine\par Department of Physical Medicine and Rehabilitation \par [facility] \par Email: [email or web address] \par [facility]\par [facility] [place] [facility]\par [address]\par [place], 11th Floor\[place] [address]\par \par [facility]\par [facility] [place] at [facility]\par [address], 4th Floor\[place] [address]\par \par [facility] at \par [facility]\par [address], 6th Floor\[place] [address]\par \par [facility] at [facility]\par [address]\[place] [address]\par \par [facility] [place] \par [facility]\par [address] [facility], [address]\[place] [address]\par \par For [place] Appointments\par Phone: [phone]\par Fax: [phone]\par \par For [place] Appointments\par Phone: [phone]\par Fax: [phone]\par \par \par ----------------------------------------------------------------------------------------------------------------------------------------\par \par PATIENT: ESTRADA DIAZ \par MRN: [id number] \par YOB: 1940 \par DATE OF VISIT: 01/23/2023 \par Referred by CANDIDO AWAD\par PCP ADDRESS:\par \par Jan 23, 2023 \par \par \par Dear Dr. none \par \par Thank you for referring DIVA EMILY to my Sports and Interventional Spine practice and office. Enclosed is a copy of the patient's consultation/progress note, which includes my complete assessment and recent studies completed during the patient's evaluation.\par \par If you have questions or have any patients who require nonsurgical, non-opiate management of any sports, spine, or musculoskeletal conditions, please do not hesitate to contact my , Salena Alarcon at [phone].\par \par I look forward to taking care of your patients along with you.\par \par Sincerely,\par \par Renny\par \par Renny Gilbert MD\par Sports, Interventional Spine, & Regenerative Musculoskeletal Medicine\par [facility] [place] [facility]\par Email: [email or web address]\par \par \par                                                   Initial Consultation:\par CC: back pain, leg pain, cannot walk\par \par HPI:  This is the first visit to [facility] [place] [facility] Sports Medicine and Interventional Spine Practice.  \par \par ESTRADA DIAZ presents with the chief complaint as above.  \par \par Initial Hx on 01/23/2023 :\par Presents in person to [facility]\par patient with extensive low back pain\par able to walk with her rollator, about 2 blocks in [facility]\par patient reports fall recently and weakness in the BLE\par fell several times at home and outside\par The patient’s difficulties began worsening over the past few years\par The pain is graded as 9/10\par The pain is described as persistent, burning pain in the right anterolateral thigh\par The pain is constant\par The pain does not radiate, located in the b/l lumbar region and over the right anterolateral hip\par The patient feels that the pain is overall persistent\par Patient denies other recent fall, MVA, injury, trauma, or accident besides presenting history above\par \par Aggravating: ambulation, prolonged sitting, prolonged standing, navigating stairs, sit to stand transitional movements, turning in bed, getting out of bed\par Alleviating: rest, activity modification, avoiding prolonged walking, pharmacologic treatments\par \par Meds: denies regular PO pain medications\par Therapy Program: no recent structured targeted therapy program\par HEP: doing HEP regularly\par \par Assoc Sx:\par Otherwise denies numbness, Tingling\par Denies Focal motor weakness in the upper or lower limbs\par Denies New or worsened bowel or bladder incontinence\par Denies Saddle anesthesia\par Denies Buckling\par Denies Using Orthotic(s)/Supportive devices\par Denies Swelling in the upper/lower extremities\par Denies Clicking\par They also deny frequent tripping, falling\par \par ROS: A 14 point review of systems was completed. Positive findings are pain as described above. The remaining systems negative.\par \par Breast Cancer Surveillance: up to date\par COVID HX: reviewed\par \par Assoc Hx:\par Ambulates without assistive device\par Injection Hx: several LESI, ZACARIAS in the past five years\par Imaging Hx: reviewed\par \par Level of functioning: indep with ambulation, indep with ADLs\par Living Situation: dwelling with steps to enter\par \par

## 2023-02-15 ENCOUNTER — RX RENEWAL (OUTPATIENT)
Age: 83
End: 2023-02-15

## 2023-02-16 ENCOUNTER — APPOINTMENT (OUTPATIENT)
Dept: OTOLARYNGOLOGY | Facility: CLINIC | Age: 83
End: 2023-02-16
Payer: MEDICARE

## 2023-02-16 VITALS
TEMPERATURE: 97.6 F | SYSTOLIC BLOOD PRESSURE: 124 MMHG | DIASTOLIC BLOOD PRESSURE: 67 MMHG | WEIGHT: 125 LBS | HEIGHT: 57 IN | HEART RATE: 84 BPM | BODY MASS INDEX: 26.97 KG/M2

## 2023-02-16 DIAGNOSIS — Z01.10 ENCOUNTER FOR EXAMINATION OF EARS AND HEARING W/OUT ABNORMAL FINDINGS: ICD-10-CM

## 2023-02-16 PROCEDURE — 99203 OFFICE O/P NEW LOW 30 MIN: CPT

## 2023-02-16 NOTE — PHYSICAL EXAM
[Midline] : trachea located in midline position [Normal] : no rashes Adequate: hears normal conversation without difficulty

## 2023-02-16 NOTE — HISTORY OF PRESENT ILLNESS
[de-identified] : 2/16/23\par 82F presents as referral from PM&R MD due to hearing loss noted during appointment. Patient denies any hearing loss or  any hearing changes. She has not been told that she has hearing loss by family or friends. Never had hearing test done. No hx of loud noise exposure. Denies otalgia, otorrhea, tinnitus or vertiginous symptoms. \par

## 2023-02-16 NOTE — ASSESSMENT
[FreeTextEntry1] : 82 year old female presents as referral for hearing loss noted during an appointment. Patient denies any hearing loss.  Based on his history and exam findings, I am recommending an audiogram and tympanogram to assess for hearing loss. Patient will follow up with us after testing to review results and discuss next steps. \par \par - audio/tymp\par - fu after testing to review results and discuss next steps

## 2023-02-21 ENCOUNTER — APPOINTMENT (OUTPATIENT)
Dept: RADIOLOGY | Facility: HOSPITAL | Age: 83
End: 2023-02-21
Payer: MEDICARE

## 2023-02-21 ENCOUNTER — APPOINTMENT (OUTPATIENT)
Dept: CT IMAGING | Facility: HOSPITAL | Age: 83
End: 2023-02-21
Payer: MEDICARE

## 2023-03-23 ENCOUNTER — RX RENEWAL (OUTPATIENT)
Age: 83
End: 2023-03-23

## 2023-03-27 ENCOUNTER — APPOINTMENT (OUTPATIENT)
Dept: PHYSICAL MEDICINE AND REHAB | Facility: CLINIC | Age: 83
End: 2023-03-27
Payer: MEDICARE

## 2023-03-27 VITALS
SYSTOLIC BLOOD PRESSURE: 112 MMHG | BODY MASS INDEX: 26.97 KG/M2 | DIASTOLIC BLOOD PRESSURE: 75 MMHG | HEIGHT: 57 IN | WEIGHT: 125 LBS | RESPIRATION RATE: 95 BRPM | HEART RATE: 113 BPM

## 2023-03-27 DIAGNOSIS — H91.90 UNSPECIFIED HEARING LOSS, UNSPECIFIED EAR: ICD-10-CM

## 2023-03-27 DIAGNOSIS — R29.898 OTHER SYMPTOMS AND SIGNS INVOLVING THE MUSCULOSKELETAL SYSTEM: ICD-10-CM

## 2023-03-27 PROCEDURE — 99215 OFFICE O/P EST HI 40 MIN: CPT

## 2023-03-29 ENCOUNTER — APPOINTMENT (OUTPATIENT)
Dept: INTERNAL MEDICINE | Facility: CLINIC | Age: 83
End: 2023-03-29
Payer: MEDICARE

## 2023-03-29 ENCOUNTER — NON-APPOINTMENT (OUTPATIENT)
Age: 83
End: 2023-03-29

## 2023-03-29 VITALS
SYSTOLIC BLOOD PRESSURE: 130 MMHG | HEART RATE: 90 BPM | HEIGHT: 57 IN | TEMPERATURE: 97.8 F | BODY MASS INDEX: 26.97 KG/M2 | OXYGEN SATURATION: 97 % | WEIGHT: 125 LBS | DIASTOLIC BLOOD PRESSURE: 80 MMHG

## 2023-03-29 DIAGNOSIS — B35.9 DERMATOPHYTOSIS, UNSPECIFIED: ICD-10-CM

## 2023-03-29 DIAGNOSIS — M54.9 DORSALGIA, UNSPECIFIED: ICD-10-CM

## 2023-03-29 PROCEDURE — 99214 OFFICE O/P EST MOD 30 MIN: CPT

## 2023-03-29 RX ORDER — NYSTATIN 100000 1/G
100000 POWDER TOPICAL
Qty: 60 | Refills: 2 | Status: ACTIVE | COMMUNITY
Start: 2023-03-29 | End: 1900-01-01

## 2023-04-10 ENCOUNTER — RX RENEWAL (OUTPATIENT)
Age: 83
End: 2023-04-10

## 2023-04-11 NOTE — HISTORY OF PRESENT ILLNESS
[FreeTextEntry1] : Renny Gilbert M.D.\par Sports Medicine and Interventional Spine\par Department of Physical Medicine and Rehabilitation \par [facility] \par Email: [email or web address] <[email or web address] \par [facility]\par [facility]edic [place] [facility]\par [address], [address]th Floor\[place] [address]\par \par [facility]\par [facility] [place] at [facility]\par [address], 4th Floor\[place] [address]\par \par [facility] at \par [facility]\par [address], 6th Floor\[place] [address]\par \par [facility] at [facility]\par [address]\[place] [address]\par \par [facility] [place] \par [facility]\par [address] [facility]\[place] [address]\par \par For [place] Appointments\par Phone: [phone]\par Fax: [phone]\par \par For [place] Appointments\par Phone: [phone]\par Fax: [phone]\par \par \par ----------------------------------------------------------------------------------------------------------------------------------------\par \par PATIENT: ESTRADA DIAZ \par MRN: [id number] \par YOB: 1940 \par DATE OF VISIT: 3/27/2023\par Referred by CANDIDO AWAD\par PCP ADDRESS:\par \par Mar 27, 2023 \par \par Dear  \par \par Thank you for referring DIVA EMILY to my Sports and Interventional Spine practice and office. Enclosed is a copy of the patient's consultation/progress note, which includes my complete assessment and recent studies completed during the patient's evaluation.\par \par If you have questions or have any patients who require nonsurgical, non-opiate management of any sports, spine, or musculoskeletal conditions, please do not hesitate to contact my , Salena Alarcon at [phone].\par \par I look forward to taking care of your patients along with you.\par \par Sincerely,\par \par Renny\par \par Renny Gilbert MD\par Sports, Interventional Spine, & Regenerative Musculoskeletal Medicine\par [facility] [place] at [facility]\par Email: [email or web address] \par \par                                                   Follow up Visit\par CC: back pain, leg pain, cannot walk\par \par HPI:  This is a follow up visit to [facility] [place] at [facility] Interventional [facility]ine [facility].  \par \par ESTRADA DIAZ presents with the chief complaint as above.  \par \par Interval Hx on Mar 27, 2023: presents for follow up. Since last visit, they continued their therapy program, now at week 6. patient has relief from PT but intermittent severe axial > radicular pain persists. Pain can be localized to the midline low back. patient will then utilize their compound cream, OTC pharmacologic treatments for their pain. Patient open to trial of alternative neuropathic medication. There have been no significant changes to their aggravating or alleviating factors since the last visit. Pharmacologic treatments now include OTC analgesics PRN, but otherwise pharmacologic treatments are minimal. Denies new or worsened numbness, tingling, or focal motor deficit. Denies interval fall, accident, or injury. Denies change in bowel or bladder functioning.\par \par Meds: denies regular PO pain medications\par Therapy Program: no recent structured targeted therapy program\par HEP: doing HEP regularly\par \par Assoc Sx:\par Otherwise denies numbness, Tingling\par Denies Focal motor weakness in the upper or lower limbs\par Denies New or worsened bowel or bladder incontinence\par Denies Saddle anesthesia\par Denies Buckling\par Denies Using Orthotic(s)/Supportive devices\par Denies Swelling in the upper/lower extremities\par Denies Clicking\par They also deny frequent tripping, falling\par \par ROS: A 14 point review of systems was completed. Positive findings are pain as described above. The remaining systems negative.\par \par Breast Cancer Surveillance: up to date\par COVID HX: reviewed\par \par Initial Hx on 01/23/2023:Presents in person to [facility]. patient with extensive low back pain. able to walk with her rollator, about 2 blocks in UES. patient reports fall recently and weakness in the BLE. fell several times at home and outside.The patient’s difficulties began worsening over the past few years. The pain is graded as 9/10. The pain is described as persistent, burning pain in the right anterolateral thigh. The pain is constant. The pain does not radiate, located in the b/l lumbar region and over the right anterolateral hip. The patient feels that the pain is overall persistent. Patient denies other recent fall, MVA, injury, trauma, or accident besides presenting history above. \par Aggravating: ambulation, prolonged sitting, prolonged standing, navigating stairs, sit to stand transitional movements, turning in bed, getting out of bed. Alleviating: rest, activity modification, avoiding prolonged walking, pharmacologic treatments\par \par Assoc Hx:\par Ambulates with assistive device, rollator\par Injection Hx: several LESI, ZACARIAS in the past five years\par Imaging Hx: reviewed\par \par Level of functioning: AD for ambulation, assistance for most ADLs\par Living Situation: elevator accessible apartment dwelling with steps to enter

## 2023-04-20 ENCOUNTER — OUTPATIENT (OUTPATIENT)
Dept: OUTPATIENT SERVICES | Facility: HOSPITAL | Age: 83
LOS: 1 days | End: 2023-04-20
Payer: MEDICARE

## 2023-04-20 ENCOUNTER — APPOINTMENT (OUTPATIENT)
Dept: CT IMAGING | Facility: HOSPITAL | Age: 83
End: 2023-04-20

## 2023-04-20 DIAGNOSIS — Z96.649 PRESENCE OF UNSPECIFIED ARTIFICIAL HIP JOINT: Chronic | ICD-10-CM

## 2023-04-20 PROCEDURE — 72131 CT LUMBAR SPINE W/O DYE: CPT

## 2023-04-20 PROCEDURE — 72131 CT LUMBAR SPINE W/O DYE: CPT | Mod: 26

## 2023-05-12 ENCOUNTER — APPOINTMENT (OUTPATIENT)
Dept: PHYSICAL MEDICINE AND REHAB | Facility: CLINIC | Age: 83
End: 2023-05-12
Payer: MEDICARE

## 2023-05-12 VITALS
HEIGHT: 57 IN | OXYGEN SATURATION: 96 % | DIASTOLIC BLOOD PRESSURE: 83 MMHG | SYSTOLIC BLOOD PRESSURE: 117 MMHG | BODY MASS INDEX: 26.97 KG/M2 | HEART RATE: 81 BPM | WEIGHT: 125 LBS

## 2023-05-12 PROCEDURE — 99215 OFFICE O/P EST HI 40 MIN: CPT

## 2023-05-15 NOTE — HISTORY OF PRESENT ILLNESS
[FreeTextEntry1] : Renny Gilbert M.D.\par Sports Medicine and Interventional Spine\par Department of Physical Medicine and Rehabilitation \par [facility] \par Email: [email or web address] <[email or web address]>\par \par [facility]\par Orthopaedic [place] [facility]\par [address]\par [place]th Floor\[place] [address]\par \par [facility]\par Orthopaedic [place] at [facility]\par [address] 4th Floor\[place] [address]\par \par [facility] at \par [facility]\par [address]th Floor\[place] [address]\par \par [facility] at [facility]\par [address]\[place] [address]\par \par [facility] Physician [facility] [place] \par [facility]\par [address]\[place] [address]\par \par For [place] Appointments\par Phone: [phone]\par Fax: [phone]\par \par For [place] Appointments\par Phone: [phone]\par Fax: [phone]\par \par \par ----------------------------------------------------------------------------------------------------------------------------------------\par \par PATIENT: ESTRADA DIAZ \par MRN: [id number] \par YOB: 1940 \par DATE OF VISIT: 5/12/2023\par Referred by CANDIDO AWAD\par PCP ADDRESS:\par \par May 12, 2023 \par \par Dear  \par \par Thank you for referring DIVA EMILY to my Sports and Interventional Spine practice and office. Enclosed is a copy of the patient's consultation/progress note, which includes my complete assessment and recent studies completed during the patient's evaluation.\par \par If you have questions or have any patients who require nonsurgical, non-opiate management of any sports, spine, or musculoskeletal conditions, please do not hesitate to contact my , Salena Alarcon at [phone].\par \par I look forward to taking care of your patients along with you.\par \par Sincerely,\par \par Renny\par \par Renny Gilbert MD\par Sports, Interventional Spine, & Regenerative Musculoskeletal Medicine\[facility] [place] at [facility] Email: [email or web address]\par \par \par                                                   Follow up Visit\par CC: back pain, leg pain, cannot walk\par \par HPI:  This is a follow up visit to [facility] [place] at [facility] Sports Medicine and Interventional Spine Practice.  \par \par ESTRADA DIAZ presents with the chief complaint as above.  \par \par Interval Hx on May 12, 2023: presents for follow up. Since last visit, they underwent further diagnostic workup including additional imaging studies. Patient informed of all relevant imaging findings, all questions were answered including multi-level lumbar degenerative changes, she continues to have osteoporosis. Patient relates that the pregabalin is helping somewhat. Patient has been using compound cream to the middle (thoracic) back as directed, asked for a refill, we called [facility] to confirm they would send her another refill. patient has been taking gabapentin/pregabalin for close to 6 months, no recent lab work. She explains that during her worst pain days she has improvement with brace. There have been no significant changes to their aggravating or alleviating factors since the last visit. Pharmacologic treatments now include OTC analgesics PRN, otherwise pharmacologic treatments are minimal. Denies new or worsened numbness, tingling, or focal motor deficit. Denies interval fall, accident, or injury. Denies change in bowel or bladder functioning.\par \par Meds: denies regular PO pain medications\par Therapy Program: no recent structured targeted therapy program\par HEP: doing HEP regularly\par \par Assoc Sx:\par Otherwise denies numbness, Tingling\par Denies Focal motor weakness in the upper or lower limbs\par Denies New or worsened bowel or bladder incontinence\par Denies Saddle anesthesia\par Denies Buckling\par Denies Using Orthotic(s)/Supportive devices\par Denies Swelling in the upper/lower extremities\par Denies Clicking\par They also deny frequent tripping, falling\par \par ROS: A 14 point review of systems was completed. Positive findings are pain as described above. The remaining systems negative.\par \par Breast Cancer Surveillance: up to date\par COVID HX: reviewed\par \par Interval Hx on Mar 27, 2023: presents for follow up. Since last visit, they continued their therapy program, now at week 6. patient has relief from PT but intermittent severe axial > radicular pain persists. Pain can be localized to the midline low back. patient will then utilize their compound cream, OTC pharmacologic treatments for their pain. Patient open to trial of alternative neuropathic medication. There have been no significant changes to their aggravating or alleviating factors since the last visit. Pharmacologic treatments now include OTC analgesics PRN, but otherwise pharmacologic treatments are minimal. Denies new or worsened numbness, tingling, or focal motor deficit. Denies interval fall, accident, or injury. Denies change in bowel or bladder functioning.\par \par Initial Hx on 01/23/2023:Presents in person to [facility]. patient with extensive low back pain. able to walk with her rollator, about 2 blocks in UES. patient reports fall recently and weakness in the BLE. fell several times at home and outside.The patient’s difficulties began worsening over the past few years. The pain is graded as 9/10. The pain is described as persistent, burning pain in the right anterolateral thigh. The pain is constant. The pain does not radiate, located in the b/l lumbar region and over the right anterolateral hip. The patient feels that the pain is overall persistent. Patient denies other recent fall, MVA, injury, trauma, or accident besides presenting history above. \par Aggravating: ambulation, prolonged sitting, prolonged standing, navigating stairs, sit to stand transitional movements, turning in bed, getting out of bed. Alleviating: rest, activity modification, avoiding prolonged walking, pharmacologic treatments\par \par Assoc Hx:\par Ambulates with assistive device, rollator\par Injection Hx: several LESI, ZACARIAS in the past five years\par Imaging Hx: reviewed\par \par Level of functioning: AD for ambulation, assistance for most ADLs\par Living Situation: elevator accessible apartment dwelling with steps to enter

## 2023-05-15 NOTE — PHYSICAL EXAM
[FreeTextEntry1] : Gen: A+O x 3 in NAD\par Psych: Normal mood and affect. Responds appropriately to commands\par Eyes: Anicteric. No discharge. EOMI.\par Resp: Breathing unlabored\par CV: DP pulses 2+ and equal. No varicosities noted\par Ext: No c/c/e\par Skin: No lesions noted\par \par Gait:\par ++ thoracic kyphosis\par +antalgic \par +  reciprocating heel to toe\par able to stand on toes and heels WITH hand holding\par Tandem gait intact WITH hand holding\par Poor single leg standing balance R or L\par Romberg deferred\par \par Trendelenburg present with R>L stance leg\par \par Inspection: Spine alignment is midline.\par Palpation: There is + tenderness over the midline spinous processes, paravertebral muscles of the thoracolumbar region\par Lumbar ROM: Flexion, extension, side-bending, rotation, limited in most planes\par +with lateral flexion\par +with oblique extension\par +pain with lateral rotation \par \par Hip ROM: pain at terminal ROM bilaterally.\par FAIR, FABERE negative bilaterally.\par \par 	Hip Flex       Knee Ext      Ankle Dorsi           EHL        Ankle Plantar\par Right	4-/5	        5/5	                 5/5	          4/5	             5/5                           \par Left	4/5	        5/5	                 5/5	          4/5	             5/5                           \par \par Hip abduction R 4/5 L4 /5\par Hip adduction R 4/5 L 4/5\par Hip extension R 4/5 L 4/5\par Knee Flexion R 4/5 L 4/5\par \par Tone: Normal. No clonus.\par Sensation: Grossly intact to light touch bilateral lower limbs.\par Proprioception: Intact at big toes bilaterally.\par Reflexes: 2+ symmetric knee jerk, ankle jerk. \par Plantars downgoing bilaterally.\par \par SLR negative bilaterally\par Crossed SLR negative bilaterally.\par Slump Test + right S1

## 2023-05-15 NOTE — ASSESSMENT
[FreeTextEntry1] :                                                       Assessment/Plan:\par \par ESTRADA DIAZ is a 82 year female with back pain s/p > 10 lifetimes LESI (last in 2022) here for follow up \par \par Facet degeneration of lumbosacral region\par Neurogenic claudication due to lumbar spinal stenosis\par Gait difficulty\par Chronic lumbar radiculopathy, L4, Right\par Tendinopathy of the gluteus medius \par Weakness of the hip, right\par \par - Tiers of treatment and management of above diagnosis(es) were discussed with patient\par - Optimal diet, weight, sleep, and lifestyle management to minimize stress and maximize well being counseling provided\par - Imaging reviewed and discussed with patient\par - Reviewed previous encounter notes from 3/29/2023 Dr. JOSEMANUEL Anglin (Internal Medicine), 9/14/2022 Dr. JOE Nevarez (Pain)\par - Patient was advised to continue a structured, targeted therapy program 2-3x/wk for 8 wks with goal toward HEP\par - Patient was educated on an appropriate home exercise program, provided with exercise recommendations, all questions answered\par - Patient may trial acetaminophen 1000mg up to TID PRN moderate to severe pain and to decrease average consumption of NSAIDs\par - Patient may continue to trial topical compound cream to the right low back no more than twice per day as needed for next 2-3 weeks, Rx entered via portal, written information provided to the patient in addition to verbal explanation regarding the medication\par - Patient will continue pregabalin 25mg in the AM and 50mg qHS.  All questions were answered and the patient displayed a clear understanding of the plan of care, including titration of pregabalin. The patient was informed about not taking this medication at the same time as any sleeping or muscle relaxant pills. Pt was also notified to stop, and contact our office, if it is too sedating, ankle swelling occurs and/or they experience suicidal thinking\par - provided with info about access a ride, due to patient's multiple medical conditions, they are unable to walk greater than 10-15 feet\par - patient relates h/o decreased bone mineral density and fall with multiple rib fractures, repeat DEXA, order re-placed\par - placed order for lumbar corset (LSO)\par - Ordered lab testing for renal function, has been taking gabapentin/pregabalin around 6 months\par - placed referral to Audiology, noted consultation that claimed no indication for hearing aid device\par - Patient was advised to apply cool compresses or warm heat to affected regions PRN\par \par - Educated about red flag symptoms including (but not limited to) new, worsened, or persistent: fever greater than 100F, bowel or bladder incontinence, bowel obstipation, inability to void urine, urinary leakage, Severe nausea or vomiting, Worsening numbness, worsening tingling/paresthesias, and/or new or progressive motor weakness; advised to seek immediate medical attention at his nearest Emergency department should they experience any of the above\par \par - Patient relates having minimal interest in locally directed treatment of their condition at this time, they were counseled on the role for local treatment as part of the tiers of treatment for their condition, all questions answered\par \par - Follow up in 2-3 weeks after imaging, in person in 2 months to assess their progress\par \par I have personally spent a total of at least 45 minutes preparing, reviewing internal and external records, explaining, counseling, and coordinating care for this patient encounter.\par \par Thank you, CANDIDO ANGLIN, for allowing me to participate in the care of your patient. Please do not hesitate to contact me with questions/concerns.\par \par Renny Gilbert M.D.\par Sports and Interventional Spine\par Department of Physical Medicine and Rehabilitation \par [facility] \par Email: [email or web address]\par \par [facility]\par Orthopaedic [place] [facility] [address]\par [facility]th [address]\[place] [address]\[facility] \par Appointments: [phone]\par Fax: [phone]\par

## 2023-05-16 LAB
ALBUMIN SERPL ELPH-MCNC: 4.6 G/DL
ALP BLD-CCNC: 94 U/L
ALT SERPL-CCNC: 34 U/L
ANION GAP SERPL CALC-SCNC: 14 MMOL/L
AST SERPL-CCNC: 27 U/L
BILIRUB SERPL-MCNC: 0.4 MG/DL
BUN SERPL-MCNC: 16 MG/DL
CALCIUM SERPL-MCNC: 10.4 MG/DL
CHLORIDE SERPL-SCNC: 101 MMOL/L
CO2 SERPL-SCNC: 22 MMOL/L
CREAT SERPL-MCNC: 0.45 MG/DL
EGFR: 97 ML/MIN/1.73M2
GLUCOSE SERPL-MCNC: 100 MG/DL
POTASSIUM SERPL-SCNC: 4.7 MMOL/L
PROT SERPL-MCNC: 6.7 G/DL
SODIUM SERPL-SCNC: 138 MMOL/L

## 2023-05-22 ENCOUNTER — APPOINTMENT (OUTPATIENT)
Dept: PHYSICAL MEDICINE AND REHAB | Facility: CLINIC | Age: 83
End: 2023-05-22

## 2023-05-25 ENCOUNTER — APPOINTMENT (OUTPATIENT)
Dept: ORTHOPEDIC SURGERY | Facility: CLINIC | Age: 83
End: 2023-05-25
Payer: MEDICARE

## 2023-05-25 VITALS
DIASTOLIC BLOOD PRESSURE: 76 MMHG | OXYGEN SATURATION: 97 % | WEIGHT: 125 LBS | SYSTOLIC BLOOD PRESSURE: 132 MMHG | BODY MASS INDEX: 26.97 KG/M2 | HEART RATE: 71 BPM | HEIGHT: 57 IN

## 2023-05-25 PROCEDURE — 99205 OFFICE O/P NEW HI 60 MIN: CPT

## 2023-05-25 PROCEDURE — 72100 X-RAY EXAM L-S SPINE 2/3 VWS: CPT

## 2023-05-25 NOTE — ASSESSMENT
[FreeTextEntry1] : 83-year-old female with degenerative lumbar scoliosis severe low back pain and right lower extremity radiculopathy as well as neurogenic claudication having failed conservative management

## 2023-05-25 NOTE — PHYSICAL EXAM
[Antalgic] : antalgic [Walker] : ambulates with walker [Normal Touch] : sensation intact for touch [Normal Proprioception] : sensation intact for proprioception [Normal] : No costovertebral angle tenderness and no spinal tenderness [de-identified] : 4-5 strength right quadriceps right tib ant right EHL\par Otherwise 5 out of 5 strength bilateral lower extremity\par Straight leg raise positive right leg pain [de-identified] : CT lumbar spine 4/25/2023 care extreme:\par Significant disc base degeneration at multiple levels with vacuum signs of L4-5 L2-3 L1-2 and above.  The L5 1 disc is relatively well-maintained.  L4-5 there is a large calcified disc bulge which appears to cause some considerable stenosis centrally as well as significant right-sided foraminal stenosis.  L3-4 also has significant right-sided foraminal stenosis L2-3 appears to have mild bilateral foraminal stenosis at L1-2 there is a central calcified disc bulge with left-sided foraminal stenosis.  Larger calcified disc bulge and osteophyte creates central stenosis at T12-L1 with left-sided foraminal stenosis.\par \par AP lateral lumbar spine 5/25/2023 Ortho PACS:\par Lumbar lordosis relatively well-maintained.  Incompletely visualized thoracolumbar scoliosis with significant asymmetric collapse to the left at T11-12 and T12-L1 1.  There is also asymmetric collapse and significant right-sided disc base degeneration and osteophyte formation at L3-4 and L4-5

## 2023-05-25 NOTE — HISTORY OF PRESENT ILLNESS
[de-identified] : 83-year-old new patient presents for evaluation of low back and right lower extremity pain.  This has been present for approximate 1 year and gradually worsening.  She describes aching burning in the mid lumbar region which radiates into the posterior lateral glutes lateral thigh and lateral leg.  Denies any symptoms in the foot.  She states this is associated with a general sense of weakness in the right leg which makes it difficult to ambulate on top of the pain.  She describes some occasional left-sided irritation however without significant limitation on the left side.  She tends to ambulate with rolling walker as standing and extension to walk as a clear aggravating factor\par She has tried 6 weeks of physical therapy with minimal relief.  She is also tried several different oral pain medications including Lyrica

## 2023-05-25 NOTE — DISCUSSION/SUMMARY
[de-identified] : There discussion was held the patient and her daughter regarding her condition the natural history of treatment options risk benefits and alternatives.  There is significant number potential pain generators regarding her low back however what seems to be most disabling is the right lower extremity radiculopathy.  We discussed the potential utility of epidural steroid injection transforaminal versus interlaminar for symptomatic relief and diagnostic benefit.  I recommend MRI of the lumbar spine to further delineate pathology so that may guide appropriate next treatment.  I also prescribed a course of physical therapy as this may benefit her marginally moving forward.  We will follow-up in 2 weeks or sooner as the MRI is completed\par \par I have spent greater than 60 minutes preparing to see the patient, collecting relevant history, performing a thorough history and physical examination, counseling the patient regarding my findings ordering the appropriate therapies and tests, communicating with other relevant healthcare professionals, documenting my encounter and coordinating care.\par

## 2023-06-08 ENCOUNTER — APPOINTMENT (OUTPATIENT)
Dept: ORTHOPEDIC SURGERY | Facility: CLINIC | Age: 83
End: 2023-06-08
Payer: MEDICARE

## 2023-06-08 DIAGNOSIS — M51.36 OTHER INTERVERTEBRAL DISC DEGENERATION, LUMBAR REGION: ICD-10-CM

## 2023-06-08 PROCEDURE — 99215 OFFICE O/P EST HI 40 MIN: CPT

## 2023-06-09 PROBLEM — M51.36 LUMBAR DEGENERATIVE DISC DISEASE: Status: ACTIVE | Noted: 2023-01-23

## 2023-06-09 NOTE — ASSESSMENT
[FreeTextEntry1] : \par 83-year-old female with degenerative lumbar scoliosis severe low back pain and right lower extremity radiculopathy as well as neurogenic claudication having failed conservative management.

## 2023-06-09 NOTE — PHYSICAL EXAM
[Wheelchair] : uses a wheelchair [UE/LE] : Sensory: Intact in bilateral upper & lower extremities [Normal] : No costovertebral angle tenderness and no spinal tenderness [de-identified] : Exam is unchanged today with mild relative diffuse weakness in the right lower extremity [de-identified] : None new today per above

## 2023-06-09 NOTE — DISCUSSION/SUMMARY
[de-identified] : We again discussed the importance of obtaining MRI to guide further treatment.  I will make referral today for consideration of an epidural steroid injection given her progressive pain.  We will follow-up again after the MRI is completed and we have a chance to assess her response to the injection.  The meantime she will continue activity as tolerated as well as oral medications if they are able to help her\par \par I have spent greater than 45 minutes preparing to see the patient, collecting relevant history, performing a thorough history and physical examination, counseling the patient regarding my findings ordering the appropriate therapies and tests, communicating with other relevant healthcare professionals, documenting my encounter and coordinating care.\par

## 2023-06-09 NOTE — HISTORY OF PRESENT ILLNESS
[de-identified] : Presents today for follow-up regarding severe right leg radicular pain.  She is here with her daughter and there seems to have been some confusion regarding the recommended MRI scan as this has not yet been done.  She denies any considerable change in her symptoms.  Oral medication provides only intermittent relief.

## 2023-06-12 ENCOUNTER — APPOINTMENT (OUTPATIENT)
Dept: MRI IMAGING | Facility: HOSPITAL | Age: 83
End: 2023-06-12

## 2023-06-20 ENCOUNTER — RX RENEWAL (OUTPATIENT)
Age: 83
End: 2023-06-20

## 2023-07-11 ENCOUNTER — RX RENEWAL (OUTPATIENT)
Age: 83
End: 2023-07-11

## 2023-08-02 ENCOUNTER — APPOINTMENT (OUTPATIENT)
Dept: INTERNAL MEDICINE | Facility: CLINIC | Age: 83
End: 2023-08-02
Payer: MEDICARE

## 2023-08-02 ENCOUNTER — LABORATORY RESULT (OUTPATIENT)
Age: 83
End: 2023-08-02

## 2023-08-02 VITALS
DIASTOLIC BLOOD PRESSURE: 78 MMHG | WEIGHT: 125 LBS | HEART RATE: 94 BPM | BODY MASS INDEX: 26.97 KG/M2 | OXYGEN SATURATION: 96 % | SYSTOLIC BLOOD PRESSURE: 124 MMHG | TEMPERATURE: 97.3 F | HEIGHT: 57 IN

## 2023-08-02 DIAGNOSIS — E03.9 HYPOTHYROIDISM, UNSPECIFIED: ICD-10-CM

## 2023-08-02 DIAGNOSIS — M54.16 RADICULOPATHY, LUMBAR REGION: ICD-10-CM

## 2023-08-02 PROCEDURE — 99214 OFFICE O/P EST MOD 30 MIN: CPT

## 2023-08-02 NOTE — PHYSICAL EXAM
[No Acute Distress] : no acute distress [Normal Sclera/Conjunctiva] : normal sclera/conjunctiva [EOMI] : extraocular movements intact [Normal Outer Ear/Nose] : the outer ears and nose were normal in appearance [No JVD] : no jugular venous distention [Supple] : supple [No Respiratory Distress] : no respiratory distress  [No Accessory Muscle Use] : no accessory muscle use [Clear to Auscultation] : lungs were clear to auscultation bilaterally [Normal Rate] : normal rate  [Regular Rhythm] : with a regular rhythm [Normal S1, S2] : normal S1 and S2 [No Edema] : there was no peripheral edema [Normal Affect] : the affect was normal [Alert and Oriented x3] : oriented to person, place, and time [Normal Insight/Judgement] : insight and judgment were intact [de-identified] : walks with walker

## 2023-08-02 NOTE — HISTORY OF PRESENT ILLNESS
[de-identified] : 84 yo female with hx HPL, hypothyroid, afib on eliquis, cholecystitis, chronic back pain radiating down leg here for f/u.  REports continues to have back pain radiating to leg.  worse in morning and with walking.  following with ortho spine, planning for MRI and injection.  compliant with all medications.  no complaints other than back pain.

## 2023-08-02 NOTE — ASSESSMENT
[FreeTextEntry1] : o female with hx HPL, hypothyroid, afib on eliquis, cholecystitis, chronic back pain radiating down leg here for f/u.  1) back pain - 2/2 lumbar radiculopathy vs sciatica awaiting MRI,  reviewed ortho spine and PM and R notes, counseled to f/u, planning for injection.    2) hypothyroid - chronic, stable, last TSH one year ago, TSH drawn today, c/w levothyroxine 75mcg daily  3) afib - chronic, stable, rate controleld, c/w eliquis, cbc within the year acceptable.

## 2023-08-02 NOTE — HEALTH RISK ASSESSMENT
[0] : 2) Feeling down, depressed, or hopeless: Not at all (0) [PHQ-2 Negative - No further assessment needed] : PHQ-2 Negative - No further assessment needed [[id number]] : 0 [Never] : Never

## 2023-08-29 ENCOUNTER — APPOINTMENT (OUTPATIENT)
Dept: PHYSICAL MEDICINE AND REHAB | Facility: CLINIC | Age: 83
End: 2023-08-29
Payer: MEDICARE

## 2023-08-29 VITALS — SYSTOLIC BLOOD PRESSURE: 130 MMHG | DIASTOLIC BLOOD PRESSURE: 62 MMHG

## 2023-08-29 VITALS
WEIGHT: 125 LBS | HEIGHT: 57 IN | TEMPERATURE: 98 F | SYSTOLIC BLOOD PRESSURE: 120 MMHG | BODY MASS INDEX: 26.97 KG/M2 | OXYGEN SATURATION: 96 % | DIASTOLIC BLOOD PRESSURE: 72 MMHG | HEART RATE: 71 BPM

## 2023-08-29 DIAGNOSIS — M54.59 OTHER LOW BACK PAIN: ICD-10-CM

## 2023-08-29 DIAGNOSIS — M54.16 RADICULOPATHY, LUMBAR REGION: ICD-10-CM

## 2023-08-29 DIAGNOSIS — M54.17 RADICULOPATHY, LUMBOSACRAL REGION: ICD-10-CM

## 2023-08-29 PROCEDURE — 99215 OFFICE O/P EST HI 40 MIN: CPT

## 2023-08-29 NOTE — PROCEDURE
[de-identified] : Trigger point injections   [address] three or more muscle groups   Pre/Post Procedure Diagnosis: Myofascial Pain  Procedure Performed by:  Dr. Renny Gilbert  Consent: Verbal and written informed consent was obtained/reviewed with the patient.  Risks, benefits, alternatives discussed in detail. All questions were answered.  Site was then marked.  Position: left lateral recumbent  Anesthesia: none  The skin was cleaned with alcohol  Time out was then performed as per protocol.  Needle used: 25 G 1.5 inch  Injectate: 1% lidocaine, 6 cc  The following trigger points were then identified: right lumbar paraspinal x 2, right gluteal med X1, right glut max x 1  After negative aspiration each of these trigger points were then injected. Total volume of the injectate administered was 4 ml into each of these trigger points.   EBL: less than 1 ml  Complications: None  Specimen: None  Fluids: None  Post Procedure: NRS: 1/10  Patient was observed in the room. Patient was stable upon discharge. Detailed post procedure instructions were provided.  Patient to call in the event of worsening pain, fever, weakness or numbness  Plan:  Further treatment will be based upon the response to the injection performed today and if found beneficial may be repeated as indicated.  The patient has local pain symptoms that have persisted for more than 3 months causing tenderness and/or weakness, restricting motion and/or causing referred pain when compressed; and a taut band is palpable in an accessible muscle with exquisite tenderness at one point along its length; and the patient has been refractory or intolerant of conservative therapies such as bed rest, active exercises, ultrasound, range of motion, heating or cooling modalities, massage, and pharmacotherapies (e.g. NSAIDS), muscle relaxants, non-narcotic analgesics, and anti-depressants for a period of at least 1 month; and the TPIs are being given as part of an overall management plan.   The patient reports a response to prior trigger point injections with improvement in pain and functional status with a greater than 50% pain relief for 6 weeks.  Repeat injection is being completed secondary to return of pain and deterioration in functional status. The injection is being completed to facilitate mobilization by providing pain relief and assist in application of non-invasive modalities.  [id number] 10/25

## 2023-08-29 NOTE — HISTORY OF PRESENT ILLNESS
[FreeTextEntry1] : Renny Gilbert M.D. Sports Medicine and Interventional Spine Department of Physical Medicine and Rehabilitation  [facility] [address] [facility], [address]th [place] [address]  [facility] [place] at [facility] [address] [place] [address]  [facility] at  [facility] [address], [address]th [place] [address]  For [place] Appointments Phone: [phone] Fax: [phone]  ----------------------------------------------------------------------------------------------------------------------------------------  PATIENT: ESTRADA DIAZ  MRN: [id number]  YOB: 1940  DATE OF SERVICE: 08/29/2023 Referred by CANDIDO JEAN-BAPTISTE ADDRESS:  Aug 29, 2023   Dear    Thank you for referring DIVA EMILY to my Sports and Interventional Spine practice and office. Enclosed is a copy of the patient's consultation/progress note, which includes my complete assessment and recent studies completed during the patient's evaluation.  If you have questions or have any patients who require nonsurgical, non-opiate management of any sports, spine, or musculoskeletal conditions, please do not hesitate to contact my , Salena Alarcon at [phone].  I look forward to taking care of your patients along with you.  Sincerely,  Renny Gilbert MD Sports, Interventional Spine, & Regenerative Musculoskeletal Medicine Orthopaedic [facility]                                                     Follow up Visit CC: back pain, leg pain, cannot walk  HPI:  This is a follow up visit to [facility] [place] at [facility] Sports Medicine and Interventional Spine Practice.    ESTRADA DIAZ presents with the chief complaint as above.    Interval Hx on Aug 29, 2023: presents for follow up. Since last visit, they have persistent right posterior hip pain, right lumbar paraspinous pain, right gluteal pain. Patient has been limited with ambulation, persistent difficulty climbing stairs.  Since the last visit, they relate improvements in pain previously associated with known exacerbating, aggravating factors and situations.  Pain has been more localized to the gluteal muscles, alleviated slightly with oral medications. has been taking lyrica with moderate relief. Pharmacologic treatments now include OTC analgesics PRN, but otherwise pharmacologic treatments are minimal. Denies new or worsened numbness, tingling, or focal motor deficit. Denies interval fall, accident, or injury. Denies change in bowel or bladder functioning.  Patient will follow up with our practice as planned following last visit.   I have personally spent a total of at least 30 minutes preparing, reviewing internal and external records, explaining, counseling, and coordinating care for this patient encounter.   Meds: denies regular PO pain medications Therapy Program: no recent structured targeted therapy program HEP: doing HEP regularly  Assoc Sx: Otherwise denies numbness, Tingling Denies Focal motor weakness in the upper or lower limbs Denies New or worsened bowel or bladder incontinence Denies Saddle anesthesia Denies Buckling Denies Using Orthotic(s)/Supportive devices Denies Swelling in the upper/lower extremities Denies Clicking They also deny frequent tripping, falling  ROS: A 14 point review of systems was completed. Positive findings are pain as described above. The remaining systems negative.  Breast Cancer Surveillance: up to date COVID HX: reviewed  Interval Hx on May 12, 2023: presents for follow up. Since last visit, they underwent further diagnostic workup including additional imaging studies. Patient informed of all relevant imaging findings, all questions were answered including multi-level lumbar degenerative changes, she continues to have osteoporosis. Patient relates that the pregabalin is helping somewhat. Patient has been using compound cream to the middle (thoracic) back as directed, asked for a refill, we called [facility] to confirm they would send her another refill. patient has been taking gabapentin/pregabalin for close to 6 months, no recent lab work. She explains that during her worst pain days she has improvement with brace. There have been no significant changes to their aggravating or alleviating factors since the last visit. Pharmacologic treatments now include OTC analgesics PRN, otherwise pharmacologic treatments are minimal. Denies new or worsened numbness, tingling, or focal motor deficit. Denies interval fall, accident, or injury. Denies change in bowel or bladder functioning.  Interval Hx on Mar 27, 2023: presents for follow up. Since last visit, they continued their therapy program, now at week 6. patient has relief from PT but intermittent severe axial > radicular pain persists. Pain can be localized to the midline low back. patient will then utilize their compound cream, OTC pharmacologic treatments for their pain. Patient open to trial of alternative neuropathic medication. There have been no significant changes to their aggravating or alleviating factors since the last visit. Pharmacologic treatments now include OTC analgesics PRN, but otherwise pharmacologic treatments are minimal. Denies new or worsened numbness, tingling, or focal motor deficit. Denies interval fall, accident, or injury. Denies change in bowel or bladder functioning.  Initial Hx on 01/23/2023:Presents in person to [facility]. patient with extensive low back pain. able to walk with her rollator, about 2 blocks in UES. patient reports fall recently and weakness in the BLE. fell several times at home and outside.The patient's difficulties began worsening over the past few years. The pain is graded as 9/10. The pain is described as persistent, burning pain in the right anterolateral thigh. The pain is constant. The pain does not radiate, located in the b/l lumbar region and over the right anterolateral hip. The patient feels that the pain is overall persistent. Patient denies other recent fall, MVA, injury, trauma, or accident besides presenting history above.  Aggravating: ambulation, prolonged sitting, prolonged standing, navigating stairs, sit to stand transitional movements, turning in bed, getting out of bed. Alleviating: rest, activity modification, avoiding prolonged walking, pharmacologic treatments  Assoc Hx: Ambulates with assistive device, rollator Injection Hx: several LESI, ZACARIAS in the past five years Imaging Hx: reviewed  Level of functioning: AD for ambulation, assistance for most ADLs Living Situation: elevator accessible apartment dwelling with steps to enter

## 2023-08-29 NOTE — ASSESSMENT
[FreeTextEntry1] :                                                       Assessment/Plan:  ESTRADA DIAZ is a 83 year female with back pain s/p > 10 lifetimes LESI (last in 2022) here for follow up   Facet degeneration of lumbosacral region Neurogenic claudication due to lumbar spinal stenosis Gait difficulty Chronic lumbar radiculopathy, L4, Right Tendinopathy of the gluteus medius  Weakness of the hip, right  - Tiers of treatment and management of above diagnosis(es) were discussed with patient - Optimal diet, weight, sleep, and lifestyle management to minimize stress and maximize well being counseling provided - Imaging reviewed and discussed with patient - Reviewed previous encounter notes from 3/29/2023 Dr. JOSEMANUEL Anglin (Internal Medicine), 9/14/2022 Dr. JOE Nevarez (Pain) - Patient was advised to continue a structured, targeted therapy program 2-3x/wk for 8 wks with goal toward HEP - Patient was educated on an appropriate home exercise program, provided with exercise recommendations, all questions answered - Patient may trial acetaminophen 1000mg up to TID PRN moderate to severe pain and to decrease average consumption of NSAIDs - Patient may continue to trial topical compound cream to the right low back no more than twice per day as needed for next 2-3 weeks, Rx entered via portal, written information provided to the patient in addition to verbal explanation regarding the medication - Patient will continue pregabalin 25mg in the AM and 50mg qHS.  All questions were answered and the patient displayed a clear understanding of the plan of care, including titration of pregabalin. The patient was informed about not taking this medication at the same time as any sleeping or muscle relaxant pills. Pt was also notified to stop, and contact our office, if it is too sedating, ankle swelling occurs and/or they experience suicidal thinking - provided with info about access a ride, due to patient's multiple medical conditions, they are unable to walk greater than 10-15 feet - patient relates h/o decreased bone mineral density and fall with multiple rib fractures, repeat DEXA, order re-placed - 5/12/2023: placed order for lumbar corset (LSO) - 5/12/2023: Ordered lab testing for renal function, has been taking gabapentin/pregabalin > 6 months, GFR wnl, slightly decreased SCr - placed referral to Audiology, noted consultation that claimed no indication for hearing aid device - Patient was advised to apply cool compresses or warm heat to affected regions PRN - Given h/o > 10 lifetimes LESI, sent for consultation with [facility]  - Educated about red flag symptoms including (but not limited to) new, worsened, or persistent: fever greater than 100F, bowel or bladder incontinence, bowel obstipation, inability to void urine, urinary leakage, Severe nausea or vomiting, Worsening numbness, worsening tingling/paresthesias, and/or new or progressive motor weakness; advised to seek immediate medical attention at his nearest Emergency department should they experience any of the above  - Follow up in 2-3 weeks after imaging, in person in 2 months to assess their progress  I have personally spent a total of at least 45 minutes preparing, reviewing internal and external records, explaining, counseling, and coordinating care for this patient encounter.  Thank you, CANDIDO ANGLIN, for allowing me to participate in the care of your patient. Please do not hesitate to contact me with questions/concerns.  Renny Gilbert M.D. Sports and Interventional Spine Department of Physical Medicine and Rehabilitation  [facility]  Email: [email or web address] [address], [address] Floor [place], [place] [address]  Appointments: [phone] Fax: [phone]

## 2023-09-20 ENCOUNTER — RX RENEWAL (OUTPATIENT)
Age: 83
End: 2023-09-20

## 2023-10-09 ENCOUNTER — RX RENEWAL (OUTPATIENT)
Age: 83
End: 2023-10-09

## 2023-10-24 ENCOUNTER — APPOINTMENT (OUTPATIENT)
Dept: PHYSICAL MEDICINE AND REHAB | Facility: CLINIC | Age: 83
End: 2023-10-24
Payer: MEDICARE

## 2023-10-24 VITALS
HEART RATE: 88 BPM | OXYGEN SATURATION: 96 % | DIASTOLIC BLOOD PRESSURE: 80 MMHG | TEMPERATURE: 97.5 F | BODY MASS INDEX: 26.97 KG/M2 | SYSTOLIC BLOOD PRESSURE: 128 MMHG | WEIGHT: 125 LBS | HEIGHT: 57 IN

## 2023-10-24 DIAGNOSIS — M85.80 OTHER SPECIFIED DISORDERS OF BONE DENSITY AND STRUCTURE, UNSPECIFIED SITE: ICD-10-CM

## 2023-10-24 DIAGNOSIS — R29.818 OTHER SYMPTOMS AND SIGNS INVOLVING THE NERVOUS SYSTEM: ICD-10-CM

## 2023-10-24 DIAGNOSIS — M79.673 PAIN IN UNSPECIFIED FOOT: ICD-10-CM

## 2023-10-24 PROCEDURE — 99215 OFFICE O/P EST HI 40 MIN: CPT

## 2023-10-30 ENCOUNTER — RX RENEWAL (OUTPATIENT)
Age: 83
End: 2023-10-30

## 2023-10-30 ENCOUNTER — APPOINTMENT (OUTPATIENT)
Dept: HEART AND VASCULAR | Facility: CLINIC | Age: 83
End: 2023-10-30

## 2023-11-07 DIAGNOSIS — M79.604 PAIN IN RIGHT LEG: ICD-10-CM

## 2023-11-07 DIAGNOSIS — M79.605 PAIN IN RIGHT LEG: ICD-10-CM

## 2023-11-08 ENCOUNTER — APPOINTMENT (OUTPATIENT)
Dept: HEART AND VASCULAR | Facility: CLINIC | Age: 83
End: 2023-11-08
Payer: MEDICARE

## 2023-11-08 VITALS
BODY MASS INDEX: 27.4 KG/M2 | HEART RATE: 81 BPM | HEIGHT: 57 IN | SYSTOLIC BLOOD PRESSURE: 132 MMHG | OXYGEN SATURATION: 96 % | TEMPERATURE: 97.5 F | WEIGHT: 127 LBS | DIASTOLIC BLOOD PRESSURE: 91 MMHG

## 2023-11-08 DIAGNOSIS — R06.09 OTHER FORMS OF DYSPNEA: ICD-10-CM

## 2023-11-08 DIAGNOSIS — G89.29 LUMBAGO WITH SCIATICA, LEFT SIDE: ICD-10-CM

## 2023-11-08 DIAGNOSIS — I73.9 PERIPHERAL VASCULAR DISEASE, UNSPECIFIED: ICD-10-CM

## 2023-11-08 DIAGNOSIS — M54.41 LUMBAGO WITH SCIATICA, LEFT SIDE: ICD-10-CM

## 2023-11-08 DIAGNOSIS — M79.606 PAIN IN LEG, UNSPECIFIED: ICD-10-CM

## 2023-11-08 DIAGNOSIS — R53.82 CHRONIC FATIGUE, UNSPECIFIED: ICD-10-CM

## 2023-11-08 DIAGNOSIS — S32.010A WEDGE COMPRESSION FRACTURE OF FIRST LUMBAR VERTEBRA, INITIAL ENCOUNTER FOR CLOSED FRACTURE: ICD-10-CM

## 2023-11-08 DIAGNOSIS — R25.2 CRAMP AND SPASM: ICD-10-CM

## 2023-11-08 DIAGNOSIS — M62.81 MUSCLE WEAKNESS (GENERALIZED): ICD-10-CM

## 2023-11-08 DIAGNOSIS — M54.42 LUMBAGO WITH SCIATICA, LEFT SIDE: ICD-10-CM

## 2023-11-08 PROCEDURE — 99214 OFFICE O/P EST MOD 30 MIN: CPT | Mod: 25

## 2023-11-08 PROCEDURE — 93923 UPR/LXTR ART STDY 3+ LVLS: CPT

## 2023-11-08 PROCEDURE — 93970 EXTREMITY STUDY: CPT

## 2024-01-04 ENCOUNTER — APPOINTMENT (OUTPATIENT)
Dept: PHYSICAL MEDICINE AND REHAB | Facility: CLINIC | Age: 84
End: 2024-01-04
Payer: MEDICARE

## 2024-01-04 VITALS
TEMPERATURE: 98.2 F | DIASTOLIC BLOOD PRESSURE: 79 MMHG | HEART RATE: 73 BPM | WEIGHT: 127 LBS | OXYGEN SATURATION: 96 % | SYSTOLIC BLOOD PRESSURE: 146 MMHG | HEIGHT: 57 IN | BODY MASS INDEX: 27.4 KG/M2

## 2024-01-04 VITALS — SYSTOLIC BLOOD PRESSURE: 134 MMHG | DIASTOLIC BLOOD PRESSURE: 75 MMHG

## 2024-01-04 DIAGNOSIS — Z75.8 OTHER PROBLEMS RELATED TO MEDICAL FACILITIES AND OTHER HEALTH CARE: ICD-10-CM

## 2024-01-04 DIAGNOSIS — M76.32 ILIOTIBIAL BAND SYNDROME, RIGHT LEG: ICD-10-CM

## 2024-01-04 DIAGNOSIS — M62.89 OTHER SPECIFIED DISORDERS OF MUSCLE: ICD-10-CM

## 2024-01-04 DIAGNOSIS — M76.31 ILIOTIBIAL BAND SYNDROME, RIGHT LEG: ICD-10-CM

## 2024-01-04 PROCEDURE — 20553 NJX 1/MLT TRIGGER POINTS 3/>: CPT | Mod: RT,59

## 2024-01-04 PROCEDURE — 20610 DRAIN/INJ JOINT/BURSA W/O US: CPT | Mod: RT

## 2024-01-04 PROCEDURE — 99215 OFFICE O/P EST HI 40 MIN: CPT | Mod: 25

## 2024-01-04 NOTE — ASSESSMENT
[FreeTextEntry1] : Assessment/Plan:  ESTRADA DIAZ is a 83 year female with back pain s/p > 10 lifetimes LESI (last in 2022) here for follow up  Greater trochanteric pain syndrome, Left > right Gluteal tendinitis, left Tensor Fascia Amrita Syndrome, Left Myofascial pain syndrome, lumbar  Facet degeneration of lumbosacral region Neurogenic claudication due to lumbar spinal stenosis Gait difficulty Chronic lumbar radiculopathy, L4, Right Tendinopathy of the gluteus medius Weakness of the hip, right  - Tiers of treatment and management of above diagnosis(es) were discussed with patient - Optimal diet, weight, sleep, and lifestyle management to minimize stress and maximize well being counseling provided - Imaging reviewed and discussed with patient - Reviewed previous encounter notes from 3/29/2023 Dr. JSOEMANUEL Anglin (Internal Medicine), 9/14/2022 Dr. JOE Nevarez (Pain) - Patient was advised to RESUME their structured, targeted therapy program 2-3x/wk for 8 wks with goal toward HEP, new script 1/4/24 - Patient was educated on an appropriate home exercise program, provided with exercise recommendations, all questions answered - Patient may trial acetaminophen 1000mg up to TID PRN moderate to severe pain and to decrease average consumption of NSAIDs - Patient may continue to trial topical compound cream to the right low back no more than twice per day as needed for next 2-3 weeks, Rx entered via portal, written information provided to the patient in addition to verbal explanation regarding the medication, new script 10/24/2023  - Patient may continue pregabalin 25mg in the AM and 50mg qHS. All questions were answered and the patient displayed a clear understanding of the plan of care, including titration of pregabalin. The patient was informed about not taking this medication at the same time as any sleeping or muscle relaxant pills. Pt was also notified to stop, and contact our office, if it is too sedating, ankle swelling occurs and/or they experience suicidal thinking  - provided with info about access a ride, due to patient's multiple medical conditions, they are unable to walk greater than 10-15 feet - patient relates h/o decreased bone mineral density and fall with multiple rib fractures, repeat DEXA, order re-placed  - 5/12/2023: placed order for lumbar corset (LSO) - 5/12/2023: Ordered lab testing for renal function, has been taking gabapentin/pregabalin > 6 months, GFR wnl, slightly decreased SCr - placed referral to Audiology, noted consultation that claimed no indication for hearing aid device - Patient was advised to apply cool compresses or warm heat to affected regions PRN - Given h/o > 10 lifetimes LESI, sent for consultation with [facility] - Regarding orthopedic shoes on 10/24/2023: provided referral for orthopedic shoes with wide toe box, velcro strap closure, and durable rubber outsole; medically necessary given observed functional gait impairments, unsteady gait, and neurogenic claudication, necessary for safety, ease of transferring, easy closure of proper fitting shoes, prevent further injury, morbidity from fall due to improper footwear - 10/24/23: provided with referral for  to help access resources including additional health insurance plans  - Educated about red flag symptoms including (but not limited to) new, worsened, or persistent: fever greater than 100F, bowel or bladder incontinence, bowel obstipation, inability to void urine, urinary leakage, Severe nausea or vomiting, Worsening numbness, worsening tingling/paresthesias, and/or new or progressive motor weakness; advised to seek immediate medical attention at his nearest Emergency department should they experience any of the above  - Follow up in person in 2 months to assess their progress with resuming PT, assess effectiveness of the left hip injections 1/4/24, assess need for further adjustments to their rollator (lowered handles 1/4/2024)  I have personally spent a total of at least 45 minutes preparing, reviewing internal and external records, explaining, counseling, and coordinating care for this patient encounter.  Thank you, CANDIDO ANGLIN, for allowing me to participate in the care of your patient. Please do not hesitate to contact me with questions/concerns.  Renny Gilbert M.D. Sports and Interventional Spine Department of Physical Medicine and Rehabilitation [facility] [address], [address] [place] [address]  Appointments: [phone] Fax: [phone]

## 2024-01-04 NOTE — DATA REVIEWED
[FreeTextEntry1] :  SITE PHONE: [phone] Patient: ESTRADA DIAZ YOB: 1940 Phone: [phone] MRN: [id number] Acc: [id number] Date of Exam: 09-   EXAM:  MRI LUMBAR SPINE WITHOUT CONTRAST  HISTORY: Lower back pain.  TECHNIQUE: Multiplanar, multi-sequential MRI of the lumbar spine was obtained on a 3T scanner using a standard protocol.  COMPARISON:  MRI lumbar spine 3/27/2020, CT lumbar spine 6/5/2023.  FINDINGS:  For purposes of this dictation, the last well-formed disc space will be labeled L5-S1.  OSSEOUS STRUCTURES: Vertebral body heights are preserved. Anterior marginal osteophytes, endplate degenerative changes and Schmorl's nodes throughout the lumbar spine. No marrow edema.  ALIGNMENT: Levoscoliosis of the lumbar spine. Mild retrolisthesis of L1-2. Grade 1 anterolisthesis of L2-3, L4-5 and L5-S1.  SPINAL CORD AND CONUS MEDULLARIS: The conus medullaris terminates at L1.  PARASPINAL AND INTRA-ABDOMINAL SOFT TISSUES: Asymmetric atrophy of the right psoas musculature compared to the left. Moderate symmetric fatty atrophy of the paravertebral musculature. Fibroid uterus.  DISCS: Moderate to severe disc height loss at T10-11 to L4-5. Multilevel disc desiccation.  The following axial levels are imaged and detailed below: T12-L1: Disc osteophyte complex and facet hypertrophy with mild spinal canal stenosis and moderate left neuroforaminal stenosis.  L1-L2: Mild retrolisthesis, disc osteophyte complex, ligamentum flavum thickening and facet hypertrophy with mild spinal canal stenosis, severe left and mild right neuroforaminal stenosis.  L2-L3: Grade 1 anterolisthesis, disc osteophyte complex, ligamentum flavum thickening and facet hypertrophy with mild spinal canal stenosis and moderate bilateral neuroforaminal stenosis.  L3-L4: Disc osteophyte complex, ligamentum flavum thickening and facet hypertrophy with mild spinal canal stenosis, moderate/severe right and mild left neuroforaminal stenosis.  L4-L5: Grade 1 anterolisthesis, disc osteophyte complex and facet hypertrophy with moderate spinal canal stenosis and moderate/severe right and moderate left neuroforaminal stenosis.  L5-S1: Grade 1 anterolisthesis of L5-S1, disc uncovering and facet hypertrophy with moderate right and mild left neuroforaminal stenosis. No spinal canal stenosis.  IMPRESSION:    Multilevel lumbar spondylosis appears to have progressed since the MRI lumbar spine of 3/27/2020. Multilevel mild to moderate spinal canal stenosis and mild to severe neuroforaminal stenosis, as described above.  Stable mild retrolisthesis of L1-2. Stable grade 1 anterolisthesis of L2-3, L4-5 and L5-S1.  Levoscoliosis of the lumbar spine.

## 2024-01-04 NOTE — HISTORY OF PRESENT ILLNESS
[FreeTextEntry1] : Renny Gilbert M.D. Sports Medicine and Interventional Spine Department of Physical Medicine and Rehabilitation  [facility] Orthopaedic [facility] [address] [facility], [address]th [place] [address]  [facility] [place] [facility] [address] [place] [address]  [facility] at  [facility] [address], [address]th [place] [address]  For [place] Appointments Phone: [phone] Fax: [phone]  ----------------------------------------------------------------------------------------------------------------------------------------  PATIENT: ESTRADA DIAZ  MRN: [id number]  YOB: 1940  DATE OF SERVICE: 1/4/24 Referred by CANDIDO JEAN-BAPTISTE ADDRESS:  Jan 04, 2024  Dear    Thank you for referring DIVA EMILY to my Sports and Interventional Spine practice and office. Enclosed is a copy of the patient's consultation/progress note, which includes my complete assessment and recent studies completed during the patient's evaluation.  If you have questions or have any patients who require nonsurgical, non-opiate management of any sports, spine, or musculoskeletal conditions, please do not hesitate to contact my , Salena Alarcon at [phone].  I look forward to taking care of your patients along with you.  Sincerely,  Renny Gilbert MD Sports, Interventional Spine, & Regenerative Musculoskeletal Medicine Orthopaedic [facility]                                                     Follow up Visit CC: back pain, leg pain, cannot walk  HPI:  This is a follow up visit to [facility] [place] at [facility] Sports Medicine and Interventional Spine Practice.    ESTRADA DIAZ presents with the chief complaint as above.    Interval Hx on Jan 04, 2024: presents for follow up. Since last visit, patient has developed left hip pain, right continues to be less than left since trigger point injections. patient has trialed the topical compound spray is not helping much so she discontinued. At the last visit, they were advised to continue topical compound, obtain orthopedic shoes, discuss their situation with a , and follow up for possible trigger point injections to the right hip as we did in August 2023. patient has been walking 2-3 blocks. continues to taking tylenol 1-3 times per day for her hips. patient does not currently plan on follow up with [facility]. Since the last visit, they relate improvements in pain previously associated with known exacerbating, aggravating factors and situations. Pharmacologic treatments now include OTC analgesics PRN, but otherwise pharmacologic treatments are minimal. Denies new or worsened numbness, tingling, or focal motor deficit. Denies interval fall, accident, or injury. Denies change in bowel or bladder functioning.   Meds: denies regular PO pain medications Therapy Program: no recent structured targeted therapy program HEP: doing HEP regularly  Assoc Sx: Otherwise denies numbness, Tingling Denies Focal motor weakness in the upper or lower limbs Denies New or worsened bowel or bladder incontinence Denies Saddle anesthesia Denies Buckling Denies Using Orthotic(s)/Supportive devices Denies Swelling in the upper/lower extremities Denies Clicking They also deny frequent tripping, falling  ROS: A 14 point review of systems was completed. Positive findings are pain as described above. The remaining systems negative.  Breast Cancer Surveillance: up to date COVID HX: reviewed  Interval Hx on Oct 24, 2023: presents for follow up. Since last visit, she continues to have right posterior hip, right gluteal pain. patient relates having marked relief with writer directed treatments (trigger point injections) at their last visit. patient had evaluation with [facility] Santillan, Dr. JOSEMANUEL Lovelace (Pain), was set to have epidural treatments, then decided to cancel the visit due to lack of support at home, she lives alone with help from home health aide. Patient has been otherwise managing their pain with home exercises, OTC analgesics. Overall, compared to their last visit pain is somewhat better. Since the last visit, they relate moderate improvements in pain previously associated with known exacerbating, aggravating factors and situations. Pharmacologic treatments now include OTC analgesics PRN, but otherwise pharmacologic treatments are minimal. Denies new or worsened numbness, tingling, or focal motor deficit. Denies interval fall, accident, or injury. Denies change in bowel or bladder functioning. Patient's nearest relative is in [place], writer suggested they accompany the patient after local treatment to the spine, patient states that is not an option at this time. patient's topical compound had been helping but was too costly. patient observed to be wearing overly large fitting sneaker at today's visit.  Interval Hx on Aug 29, 2023: presents for follow up. Since last visit, they have persistent right posterior hip pain, right lumbar paraspinous pain, right gluteal pain. Patient has been limited with ambulation, persistent difficulty climbing stairs.  Since the last visit, they relate improvements in pain previously associated with known exacerbating, aggravating factors and situations.  Pain has been more localized to the gluteal muscles, alleviated slightly with oral medications. has been taking lyrica with moderate relief. Pharmacologic treatments now include OTC analgesics PRN, but otherwise pharmacologic treatments are minimal. Denies new or worsened numbness, tingling, or focal motor deficit. Denies interval fall, accident, or injury. Denies change in bowel or bladder functioning.  Interval Hx on May 12, 2023: presents for follow up. Since last visit, they underwent further diagnostic workup including additional imaging studies. Patient informed of all relevant imaging findings, all questions were answered including multi-level lumbar degenerative changes, she continues to have osteoporosis. Patient relates that the pregabalin is helping somewhat. Patient has been using compound cream to the middle (thoracic) back as directed, asked for a refill, we called [facility] to confirm they would send her another refill. patient has been taking gabapentin/pregabalin for close to 6 months, no recent lab work. She explains that during her worst pain days she has improvement with brace. There have been no significant changes to their aggravating or alleviating factors since the last visit. Pharmacologic treatments now include OTC analgesics PRN, otherwise pharmacologic treatments are minimal. Denies new or worsened numbness, tingling, or focal motor deficit. Denies interval fall, accident, or injury. Denies change in bowel or bladder functioning.  Interval Hx on Mar 27, 2023: presents for follow up. Since last visit, they continued their therapy program, now at week 6. patient has relief from PT but intermittent severe axial > radicular pain persists. Pain can be localized to the midline low back. patient will then utilize their compound cream, OTC pharmacologic treatments for their pain. Patient open to trial of alternative neuropathic medication. There have been no significant changes to their aggravating or alleviating factors since the last visit. Pharmacologic treatments now include OTC analgesics PRN, but otherwise pharmacologic treatments are minimal. Denies new or worsened numbness, tingling, or focal motor deficit. Denies interval fall, accident, or injury. Denies change in bowel or bladder functioning.  Initial Hx on 01/23/2023:Presents in person to [facility]. patient with extensive low back pain. able to walk with her rollator, about 2 blocks in UES. patient reports fall recently and weakness in the BLE. fell several times at home and outside.The patient's difficulties began worsening over the past few years. The pain is graded as 9/10. The pain is described as persistent, burning pain in the right anterolateral thigh. The pain is constant. The pain does not radiate, located in the b/l lumbar region and over the right anterolateral hip. The patient feels that the pain is overall persistent. Patient denies other recent fall, MVA, injury, trauma, or accident besides presenting history above.  Aggravating: ambulation, prolonged sitting, prolonged standing, navigating stairs, sit to stand transitional movements, turning in bed, getting out of bed. Alleviating: rest, activity modification, avoiding prolonged walking, pharmacologic treatments  Assoc Hx: Ambulates with assistive device, rollator Injection Hx: several LESI, ZACARIAS in the past five years Imaging Hx: reviewed  Level of functioning: AD for ambulation, assistance for most ADLs Living Situation: elevator accessible apartment dwelling with steps to enter

## 2024-01-04 NOTE — PHYSICAL EXAM
[FreeTextEntry1] : Gen: A+O x 3 in NAD Psych: Normal mood and affect. Responds appropriately to commands Eyes: Anicteric. No discharge. EOMI. Resp: Breathing unlabored CV: DP pulses 2+ and equal. No varicosities noted Ext: No c/c/e Skin: No lesions noted  Gait: persistent 01/04/2024 ++ thoracic kyphosis neg antalgic  +  reciprocating heel to toe able to stand on toes and heels WITH hand holding Tandem gait intact WITH hand holding Poor single leg standing balance R or L Romberg deferred  Trendelenburg present with R>L stance leg  Inspection: Spine alignment is midline. Palpation: There is + tenderness over the midline spinous processes, paravertebral muscles of the thoracolumbar region  + TTP over the left GTB + TTP over the left TFL + TTP over the left GLuteal tendons  Lumbar ROM: Flexion, extension, side-bending, rotation, limited in most planes now 01/04/2024 minimal +with lateral flexion now 01/04/2024 minimal +with oblique extension now 01/04/2024 minimal +pain with lateral rotation   Hip ROM: pain at terminal ROM bilaterally. FAIR, FABERE negative bilaterally.  	Hip Flex       Knee Ext      Ankle Dorsi           EHL        Ankle Plantar Right	4-/5	        5/5	                 5/5	          4/5	             5/5                            Left	4/5	        5/5	                 5/5	          4/5	             5/5                             Hip abduction R 4/5 L4 /5 Hip adduction R 4/5 L 4/5 Hip extension R 4/5 L 4/5 Knee Flexion R 4/5 L 4/5  Tone: Normal. No clonus. Sensation: Grossly intact to light touch bilateral lower limbs. Proprioception: Intact at big toes bilaterally. Reflexes: 2+ symmetric knee jerk, ankle jerk.  Plantars downgoing bilaterally.  SLR negative bilaterally Crossed SLR negative bilaterally. Slump Test + right S1

## 2024-01-04 NOTE — PROCEDURE
[de-identified] : PROCEDURE #1  Greater trochanteric bursa , LEFT 25 gauge, 1.5 inch Injectate lidocaine 1% 2mL Patient positioned in a lateral recumbent position with painful hip exposed and prepped with chlorhexidine wipes. The greater trochanter was identified by palpating the femur from the mid-shaft proximally until the area of bony protrusion is reached.  The injection site was the point of maximal tenderness or swelling, closest to the superoposterior aspect of the Greater Trochanter. At the area most tender in the region of the greater trochanter, a 25-gauge, one and one-half-inch needle was inserted perpendicular to the skin. The needle was inserted directly down to bone and then withdrawn two to three millimeters before injecting.   PROCEDURE #2 01/04/2024  Trigger point injections  20[address] one or two muscle group   Pre/Post Procedure Diagnosis: Myofascial Pain   Procedure Performed by:  Dr. Renny Gilbert Consent: Verbal and written informed consent was obtained/reviewed with the patient.  Risks, benefits, alternatives discussed in detail. All questions were answered.   Site was then marked.   Position: lateral recumbent, right   Anesthesia: none   The skin was cleaned with alcohol   Time out was then performed as per protocol.   Needle used: 25 G 1.5 inch   Injectate: 1.0% lidocaine without epinephrine, 6 mL    The following trigger points were then identified:  right TFL x1 right gluteus medius x1 right gluteus adria x1   After negative aspiration each of these trigger points were then injected. Total volume of the injectate administered was 4 ml into each of these trigger points.   EBL: less than 1 ml   Complications: None   Specimen: None   Fluids: None   Post Procedure: NRS: 1/10   Patient was observed in the room. Patient was stable upon discharge. Detailed post procedure instructions were provided.   Patient to call in the event of worsening pain, fever, weakness or numbness   Plan:  Further treatment will be based upon the response to the injection performed today and if found beneficial may be repeated as indicated.   The patient has local pain symptoms that have persisted for more than 3 months causing tenderness and/or weakness, restricting motion and/or causing referred pain when compressed; and a taut band is palpable in an accessible muscle with exquisite tenderness at one point along its length; and the patient has been refractory or intolerant of conservative therapies such as bed rest, active exercises, ultrasound, range of motion, heating or cooling modalities, massage, and pharmacotherapies (e.g. NSAIDS), muscle relaxants, non-narcotic analgesics, and anti-depressants for a period of at least 1 month; and the TPIs are being given as part of an overall management plan.  The patient reports a response to prior trigger point injections with improvement in pain and functional status with a greater than 50% pain relief for 6 weeks.  Repeat injection is being completed secondary to return of pain and deterioration in functional status. The injection is being completed to facilitate mobilization by providing pain relief and assist in application of non-invasive modalities.

## 2024-01-08 ENCOUNTER — INPATIENT (INPATIENT)
Facility: HOSPITAL | Age: 84
LOS: 15 days | Discharge: NURSING, REHAB OR LONG-TERM CARE | DRG: 872 | End: 2024-01-24
Attending: STUDENT IN AN ORGANIZED HEALTH CARE EDUCATION/TRAINING PROGRAM | Admitting: STUDENT IN AN ORGANIZED HEALTH CARE EDUCATION/TRAINING PROGRAM
Payer: MEDICARE

## 2024-01-08 VITALS
DIASTOLIC BLOOD PRESSURE: 77 MMHG | HEART RATE: 110 BPM | RESPIRATION RATE: 18 BRPM | OXYGEN SATURATION: 94 % | TEMPERATURE: 102 F | SYSTOLIC BLOOD PRESSURE: 125 MMHG | WEIGHT: 110.01 LBS

## 2024-01-08 DIAGNOSIS — Z96.649 PRESENCE OF UNSPECIFIED ARTIFICIAL HIP JOINT: Chronic | ICD-10-CM

## 2024-01-08 DIAGNOSIS — F32.A DEPRESSION, UNSPECIFIED: ICD-10-CM

## 2024-01-08 LAB
ALBUMIN SERPL ELPH-MCNC: 4.2 G/DL — SIGNIFICANT CHANGE UP (ref 3.3–5)
ALBUMIN SERPL ELPH-MCNC: 4.2 G/DL — SIGNIFICANT CHANGE UP (ref 3.3–5)
ALP SERPL-CCNC: 82 U/L — SIGNIFICANT CHANGE UP (ref 40–120)
ALP SERPL-CCNC: 82 U/L — SIGNIFICANT CHANGE UP (ref 40–120)
ALT FLD-CCNC: 37 U/L — SIGNIFICANT CHANGE UP (ref 10–45)
ALT FLD-CCNC: 37 U/L — SIGNIFICANT CHANGE UP (ref 10–45)
ANION GAP SERPL CALC-SCNC: 15 MMOL/L — SIGNIFICANT CHANGE UP (ref 5–17)
ANION GAP SERPL CALC-SCNC: 15 MMOL/L — SIGNIFICANT CHANGE UP (ref 5–17)
ANISOCYTOSIS BLD QL: SLIGHT — SIGNIFICANT CHANGE UP
ANISOCYTOSIS BLD QL: SLIGHT — SIGNIFICANT CHANGE UP
APTT BLD: 39 SEC — HIGH (ref 24.5–35.6)
APTT BLD: 39 SEC — HIGH (ref 24.5–35.6)
AST SERPL-CCNC: 42 U/L — HIGH (ref 10–40)
AST SERPL-CCNC: 42 U/L — HIGH (ref 10–40)
BASOPHILS # BLD AUTO: 0 K/UL — SIGNIFICANT CHANGE UP (ref 0–0.2)
BASOPHILS # BLD AUTO: 0 K/UL — SIGNIFICANT CHANGE UP (ref 0–0.2)
BASOPHILS NFR BLD AUTO: 0 % — SIGNIFICANT CHANGE UP (ref 0–2)
BASOPHILS NFR BLD AUTO: 0 % — SIGNIFICANT CHANGE UP (ref 0–2)
BILIRUB SERPL-MCNC: 1.2 MG/DL — SIGNIFICANT CHANGE UP (ref 0.2–1.2)
BILIRUB SERPL-MCNC: 1.2 MG/DL — SIGNIFICANT CHANGE UP (ref 0.2–1.2)
BUN SERPL-MCNC: 18 MG/DL — SIGNIFICANT CHANGE UP (ref 7–23)
BUN SERPL-MCNC: 18 MG/DL — SIGNIFICANT CHANGE UP (ref 7–23)
BURR CELLS BLD QL SMEAR: PRESENT — SIGNIFICANT CHANGE UP
BURR CELLS BLD QL SMEAR: PRESENT — SIGNIFICANT CHANGE UP
CALCIUM SERPL-MCNC: 10.4 MG/DL — SIGNIFICANT CHANGE UP (ref 8.4–10.5)
CALCIUM SERPL-MCNC: 10.4 MG/DL — SIGNIFICANT CHANGE UP (ref 8.4–10.5)
CHLORIDE SERPL-SCNC: 98 MMOL/L — SIGNIFICANT CHANGE UP (ref 96–108)
CHLORIDE SERPL-SCNC: 98 MMOL/L — SIGNIFICANT CHANGE UP (ref 96–108)
CO2 SERPL-SCNC: 22 MMOL/L — SIGNIFICANT CHANGE UP (ref 22–31)
CO2 SERPL-SCNC: 22 MMOL/L — SIGNIFICANT CHANGE UP (ref 22–31)
CREAT SERPL-MCNC: 0.53 MG/DL — SIGNIFICANT CHANGE UP (ref 0.5–1.3)
CREAT SERPL-MCNC: 0.53 MG/DL — SIGNIFICANT CHANGE UP (ref 0.5–1.3)
EGFR: 93 ML/MIN/1.73M2 — SIGNIFICANT CHANGE UP
EGFR: 93 ML/MIN/1.73M2 — SIGNIFICANT CHANGE UP
EOSINOPHIL # BLD AUTO: 0 K/UL — SIGNIFICANT CHANGE UP (ref 0–0.5)
EOSINOPHIL # BLD AUTO: 0 K/UL — SIGNIFICANT CHANGE UP (ref 0–0.5)
EOSINOPHIL NFR BLD AUTO: 0 % — SIGNIFICANT CHANGE UP (ref 0–6)
EOSINOPHIL NFR BLD AUTO: 0 % — SIGNIFICANT CHANGE UP (ref 0–6)
GLUCOSE BLDC GLUCOMTR-MCNC: 106 MG/DL — HIGH (ref 70–99)
GLUCOSE BLDC GLUCOMTR-MCNC: 106 MG/DL — HIGH (ref 70–99)
GLUCOSE SERPL-MCNC: 132 MG/DL — HIGH (ref 70–99)
GLUCOSE SERPL-MCNC: 132 MG/DL — HIGH (ref 70–99)
HCT VFR BLD CALC: 38.8 % — SIGNIFICANT CHANGE UP (ref 34.5–45)
HCT VFR BLD CALC: 38.8 % — SIGNIFICANT CHANGE UP (ref 34.5–45)
HGB BLD-MCNC: 13.2 G/DL — SIGNIFICANT CHANGE UP (ref 11.5–15.5)
HGB BLD-MCNC: 13.2 G/DL — SIGNIFICANT CHANGE UP (ref 11.5–15.5)
INR BLD: 1.51 — HIGH (ref 0.85–1.18)
INR BLD: 1.51 — HIGH (ref 0.85–1.18)
LACTATE SERPL-SCNC: 1.6 MMOL/L — SIGNIFICANT CHANGE UP (ref 0.5–2)
LACTATE SERPL-SCNC: 1.6 MMOL/L — SIGNIFICANT CHANGE UP (ref 0.5–2)
LACTATE SERPL-SCNC: 3.2 MMOL/L — HIGH (ref 0.5–2)
LACTATE SERPL-SCNC: 3.2 MMOL/L — HIGH (ref 0.5–2)
LIDOCAIN IGE QN: 21 U/L — SIGNIFICANT CHANGE UP (ref 7–60)
LIDOCAIN IGE QN: 21 U/L — SIGNIFICANT CHANGE UP (ref 7–60)
LYMPHOCYTES # BLD AUTO: 0.98 K/UL — LOW (ref 1–3.3)
LYMPHOCYTES # BLD AUTO: 0.98 K/UL — LOW (ref 1–3.3)
LYMPHOCYTES # BLD AUTO: 9.5 % — LOW (ref 13–44)
LYMPHOCYTES # BLD AUTO: 9.5 % — LOW (ref 13–44)
MACROCYTES BLD QL: SLIGHT — SIGNIFICANT CHANGE UP
MACROCYTES BLD QL: SLIGHT — SIGNIFICANT CHANGE UP
MANUAL SMEAR VERIFICATION: SIGNIFICANT CHANGE UP
MANUAL SMEAR VERIFICATION: SIGNIFICANT CHANGE UP
MCHC RBC-ENTMCNC: 31.8 PG — SIGNIFICANT CHANGE UP (ref 27–34)
MCHC RBC-ENTMCNC: 31.8 PG — SIGNIFICANT CHANGE UP (ref 27–34)
MCHC RBC-ENTMCNC: 34 GM/DL — SIGNIFICANT CHANGE UP (ref 32–36)
MCHC RBC-ENTMCNC: 34 GM/DL — SIGNIFICANT CHANGE UP (ref 32–36)
MCV RBC AUTO: 93.5 FL — SIGNIFICANT CHANGE UP (ref 80–100)
MCV RBC AUTO: 93.5 FL — SIGNIFICANT CHANGE UP (ref 80–100)
MICROCYTES BLD QL: SLIGHT — SIGNIFICANT CHANGE UP
MICROCYTES BLD QL: SLIGHT — SIGNIFICANT CHANGE UP
MONOCYTES # BLD AUTO: 0 K/UL — SIGNIFICANT CHANGE UP (ref 0–0.9)
MONOCYTES # BLD AUTO: 0 K/UL — SIGNIFICANT CHANGE UP (ref 0–0.9)
MONOCYTES NFR BLD AUTO: 0 % — LOW (ref 2–14)
MONOCYTES NFR BLD AUTO: 0 % — LOW (ref 2–14)
NEUTROPHILS # BLD AUTO: 9.38 K/UL — HIGH (ref 1.8–7.4)
NEUTROPHILS # BLD AUTO: 9.38 K/UL — HIGH (ref 1.8–7.4)
NEUTROPHILS NFR BLD AUTO: 86.2 % — HIGH (ref 43–77)
NEUTROPHILS NFR BLD AUTO: 86.2 % — HIGH (ref 43–77)
NEUTS BAND # BLD: 4.3 % — SIGNIFICANT CHANGE UP (ref 0–8)
NEUTS BAND # BLD: 4.3 % — SIGNIFICANT CHANGE UP (ref 0–8)
OVALOCYTES BLD QL SMEAR: SLIGHT — SIGNIFICANT CHANGE UP
OVALOCYTES BLD QL SMEAR: SLIGHT — SIGNIFICANT CHANGE UP
PLAT MORPH BLD: NORMAL — SIGNIFICANT CHANGE UP
PLAT MORPH BLD: NORMAL — SIGNIFICANT CHANGE UP
PLATELET # BLD AUTO: 292 K/UL — SIGNIFICANT CHANGE UP (ref 150–400)
PLATELET # BLD AUTO: 292 K/UL — SIGNIFICANT CHANGE UP (ref 150–400)
POIKILOCYTOSIS BLD QL AUTO: SLIGHT — SIGNIFICANT CHANGE UP
POIKILOCYTOSIS BLD QL AUTO: SLIGHT — SIGNIFICANT CHANGE UP
POTASSIUM SERPL-MCNC: 3.6 MMOL/L — SIGNIFICANT CHANGE UP (ref 3.5–5.3)
POTASSIUM SERPL-MCNC: 3.6 MMOL/L — SIGNIFICANT CHANGE UP (ref 3.5–5.3)
POTASSIUM SERPL-SCNC: 3.6 MMOL/L — SIGNIFICANT CHANGE UP (ref 3.5–5.3)
POTASSIUM SERPL-SCNC: 3.6 MMOL/L — SIGNIFICANT CHANGE UP (ref 3.5–5.3)
PROT SERPL-MCNC: 6.6 G/DL — SIGNIFICANT CHANGE UP (ref 6–8.3)
PROT SERPL-MCNC: 6.6 G/DL — SIGNIFICANT CHANGE UP (ref 6–8.3)
PROTHROM AB SERPL-ACNC: 17 SEC — HIGH (ref 9.5–13)
PROTHROM AB SERPL-ACNC: 17 SEC — HIGH (ref 9.5–13)
RAPID RVP RESULT: SIGNIFICANT CHANGE UP
RAPID RVP RESULT: SIGNIFICANT CHANGE UP
RBC # BLD: 4.15 M/UL — SIGNIFICANT CHANGE UP (ref 3.8–5.2)
RBC # BLD: 4.15 M/UL — SIGNIFICANT CHANGE UP (ref 3.8–5.2)
RBC # FLD: 12.8 % — SIGNIFICANT CHANGE UP (ref 10.3–14.5)
RBC # FLD: 12.8 % — SIGNIFICANT CHANGE UP (ref 10.3–14.5)
RBC BLD AUTO: ABNORMAL
RBC BLD AUTO: ABNORMAL
SARS-COV-2 RNA SPEC QL NAA+PROBE: SIGNIFICANT CHANGE UP
SARS-COV-2 RNA SPEC QL NAA+PROBE: SIGNIFICANT CHANGE UP
SODIUM SERPL-SCNC: 135 MMOL/L — SIGNIFICANT CHANGE UP (ref 135–145)
SODIUM SERPL-SCNC: 135 MMOL/L — SIGNIFICANT CHANGE UP (ref 135–145)
SPHEROCYTES BLD QL SMEAR: SLIGHT — SIGNIFICANT CHANGE UP
SPHEROCYTES BLD QL SMEAR: SLIGHT — SIGNIFICANT CHANGE UP
WBC # BLD: 10.36 K/UL — SIGNIFICANT CHANGE UP (ref 3.8–10.5)
WBC # BLD: 10.36 K/UL — SIGNIFICANT CHANGE UP (ref 3.8–10.5)
WBC # FLD AUTO: 10.36 K/UL — SIGNIFICANT CHANGE UP (ref 3.8–10.5)
WBC # FLD AUTO: 10.36 K/UL — SIGNIFICANT CHANGE UP (ref 3.8–10.5)

## 2024-01-08 PROCEDURE — 99222 1ST HOSP IP/OBS MODERATE 55: CPT | Mod: GC

## 2024-01-08 PROCEDURE — 71045 X-RAY EXAM CHEST 1 VIEW: CPT | Mod: 26

## 2024-01-08 PROCEDURE — 99285 EMERGENCY DEPT VISIT HI MDM: CPT

## 2024-01-08 PROCEDURE — 99223 1ST HOSP IP/OBS HIGH 75: CPT

## 2024-01-08 PROCEDURE — 74177 CT ABD & PELVIS W/CONTRAST: CPT | Mod: 26,MA

## 2024-01-08 RX ORDER — PIPERACILLIN AND TAZOBACTAM 4; .5 G/20ML; G/20ML
3.38 INJECTION, POWDER, LYOPHILIZED, FOR SOLUTION INTRAVENOUS EVERY 8 HOURS
Refills: 0 | Status: DISCONTINUED | OUTPATIENT
Start: 2024-01-08 | End: 2024-01-09

## 2024-01-08 RX ORDER — FLUCONAZOLE 150 MG/1
400 TABLET ORAL EVERY 24 HOURS
Refills: 0 | Status: DISCONTINUED | OUTPATIENT
Start: 2024-01-08 | End: 2024-01-13

## 2024-01-08 RX ORDER — GLUCAGON INJECTION, SOLUTION 0.5 MG/.1ML
1 INJECTION, SOLUTION SUBCUTANEOUS ONCE
Refills: 0 | Status: DISCONTINUED | OUTPATIENT
Start: 2024-01-08 | End: 2024-01-10

## 2024-01-08 RX ORDER — ACETAMINOPHEN 500 MG
1000 TABLET ORAL EVERY 6 HOURS
Refills: 0 | Status: COMPLETED | OUTPATIENT
Start: 2024-01-08 | End: 2024-01-09

## 2024-01-08 RX ORDER — INFLUENZA VIRUS VACCINE 15; 15; 15; 15 UG/.5ML; UG/.5ML; UG/.5ML; UG/.5ML
0.7 SUSPENSION INTRAMUSCULAR ONCE
Refills: 0 | Status: DISCONTINUED | OUTPATIENT
Start: 2024-01-08 | End: 2024-01-11

## 2024-01-08 RX ORDER — SODIUM CHLORIDE 9 MG/ML
1000 INJECTION, SOLUTION INTRAVENOUS
Refills: 0 | Status: DISCONTINUED | OUTPATIENT
Start: 2024-01-08 | End: 2024-01-10

## 2024-01-08 RX ORDER — DEXTROSE 50 % IN WATER 50 %
25 SYRINGE (ML) INTRAVENOUS ONCE
Refills: 0 | Status: DISCONTINUED | OUTPATIENT
Start: 2024-01-08 | End: 2024-01-10

## 2024-01-08 RX ORDER — HEPARIN SODIUM 5000 [USP'U]/ML
900 INJECTION INTRAVENOUS; SUBCUTANEOUS
Qty: 25000 | Refills: 0 | Status: DISCONTINUED | OUTPATIENT
Start: 2024-01-08 | End: 2024-01-12

## 2024-01-08 RX ORDER — INSULIN LISPRO 100/ML
VIAL (ML) SUBCUTANEOUS EVERY 6 HOURS
Refills: 0 | Status: DISCONTINUED | OUTPATIENT
Start: 2024-01-08 | End: 2024-01-10

## 2024-01-08 RX ORDER — PANTOPRAZOLE SODIUM 20 MG/1
40 TABLET, DELAYED RELEASE ORAL EVERY 12 HOURS
Refills: 0 | Status: DISCONTINUED | OUTPATIENT
Start: 2024-01-08 | End: 2024-01-14

## 2024-01-08 RX ORDER — ACETAMINOPHEN 500 MG
750 TABLET ORAL ONCE
Refills: 0 | Status: COMPLETED | OUTPATIENT
Start: 2024-01-08 | End: 2024-01-08

## 2024-01-08 RX ORDER — CHLORHEXIDINE GLUCONATE 213 G/1000ML
1 SOLUTION TOPICAL
Refills: 0 | Status: DISCONTINUED | OUTPATIENT
Start: 2024-01-08 | End: 2024-01-11

## 2024-01-08 RX ORDER — PIPERACILLIN AND TAZOBACTAM 4; .5 G/20ML; G/20ML
3.38 INJECTION, POWDER, LYOPHILIZED, FOR SOLUTION INTRAVENOUS ONCE
Refills: 0 | Status: COMPLETED | OUTPATIENT
Start: 2024-01-08 | End: 2024-01-08

## 2024-01-08 RX ORDER — ATORVASTATIN CALCIUM 80 MG/1
1 TABLET, FILM COATED ORAL
Qty: 0 | Refills: 0 | DISCHARGE

## 2024-01-08 RX ORDER — SODIUM CHLORIDE 9 MG/ML
1000 INJECTION, SOLUTION INTRAVENOUS
Refills: 0 | Status: DISCONTINUED | OUTPATIENT
Start: 2024-01-08 | End: 2024-01-09

## 2024-01-08 RX ORDER — DEXTROSE 50 % IN WATER 50 %
15 SYRINGE (ML) INTRAVENOUS ONCE
Refills: 0 | Status: DISCONTINUED | OUTPATIENT
Start: 2024-01-08 | End: 2024-01-10

## 2024-01-08 RX ORDER — LEVOTHYROXINE SODIUM 125 MCG
60 TABLET ORAL AT BEDTIME
Refills: 0 | Status: DISCONTINUED | OUTPATIENT
Start: 2024-01-08 | End: 2024-01-14

## 2024-01-08 RX ORDER — SODIUM CHLORIDE 9 MG/ML
1000 INJECTION INTRAMUSCULAR; INTRAVENOUS; SUBCUTANEOUS ONCE
Refills: 0 | Status: COMPLETED | OUTPATIENT
Start: 2024-01-08 | End: 2024-01-08

## 2024-01-08 RX ADMIN — SODIUM CHLORIDE 90 MILLILITER(S): 9 INJECTION, SOLUTION INTRAVENOUS at 20:43

## 2024-01-08 RX ADMIN — HEPARIN SODIUM 9 UNIT(S)/HR: 5000 INJECTION INTRAVENOUS; SUBCUTANEOUS at 21:11

## 2024-01-08 RX ADMIN — SODIUM CHLORIDE 1000 MILLILITER(S): 9 INJECTION INTRAMUSCULAR; INTRAVENOUS; SUBCUTANEOUS at 13:49

## 2024-01-08 RX ADMIN — PIPERACILLIN AND TAZOBACTAM 200 GRAM(S): 4; .5 INJECTION, POWDER, LYOPHILIZED, FOR SOLUTION INTRAVENOUS at 13:49

## 2024-01-08 RX ADMIN — Medication 1000 MILLIGRAM(S): at 23:00

## 2024-01-08 RX ADMIN — CHLORHEXIDINE GLUCONATE 1 APPLICATION(S): 213 SOLUTION TOPICAL at 22:14

## 2024-01-08 RX ADMIN — PIPERACILLIN AND TAZOBACTAM 25 GRAM(S): 4; .5 INJECTION, POWDER, LYOPHILIZED, FOR SOLUTION INTRAVENOUS at 21:11

## 2024-01-08 RX ADMIN — FLUCONAZOLE 100 MILLIGRAM(S): 150 TABLET ORAL at 20:43

## 2024-01-08 RX ADMIN — Medication 60 MICROGRAM(S): at 22:41

## 2024-01-08 RX ADMIN — Medication 400 MILLIGRAM(S): at 22:12

## 2024-01-08 RX ADMIN — PANTOPRAZOLE SODIUM 40 MILLIGRAM(S): 20 TABLET, DELAYED RELEASE ORAL at 22:13

## 2024-01-08 RX ADMIN — Medication 300 MILLIGRAM(S): at 14:57

## 2024-01-08 NOTE — H&P ADULT - TIME BILLING
evaluation and management of perforated duodenal diverticulum, review of labs and imaging, discussion with patient and family, documentation.

## 2024-01-08 NOTE — ED PROVIDER NOTE - PHYSICAL EXAMINATION
Gen - NAD; well-appearing, answers questions appropriately; A+Ox2   HEENT - NCAT, EOMI, moist mucous membranes, clear oropharynx  Neck - supple  Resp - CTAB, no increased WOB  CV -  RRR, no m/r/g  Abd - soft, nondistended, R>L diffuse tenderness, no guarding or rebound  Back - no midline, paraspinous, or CVA tenderness  MSK - FROM of b/l UE and LE, no gross deformities  Extrem - no LE edema/erythema/tenderness  Neuro - no focal motor or sensation deficits  Skin - warm, well perfused

## 2024-01-08 NOTE — ED ADULT NURSE NOTE - NSFALLHARMRISKINTERV_ED_ALL_ED
Assistance OOB with selected safe patient handling equipment if applicable/Assistance with ambulation/Communicate risk of Fall with Harm to all staff, patient, and family/Monitor gait and stability/Provide patient with walking aids/Provide visual cue: red socks, yellow wristband, yellow gown, etc/Reinforce activity limits and safety measures with patient and family/Bed in lowest position, wheels locked, appropriate side rails in place/Call bell, personal items and telephone in reach/Instruct patient to call for assistance before getting out of bed/chair/stretcher/Non-slip footwear applied when patient is off stretcher/[place] to call system/Physically safe environment - no spills, clutter or unnecessary equipment/Purposeful Proactive Rounding/Room/bathroom lighting operational, light cord in reach Assistance OOB with selected safe patient handling equipment if applicable/Assistance with ambulation/Communicate risk of Fall with Harm to all staff, patient, and family/Monitor gait and stability/Provide patient with walking aids/Provide visual cue: red socks, yellow wristband, yellow gown, etc/Reinforce activity limits and safety measures with patient and family/Bed in lowest position, wheels locked, appropriate side rails in place/Call bell, personal items and telephone in reach/Instruct patient to call for assistance before getting out of bed/chair/stretcher/Non-slip footwear applied when patient is off stretcher/[place] to call system/Physically safe environment - no spills, clutter or unnecessary equipment/Purposeful Proactive Rounding/Room/bathroom lighting operational, light cord in reach

## 2024-01-08 NOTE — CONSULT NOTE ADULT - SUBJECTIVE AND OBJECTIVE BOX
SICU Consult Note    HPI:  81yo Female pt with PMH depression, hypothyroidism, HLD, Afib on Eliquis (last dose today) and PSH of L hip surgery, cholecystectomy and VHR presents with 1-day history of sudden severe midabdominal/epigastric pain. Patient states pain started very suddenly this morning, described as cramping and sharp. Unable to bambi PO. Denies n/v, fevers/chills, last BM yesterday, passing flatus.    Last colonoscopy - 7 years ago normal  EGD - 30 years ago normal    ED: febrile to 101.7, tachycardic to 110 improved to 90 with 1L bolus. On exam, soft, mildly distended, moderate to severe midabdominal and epigastric ttp with voluntary guarding and no rebound; NGT placed with immediate return of 75cc bloody output. Labs show WBC 10.36, H/H 13.2/38.8, BUN/Cr 18/0.53, lactate 3.2. CT AP with PO and IV contrast demonstrates perforated diverticulitis of distal duodenum.        PMH: depression, hypothyroidism, Afib on Eliquis (last dose today)   PSH: L hip surgery, cholecystectomy and VHR (Dr. Torres, 10/2022)   Social Hx: no ETOH, no smoking, no recreational drug use  Family Hx: Denies family hx of IBS, Crohn's, UC, or colon cancer.    Meds: eliquis 5BID, atorvastatin 80, metop succ 25, Levothyroxine 75   (08 Jan 2024 19:35)      Intensivist:      SICU Addendum:        PAST MEDICAL & SURGICAL HISTORY:  Depression      Thyroid disorder      Afib      HLD (hyperlipidemia)      History of hip replacement  R, for degenerative pain          MEDICATIONS  (STANDING):  acetaminophen   IVPB .. 1000 milliGRAM(s) IV Intermittent every 6 hours  chlorhexidine 2% Cloths 1 Application(s) Topical <User Schedule>  dextrose 5%. 1000 milliLiter(s) (50 mL/Hr) IV Continuous <Continuous>  dextrose 50% Injectable 25 Gram(s) IV Push once  dextrose 50% Injectable 25 Gram(s) IV Push once  fluconAZOLE IVPB 400 milliGRAM(s) IV Intermittent every 24 hours  glucagon  Injectable 1 milliGRAM(s) IntraMuscular once  heparin  Infusion 900 Unit(s)/Hr (9 mL/Hr) IV Continuous <Continuous>  influenza  Vaccine (HIGH DOSE) 0.7 milliLiter(s) IntraMuscular once  insulin lispro (ADMELOG) corrective regimen sliding scale   SubCutaneous three times a day before meals  lactated ringers. 1000 milliLiter(s) (90 mL/Hr) IV Continuous <Continuous>  levothyroxine Injectable 60 MICROGram(s) IV Push at bedtime  pantoprazole  Injectable 40 milliGRAM(s) IV Push every 12 hours  piperacillin/tazobactam IVPB.. 3.375 Gram(s) IV Intermittent every 8 hours    MEDICATIONS  (PRN):  dextrose Oral Gel 15 Gram(s) Oral once PRN Blood Glucose LESS THAN 70 milliGRAM(s)/deciliter      Allergies    No Known Allergies    Intolerances        SOCIAL HISTORY:    FAMILY HISTORY:  FH: prostate cancer        Vital Signs Last 24 Hrs  T(C): 36.7 (08 Jan 2024 21:31), Max: 38.7 (08 Jan 2024 13:04)  T(F): 98.1 (08 Jan 2024 21:31), Max: 101.7 (08 Jan 2024 13:04)  HR: 81 (08 Jan 2024 21:00) (79 - 110)  BP: 150/76 (08 Jan 2024 21:00) (113/80 - 150/76)  BP(mean): 102 (08 Jan 2024 21:00) (92 - 102)  RR: 15 (08 Jan 2024 21:00) (15 - 18)  SpO2: 97% (08 Jan 2024 21:00) (94% - 98%)    Parameters below as of 08 Jan 2024 21:00  Patient On (Oxygen Delivery Method): room air        I&O's Summary    08 Jan 2024 07:01  -  08 Jan 2024 22:28  --------------------------------------------------------  IN: 430 mL / OUT: 0 mL / NET: 430 mL        Physical Exam:  General: NAD, resting comfortably  HEENT: NC/AT, EOMI, normal hearing, no oral lesions, no LAD, neck supple  Pulmonary: normal resp effort, CTA-B  Cardiovascular: NSR, no murmurs  Abdominal: soft, ND/NT, no organomegaly  Extremities: WWP, normal strength, no clubbing/cyanosis/edema  Neuro: A/O x 3, CNs II-XII grossly intact, normal sensation, no focal deficits  Pulses: palpable distal pulses    Lines/drains/tubes:    LABS:                        13.2   10.36 )-----------( 292      ( 08 Jan 2024 13:08 )             38.8     01-08    135  |  98  |  18  ----------------------------<  132<H>  3.6   |  22  |  0.53    Ca    10.4      08 Jan 2024 13:08    TPro  6.6  /  Alb  4.2  /  TBili  1.2  /  DBili  x   /  AST  42<H>  /  ALT  37  /  AlkPhos  82  01-08    PT/INR - ( 08 Jan 2024 13:08 )   PT: 17.0 sec;   INR: 1.51          PTT - ( 08 Jan 2024 13:08 )  PTT:39.0 sec  Urinalysis Basic - ( 08 Jan 2024 13:08 )    Color: x / Appearance: x / SG: x / pH: x  Gluc: 132 mg/dL / Ketone: x  / Bili: x / Urobili: x   Blood: x / Protein: x / Nitrite: x   Leuk Esterase: x / RBC: x / WBC x   Sq Epi: x / Non Sq Epi: x / Bacteria: x      CAPILLARY BLOOD GLUCOSE      POCT Blood Glucose.: 106 mg/dL (08 Jan 2024 22:20)    LIVER FUNCTIONS - ( 08 Jan 2024 13:08 )  Alb: 4.2 g/dL / Pro: 6.6 g/dL / ALK PHOS: 82 U/L / ALT: 37 U/L / AST: 42 U/L / GGT: x             Cultures:      RADIOLOGY & ADDITIONAL STUDIES:         SICU Consult Note    HPI:  83yo Female pt with PMH depression, hypothyroidism, HLD, Afib on Eliquis (last dose today) and PSH of L hip surgery, cholecystectomy and VHR presents with 1-day history of sudden severe midabdominal/epigastric pain. Patient states pain started very suddenly this morning, described as cramping and sharp. Unable to bambi PO. Denies n/v, fevers/chills, last BM yesterday, passing flatus.    Last colonoscopy - 7 years ago normal  EGD - 30 years ago normal    ED: febrile to 101.7, tachycardic to 110 improved to 90 with 1L bolus. On exam, soft, mildly distended, moderate to severe midabdominal and epigastric ttp with voluntary guarding and no rebound; NGT placed with immediate return of 75cc bloody output. Labs show WBC 10.36, H/H 13.2/38.8, BUN/Cr 18/0.53, lactate 3.2. CT AP with PO and IV contrast demonstrates perforated diverticulitis of distal duodenum.        PMH: depression, hypothyroidism, Afib on Eliquis (last dose today)   PSH: L hip surgery, cholecystectomy and VHR (Dr. Torres, 10/2022)   Social Hx: no ETOH, no smoking, no recreational drug use  Family Hx: Denies family hx of IBS, Crohn's, UC, or colon cancer.    Meds: eliquis 5BID, atorvastatin 80, metop succ 25, Levothyroxine 75   (08 Jan 2024 19:35)      Intensivist:      SICU Addendum:        PAST MEDICAL & SURGICAL HISTORY:  Depression      Thyroid disorder      Afib      HLD (hyperlipidemia)      History of hip replacement  R, for degenerative pain          MEDICATIONS  (STANDING):  acetaminophen   IVPB .. 1000 milliGRAM(s) IV Intermittent every 6 hours  chlorhexidine 2% Cloths 1 Application(s) Topical <User Schedule>  dextrose 5%. 1000 milliLiter(s) (50 mL/Hr) IV Continuous <Continuous>  dextrose 50% Injectable 25 Gram(s) IV Push once  dextrose 50% Injectable 25 Gram(s) IV Push once  fluconAZOLE IVPB 400 milliGRAM(s) IV Intermittent every 24 hours  glucagon  Injectable 1 milliGRAM(s) IntraMuscular once  heparin  Infusion 900 Unit(s)/Hr (9 mL/Hr) IV Continuous <Continuous>  influenza  Vaccine (HIGH DOSE) 0.7 milliLiter(s) IntraMuscular once  insulin lispro (ADMELOG) corrective regimen sliding scale   SubCutaneous three times a day before meals  lactated ringers. 1000 milliLiter(s) (90 mL/Hr) IV Continuous <Continuous>  levothyroxine Injectable 60 MICROGram(s) IV Push at bedtime  pantoprazole  Injectable 40 milliGRAM(s) IV Push every 12 hours  piperacillin/tazobactam IVPB.. 3.375 Gram(s) IV Intermittent every 8 hours    MEDICATIONS  (PRN):  dextrose Oral Gel 15 Gram(s) Oral once PRN Blood Glucose LESS THAN 70 milliGRAM(s)/deciliter      Allergies    No Known Allergies    Intolerances        SOCIAL HISTORY:    FAMILY HISTORY:  FH: prostate cancer        Vital Signs Last 24 Hrs  T(C): 36.7 (08 Jan 2024 21:31), Max: 38.7 (08 Jan 2024 13:04)  T(F): 98.1 (08 Jan 2024 21:31), Max: 101.7 (08 Jan 2024 13:04)  HR: 81 (08 Jan 2024 21:00) (79 - 110)  BP: 150/76 (08 Jan 2024 21:00) (113/80 - 150/76)  BP(mean): 102 (08 Jan 2024 21:00) (92 - 102)  RR: 15 (08 Jan 2024 21:00) (15 - 18)  SpO2: 97% (08 Jan 2024 21:00) (94% - 98%)    Parameters below as of 08 Jan 2024 21:00  Patient On (Oxygen Delivery Method): room air        I&O's Summary    08 Jan 2024 07:01  -  08 Jan 2024 22:28  --------------------------------------------------------  IN: 430 mL / OUT: 0 mL / NET: 430 mL        Physical Exam:  General: NAD, resting comfortably  HEENT: NC/AT, EOMI, normal hearing, no oral lesions, no LAD, neck supple  Pulmonary: normal resp effort, CTA-B  Cardiovascular: NSR, no murmurs  Abdominal: soft, ND/NT, no organomegaly  Extremities: WWP, normal strength, no clubbing/cyanosis/edema  Neuro: A/O x 3, CNs II-XII grossly intact, normal sensation, no focal deficits  Pulses: palpable distal pulses    Lines/drains/tubes:    LABS:                        13.2   10.36 )-----------( 292      ( 08 Jan 2024 13:08 )             38.8     01-08    135  |  98  |  18  ----------------------------<  132<H>  3.6   |  22  |  0.53    Ca    10.4      08 Jan 2024 13:08    TPro  6.6  /  Alb  4.2  /  TBili  1.2  /  DBili  x   /  AST  42<H>  /  ALT  37  /  AlkPhos  82  01-08    PT/INR - ( 08 Jan 2024 13:08 )   PT: 17.0 sec;   INR: 1.51          PTT - ( 08 Jan 2024 13:08 )  PTT:39.0 sec  Urinalysis Basic - ( 08 Jan 2024 13:08 )    Color: x / Appearance: x / SG: x / pH: x  Gluc: 132 mg/dL / Ketone: x  / Bili: x / Urobili: x   Blood: x / Protein: x / Nitrite: x   Leuk Esterase: x / RBC: x / WBC x   Sq Epi: x / Non Sq Epi: x / Bacteria: x      CAPILLARY BLOOD GLUCOSE      POCT Blood Glucose.: 106 mg/dL (08 Jan 2024 22:20)    LIVER FUNCTIONS - ( 08 Jan 2024 13:08 )  Alb: 4.2 g/dL / Pro: 6.6 g/dL / ALK PHOS: 82 U/L / ALT: 37 U/L / AST: 42 U/L / GGT: x             Cultures:      RADIOLOGY & ADDITIONAL STUDIES:         SICU Consult Note    HPI:  81yo Female pt with PMH depression, hypothyroidism, HLD, Afib on Eliquis (last dose today) and PSH of L hip surgery, cholecystectomy and VHR presents with 1-day history of sudden severe midabdominal/epigastric pain. Patient states pain started very suddenly this morning, described as cramping and sharp. Unable to bambi PO. Denies n/v, fevers/chills, last BM yesterday, passing flatus.    Last colonoscopy - 7 years ago normal  EGD - 30 years ago normal    ED: febrile to 101.7, tachycardic to 110 improved to 90 with 1L bolus. On exam, soft, mildly distended, moderate to severe midabdominal and epigastric ttp with voluntary guarding and no rebound; NGT placed with immediate return of 75cc bloody output. Labs show WBC 10.36, H/H 13.2/38.8, BUN/Cr 18/0.53, lactate 3.2. CT AP with PO and IV contrast demonstrates perforated diverticulitis of distal duodenum.        PMH: depression, hypothyroidism, Afib on Eliquis (last dose today)   PSH: L hip surgery, cholecystectomy and VHR (Dr. Torres, 10/2022)   Social Hx: no ETOH, no smoking, no recreational drug use  Family Hx: Denies family hx of IBS, Crohn's, UC, or colon cancer.    Meds: eliquis 5BID, atorvastatin 80, metop succ 25, Levothyroxine 75   (08 Jan 2024 19:35)    Intensivist:  Dr. Brooks    SICU Addendum: History as above. Received from ED hemodynamically stable reporting 8/10 epigastric abdominal pain. NGT in place.         PAST MEDICAL & SURGICAL HISTORY:  Depression      Thyroid disorder      Afib      HLD (hyperlipidemia)      History of hip replacement  R, for degenerative pain          MEDICATIONS  (STANDING):  acetaminophen   IVPB .. 1000 milliGRAM(s) IV Intermittent every 6 hours  chlorhexidine 2% Cloths 1 Application(s) Topical <User Schedule>  dextrose 5%. 1000 milliLiter(s) (50 mL/Hr) IV Continuous <Continuous>  dextrose 50% Injectable 25 Gram(s) IV Push once  dextrose 50% Injectable 25 Gram(s) IV Push once  fluconAZOLE IVPB 400 milliGRAM(s) IV Intermittent every 24 hours  glucagon  Injectable 1 milliGRAM(s) IntraMuscular once  heparin  Infusion 900 Unit(s)/Hr (9 mL/Hr) IV Continuous <Continuous>  influenza  Vaccine (HIGH DOSE) 0.7 milliLiter(s) IntraMuscular once  insulin lispro (ADMELOG) corrective regimen sliding scale   SubCutaneous three times a day before meals  lactated ringers. 1000 milliLiter(s) (90 mL/Hr) IV Continuous <Continuous>  levothyroxine Injectable 60 MICROGram(s) IV Push at bedtime  pantoprazole  Injectable 40 milliGRAM(s) IV Push every 12 hours  piperacillin/tazobactam IVPB.. 3.375 Gram(s) IV Intermittent every 8 hours    MEDICATIONS  (PRN):  dextrose Oral Gel 15 Gram(s) Oral once PRN Blood Glucose LESS THAN 70 milliGRAM(s)/deciliter      Allergies    No Known Allergies    Intolerances        SOCIAL HISTORY:    FAMILY HISTORY:  FH: prostate cancer        Vital Signs Last 24 Hrs  T(C): 36.7 (08 Jan 2024 21:31), Max: 38.7 (08 Jan 2024 13:04)  T(F): 98.1 (08 Jan 2024 21:31), Max: 101.7 (08 Jan 2024 13:04)  HR: 81 (08 Jan 2024 21:00) (79 - 110)  BP: 150/76 (08 Jan 2024 21:00) (113/80 - 150/76)  BP(mean): 102 (08 Jan 2024 21:00) (92 - 102)  RR: 15 (08 Jan 2024 21:00) (15 - 18)  SpO2: 97% (08 Jan 2024 21:00) (94% - 98%)    Parameters below as of 08 Jan 2024 21:00  Patient On (Oxygen Delivery Method): room air        I&O's Summary    08 Jan 2024 07:01  -  08 Jan 2024 22:28  --------------------------------------------------------  IN: 430 mL / OUT: 0 mL / NET: 430 mL      General: NAD, resting comfortably in bed.  C/V: NSR.  Pulm: Nonlabored breathing, no respiratory distress on RA.  Abd: soft, mildly distended, moderate to severe midabdominal and epigastric ttp with voluntary guarding and no rebound; NGT in place draining bloody fluid.   Extrem: WWP, no edema, SCDs in place.  Neuro: motor/sensory grossly intact, moves all ext . to command and spontaneously.  Lines/drains/tubes: NGT    LABS:                        13.2   10.36 )-----------( 292      ( 08 Jan 2024 13:08 )             38.8     01-08    135  |  98  |  18  ----------------------------<  132<H>  3.6   |  22  |  0.53    Ca    10.4      08 Jan 2024 13:08    TPro  6.6  /  Alb  4.2  /  TBili  1.2  /  DBili  x   /  AST  42<H>  /  ALT  37  /  AlkPhos  82  01-08    PT/INR - ( 08 Jan 2024 13:08 )   PT: 17.0 sec;   INR: 1.51          PTT - ( 08 Jan 2024 13:08 )  PTT:39.0 sec  Urinalysis Basic - ( 08 Jan 2024 13:08 )    Color: x / Appearance: x / SG: x / pH: x  Gluc: 132 mg/dL / Ketone: x  / Bili: x / Urobili: x   Blood: x / Protein: x / Nitrite: x   Leuk Esterase: x / RBC: x / WBC x   Sq Epi: x / Non Sq Epi: x / Bacteria: x      CAPILLARY BLOOD GLUCOSE      POCT Blood Glucose.: 106 mg/dL (08 Jan 2024 22:20)    LIVER FUNCTIONS - ( 08 Jan 2024 13:08 )  Alb: 4.2 g/dL / Pro: 6.6 g/dL / ALK PHOS: 82 U/L / ALT: 37 U/L / AST: 42 U/L / GGT: x             Cultures:      RADIOLOGY & ADDITIONAL STUDIES:    81yo Female pt with PMH depression, hypothyroidism, HLD, Afib on Eliquis (last dose today) and PSH of L hip surgery, cholecystectomy and VHR presents with 1-day history of sudden severe midabdominal/epigastric pain. Febrile to 101.7, tachycardic to 110 improved to 90 with 1L bolus. On exam, soft, mildly distended, moderate to severe midabdominal and epigastric ttp with voluntary guarding and no rebound; NGT placed with immediate return of 75cc bloody output. Labs show WBC 10.36, H/H 13.2/38.8, BUN/Cr 18/0.53, lactate 3.2. CT AP with PO and IV contrast demonstrates perforated diverticulitis of distal duodenum.     N: Pain control w/ IV Tylenol  CV:   -Map >65.   -MIVF @ 90cc/hr.   -History of A fib. Holding beta blocker per surgery in the setting of monitoring for tachycardia of sepsis.   Pulm: On room air.   GI: Contained perforated ulcer/diverticula. NPO. NGT to LIWS.   : Voiding. Strict I/Os.   ID: Zosyn, fluc  Heme: Heparin gtt.   Endo: LDSSI. Continue synthroid.   PPx: Heparin gtt, scds  Lines/tubes/drains: NGT  Access: PIV  Dispo: SICU

## 2024-01-08 NOTE — ED PROVIDER NOTE - CLINICAL SUMMARY MEDICAL DECISION MAKING FREE TEXT BOX
83 year old female with history of depression, hypothyroidism, ASD closure (6/2021 [facility]), Afib on Eliquis, spinal stenosis with lumbar radiculopathy, s/p cholecystectomy 10/25, presenting with abdominal pain and fever x 1d. 83 year old female with history of depression, hypothyroidism, ASD closure (6/2021 [facility]), Afib on Eliquis, spinal stenosis with lumbar radiculopathy, s/p cholecystectomy 10/25, presenting with abdominal pain and fever x 1d. 83 year old female with history of depression, hypothyroidism, ASD closure (6/2021 [facility]), Afib on Eliquis, spinal stenosis with lumbar radiculopathy, s/p cholecystectomy 10/25, presenting with abdominal pain and fever x 1d. Nontoxic appearing on arrival to ED but tachycardic/febrile with mild component of AMS/delirium as per HHA at bedside--sepsis bundle initiated with empiric 1L IVF bolus and zosyn dose, will need CT imaging to r/o acute intraabdominal pathology e.g. infection vs abscess vs retained gallstone in setting of recent cholecystectomy causing possible cholangitis picture--anticipate admission with low threshold for elevated level of care 83 year old female with history of depression, hypothyroidism, ASD closure (6/2021 [facility]), Afib on Eliquis, spinal stenosis with lumbar radiculopathy, s/p cholecystectomy 10/25, presenting with abdominal pain and fever x 1d. Nontoxic appearing on arrival to ED but tachycardic/febrile with mild component of AMS/delirium as per HHA at bedside--sepsis bundle initiated with empiric 1L IVF bolus and zosyn dose, will need CT imaging to r/o acute intraabdominal pathology e.g. infection vs abscess vs retained gallstone in setting of recent cholecystectomy causing possible cholangitis picture--anticipate admission with low threshold for elevated level of care

## 2024-01-08 NOTE — H&P ADULT - NSHPLABSRESULTS_GEN_ALL_CORE
13.2   10.36 )-----------( 292      ( 08 Jan 2024 13:08 )             38.8   01-08    135  |  98  |  18  ----------------------------<  132<H>  3.6   |  22  |  0.53    Ca    10.4      08 Jan 2024 13:08    TPro  6.6  /  Alb  4.2  /  TBili  1.2  /  DBili  x   /  AST  42<H>  /  ALT  37  /  AlkPhos  82  01-08          INTERPRETATION:  CLINICAL INFORMATION: Abdominal pain, nausea and sepsis.    COMPARISON: 10/20/2022    CONTRAST/COMPLICATIONS:  IV Contrast: Isovue 370  90 cc administered   10 cc discarded  Oral Contrast: NONE  Complications: None reported at time of study completion    PROCEDURE:  CT of the Abdomen and Pelvis was performed.  Sagittal and coronal reformats were performed.    FINDINGS:  LOWER CHEST: Wireless cardiac device in the left lower chest wall. Atrial   septal closure device.    LIVER: Within normal limits.  BILE DUCTS: Mildly dilated intrahepatic bile ducts again noted. CBD   measuring 1 cm with distal tapering.  GALLBLADDER: Cholecystectomy.  SPLEEN: Within normal limits.  PANCREAS: Within normal limits.  ADRENALS: Within normal limits.  KIDNEYS/URETERS: Within normal limits.    BLADDER: Within normal limits.  REPRODUCTIVE ORGANS: Uterus and adnexa within normal limits.    BOWEL: 2.5 cm distal duodenal diverticulum near the junction with   jejunum. Infiltration of the adjacent mesenteric fat and a few tiny   pockets of extraluminal gas. No drainable fluid collection. No bowel   obstruction. Colonic diverticulosis. Appendix is not identified.  PERITONEUM: No ascites.  VESSELS: No abdominal aortic aneurysm.  RETROPERITONEUM/LYMPH NODES: No lymphadenopathy.  ABDOMINAL WALL: Within normal limits.  BONES: Right hip arthroplasty. Scoliosis and degenerative changes.    IMPRESSION:    Findings consistent with perforated diverticulitis of distal duodenum   near the junction of the jejunum. No drainable fluid collection at this   time.

## 2024-01-08 NOTE — H&P ADULT - ATTENDING COMMENTS
Patient is an 82 year old woman with history of depression, hypothyroidism, HLD, A fib on Eliquis, left hip surgery, cholecystectomy, ventral hernia repair who presents now with clinical, laboratory, and radiographic findings concerning for perforated duodenal diverticulum. She reports one day history of sudden onset epigastric abdominal pain. At time of evaluation, febrile, tachycardic, hemodynamically stable, abdomen soft, mild tenderness in epigastric region, mild distension, no rebound or guarding. Labs remarkable for lactic acidosis 3.2. CT imaging demonstrates perforated duodenal diverticulum, appears contained. Plan for admission, bowel rest, NG tube decompression, IV fluids, IV antibiotics, antifungal coverage, serial abdominal exams. Late entry, date of service 1/8/24.

## 2024-01-08 NOTE — PATIENT PROFILE ADULT - LEGAL HELP
Dear Nel,     Thank you for connecting with us today on the [facility] [facility] service of [facility]. If you have any questions after your visit, please feel free to contact us at [phone]. If you are experiencing a medical emergency, call 188 immediately.  If your symptoms worsen or do not improve, please contact your primary care provider to schedule an in-person visit. Symptoms that require immediate attention require a visit at Urgent Care ([place]), Immediate [facility] Center ([place]) or the Emergency Room of a nearby hospital.       Medication refills: If you were prescribed a medication today please be aware that the [employer] providers are not able to provide refills on these medications. If you require a refill, please contact your primary care provider. If you do not have a primary care provider please call [phone] and we can schedule a visit with an [facility] primary care provider.      Billing department:    Call with any billing questions or concerns.    Phone: [phone]. Hours: Mon. - Thu. 7:30 a.m. - 6 p.m. and Friday 7:30 p.m. - 5 p.m.   ___________________________________________________________________________________________________________________________    Symptoms Management:     Instructions for Adults:     Take Mucinex (guaifenesin) 600 mg twice daily, this is an expectorant which means it thins the mucus/phlegm so one can blow, cough or spit mucus/phlegm out better.    Take Flonase nasal spray 2 sprays in each nostril daily for 2 weeks for nasal congestion.     If needed you may take Sudafed as instructed on the package, if sinus pressure is present (do not take if you have history of high blood pressure/hypertension, are pregnant or breastfeeding).      If needed you may take Tylenol every 4 hours as needed for fever or aches (Do not exceed 4,000mg in 24 hours).    Drink Warm tea with honey.    Salt water gargles and throat lozenges for sore  throat.    Instructions for Children:       Give Tylenol or ibuprofen as needed for fever.    Do not give ibuprofen if your child is not drinking enough fluids or are throwing up a lot.   Use Nose Lana and nasal saline to help suction mucus.  Salt water gargles if able to follow instructions.  Throat lozenges may be given to children 4 and over for a sore throat. Follow package instructions.   Do not give over the counter cough and cold medications to children under 6 years of age. These medications can have serious side effects.    Use mentholated rubs over the chest and front of neck. This can help soothe a cough.    Honey can also help a cough (do not given to babies under 1 year of age):   1-5 years old give ½ teaspoon.    6-11 years old give 1 teaspoon.   and older give 2 teaspoons of honey.        Instructions for Everyone:     Drink lots of fluids.     Use a cool mist Humidifier.     Use a warm moist compress to sinuses 4-6 times daily to help facilitate sinus drainage.      Sterile saline rinses 3 times daily (nasal saline spray)    Cover coughs or sneezes.      Practice good hand hygiene.        If symptoms worsen an in-person visit is needed. If symptoms are no better after 10 days return to [facility] for a follow up visit.              __________________________________________________________________________________________________________________________________________  Quarantine Guidelines:     Contact your employee health or human resources department for further guidance on employer specific quarantine and return to work guidelines. If your school age child has symptoms of COVID or has had a COVID exposure, contact your child's school as they may have additional instructions and requirements to return to school.    You should also isolate if you are sick and suspect that you have COVID-19 but do not yet have test results.     COVID POSITIVE:   You can end isolation after 5 full  days if you are fever-free for 24 hours without the use of fever-reducing medication and your other symptoms have improved (Loss of taste and smell may persist for weeks or months after recovery and need not delay the end of isolation).     no

## 2024-01-08 NOTE — CONSULT NOTE ADULT - ATTENDING COMMENTS
Rut Larkin  [facility]  1/8/24     [id number]  Ms. Larkin is an 81 y/o female with chronic Afib on Eliquis and hypothyroidism, she had fever, tachycardia, elevated RBC and epigastric abdominal pain.  CT shows perforated duodenal diverticulosis.  In the ED T107F, /min, /60.  She was given 1L IVF and started on zosyn.  NGT was placed to suction.  Lactate 3.2 -> 1.6  -epigastric abdominal pain  -perforated duodenal diverticulum   -sepsis without shock  -chronic Afib  -hypothyroidism  >pain control with Tylenol IV standing  >for severe pain or pain not controlled with Tylenol -> Dilaudid 0.2mg IV Q6hr prn  >maintain SO2 >94%  >maintain the MAP above 65 mmHg  >maintenance Fluids  >NGT to suction  >add  PPI  >c/w zosyn  >hold Eliquis; c/w heparin gtt  This patient was evaluated with the resident and management plan made  time >55 mins. Rut Larkin  [facility]  1/8/24     [id number]  Ms. Larkin is an 83 y/o female with chronic Afib on Eliquis and hypothyroidism, she had fever, tachycardia, elevated RBC and epigastric abdominal pain.  CT shows perforated duodenal diverticulosis.  In the ED T107F, /min, /60.  She was given 1L IVF and started on zosyn.  NGT was placed to suction.  Lactate 3.2 -> 1.6  -epigastric abdominal pain  -perforated duodenal diverticulum   -sepsis without shock  -chronic Afib  -hypothyroidism  >pain control with Tylenol IV standing  >for severe pain or pain not controlled with Tylenol -> Dilaudid 0.2mg IV Q6hr prn  >maintain SO2 >94%  >maintain the MAP above 65 mmHg  >maintenance Fluids  >NGT to suction  >add  PPI  >c/w zosyn  >hold Eliquis; c/w heparin gtt  This patient was evaluated with the resident and management plan made  time >55 mins.

## 2024-01-08 NOTE — H&P ADULT - ASSESSMENT
83yo Female pt with PMH depression, hypothyroidism, HLD, Afib on Eliquis (last dose today) and PSH of L hip surgery, cholecystectomy and VHR presents with 1-day history of sudden severe midabdominal/epigastric pain. Febrile to 101.7, tachycardic to 110 improved to 90 with 1L bolus. On exam, soft, mildly distended, moderate to severe midabdominal and epigastric ttp with voluntary guarding and no rebound; NGT placed with immediate return of 75cc bloody output. Labs show WBC 10.36, H/H 13.2/38.8, BUN/Cr 18/0.53, lactate 3.2. CT AP with PO and IV contrast demonstrates perforated diverticulitis of distal duodenum.     Admit to general surgery, team 4, Dr. Mccarthy, SICU  NPO/IVF  NGT to LIWS - CXR done in ED and confirms placement  Fluconazole/Zosyn  Hep gtt  Synthroid  DAVID  AM labs     81yo Female pt with PMH depression, hypothyroidism, HLD, Afib on Eliquis (last dose today) and PSH of L hip surgery, cholecystectomy and VHR presents with 1-day history of sudden severe midabdominal/epigastric pain. Febrile to 101.7, tachycardic to 110 improved to 90 with 1L bolus. On exam, soft, mildly distended, moderate to severe midabdominal and epigastric ttp with voluntary guarding and no rebound; NGT placed with immediate return of 75cc bloody output. Labs show WBC 10.36, H/H 13.2/38.8, BUN/Cr 18/0.53, lactate 3.2. CT AP with PO and IV contrast demonstrates perforated diverticulitis of distal duodenum.     Admit to general surgery, team 4, Dr. Mccarthy, SICU  NPO/IVF  NGT to LIWS - CXR done in ED and confirms placement  Fluconazole/Zosyn  Hep gtt  Synthroid  DAVID  AM labs

## 2024-01-08 NOTE — H&P ADULT - HISTORY OF PRESENT ILLNESS
81yo Female pt with PMH depression, hypothyroidism, HLD, Afib on Eliquis (last dose today) and PSH of L hip surgery, cholecystectomy and VHR presents with 1-day history of sudden severe midabdominal/epigastric pain. Patient states pain started very suddenly this morning, described as cramping and sharp. Unable to bambi PO. Denies n/v, fevers/chills, last BM yesterday, passing flatus.    Last colonoscopy - 7 years ago normal  EGD - 30 years ago normal    ED: febrile to 101.7, tachycardic to 110 improved to 90 with 1L bolus. On exam, soft, mildly distended, moderate to severe midabdominal and epigastric ttp with voluntary guarding and no rebound; NGT placed with immediate return of 75cc bloody output. Labs show WBC 10.36, H/H 13.2/38.8, BUN/Cr 18/0.53, lactate 3.2. CT AP with PO and IV contrast demonstrates perforated diverticulitis of distal duodenum.        PMH: depression, hypothyroidism, Afib on Eliquis (last dose today)   PSH: L hip surgery, cholecystectomy and VHR (Dr. Torres, 10/2022)   Social Hx: no ETOH, no smoking, no recreational drug use  Family Hx: Denies family hx of IBS, Crohn's, UC, or colon cancer.    Meds: eliquis 5BID, atorvastatin 80, metop succ 25, Levothyroxine 75   83yo Female pt with PMH depression, hypothyroidism, HLD, Afib on Eliquis (last dose today) and PSH of L hip surgery, cholecystectomy and VHR presents with 1-day history of sudden severe midabdominal/epigastric pain. Patient states pain started very suddenly this morning, described as cramping and sharp. Unable to bambi PO. Denies n/v, fevers/chills, last BM yesterday, passing flatus.    Last colonoscopy - 7 years ago normal  EGD - 30 years ago normal    ED: febrile to 101.7, tachycardic to 110 improved to 90 with 1L bolus. On exam, soft, mildly distended, moderate to severe midabdominal and epigastric ttp with voluntary guarding and no rebound; NGT placed with immediate return of 75cc bloody output. Labs show WBC 10.36, H/H 13.2/38.8, BUN/Cr 18/0.53, lactate 3.2. CT AP with PO and IV contrast demonstrates perforated diverticulitis of distal duodenum.        PMH: depression, hypothyroidism, Afib on Eliquis (last dose today)   PSH: L hip surgery, cholecystectomy and VHR (Dr. Torres, 10/2022)   Social Hx: no ETOH, no smoking, no recreational drug use  Family Hx: Denies family hx of IBS, Crohn's, UC, or colon cancer.    Meds: eliquis 5BID, atorvastatin 80, metop succ 25, Levothyroxine 75

## 2024-01-08 NOTE — ED PROVIDER NOTE - OBJECTIVE STATEMENT
83 year old female with history of depression, hypothyroidism, ASD closure (6/2021 [facility]), Afib on Eliquis, spinal stenosis with lumbar radiculopathy, s/p cholecystectomy 10/25, presenting with abdominal pain and fever x 1d. States having onset this AM, diffuse abdominal pain associated with nausea, denies vomiting, diarrhea, chest pain, sob, cough. Noted to be febrile on arrival here - 101.7 temp. 83 year old female with history of depression, hypothyroidism, ASD closure (6/2021 [facility]), Afib on Eliquis, spinal stenosis with lumbar radiculopathy, s/p cholecystectomy 10/25, presenting with abdominal pain and fever x 1d. States having onset this AM, diffuse abdominal pain associated with nausea, denies vomiting, diarrhea, chest pain, sob, cough. Noted to be febrile on arrival here - 101.7 temp.

## 2024-01-08 NOTE — CHART NOTE - NSCHARTNOTEFT_GEN_A_CORE
Patient examined upon admission to SICU. Noted to have tenderness in epigastric area w/ some voluntary guarding but abdomen soft and non-distended. NGT w/ light fruit punch output. PPI started. IV tylenol ordered for discomfort. Continue serial exams.

## 2024-01-08 NOTE — H&P ADULT - NSHPPHYSICALEXAM_GEN_ALL_CORE
Vital Signs Last 24 Hrs  T(C): 37.9 (08 Jan 2024 18:56), Max: 38.7 (08 Jan 2024 13:04)  T(F): 100.2 (08 Jan 2024 18:56), Max: 101.7 (08 Jan 2024 13:04)  HR: 85 (08 Jan 2024 18:56) (79 - 110)  BP: 142/60 (08 Jan 2024 18:56) (113/80 - 142/60)  BP(mean): --  RR: 16 (08 Jan 2024 18:56) (16 - 18)  SpO2: 95% (08 Jan 2024 18:56) (94% - 97%)    Parameters below as of 08 Jan 2024 18:56  Patient On (Oxygen Delivery Method): room air    General: NAD, resting comfortably in bed.  C/V: NSR.  Pulm: Nonlabored breathing, no respiratory distress on RA.  Abd: soft, mildly distended, moderate to severe midabdominal and epigastric ttp with voluntary guarding and no rebound; NGT placed with immediate return of 75cc bloody output  Extrem: WWP, no edema, SCDs in place.  Neuro: motor/sensory grossly intact, moves all ext . to command and spontaneously.

## 2024-01-08 NOTE — PATIENT PROFILE ADULT - FALL HARM RISK - HARM RISK INTERVENTIONS
Assistance with ambulation/Assistance OOB with selected safe patient handling equipment/Communicate Risk of Fall with Harm to all staff/Discuss with provider need for PT consult/Monitor gait and stability/Provide patient with walking aids - walker, cane, crutches/Reinforce activity limits and safety measures with patient and family/Tailored Fall Risk Interventions/Visual Cue: Yellow wristband and red socks/Bed in lowest position, wheels locked, appropriate side rails in place/Call bell, personal items and telephone in reach/Instruct patient to call for assistance before getting out of bed or chair/Non-slip footwear when patient is out of bed/[place] to call system/Physically safe environment - no spills, clutter or unnecessary equipment/Purposeful Proactive Rounding/Room/bathroom lighting operational, light cord in reach Assistance with ambulation/Assistance OOB with selected safe patient handling equipment/Communicate Risk of Fall with Harm to all staff/Discuss with provider need for PT consult/Monitor gait and stability/Provide patient with walking aids - walker, cane, crutches/Reinforce activity limits and safety measures with patient and family/Tailored Fall Risk Interventions/Visual Cue: Yellow wristband and red socks/Bed in lowest position, wheels locked, appropriate side rails in place/Call bell, personal items and telephone in reach/Instruct patient to call for assistance before getting out of bed or chair/Non-slip footwear when patient is out of bed/[place] to call system/Physically safe environment - no spills, clutter or unnecessary equipment/Purposeful Proactive Rounding/Room/bathroom lighting operational, light cord in reach

## 2024-01-08 NOTE — ED ADULT NURSE NOTE - OBJECTIVE STATEMENT
Pt A&Ox3, speaking in clear/full sentences, respirations even and unlabored NAD noted. Pt presents to the ED for generalized abd pain with nausea since last night. Pt denies vomiting/diarrhea. Pt denies fever, cough at home. Pt febrile in triage, tachycardic to 114. EKG complete. Pt upgraded to Dr Valverde. Ezequiel ivey on Eliquis. Pt A&Ox3, speaking in clear/full sentences, respirations even and unlabored NAD noted. Pt presents to the ED for generalized abd pain with nausea since last night. Pt denies vomiting/diarrhea. Pt denies fever, cough at home. Pt febrile in triage, tachycardic to 114. EKG complete. Pt upgraded to Dr Valverde. Ezequiel ivey on Eliquis. RN spoke to pt in RN native language.

## 2024-01-09 LAB
-  STAPHYLOCOCCUS EPIDERMIDIS, METHICILLIN RESISTANT: SIGNIFICANT CHANGE UP
-  STAPHYLOCOCCUS EPIDERMIDIS, METHICILLIN RESISTANT: SIGNIFICANT CHANGE UP
ANION GAP SERPL CALC-SCNC: 10 MMOL/L — SIGNIFICANT CHANGE UP (ref 5–17)
ANION GAP SERPL CALC-SCNC: 10 MMOL/L — SIGNIFICANT CHANGE UP (ref 5–17)
ANION GAP SERPL CALC-SCNC: 8 MMOL/L — SIGNIFICANT CHANGE UP (ref 5–17)
ANION GAP SERPL CALC-SCNC: 8 MMOL/L — SIGNIFICANT CHANGE UP (ref 5–17)
ANISOCYTOSIS BLD QL: SLIGHT — SIGNIFICANT CHANGE UP
ANISOCYTOSIS BLD QL: SLIGHT — SIGNIFICANT CHANGE UP
APTT BLD: 108.7 SEC — HIGH (ref 24.5–35.6)
APTT BLD: 108.7 SEC — HIGH (ref 24.5–35.6)
APTT BLD: 167 SEC — CRITICAL HIGH (ref 24.5–35.6)
APTT BLD: 167 SEC — CRITICAL HIGH (ref 24.5–35.6)
APTT BLD: 86.3 SEC — HIGH (ref 24.5–35.6)
APTT BLD: 86.3 SEC — HIGH (ref 24.5–35.6)
BASOPHILS # BLD AUTO: 0 K/UL — SIGNIFICANT CHANGE UP (ref 0–0.2)
BASOPHILS # BLD AUTO: 0 K/UL — SIGNIFICANT CHANGE UP (ref 0–0.2)
BASOPHILS NFR BLD AUTO: 0 % — SIGNIFICANT CHANGE UP (ref 0–2)
BASOPHILS NFR BLD AUTO: 0 % — SIGNIFICANT CHANGE UP (ref 0–2)
BUN SERPL-MCNC: 13 MG/DL — SIGNIFICANT CHANGE UP (ref 7–23)
BUN SERPL-MCNC: 13 MG/DL — SIGNIFICANT CHANGE UP (ref 7–23)
BUN SERPL-MCNC: 8 MG/DL — SIGNIFICANT CHANGE UP (ref 7–23)
BUN SERPL-MCNC: 8 MG/DL — SIGNIFICANT CHANGE UP (ref 7–23)
BURR CELLS BLD QL SMEAR: PRESENT — SIGNIFICANT CHANGE UP
BURR CELLS BLD QL SMEAR: PRESENT — SIGNIFICANT CHANGE UP
CALCIUM SERPL-MCNC: 9 MG/DL — SIGNIFICANT CHANGE UP (ref 8.4–10.5)
CALCIUM SERPL-MCNC: 9 MG/DL — SIGNIFICANT CHANGE UP (ref 8.4–10.5)
CALCIUM SERPL-MCNC: 9.3 MG/DL — SIGNIFICANT CHANGE UP (ref 8.4–10.5)
CALCIUM SERPL-MCNC: 9.3 MG/DL — SIGNIFICANT CHANGE UP (ref 8.4–10.5)
CHLORIDE SERPL-SCNC: 102 MMOL/L — SIGNIFICANT CHANGE UP (ref 96–108)
CO2 SERPL-SCNC: 22 MMOL/L — SIGNIFICANT CHANGE UP (ref 22–31)
CREAT SERPL-MCNC: 0.43 MG/DL — LOW (ref 0.5–1.3)
DACRYOCYTES BLD QL SMEAR: SLIGHT — SIGNIFICANT CHANGE UP
DACRYOCYTES BLD QL SMEAR: SLIGHT — SIGNIFICANT CHANGE UP
EGFR: 98 ML/MIN/1.73M2 — SIGNIFICANT CHANGE UP
ELLIPTOCYTES BLD QL SMEAR: SLIGHT — SIGNIFICANT CHANGE UP
ELLIPTOCYTES BLD QL SMEAR: SLIGHT — SIGNIFICANT CHANGE UP
EOSINOPHIL # BLD AUTO: 0 K/UL — SIGNIFICANT CHANGE UP (ref 0–0.5)
EOSINOPHIL # BLD AUTO: 0 K/UL — SIGNIFICANT CHANGE UP (ref 0–0.5)
EOSINOPHIL NFR BLD AUTO: 0 % — SIGNIFICANT CHANGE UP (ref 0–6)
EOSINOPHIL NFR BLD AUTO: 0 % — SIGNIFICANT CHANGE UP (ref 0–6)
GLUCOSE BLDC GLUCOMTR-MCNC: 112 MG/DL — HIGH (ref 70–99)
GLUCOSE BLDC GLUCOMTR-MCNC: 112 MG/DL — HIGH (ref 70–99)
GLUCOSE BLDC GLUCOMTR-MCNC: 117 MG/DL — HIGH (ref 70–99)
GLUCOSE BLDC GLUCOMTR-MCNC: 117 MG/DL — HIGH (ref 70–99)
GLUCOSE BLDC GLUCOMTR-MCNC: 124 MG/DL — HIGH (ref 70–99)
GLUCOSE BLDC GLUCOMTR-MCNC: 124 MG/DL — HIGH (ref 70–99)
GLUCOSE SERPL-MCNC: 106 MG/DL — HIGH (ref 70–99)
GLUCOSE SERPL-MCNC: 106 MG/DL — HIGH (ref 70–99)
GLUCOSE SERPL-MCNC: 99 MG/DL — SIGNIFICANT CHANGE UP (ref 70–99)
GLUCOSE SERPL-MCNC: 99 MG/DL — SIGNIFICANT CHANGE UP (ref 70–99)
GRAM STN FLD: ABNORMAL
HCT VFR BLD CALC: 33.7 % — LOW (ref 34.5–45)
HCT VFR BLD CALC: 33.7 % — LOW (ref 34.5–45)
HGB BLD-MCNC: 11.5 G/DL — SIGNIFICANT CHANGE UP (ref 11.5–15.5)
HGB BLD-MCNC: 11.5 G/DL — SIGNIFICANT CHANGE UP (ref 11.5–15.5)
LYMPHOCYTES # BLD AUTO: 0.97 K/UL — LOW (ref 1–3.3)
LYMPHOCYTES # BLD AUTO: 0.97 K/UL — LOW (ref 1–3.3)
LYMPHOCYTES # BLD AUTO: 9.6 % — LOW (ref 13–44)
LYMPHOCYTES # BLD AUTO: 9.6 % — LOW (ref 13–44)
MACROCYTES BLD QL: SLIGHT — SIGNIFICANT CHANGE UP
MACROCYTES BLD QL: SLIGHT — SIGNIFICANT CHANGE UP
MAGNESIUM SERPL-MCNC: 1.8 MG/DL — SIGNIFICANT CHANGE UP (ref 1.6–2.6)
MAGNESIUM SERPL-MCNC: 1.8 MG/DL — SIGNIFICANT CHANGE UP (ref 1.6–2.6)
MANUAL SMEAR VERIFICATION: SIGNIFICANT CHANGE UP
MANUAL SMEAR VERIFICATION: SIGNIFICANT CHANGE UP
MCHC RBC-ENTMCNC: 31.8 PG — SIGNIFICANT CHANGE UP (ref 27–34)
MCHC RBC-ENTMCNC: 31.8 PG — SIGNIFICANT CHANGE UP (ref 27–34)
MCHC RBC-ENTMCNC: 34.1 GM/DL — SIGNIFICANT CHANGE UP (ref 32–36)
MCHC RBC-ENTMCNC: 34.1 GM/DL — SIGNIFICANT CHANGE UP (ref 32–36)
MCV RBC AUTO: 93.1 FL — SIGNIFICANT CHANGE UP (ref 80–100)
MCV RBC AUTO: 93.1 FL — SIGNIFICANT CHANGE UP (ref 80–100)
METHOD TYPE: SIGNIFICANT CHANGE UP
METHOD TYPE: SIGNIFICANT CHANGE UP
MICROCYTES BLD QL: SLIGHT — SIGNIFICANT CHANGE UP
MICROCYTES BLD QL: SLIGHT — SIGNIFICANT CHANGE UP
MONOCYTES # BLD AUTO: 0 K/UL — SIGNIFICANT CHANGE UP (ref 0–0.9)
MONOCYTES # BLD AUTO: 0 K/UL — SIGNIFICANT CHANGE UP (ref 0–0.9)
MONOCYTES NFR BLD AUTO: 0 % — LOW (ref 2–14)
MONOCYTES NFR BLD AUTO: 0 % — LOW (ref 2–14)
NEUTROPHILS # BLD AUTO: 9.14 K/UL — HIGH (ref 1.8–7.4)
NEUTROPHILS # BLD AUTO: 9.14 K/UL — HIGH (ref 1.8–7.4)
NEUTROPHILS NFR BLD AUTO: 84.3 % — HIGH (ref 43–77)
NEUTROPHILS NFR BLD AUTO: 84.3 % — HIGH (ref 43–77)
NEUTS BAND # BLD: 6.1 % — SIGNIFICANT CHANGE UP (ref 0–8)
NEUTS BAND # BLD: 6.1 % — SIGNIFICANT CHANGE UP (ref 0–8)
OVALOCYTES BLD QL SMEAR: SIGNIFICANT CHANGE UP
OVALOCYTES BLD QL SMEAR: SIGNIFICANT CHANGE UP
PHOSPHATE SERPL-MCNC: 2.7 MG/DL — SIGNIFICANT CHANGE UP (ref 2.5–4.5)
PHOSPHATE SERPL-MCNC: 2.7 MG/DL — SIGNIFICANT CHANGE UP (ref 2.5–4.5)
PLAT MORPH BLD: ABNORMAL
PLAT MORPH BLD: ABNORMAL
PLATELET # BLD AUTO: 234 K/UL — SIGNIFICANT CHANGE UP (ref 150–400)
PLATELET # BLD AUTO: 234 K/UL — SIGNIFICANT CHANGE UP (ref 150–400)
POIKILOCYTOSIS BLD QL AUTO: SIGNIFICANT CHANGE UP
POIKILOCYTOSIS BLD QL AUTO: SIGNIFICANT CHANGE UP
POTASSIUM SERPL-MCNC: 3 MMOL/L — LOW (ref 3.5–5.3)
POTASSIUM SERPL-MCNC: 3 MMOL/L — LOW (ref 3.5–5.3)
POTASSIUM SERPL-MCNC: 4.2 MMOL/L — SIGNIFICANT CHANGE UP (ref 3.5–5.3)
POTASSIUM SERPL-MCNC: 4.2 MMOL/L — SIGNIFICANT CHANGE UP (ref 3.5–5.3)
POTASSIUM SERPL-SCNC: 3 MMOL/L — LOW (ref 3.5–5.3)
POTASSIUM SERPL-SCNC: 3 MMOL/L — LOW (ref 3.5–5.3)
POTASSIUM SERPL-SCNC: 4.2 MMOL/L — SIGNIFICANT CHANGE UP (ref 3.5–5.3)
POTASSIUM SERPL-SCNC: 4.2 MMOL/L — SIGNIFICANT CHANGE UP (ref 3.5–5.3)
RBC # BLD: 3.62 M/UL — LOW (ref 3.8–5.2)
RBC # BLD: 3.62 M/UL — LOW (ref 3.8–5.2)
RBC # FLD: 12.7 % — SIGNIFICANT CHANGE UP (ref 10.3–14.5)
RBC # FLD: 12.7 % — SIGNIFICANT CHANGE UP (ref 10.3–14.5)
RBC BLD AUTO: ABNORMAL
RBC BLD AUTO: ABNORMAL
SODIUM SERPL-SCNC: 132 MMOL/L — LOW (ref 135–145)
SODIUM SERPL-SCNC: 132 MMOL/L — LOW (ref 135–145)
SODIUM SERPL-SCNC: 134 MMOL/L — LOW (ref 135–145)
SODIUM SERPL-SCNC: 134 MMOL/L — LOW (ref 135–145)
WBC # BLD: 10.11 K/UL — SIGNIFICANT CHANGE UP (ref 3.8–10.5)
WBC # BLD: 10.11 K/UL — SIGNIFICANT CHANGE UP (ref 3.8–10.5)
WBC # FLD AUTO: 10.11 K/UL — SIGNIFICANT CHANGE UP (ref 3.8–10.5)
WBC # FLD AUTO: 10.11 K/UL — SIGNIFICANT CHANGE UP (ref 3.8–10.5)

## 2024-01-09 PROCEDURE — 99233 SBSQ HOSP IP/OBS HIGH 50: CPT | Mod: GC

## 2024-01-09 PROCEDURE — 99232 SBSQ HOSP IP/OBS MODERATE 35: CPT

## 2024-01-09 RX ORDER — METRONIDAZOLE 500 MG
500 TABLET ORAL EVERY 8 HOURS
Refills: 0 | Status: DISCONTINUED | OUTPATIENT
Start: 2024-01-09 | End: 2024-01-13

## 2024-01-09 RX ORDER — CEFTRIAXONE 500 MG/1
2000 INJECTION, POWDER, FOR SOLUTION INTRAMUSCULAR; INTRAVENOUS EVERY 24 HOURS
Refills: 0 | Status: DISCONTINUED | OUTPATIENT
Start: 2024-01-09 | End: 2024-01-12

## 2024-01-09 RX ORDER — ACETAMINOPHEN 500 MG
750 TABLET ORAL ONCE
Refills: 0 | Status: COMPLETED | OUTPATIENT
Start: 2024-01-09 | End: 2024-01-09

## 2024-01-09 RX ORDER — MAGNESIUM SULFATE 500 MG/ML
2 VIAL (ML) INJECTION ONCE
Refills: 0 | Status: COMPLETED | OUTPATIENT
Start: 2024-01-09 | End: 2024-01-09

## 2024-01-09 RX ORDER — POTASSIUM CHLORIDE 20 MEQ
20 PACKET (EA) ORAL
Refills: 0 | Status: COMPLETED | OUTPATIENT
Start: 2024-01-09 | End: 2024-01-09

## 2024-01-09 RX ORDER — SODIUM CHLORIDE 9 MG/ML
1000 INJECTION, SOLUTION INTRAVENOUS
Refills: 0 | Status: DISCONTINUED | OUTPATIENT
Start: 2024-01-09 | End: 2024-01-10

## 2024-01-09 RX ADMIN — Medication 60 MICROGRAM(S): at 22:16

## 2024-01-09 RX ADMIN — PIPERACILLIN AND TAZOBACTAM 25 GRAM(S): 4; .5 INJECTION, POWDER, LYOPHILIZED, FOR SOLUTION INTRAVENOUS at 06:38

## 2024-01-09 RX ADMIN — Medication 300 MILLIGRAM(S): at 23:30

## 2024-01-09 RX ADMIN — PANTOPRAZOLE SODIUM 40 MILLIGRAM(S): 20 TABLET, DELAYED RELEASE ORAL at 06:38

## 2024-01-09 RX ADMIN — PANTOPRAZOLE SODIUM 40 MILLIGRAM(S): 20 TABLET, DELAYED RELEASE ORAL at 18:02

## 2024-01-09 RX ADMIN — Medication 400 MILLIGRAM(S): at 12:23

## 2024-01-09 RX ADMIN — CEFTRIAXONE 100 MILLIGRAM(S): 500 INJECTION, POWDER, FOR SOLUTION INTRAMUSCULAR; INTRAVENOUS at 14:00

## 2024-01-09 RX ADMIN — FLUCONAZOLE 100 MILLIGRAM(S): 150 TABLET ORAL at 19:35

## 2024-01-09 RX ADMIN — Medication 50 MILLIEQUIVALENT(S): at 08:53

## 2024-01-09 RX ADMIN — Medication 25 GRAM(S): at 04:49

## 2024-01-09 RX ADMIN — CHLORHEXIDINE GLUCONATE 1 APPLICATION(S): 213 SOLUTION TOPICAL at 06:39

## 2024-01-09 RX ADMIN — Medication 100 MILLIGRAM(S): at 21:46

## 2024-01-09 RX ADMIN — Medication 50 MILLIEQUIVALENT(S): at 04:50

## 2024-01-09 RX ADMIN — Medication 50 MILLIEQUIVALENT(S): at 06:57

## 2024-01-09 RX ADMIN — Medication 100 MILLIGRAM(S): at 14:00

## 2024-01-09 RX ADMIN — Medication 1000 MILLIGRAM(S): at 12:53

## 2024-01-09 RX ADMIN — SODIUM CHLORIDE 90 MILLILITER(S): 9 INJECTION, SOLUTION INTRAVENOUS at 08:53

## 2024-01-09 RX ADMIN — Medication 1000 MILLIGRAM(S): at 07:00

## 2024-01-09 RX ADMIN — Medication 400 MILLIGRAM(S): at 06:38

## 2024-01-09 RX ADMIN — SODIUM CHLORIDE 90 MILLILITER(S): 9 INJECTION, SOLUTION INTRAVENOUS at 12:57

## 2024-01-09 NOTE — PROGRESS NOTE ADULT - ASSESSMENT
81yo Female pt with PMH depression, hypothyroidism, HLD, Afib on Eliquis (last dose today) and PSH of L hip surgery, cholecystectomy and VHR presents with 1-day history of sudden severe midabdominal/epigastric pain. Febrile to 101.7, tachycardic to 110 improved to 90 with 1L bolus. On exam, soft, mildly distended, moderate to severe midabdominal and epigastric ttp with voluntary guarding and no rebound; NGT placed with immediate return of 75cc bloody output. Labs show WBC 10.36, H/H 13.2/38.8, BUN/Cr 18/0.53, lactate 3.2. CT AP with PO and IV contrast demonstrates perforated diverticulitis of distal duodenum.     N: Pain control w/ IV Tylenol. Avoid narcotics.  CV: Goal maintain MAP>65. D5LR @ 90cc/hr. History of AFib- holding metoprolol succinate, holding Eliquis. Hx of HLD - holding atrovastatin  Pulm: On room air. No pulmonary concerns  GI: Contained perforated ulcer/diverticula. NPO. NGT to LIWS. IV PPI BID. Plan for UGIS later in week  : Voiding. Strict I/Os.   ID: Ceftriaxone (1/9-), Flagyl (1/9-), Fluconazole (1/8-) // D/C Zosyn (1/8-1/9)  Heme: Heparin gtt. Trend PTT q6  Endo: mISS. Hx of Hypothyroidism - Continue synthroid IV.   PPx: SCDs  Lines/tubes/drains: NGT  Access: PIVs  PT/OT: Ordered  Dispo: SICU 83yo Female pt with PMH depression, hypothyroidism, HLD, Afib on Eliquis (last dose today) and PSH of L hip surgery, cholecystectomy and VHR presents with 1-day history of sudden severe midabdominal/epigastric pain. Febrile to 101.7, tachycardic to 110 improved to 90 with 1L bolus. On exam, soft, mildly distended, moderate to severe midabdominal and epigastric ttp with voluntary guarding and no rebound; NGT placed with immediate return of 75cc bloody output. Labs show WBC 10.36, H/H 13.2/38.8, BUN/Cr 18/0.53, lactate 3.2. CT AP with PO and IV contrast demonstrates perforated diverticulitis of distal duodenum.     N: Pain control w/ IV Tylenol. Avoid narcotics.  CV: Goal maintain MAP>65. D5LR @ 90cc/hr. History of AFib- holding metoprolol succinate, holding Eliquis. Hx of HLD - holding atrovastatin  Pulm: On room air. No pulmonary concerns  GI: Contained perforated ulcer/diverticula. NPO. NGT to LIWS. IV PPI BID. Plan for UGIS later in week  : Voiding. Strict I/Os.   ID: Ceftriaxone (1/9-), Flagyl (1/9-), Fluconazole (1/8-) // D/C Zosyn (1/8-1/9)  Heme: Heparin gtt. Trend PTT q6  Endo: mISS. Hx of Hypothyroidism - Continue synthroid IV.   PPx: SCDs  Lines/tubes/drains: NGT  Access: PIVs  PT/OT: Ordered  Dispo: SICU

## 2024-01-09 NOTE — PROGRESS NOTE ADULT - ATTENDING COMMENTS
Patient is an 82 year old woman with history of depression, hypothyroidism, HLD, A fib on Eliquis, left hip surgery, cholecystectomy, ventral hernia repair who presents now with clinical, laboratory, and radiographic findings concerning for perforated duodenal diverticulum. She reports one day history of sudden onset epigastric abdominal pain. At time of evaluation, febrile, tachycardic, hemodynamically stable, abdomen soft, mild tenderness in epigastric region, mild distension, no rebound or guarding. Lactic acidosis resolved. CT imaging demonstrates perforated duodenal diverticulum, appears contained. Continue NPO, bowel rest, NG tube decompression, IV fluids, IV antibiotics, antifungal coverage, serial abdominal exams. Late entry, date of service 1/9/24.

## 2024-01-09 NOTE — PROGRESS NOTE ADULT - ATTENDING COMMENTS
Hypothyroidism, AF, now with sepsis secondary to peritonitis from perforated duodenal diverticulum  physical as above  continue ceftriaxone, flagyl and fluconazole  NPO  eventual barium study   hold metoprolol and eliquis for now  continue heparin  add D5 to RL at 90/hr

## 2024-01-09 NOTE — CHART NOTE - NSCHARTNOTEFT_GEN_A_CORE
Patient seen and examined at bedside for serial abdominal exam. Patient reports pain in epigastric region, but reports it is stable. Denies fevers or chills. Denies nausea. Denies distention.  Patient's abdomen is soft, nondistended, with mild to moderate tenderness in epigastrium, stable from prior reported exams.  Will continue to monitor.

## 2024-01-09 NOTE — PROGRESS NOTE ADULT - SUBJECTIVE AND OBJECTIVE BOX
SUBJECTIVE: Pt seen and examined at bedside this am by surgery team. Patient is lying comfortably in bed with no complaints. Pain well controlled with current regimen. Patient denies fever, nausea, vomiting, chest pain, and shortness of breath. NGT in place    MEDICATIONS  (STANDING):  acetaminophen   IVPB .. 1000 milliGRAM(s) IV Intermittent every 6 hours  chlorhexidine 2% Cloths 1 Application(s) Topical <User Schedule>  dextrose 5%. 1000 milliLiter(s) (50 mL/Hr) IV Continuous <Continuous>  dextrose 50% Injectable 25 Gram(s) IV Push once  dextrose 50% Injectable 25 Gram(s) IV Push once  fluconAZOLE IVPB 400 milliGRAM(s) IV Intermittent every 24 hours  glucagon  Injectable 1 milliGRAM(s) IntraMuscular once  heparin  Infusion 900 Unit(s)/Hr (8 mL/Hr) IV Continuous <Continuous>  influenza  Vaccine (HIGH DOSE) 0.7 milliLiter(s) IntraMuscular once  insulin lispro (ADMELOG) corrective regimen sliding scale   SubCutaneous every 6 hours  lactated ringers. 1000 milliLiter(s) (90 mL/Hr) IV Continuous <Continuous>  levothyroxine Injectable 60 MICROGram(s) IV Push at bedtime  pantoprazole  Injectable 40 milliGRAM(s) IV Push every 12 hours  piperacillin/tazobactam IVPB.. 3.375 Gram(s) IV Intermittent every 8 hours    MEDICATIONS  (PRN):  dextrose Oral Gel 15 Gram(s) Oral once PRN Blood Glucose LESS THAN 70 milliGRAM(s)/deciliter      Vital Signs Last 24 Hrs  T(C): 37.5 (09 Jan 2024 09:00), Max: 38.7 (08 Jan 2024 13:04)  T(F): 99.5 (09 Jan 2024 09:00), Max: 101.7 (08 Jan 2024 13:04)  HR: 85 (09 Jan 2024 09:00) (69 - 110)  BP: 111/56 (09 Jan 2024 09:00) (111/56 - 150/76)  BP(mean): 80 (09 Jan 2024 09:00) (80 - 102)  RR: 17 (09 Jan 2024 09:00) (14 - 24)  SpO2: 95% (09 Jan 2024 09:00) (94% - 98%)    Parameters below as of 09 Jan 2024 08:00  Patient On (Oxygen Delivery Method): room air        Physical Exam  General: Patient is doing well and lying in bed comfortably  Constitutional: alert and oriented   Pulm: Nonlabored breathing, no respiratory distress  CV: Regular rate and rhythm, normal sinus rhythm  Abd:  soft, minimally tender in epigastric region, nondistended. No rebound, no guarding.   Extremities: warm, well perfused, no edema  Drains: NGT in place with pink tinged gastric/serous content    I&O's Detail    08 Jan 2024 07:01  -  09 Jan 2024 07:00  --------------------------------------------------------  IN:    Heparin: 87 mL    IV PiggyBack: 100 mL    IV PiggyBack: 250 mL    IV PiggyBack: 150 mL    Lactated Ringers: 990 mL  Total IN: 1577 mL    OUT:    Nasogastric/Oral tube (mL): 100 mL    Voided (mL): 500 mL  Total OUT: 600 mL    Total NET: 977 mL        LABS:                        11.5   10.11 )-----------( 234      ( 09 Jan 2024 03:38 )             33.7     01-09    134<L>  |  102  |  13  ----------------------------<  106<H>  3.0<L>   |  22  |  0.43<L>    Ca    9.3      09 Jan 2024 03:38  Phos  2.7     01-09  Mg     1.8     01-09    TPro  6.6  /  Alb  4.2  /  TBili  1.2  /  DBili  x   /  AST  42<H>  /  ALT  37  /  AlkPhos  82  01-08    PT/INR - ( 08 Jan 2024 13:08 )   PT: 17.0 sec;   INR: 1.51          PTT - ( 09 Jan 2024 03:38 )  PTT:167.0 sec  Urinalysis Basic - ( 09 Jan 2024 03:38 )    Color: x / Appearance: x / SG: x / pH: x  Gluc: 106 mg/dL / Ketone: x  / Bili: x / Urobili: x   Blood: x / Protein: x / Nitrite: x   Leuk Esterase: x / RBC: x / WBC x   Sq Epi: x / Non Sq Epi: x / Bacteria: x

## 2024-01-09 NOTE — PROGRESS NOTE ADULT - ASSESSMENT
The patient is an 82 year old female pt with PMH depression, hypothyroidism, HLD, Afib on Eliquis (last dose today) and PSHx of L hip surgery, cholecystectomy and VHR presents with 1-day history of sudden severe midabdominal/epigastric pain admitted for perforated diverticulitis of distal duodenum seen on CTAP.     Recommendations  NPO/IVF  NGT to LIWS  CTX/Flagyl/Fluconazole  PPI bid  SCDs/OOBA/IS  Rest of care per SICU

## 2024-01-09 NOTE — CHART NOTE - NSCHARTNOTEFT_GEN_A_CORE
Patient examined while sleeping. Noted to have tenderness in epigastric area but abdomen soft and non-distended. Possible improvement in exam from prior. NGT w/ clear output, no longer fruit punch. Continue serial exams.

## 2024-01-09 NOTE — PROGRESS NOTE ADULT - SUBJECTIVE AND OBJECTIVE BOX
SICU PROGRESS NOTE    ON: Started IV APAP. Heparin GTT started at 2130. Protonix BID. BCX ngt12h. K 3.0, ptt 167. Hep gtt held x 1 hr, restarted at 8mL / hr.     SUBJECTIVE: Pt seen and examined at bedside this am by ICU team. Patient is lying comfortably in bed. Reports abdominal pain, but reports it is stable. Denies fevers or chills.    MEDICATIONS  (STANDING):  cefTRIAXone   IVPB 2000 milliGRAM(s) IV Intermittent every 24 hours  chlorhexidine 2% Cloths 1 Application(s) Topical <User Schedule>  dextrose 5% + lactated ringers. 1000 milliLiter(s) (90 mL/Hr) IV Continuous <Continuous>  dextrose 5%. 1000 milliLiter(s) (50 mL/Hr) IV Continuous <Continuous>  dextrose 50% Injectable 25 Gram(s) IV Push once  dextrose 50% Injectable 25 Gram(s) IV Push once  fluconAZOLE IVPB 400 milliGRAM(s) IV Intermittent every 24 hours  glucagon  Injectable 1 milliGRAM(s) IntraMuscular once  heparin  Infusion 900 Unit(s)/Hr (8 mL/Hr) IV Continuous <Continuous>  influenza  Vaccine (HIGH DOSE) 0.7 milliLiter(s) IntraMuscular once  insulin lispro (ADMELOG) corrective regimen sliding scale   SubCutaneous every 6 hours  levothyroxine Injectable 60 MICROGram(s) IV Push at bedtime  metroNIDAZOLE  IVPB 500 milliGRAM(s) IV Intermittent every 8 hours  pantoprazole  Injectable 40 milliGRAM(s) IV Push every 12 hours    MEDICATIONS  (PRN):  dextrose Oral Gel 15 Gram(s) Oral once PRN Blood Glucose LESS THAN 70 milliGRAM(s)/deciliter    ICU Vital Signs Last 24 Hrs  T(C): 37.5 (09 Jan 2024 09:00), Max: 38.7 (08 Jan 2024 13:04)  T(F): 99.5 (09 Jan 2024 09:00), Max: 101.7 (08 Jan 2024 13:04)  HR: 84 (09 Jan 2024 12:00) (69 - 110)  BP: 127/60 (09 Jan 2024 12:00) (111/56 - 150/76)  BP(mean): 86 (09 Jan 2024 12:00) (80 - 102)  RR: 20 (09 Jan 2024 12:00) (14 - 24)  SpO2: 97% (09 Jan 2024 12:00) (94% - 98%)    O2 Parameters below as of 09 Jan 2024 12:00  Patient On (Oxygen Delivery Method): room air      Physical Exam:  General: Resting in bed, NAD  HEENT: NC/AT, EOMI, neck supple. NGT in place with dark blood tinged output   Pulmonary: Equal chest wall expansion b/l, no respiratory distress. Lungs CTA B/L  Cardiovascular: Sinus rhythm - regular rate. No murmurs appreciated  Abdominal: soft, nondistended, mild to moderate tenderness in epigastrium. No rebound, no guarding  Extremities: WWP, 5/5 strength x 4, no clubbing/cyanosis/edema  Neuro: A/O x3, CNs II-XII grossly intact, normal motor/sensation, no focal deficits  Pulses: palpable distal pulses    I&O's Summary    08 Jan 2024 07:01  -  09 Jan 2024 07:00  --------------------------------------------------------  IN: 1577 mL / OUT: 600 mL / NET: 977 mL    09 Jan 2024 07:01  -  09 Jan 2024 12:31  --------------------------------------------------------  IN: 740 mL / OUT: 700 mL / NET: 40 mL        LABS:                        11.5   10.11 )-----------( 234      ( 09 Jan 2024 03:38 )             33.7     01-09    132<L>  |  102  |  8   ----------------------------<  99  4.2   |  22  |  0.43<L>    Ca    9.0      09 Jan 2024 11:13  Phos  2.7     01-09  Mg     1.8     01-09    TPro  6.6  /  Alb  4.2  /  TBili  1.2  /  DBili  x   /  AST  42<H>  /  ALT  37  /  AlkPhos  82  01-08    PT/INR - ( 08 Jan 2024 13:08 )   PT: 17.0 sec;   INR: 1.51          PTT - ( 09 Jan 2024 11:13 )  PTT:86.3 sec  Urinalysis Basic - ( 09 Jan 2024 11:13 )    Color: x / Appearance: x / SG: x / pH: x  Gluc: 99 mg/dL / Ketone: x  / Bili: x / Urobili: x   Blood: x / Protein: x / Nitrite: x   Leuk Esterase: x / RBC: x / WBC x   Sq Epi: x / Non Sq Epi: x / Bacteria: x      CAPILLARY BLOOD GLUCOSE      POCT Blood Glucose.: 112 mg/dL (09 Jan 2024 06:33)  POCT Blood Glucose.: 106 mg/dL (08 Jan 2024 22:20)    LIVER FUNCTIONS - ( 08 Jan 2024 13:08 )  Alb: 4.2 g/dL / Pro: 6.6 g/dL / ALK PHOS: 82 U/L / ALT: 37 U/L / AST: 42 U/L / GGT: x             Cultures:Culture Results:   Culture in progress (01-08 @ 13:08)  Culture Results:   Culture in progress (01-08 @ 13:08)

## 2024-01-10 LAB
ANION GAP SERPL CALC-SCNC: 11 MMOL/L — SIGNIFICANT CHANGE UP (ref 5–17)
ANION GAP SERPL CALC-SCNC: 11 MMOL/L — SIGNIFICANT CHANGE UP (ref 5–17)
ANION GAP SERPL CALC-SCNC: 8 MMOL/L — SIGNIFICANT CHANGE UP (ref 5–17)
ANION GAP SERPL CALC-SCNC: 8 MMOL/L — SIGNIFICANT CHANGE UP (ref 5–17)
APPEARANCE UR: CLEAR — SIGNIFICANT CHANGE UP
APPEARANCE UR: CLEAR — SIGNIFICANT CHANGE UP
APTT BLD: 68 SEC — HIGH (ref 24.5–35.6)
APTT BLD: 68 SEC — HIGH (ref 24.5–35.6)
APTT BLD: 78.5 SEC — HIGH (ref 24.5–35.6)
APTT BLD: 78.5 SEC — HIGH (ref 24.5–35.6)
APTT BLD: 84.5 SEC — HIGH (ref 24.5–35.6)
APTT BLD: 84.5 SEC — HIGH (ref 24.5–35.6)
APTT BLD: 90.5 SEC — HIGH (ref 24.5–35.6)
APTT BLD: 90.5 SEC — HIGH (ref 24.5–35.6)
BACTERIA # UR AUTO: NEGATIVE /HPF — SIGNIFICANT CHANGE UP
BACTERIA # UR AUTO: NEGATIVE /HPF — SIGNIFICANT CHANGE UP
BILIRUB UR-MCNC: NEGATIVE — SIGNIFICANT CHANGE UP
BILIRUB UR-MCNC: NEGATIVE — SIGNIFICANT CHANGE UP
BUN SERPL-MCNC: 5 MG/DL — LOW (ref 7–23)
BUN SERPL-MCNC: 5 MG/DL — LOW (ref 7–23)
BUN SERPL-MCNC: 6 MG/DL — LOW (ref 7–23)
BUN SERPL-MCNC: 6 MG/DL — LOW (ref 7–23)
CALCIUM SERPL-MCNC: 8.1 MG/DL — LOW (ref 8.4–10.5)
CALCIUM SERPL-MCNC: 8.1 MG/DL — LOW (ref 8.4–10.5)
CALCIUM SERPL-MCNC: 8.7 MG/DL — SIGNIFICANT CHANGE UP (ref 8.4–10.5)
CALCIUM SERPL-MCNC: 8.7 MG/DL — SIGNIFICANT CHANGE UP (ref 8.4–10.5)
CHLORIDE SERPL-SCNC: 102 MMOL/L — SIGNIFICANT CHANGE UP (ref 96–108)
CHLORIDE SERPL-SCNC: 102 MMOL/L — SIGNIFICANT CHANGE UP (ref 96–108)
CHLORIDE SERPL-SCNC: 104 MMOL/L — SIGNIFICANT CHANGE UP (ref 96–108)
CHLORIDE SERPL-SCNC: 104 MMOL/L — SIGNIFICANT CHANGE UP (ref 96–108)
CO2 SERPL-SCNC: 17 MMOL/L — LOW (ref 22–31)
CO2 SERPL-SCNC: 17 MMOL/L — LOW (ref 22–31)
CO2 SERPL-SCNC: 22 MMOL/L — SIGNIFICANT CHANGE UP (ref 22–31)
CO2 SERPL-SCNC: 22 MMOL/L — SIGNIFICANT CHANGE UP (ref 22–31)
COLOR SPEC: YELLOW — SIGNIFICANT CHANGE UP
COLOR SPEC: YELLOW — SIGNIFICANT CHANGE UP
CREAT ?TM UR-MCNC: 41 MG/DL — SIGNIFICANT CHANGE UP
CREAT ?TM UR-MCNC: 41 MG/DL — SIGNIFICANT CHANGE UP
CREAT SERPL-MCNC: 0.29 MG/DL — LOW (ref 0.5–1.3)
CREAT SERPL-MCNC: 0.29 MG/DL — LOW (ref 0.5–1.3)
CREAT SERPL-MCNC: 0.35 MG/DL — LOW (ref 0.5–1.3)
CREAT SERPL-MCNC: 0.35 MG/DL — LOW (ref 0.5–1.3)
DIFF PNL FLD: NEGATIVE — SIGNIFICANT CHANGE UP
DIFF PNL FLD: NEGATIVE — SIGNIFICANT CHANGE UP
EGFR: 103 ML/MIN/1.73M2 — SIGNIFICANT CHANGE UP
EGFR: 103 ML/MIN/1.73M2 — SIGNIFICANT CHANGE UP
EGFR: 107 ML/MIN/1.73M2 — SIGNIFICANT CHANGE UP
EGFR: 107 ML/MIN/1.73M2 — SIGNIFICANT CHANGE UP
GLUCOSE BLDC GLUCOMTR-MCNC: 123 MG/DL — HIGH (ref 70–99)
GLUCOSE BLDC GLUCOMTR-MCNC: 123 MG/DL — HIGH (ref 70–99)
GLUCOSE BLDC GLUCOMTR-MCNC: 132 MG/DL — HIGH (ref 70–99)
GLUCOSE BLDC GLUCOMTR-MCNC: 132 MG/DL — HIGH (ref 70–99)
GLUCOSE SERPL-MCNC: 131 MG/DL — HIGH (ref 70–99)
GLUCOSE SERPL-MCNC: 131 MG/DL — HIGH (ref 70–99)
GLUCOSE SERPL-MCNC: 135 MG/DL — HIGH (ref 70–99)
GLUCOSE SERPL-MCNC: 135 MG/DL — HIGH (ref 70–99)
GLUCOSE UR QL: NEGATIVE MG/DL — SIGNIFICANT CHANGE UP
GLUCOSE UR QL: NEGATIVE MG/DL — SIGNIFICANT CHANGE UP
HCT VFR BLD CALC: 30.7 % — LOW (ref 34.5–45)
HCT VFR BLD CALC: 30.7 % — LOW (ref 34.5–45)
HGB BLD-MCNC: 10.9 G/DL — LOW (ref 11.5–15.5)
HGB BLD-MCNC: 10.9 G/DL — LOW (ref 11.5–15.5)
KETONES UR-MCNC: 15 MG/DL
KETONES UR-MCNC: 15 MG/DL
LEUKOCYTE ESTERASE UR-ACNC: NEGATIVE — SIGNIFICANT CHANGE UP
LEUKOCYTE ESTERASE UR-ACNC: NEGATIVE — SIGNIFICANT CHANGE UP
MAGNESIUM SERPL-MCNC: 1.8 MG/DL — SIGNIFICANT CHANGE UP (ref 1.6–2.6)
MAGNESIUM SERPL-MCNC: 1.8 MG/DL — SIGNIFICANT CHANGE UP (ref 1.6–2.6)
MAGNESIUM SERPL-MCNC: 2 MG/DL — SIGNIFICANT CHANGE UP (ref 1.6–2.6)
MAGNESIUM SERPL-MCNC: 2 MG/DL — SIGNIFICANT CHANGE UP (ref 1.6–2.6)
MCHC RBC-ENTMCNC: 32.6 PG — SIGNIFICANT CHANGE UP (ref 27–34)
MCHC RBC-ENTMCNC: 32.6 PG — SIGNIFICANT CHANGE UP (ref 27–34)
MCHC RBC-ENTMCNC: 35.5 GM/DL — SIGNIFICANT CHANGE UP (ref 32–36)
MCHC RBC-ENTMCNC: 35.5 GM/DL — SIGNIFICANT CHANGE UP (ref 32–36)
MCV RBC AUTO: 91.9 FL — SIGNIFICANT CHANGE UP (ref 80–100)
MCV RBC AUTO: 91.9 FL — SIGNIFICANT CHANGE UP (ref 80–100)
NITRITE UR-MCNC: NEGATIVE — SIGNIFICANT CHANGE UP
NITRITE UR-MCNC: NEGATIVE — SIGNIFICANT CHANGE UP
NRBC # BLD: 0 /100 WBCS — SIGNIFICANT CHANGE UP (ref 0–0)
NRBC # BLD: 0 /100 WBCS — SIGNIFICANT CHANGE UP (ref 0–0)
OSMOLALITY SERPL: 267 MOSM/KG — LOW (ref 280–301)
OSMOLALITY SERPL: 267 MOSM/KG — LOW (ref 280–301)
OSMOLALITY UR: 600 MOSM/KG — SIGNIFICANT CHANGE UP (ref 300–900)
OSMOLALITY UR: 600 MOSM/KG — SIGNIFICANT CHANGE UP (ref 300–900)
PH UR: 7.5 — SIGNIFICANT CHANGE UP (ref 5–8)
PH UR: 7.5 — SIGNIFICANT CHANGE UP (ref 5–8)
PHOSPHATE SERPL-MCNC: 1.4 MG/DL — LOW (ref 2.5–4.5)
PHOSPHATE SERPL-MCNC: 1.4 MG/DL — LOW (ref 2.5–4.5)
PHOSPHATE SERPL-MCNC: 3.1 MG/DL — SIGNIFICANT CHANGE UP (ref 2.5–4.5)
PHOSPHATE SERPL-MCNC: 3.1 MG/DL — SIGNIFICANT CHANGE UP (ref 2.5–4.5)
PLATELET # BLD AUTO: 215 K/UL — SIGNIFICANT CHANGE UP (ref 150–400)
PLATELET # BLD AUTO: 215 K/UL — SIGNIFICANT CHANGE UP (ref 150–400)
POTASSIUM SERPL-MCNC: 3.1 MMOL/L — LOW (ref 3.5–5.3)
POTASSIUM SERPL-MCNC: 3.1 MMOL/L — LOW (ref 3.5–5.3)
POTASSIUM SERPL-MCNC: 4 MMOL/L — SIGNIFICANT CHANGE UP (ref 3.5–5.3)
POTASSIUM SERPL-MCNC: 4 MMOL/L — SIGNIFICANT CHANGE UP (ref 3.5–5.3)
POTASSIUM SERPL-SCNC: 3.1 MMOL/L — LOW (ref 3.5–5.3)
POTASSIUM SERPL-SCNC: 3.1 MMOL/L — LOW (ref 3.5–5.3)
POTASSIUM SERPL-SCNC: 4 MMOL/L — SIGNIFICANT CHANGE UP (ref 3.5–5.3)
POTASSIUM SERPL-SCNC: 4 MMOL/L — SIGNIFICANT CHANGE UP (ref 3.5–5.3)
POTASSIUM UR-SCNC: 42 MMOL/L — SIGNIFICANT CHANGE UP
POTASSIUM UR-SCNC: 42 MMOL/L — SIGNIFICANT CHANGE UP
PROT ?TM UR-MCNC: 36 MG/DL — HIGH (ref 0–12)
PROT ?TM UR-MCNC: 36 MG/DL — HIGH (ref 0–12)
PROT UR-MCNC: 30 MG/DL
PROT UR-MCNC: 30 MG/DL
PROT/CREAT UR-RTO: 0.9 RATIO — HIGH (ref 0–0.2)
PROT/CREAT UR-RTO: 0.9 RATIO — HIGH (ref 0–0.2)
RBC # BLD: 3.34 M/UL — LOW (ref 3.8–5.2)
RBC # BLD: 3.34 M/UL — LOW (ref 3.8–5.2)
RBC # FLD: 12.9 % — SIGNIFICANT CHANGE UP (ref 10.3–14.5)
RBC # FLD: 12.9 % — SIGNIFICANT CHANGE UP (ref 10.3–14.5)
RBC CASTS # UR COMP ASSIST: 6 /HPF — HIGH (ref 0–4)
RBC CASTS # UR COMP ASSIST: 6 /HPF — HIGH (ref 0–4)
SODIUM SERPL-SCNC: 132 MMOL/L — LOW (ref 135–145)
SODIUM UR-SCNC: 195 MMOL/L — SIGNIFICANT CHANGE UP
SODIUM UR-SCNC: 195 MMOL/L — SIGNIFICANT CHANGE UP
SP GR SPEC: 1.02 — SIGNIFICANT CHANGE UP (ref 1–1.03)
SP GR SPEC: 1.02 — SIGNIFICANT CHANGE UP (ref 1–1.03)
SQUAMOUS # UR AUTO: 0 /HPF — SIGNIFICANT CHANGE UP (ref 0–5)
SQUAMOUS # UR AUTO: 0 /HPF — SIGNIFICANT CHANGE UP (ref 0–5)
UROBILINOGEN FLD QL: 1 MG/DL — SIGNIFICANT CHANGE UP (ref 0.2–1)
UROBILINOGEN FLD QL: 1 MG/DL — SIGNIFICANT CHANGE UP (ref 0.2–1)
UUN UR-MCNC: 322 MG/DL — SIGNIFICANT CHANGE UP
UUN UR-MCNC: 322 MG/DL — SIGNIFICANT CHANGE UP
WBC # BLD: 10.75 K/UL — HIGH (ref 3.8–10.5)
WBC # BLD: 10.75 K/UL — HIGH (ref 3.8–10.5)
WBC # FLD AUTO: 10.75 K/UL — HIGH (ref 3.8–10.5)
WBC # FLD AUTO: 10.75 K/UL — HIGH (ref 3.8–10.5)
WBC UR QL: 1 /HPF — SIGNIFICANT CHANGE UP (ref 0–5)
WBC UR QL: 1 /HPF — SIGNIFICANT CHANGE UP (ref 0–5)

## 2024-01-10 PROCEDURE — 99232 SBSQ HOSP IP/OBS MODERATE 35: CPT

## 2024-01-10 PROCEDURE — 99233 SBSQ HOSP IP/OBS HIGH 50: CPT | Mod: GC

## 2024-01-10 RX ORDER — SODIUM CHLORIDE 9 MG/ML
1000 INJECTION, SOLUTION INTRAVENOUS
Refills: 0 | Status: DISCONTINUED | OUTPATIENT
Start: 2024-01-10 | End: 2024-01-10

## 2024-01-10 RX ORDER — ACETAMINOPHEN 500 MG
750 TABLET ORAL ONCE
Refills: 0 | Status: COMPLETED | OUTPATIENT
Start: 2024-01-10 | End: 2024-01-10

## 2024-01-10 RX ORDER — MAGNESIUM SULFATE 500 MG/ML
1 VIAL (ML) INJECTION ONCE
Refills: 0 | Status: COMPLETED | OUTPATIENT
Start: 2024-01-10 | End: 2024-01-10

## 2024-01-10 RX ORDER — POTASSIUM PHOSPHATE, MONOBASIC POTASSIUM PHOSPHATE, DIBASIC 236; 224 MG/ML; MG/ML
30 INJECTION, SOLUTION INTRAVENOUS ONCE
Refills: 0 | Status: COMPLETED | OUTPATIENT
Start: 2024-01-10 | End: 2024-01-10

## 2024-01-10 RX ORDER — SODIUM CHLORIDE 9 MG/ML
1000 INJECTION, SOLUTION INTRAVENOUS
Refills: 0 | Status: DISCONTINUED | OUTPATIENT
Start: 2024-01-10 | End: 2024-01-12

## 2024-01-10 RX ORDER — SODIUM CHLORIDE 9 MG/ML
1000 INJECTION INTRAMUSCULAR; INTRAVENOUS; SUBCUTANEOUS
Refills: 0 | Status: DISCONTINUED | OUTPATIENT
Start: 2024-01-10 | End: 2024-01-10

## 2024-01-10 RX ORDER — METOPROLOL TARTRATE 50 MG
5 TABLET ORAL EVERY 6 HOURS
Refills: 0 | Status: DISCONTINUED | OUTPATIENT
Start: 2024-01-10 | End: 2024-01-12

## 2024-01-10 RX ORDER — POTASSIUM CHLORIDE 20 MEQ
10 PACKET (EA) ORAL
Refills: 0 | Status: COMPLETED | OUTPATIENT
Start: 2024-01-10 | End: 2024-01-10

## 2024-01-10 RX ORDER — POTASSIUM CHLORIDE 20 MEQ
10 PACKET (EA) ORAL
Refills: 0 | Status: DISCONTINUED | OUTPATIENT
Start: 2024-01-10 | End: 2024-01-10

## 2024-01-10 RX ADMIN — PANTOPRAZOLE SODIUM 40 MILLIGRAM(S): 20 TABLET, DELAYED RELEASE ORAL at 05:16

## 2024-01-10 RX ADMIN — SODIUM CHLORIDE 80 MILLILITER(S): 9 INJECTION INTRAMUSCULAR; INTRAVENOUS; SUBCUTANEOUS at 03:21

## 2024-01-10 RX ADMIN — Medication 100 MILLIGRAM(S): at 14:58

## 2024-01-10 RX ADMIN — SODIUM CHLORIDE 80 MILLILITER(S): 9 INJECTION, SOLUTION INTRAVENOUS at 10:58

## 2024-01-10 RX ADMIN — FLUCONAZOLE 100 MILLIGRAM(S): 150 TABLET ORAL at 19:11

## 2024-01-10 RX ADMIN — PANTOPRAZOLE SODIUM 40 MILLIGRAM(S): 20 TABLET, DELAYED RELEASE ORAL at 18:14

## 2024-01-10 RX ADMIN — Medication 100 MILLIEQUIVALENT(S): at 03:21

## 2024-01-10 RX ADMIN — POTASSIUM PHOSPHATE, MONOBASIC POTASSIUM PHOSPHATE, DIBASIC 83.33 MILLIMOLE(S): 236; 224 INJECTION, SOLUTION INTRAVENOUS at 05:25

## 2024-01-10 RX ADMIN — Medication 5 MILLIGRAM(S): at 10:58

## 2024-01-10 RX ADMIN — Medication 5 MILLIGRAM(S): at 18:14

## 2024-01-10 RX ADMIN — Medication 100 MILLIGRAM(S): at 22:28

## 2024-01-10 RX ADMIN — Medication 60 MICROGRAM(S): at 22:27

## 2024-01-10 RX ADMIN — Medication 300 MILLIGRAM(S): at 11:09

## 2024-01-10 RX ADMIN — CEFTRIAXONE 100 MILLIGRAM(S): 500 INJECTION, POWDER, FOR SOLUTION INTRAMUSCULAR; INTRAVENOUS at 14:59

## 2024-01-10 RX ADMIN — Medication 100 MILLIGRAM(S): at 05:18

## 2024-01-10 RX ADMIN — Medication 750 MILLIGRAM(S): at 11:39

## 2024-01-10 RX ADMIN — Medication 100 MILLIEQUIVALENT(S): at 04:12

## 2024-01-10 RX ADMIN — CHLORHEXIDINE GLUCONATE 1 APPLICATION(S): 213 SOLUTION TOPICAL at 05:25

## 2024-01-10 RX ADMIN — Medication 750 MILLIGRAM(S): at 00:30

## 2024-01-10 RX ADMIN — Medication 5 MILLIGRAM(S): at 23:15

## 2024-01-10 RX ADMIN — HEPARIN SODIUM 7 UNIT(S)/HR: 5000 INJECTION INTRAVENOUS; SUBCUTANEOUS at 06:19

## 2024-01-10 RX ADMIN — Medication 100 GRAM(S): at 17:03

## 2024-01-10 NOTE — DIETITIAN INITIAL EVALUATION ADULT - PERTINENT MEDS FT
MEDICATIONS  (STANDING):  cefTRIAXone   IVPB 2000 milliGRAM(s) IV Intermittent every 24 hours  chlorhexidine 2% Cloths 1 Application(s) Topical <User Schedule>  dextrose 5% + sodium chloride 0.9%. 1000 milliLiter(s) (80 mL/Hr) IV Continuous <Continuous>  fluconAZOLE IVPB 400 milliGRAM(s) IV Intermittent every 24 hours  heparin  Infusion 900 Unit(s)/Hr (7 mL/Hr) IV Continuous <Continuous>  influenza  Vaccine (HIGH DOSE) 0.7 milliLiter(s) IntraMuscular once  levothyroxine Injectable 60 MICROGram(s) IV Push at bedtime  magnesium sulfate  IVPB 1 Gram(s) IV Intermittent once  metoprolol tartrate Injectable 5 milliGRAM(s) IV Push every 6 hours  metroNIDAZOLE  IVPB 500 milliGRAM(s) IV Intermittent every 8 hours  pantoprazole  Injectable 40 milliGRAM(s) IV Push every 12 hours    MEDICATIONS  (PRN):

## 2024-01-10 NOTE — DIETITIAN INITIAL EVALUATION ADULT - ADD RECOMMEND
1. Initiate nutrition as soon as medically feasible  - if PO, consider need for low fiber diet, safe texture per team/SLP  - if unable to initiate PO diet within 5+ days, recommend temporary TPN to meet needs  2. Hydration per team  - adjust IV D5 + NS prn  3. Ongoing diet education prn  4. Monitor chemistry, GI function, skin integrity  5. Pain & bowel regimen per team

## 2024-01-10 NOTE — PROGRESS NOTE ADULT - ASSESSMENT
81yo Female pt with PMH depression, hypothyroidism, HLD, Afib on Eliquis (last dose today) and PSH of L hip surgery, cholecystectomy and VHR presents with 1-day history of sudden severe midabdominal/epigastric pain. Febrile to 101.7, tachycardic to 110 improved to 90 with 1L bolus. On exam, soft, mildly distended, moderate to severe midabdominal and epigastric ttp with voluntary guarding and no rebound; NGT placed with immediate return of 75cc bloody output. Labs show WBC 10.36, H/H 13.2/38.8, BUN/Cr 18/0.53, lactate 3.2. CT AP with PO and IV contrast demonstrates perforated diverticulitis of distal duodenum.     N: Pain adequately controlled with IV tylenol  CV: Hypertension and atrial fibrillation. Start Metoprolol 5 Q6 continue Heparin gtt  GI: Peforated duodenal diverticula Continue NGT suction and keep NPO. Protonix 40 Q12  : Voiding. Strict I/Os. 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 Blood cultures drawn on 1/9 preliminary results show no growth at 12 hours. Peforated viscus Continue Ceftriaxone, Flagyl, and Fluconazole  Endo: Hypothyroidism Synthroid 60 Qhs, Insulin sliding scale  Heme: Atrial fibrillation Heparin gtt, continue to monitor aPTT Q6 for goal 60-80  PPx: Heparin gtt  Lines/tubes/drains: NGT to suction  Access: PIVs  PT/OT: Ordered  Dispo: Transfer to SDU today

## 2024-01-10 NOTE — DIETITIAN INITIAL EVALUATION ADULT - OTHER INFO
81yo Female pt with PMH depression, hypothyroidism, HLD, Afib on Eliquis (last dose today) and PSH of L hip surgery, cholecystectomy and VHR presents with 1-day history of sudden severe midabdominal/epigastric pain. Febrile to 101.7, tachycardic to 110 improved to 90 with 1L bolus. On exam, soft, mildly distended, moderate to severe midabdominal and epigastric ttp with voluntary guarding and no rebound; NGT placed with immediate return of 75cc bloody output. Labs show WBC 10.36, H/H 13.2/38.8, BUN/Cr 18/0.53, lactate 3.2. CT AP with PO and IV contrast demonstrates perforated diverticulitis of distal duodenum.     Chart reviewed, discussed with IDT. Rx and Labs reviewed. Pt seen at bedside on 5 EA, on room air. Currently NPO. Found with contained perforated ulcer/diverticula, NGT to LIWS in place with dark blood tinged output. Pending Upper GI series. Pt endorses hunger at present. Confirms No known food allergies, no cultural/ethnic/Evangelical food preferences provided. Reported height of 57in. used for calculations. Pt endorses good appetite & PO intake at baseline, no recent changes. Last time pt has eaten was 3 days ago. Denies recent changes to weight. No overt muscle wasting/subcutaneous losses noted, pt does not meet criteria for malnutrition at this time. Labs reviewed- Na 132 <L>, serum osm 267 <L> [monitor fluid/volume status], bicarb 17 <L>, BUN 5 <L>, Creat 0.29 <L> [inadequate nutrition status], K+ & Phos previously low, now WNL. fingersticks trending 112-132 mg/dL x 24hrs. Meds- on IV D5 + NS [provides 96g dextrose / 326.4 kcals daily], protonix, synthroid [administer 30-60 minutes before food; separate at least 4hrs from calcium or iron-containing products or bile acid sequestrants], magnesium sulfate. RDN will continue to monitor, reassess, and intervene as appropriate.     Pain: no pain/discomfort noted  Skin: no pressure injuries, no edema noted; Fermin score 17  GI: soft, nontender/nondistended 81yo Female pt with PMH depression, hypothyroidism, HLD, Afib on Eliquis (last dose today) and PSH of L hip surgery, cholecystectomy and VHR presents with 1-day history of sudden severe midabdominal/epigastric pain. Febrile to 101.7, tachycardic to 110 improved to 90 with 1L bolus. On exam, soft, mildly distended, moderate to severe midabdominal and epigastric ttp with voluntary guarding and no rebound; NGT placed with immediate return of 75cc bloody output. Labs show WBC 10.36, H/H 13.2/38.8, BUN/Cr 18/0.53, lactate 3.2. CT AP with PO and IV contrast demonstrates perforated diverticulitis of distal duodenum.     Chart reviewed, discussed with IDT. Rx and Labs reviewed. Pt seen at bedside on 5 EA, on room air. Currently NPO. Found with contained perforated ulcer/diverticula, NGT to LIWS in place with dark blood tinged output. Pending Upper GI series. Pt endorses hunger at present. Confirms No known food allergies, no cultural/ethnic/Zoroastrianism food preferences provided. Reported height of 57in. used for calculations. Pt endorses good appetite & PO intake at baseline, no recent changes. Last time pt has eaten was 3 days ago. Denies recent changes to weight. No overt muscle wasting/subcutaneous losses noted, pt does not meet criteria for malnutrition at this time. Labs reviewed- Na 132 <L>, serum osm 267 <L> [monitor fluid/volume status], bicarb 17 <L>, BUN 5 <L>, Creat 0.29 <L> [inadequate nutrition status], K+ & Phos previously low, now WNL. fingersticks trending 112-132 mg/dL x 24hrs. Meds- on IV D5 + NS [provides 96g dextrose / 326.4 kcals daily], protonix, synthroid [administer 30-60 minutes before food; separate at least 4hrs from calcium or iron-containing products or bile acid sequestrants], magnesium sulfate. RDN will continue to monitor, reassess, and intervene as appropriate.     Pain: no pain/discomfort noted  Skin: no pressure injuries, no edema noted; Fermin score 17  GI: soft, nontender/nondistended

## 2024-01-10 NOTE — PROGRESS NOTE ADULT - SUBJECTIVE AND OBJECTIVE BOX
Interval Events:  Patient seen and examined at bedside. Overnight PTT was 108.6 Heparin gtt decreased to 7mL/hr. Na overnight was 132 D5LR was switched to NS. Phosphate was 1.4 Kphos was ordered.      Allergies    No Known Allergies    Intolerances        Vital Signs Last 24 Hrs  T(C): 37.5 (10 Terrell 2024 10:00), Max: 37.5 (10 Terrell 2024 10:00)  T(F): 99.5 (10 Terrell 2024 10:00), Max: 99.5 (10 Terrell 2024 10:00)  HR: 101 (10 Terrell 2024 11:) (76 - 110)  BP: 144/69 (10 Terrell 2024 11:) (119/58 - 162/72)  BP(mean): 99 (10 Terrell 2024 11:) (82 - 107)  RR: 20 (10 Terrell 2024 11:) (20 - 26)  SpO2: 96% (10 Terrell 2024 11:) (94% - 98%)    Parameters below as of 10 Terrell 2024 11:00  Patient On (Oxygen Delivery Method): room air         @ :01  -  01-10 @ 07:00  --------------------------------------------------------  IN: 3775.9 mL / OUT: 2500 mL / NET: 1275.9 mL    01-10 @ :01  -  01-10 @ 11:35  --------------------------------------------------------  IN: 431.3 mL / OUT: 0 mL / NET: 431.3 mL       @ :01  -  01-10 @ 07:00  --------------------------------------------------------  IN: 3775.9 mL / OUT: 2500 mL / NET: 1275.9 mL    01-10 @ 07:01  -  01-10 @ 11:35  --------------------------------------------------------  IN: 431.3 mL / OUT: 0 mL / NET: 431.3 mL        Physical Exam:     Gen: NAD well nourished  Neuro: A&OX3 No deficits  CV:RRR Reg s1s2 noM  Pulm: CTA b/l No w/r/r  Abd: Soft Mild tenderness in epigastric region to deep palpation ND + BS  Ext: No C/C/E   Vasc: + DP b/l   Skin: no rashes noted  MSK: No joint swelling  Psych: No signs of anxiety or depression      LABS:      CBC Full  -  ( 10 Terrell 2024 02:41 )  WBC Count : 10.75 K/uL  RBC Count : 3.34 M/uL  Hemoglobin : 10.9 g/dL  Hematocrit : 30.7 %  Platelet Count - Automated : 215 K/uL  Mean Cell Volume : 91.9 fl  Mean Cell Hemoglobin : 32.6 pg  Mean Cell Hemoglobin Concentration : 35.5 gm/dL  Auto Neutrophil # : x  Auto Lymphocyte # : x  Auto Monocyte # : x  Auto Eosinophil # : x  Auto Basophil # : x  Auto Neutrophil % : x  Auto Lymphocyte % : x  Auto Monocyte % : x  Auto Eosinophil % : x  Auto Basophil % : x    01-10    132<L>  |  102  |  6<L>  ----------------------------<  131<H>  3.1<L>   |  22  |  0.35<L>    Ca    8.7      10 Terrell 2024 02:41  Phos  1.4     01-10  Mg     2.0     01-10    TPro  6.6  /  Alb  4.2  /  TBili  1.2  /  DBili  x   /  AST  42<H>  /  ALT  37  /  AlkPhos  82  -08    PT/INR - ( 2024 13:08 )   PT: 17.0 sec;   INR: 1.51          PTT - ( 10 Terrell 2024 08:26 )  PTT:68.0 sec      Urinalysis Basic - ( 10 Terrell 2024 07:07 )    Color: Yellow / Appearance: Clear / S.019 / pH: x  Gluc: x / Ketone: 15 mg/dL  / Bili: Negative / Urobili: 1.0 mg/dL   Blood: x / Protein: 30 mg/dL / Nitrite: Negative   Leuk Esterase: Negative / RBC: 6 /HPF / WBC 1 /HPF   Sq Epi: x / Non Sq Epi: 0 /HPF / Bacteria: Negative /HPF              RADIOLOGY & ADDITIONAL STUDIES (The following images were personally reviewed):          A/p: 83yFemale

## 2024-01-10 NOTE — DIETITIAN INITIAL EVALUATION ADULT - NSFNSGIIOFT_GEN_A_CORE
01-09-24 @ 07:01  -  01-10-24 @ 07:00  --------------------------------------------------------  OUT:    Nasogastric/Oral tube (mL): 250 mL  Total OUT: 250 mL    Total NET: -250 mL      01-10-24 @ 07:01  -  01-10-24 @ 14:46  --------------------------------------------------------  OUT:    Nasogastric/Oral tube (mL): 0 mL  Total OUT: 0 mL    Total NET: 0 mL

## 2024-01-10 NOTE — DIETITIAN INITIAL EVALUATION ADULT - OTHER CALCULATIONS
Ideal body weight (42.6kg) used for calculations as pt >100% IBW and BMI <30 per [facility] Standards of Care. Needs estimated for age and adjusted for current clinical status. Fluid needs per team Ideal body weight (42.6kg) used for calculations as pt >100% IBW and BMI <30 per [facility] Standards of Care. Needs estimated for age and adjusted for current clinical status. Fluid needs per team

## 2024-01-10 NOTE — DIETITIAN INITIAL EVALUATION ADULT - PERTINENT LABORATORY DATA
01-10    132<L>  |  104  |  5<L>  ----------------------------<  135<H>  4.0   |  17<L>  |  0.29<L>    Ca    8.1<L>      10 Terrell 2024 12:32  Phos  3.1     01-10  Mg     1.8     01-10    POCT Blood Glucose.: 123 mg/dL (01-10-24 @ 11:40)

## 2024-01-10 NOTE — PROGRESS NOTE ADULT - ATTENDING COMMENTS
Patient is an 82 year old woman with history of depression, hypothyroidism, HLD, A fib on Eliquis, left hip surgery, cholecystectomy, ventral hernia repair who presents now with clinical, laboratory, and radiographic findings concerning for perforated duodenal diverticulum. She reports one day history of sudden onset epigastric abdominal pain. At time of evaluation, febrile, tachycardic, hemodynamically stable, abdomen soft, mild tenderness in epigastric region, mild distension, no rebound or guarding. Lactic acidosis resolved. CT imaging demonstrates perforated duodenal diverticulum, appears contained. Continue NPO, bowel rest, NG tube decompression, IV fluids, IV antibiotics, antifungal coverage, serial abdominal exams. Late entry, date of service 1/10/24.

## 2024-01-10 NOTE — PROGRESS NOTE ADULT - SUBJECTIVE AND OBJECTIVE BOX
Interval Events Overnight:  .7 therefore Hep gtt decr by to 7ml/hr. Morning labs sent with PTT. Na+ 132 (132). Phos 1.4. K+ 3.1. D5LR changed to NS. Kphos ordered. KCL ordered. PTT 90.5. Rate kept at 7ml/hr. Abd pain improved.   Patient seen and examined at bedside.      Allergies    No Known Allergies    Intolerances        Vital Signs Last 24 Hrs  T(C): 36.7 (10 Terrell 2024 05:11), Max: 37.5 (09 Jan 2024 09:00)  T(F): 98.1 (10 Terrell 2024 05:11), Max: 99.5 (09 Jan 2024 09:00)  HR: 92 (10 Terrell 2024 06:00) (76 - 93)  BP: 144/67 (10 Terrell 2024 06:00) (111/56 - 162/72)  BP(mean): 96 (10 Terrell 2024 06:00) (80 - 107)  RR: 20 (10 Terrell 2024 06:00) (17 - 26)  SpO2: 95% (10 Terrell 2024 06:00) (95% - 98%)    Parameters below as of 10 Terrell 2024 07:00  Patient On (Oxygen Delivery Method): room air        01-09 @ 07:01  -  01-10 @ 07:00  --------------------------------------------------------  IN: 3605.6 mL / OUT: 2300 mL / NET: 1305.6 mL      01-09 @ 07:01  -  01-10 @ 07:00  --------------------------------------------------------  IN: 3605.6 mL / OUT: 2300 mL / NET: 1305.6 mL        Physical Exam:     General: Resting in bed, NAD  HEENT: NC/AT, EOMI, neck supple. NGT in place with dark blood tinged output   Pulmonary: Equal chest wall expansion b/l, no respiratory distress. Lungs CTA B/L  Cardiovascular: Sinus rhythm - regular rate. No murmurs appreciated  Abdominal: soft, nondistended, mild to moderate tenderness in epigastrium. No rebound, no guarding  Extremities: WWP, 5/5 strength x 4, no clubbing/cyanosis/edema  Neuro: A/O x3, CNs II-XII grossly intact, normal motor/sensation, no focal deficits  Pulses: palpable distal pulses  Skin: no rashes noted  MSK: No joint swelling  Psych: No signs of anxiety or depression      LABS:      CBC Full  -  ( 10 Terrell 2024 02:41 )  WBC Count : 10.75 K/uL  RBC Count : 3.34 M/uL  Hemoglobin : 10.9 g/dL  Hematocrit : 30.7 %  Platelet Count - Automated : 215 K/uL  Mean Cell Volume : 91.9 fl  Mean Cell Hemoglobin : 32.6 pg  Mean Cell Hemoglobin Concentration : 35.5 gm/dL  Auto Neutrophil # : x  Auto Lymphocyte # : x  Auto Monocyte # : x  Auto Eosinophil # : x  Auto Basophil # : x  Auto Neutrophil % : x  Auto Lymphocyte % : x  Auto Monocyte % : x  Auto Eosinophil % : x  Auto Basophil % : x    01-10    132<L>  |  102  |  6<L>  ----------------------------<  131<H>  3.1<L>   |  22  |  0.35<L>    Ca    8.7      10 Terrell 2024 02:41  Phos  1.4     01-10  Mg     2.0     01-10    TPro  6.6  /  Alb  4.2  /  TBili  1.2  /  DBili  x   /  AST  42<H>  /  ALT  37  /  AlkPhos  82  01-08    PT/INR - ( 08 Jan 2024 13:08 )   PT: 17.0 sec;   INR: 1.51          PTT - ( 10 Terrell 2024 02:06 )  PTT:90.5 sec      Urinalysis Basic - ( 10 Terrell 2024 02:41 )    Color: x / Appearance: x / SG: x / pH: x  Gluc: 131 mg/dL / Ketone: x  / Bili: x / Urobili: x   Blood: x / Protein: x / Nitrite: x   Leuk Esterase: x / RBC: x / WBC x   Sq Epi: x / Non Sq Epi: x / Bacteria: x              RADIOLOGY & ADDITIONAL STUDIES (The following images were personally reviewed):          A/p: 81yo Female pt with PMH depression, hypothyroidism, HLD, Afib on Eliquis (last dose today) and PSH of L hip surgery, cholecystectomy and VHR presents with 1-day history of sudden severe midabdominal/epigastric pain. Febrile to 101.7, tachycardic to 110 improved to 90 with 1L bolus. On exam, soft, mildly distended, moderate to severe midabdominal and epigastric ttp with voluntary guarding and no rebound; NGT placed with immediate return of 75cc bloody output. Labs show WBC 10.36, H/H 13.2/38.8, BUN/Cr 18/0.53, lactate 3.2. CT AP with PO and IV contrast demonstrates perforated diverticulitis of distal duodenum.     N: Pain control w/ IV Tylenol. Avoid narcotics.  CV: Goal maintain MAP>65. NS @ 80cc/hr. History of AFib- , holding Eliquis Cont Heparin gtt with Goal ( 60-80)  Will restart Metoprolol today 25 QD.  Hx of HLD - holding atrovastatin  Pulm: On room air. No pulmonary concerns  GI: Contained perforated ulcer/diverticula. NPO. NGT to LIWS. IV PPI BID. ?Refeeding syndrome:  Hypokalemia & Hypophosphatemia- repleted f/u repeat BMP once repletions finished.  Will f/u with Surgical team regarding timing of UGI series timing and diet plan.   : Voiding. Strict I/Os. Hyponatremia: IVF changed to NS- F/u Urine Lytes with serum osm.   ID: Bld cx's from 1/8: staph epi ( most likely a contaminant) therefore will f/u repeat Cx's from 1/9. Cont: Ceftriaxone (1/9-), Flagyl (1/9-), Fluconazole (1/8-) // D/C Zosyn (1/8-1/9)  Endo: mISS. Hx of Hypothyroidism - Continue synthroid IV.   Heme: Heparin gtt. Trend PTT q6 for goal 60-80  PPx: SCDs Heparin Gtt  Lines: PIVs  PT/OT: Ordered OOB to chair  Dispo: Transfer to SDU today  Interval Events Overnight:  .7 therefore Hep gtt decr by to 7ml/hr. Morning labs sent with PTT. Na+ 132 (132). Phos 1.4. K+ 3.1. D5LR changed to NS. Kphos ordered. KCL ordered. PTT 90.5. Rate kept at 7ml/hr. Abd pain improved.   Patient seen and examined at bedside.      Allergies    No Known Allergies    Intolerances        Vital Signs Last 24 Hrs  T(C): 36.7 (10 Terrell 2024 05:11), Max: 37.5 (09 Jan 2024 09:00)  T(F): 98.1 (10 Terrell 2024 05:11), Max: 99.5 (09 Jan 2024 09:00)  HR: 92 (10 Terrell 2024 06:00) (76 - 93)  BP: 144/67 (10 Terrell 2024 06:00) (111/56 - 162/72)  BP(mean): 96 (10 Terrell 2024 06:00) (80 - 107)  RR: 20 (10 Terrell 2024 06:00) (17 - 26)  SpO2: 95% (10 Terrell 2024 06:00) (95% - 98%)    Parameters below as of 10 Terrell 2024 07:00  Patient On (Oxygen Delivery Method): room air        01-09 @ 07:01  -  01-10 @ 07:00  --------------------------------------------------------  IN: 3605.6 mL / OUT: 2300 mL / NET: 1305.6 mL      01-09 @ 07:01  -  01-10 @ 07:00  --------------------------------------------------------  IN: 3605.6 mL / OUT: 2300 mL / NET: 1305.6 mL        Physical Exam:     General: Resting in bed, NAD  HEENT: NC/AT, EOMI, neck supple. NGT in place with dark blood tinged output   Pulmonary: Equal chest wall expansion b/l, no respiratory distress. Lungs CTA B/L  Cardiovascular: Sinus rhythm - regular rate. No murmurs appreciated  Abdominal: soft, nondistended, mild to moderate tenderness in epigastrium. No rebound, no guarding  Extremities: WWP, 5/5 strength x 4, no clubbing/cyanosis/edema  Neuro: A/O x3, CNs II-XII grossly intact, normal motor/sensation, no focal deficits  Pulses: palpable distal pulses  Skin: no rashes noted  MSK: No joint swelling  Psych: No signs of anxiety or depression      LABS:      CBC Full  -  ( 10 Terrell 2024 02:41 )  WBC Count : 10.75 K/uL  RBC Count : 3.34 M/uL  Hemoglobin : 10.9 g/dL  Hematocrit : 30.7 %  Platelet Count - Automated : 215 K/uL  Mean Cell Volume : 91.9 fl  Mean Cell Hemoglobin : 32.6 pg  Mean Cell Hemoglobin Concentration : 35.5 gm/dL  Auto Neutrophil # : x  Auto Lymphocyte # : x  Auto Monocyte # : x  Auto Eosinophil # : x  Auto Basophil # : x  Auto Neutrophil % : x  Auto Lymphocyte % : x  Auto Monocyte % : x  Auto Eosinophil % : x  Auto Basophil % : x    01-10    132<L>  |  102  |  6<L>  ----------------------------<  131<H>  3.1<L>   |  22  |  0.35<L>    Ca    8.7      10 Terrell 2024 02:41  Phos  1.4     01-10  Mg     2.0     01-10    TPro  6.6  /  Alb  4.2  /  TBili  1.2  /  DBili  x   /  AST  42<H>  /  ALT  37  /  AlkPhos  82  01-08    PT/INR - ( 08 Jan 2024 13:08 )   PT: 17.0 sec;   INR: 1.51          PTT - ( 10 Terrell 2024 02:06 )  PTT:90.5 sec      Urinalysis Basic - ( 10 Terrell 2024 02:41 )    Color: x / Appearance: x / SG: x / pH: x  Gluc: 131 mg/dL / Ketone: x  / Bili: x / Urobili: x   Blood: x / Protein: x / Nitrite: x   Leuk Esterase: x / RBC: x / WBC x   Sq Epi: x / Non Sq Epi: x / Bacteria: x              RADIOLOGY & ADDITIONAL STUDIES (The following images were personally reviewed):          A/p: 83yo Female pt with PMH depression, hypothyroidism, HLD, Afib on Eliquis (last dose today) and PSH of L hip surgery, cholecystectomy and VHR presents with 1-day history of sudden severe midabdominal/epigastric pain. Febrile to 101.7, tachycardic to 110 improved to 90 with 1L bolus. On exam, soft, mildly distended, moderate to severe midabdominal and epigastric ttp with voluntary guarding and no rebound; NGT placed with immediate return of 75cc bloody output. Labs show WBC 10.36, H/H 13.2/38.8, BUN/Cr 18/0.53, lactate 3.2. CT AP with PO and IV contrast demonstrates perforated diverticulitis of distal duodenum.     N: Pain control w/ IV Tylenol. Avoid narcotics.  CV: Goal maintain MAP>65. NS @ 80cc/hr. History of AFib- , holding Eliquis Cont Heparin gtt with Goal ( 60-80)  Will restart Metoprolol today 25 QD.  Hx of HLD - holding atrovastatin  Pulm: On room air. No pulmonary concerns  GI: Contained perforated ulcer/diverticula. NPO. NGT to LIWS. IV PPI BID. ?Refeeding syndrome:  Hypokalemia & Hypophosphatemia- repleted f/u repeat BMP once repletions finished.  Will f/u with Surgical team regarding timing of UGI series timing and diet plan.   : Voiding. Strict I/Os. Hyponatremia: IVF changed to NS- F/u Urine Lytes with serum osm.   ID: Bld cx's from 1/8: staph epi ( most likely a contaminant) therefore will f/u repeat Cx's from 1/9. Cont: Ceftriaxone (1/9-), Flagyl (1/9-), Fluconazole (1/8-) // D/C Zosyn (1/8-1/9)  Endo: mISS. Hx of Hypothyroidism - Continue synthroid IV.   Heme: Heparin gtt. Trend PTT q6 for goal 60-80  PPx: SCDs Heparin Gtt  Lines: PIVs  PT/OT: Ordered OOB to chair  Dispo: Transfer to SDU today  Interval Events Overnight:  .7 therefore Hep gtt decr by to 7ml/hr. Morning labs sent with PTT showing: Na+ 132 (132). Phos 1.4. K+ 3.1. D5LR changed to NS,  Kphos and  KCL repletions ordered. PTT 90.5. Rate kept at 7ml/hr. Abd pain improved.   Patient seen and examined at bedside.      Allergies    No Known Allergies    Intolerances        Vital Signs Last 24 Hrs  T(C): 36.7 (10 Terrell 2024 05:11), Max: 37.5 (09 Jan 2024 09:00)  T(F): 98.1 (10 Terrell 2024 05:11), Max: 99.5 (09 Jan 2024 09:00)  HR: 92 (10 Terrell 2024 06:00) (76 - 93)  BP: 144/67 (10 Terrell 2024 06:00) (111/56 - 162/72)  BP(mean): 96 (10 Terrell 2024 06:00) (80 - 107)  RR: 20 (10 Terrell 2024 06:00) (17 - 26)  SpO2: 95% (10 Terrell 2024 06:00) (95% - 98%)    Parameters below as of 10 Terrell 2024 07:00  Patient On (Oxygen Delivery Method): room air        01-09 @ 07:01  -  01-10 @ 07:00  --------------------------------------------------------  IN: 3605.6 mL / OUT: 2300 mL / NET: 1305.6 mL      01-09 @ 07:01  -  01-10 @ 07:00  --------------------------------------------------------  IN: 3605.6 mL / OUT: 2300 mL / NET: 1305.6 mL        Physical Exam:     General: Resting in bed, NAD  HEENT: NC/AT, EOMI, neck supple. NGT in place with dark blood tinged output   Pulmonary: Equal chest wall expansion b/l, no respiratory distress. Lungs CTA B/L decreased BS in bases rhonchi cleared upon cough.   Cardiovascular: Sinus rhythm - regular rate. No murmurs appreciated  Abdominal: soft, nondistended, mild to moderate tenderness in epigastrium. No rebound, no guarding  Extremities: WWP, 5/5 strength x 4, no clubbing/cyanosis/edema  Neuro: A/O x3, CNs II-XII grossly intact, normal motor/sensation, no focal deficits  Pulses: palpable distal pulses  Skin: no rashes noted  MSK: No joint swelling  Psych: No signs of anxiety or depression      LABS:      CBC Full  -  ( 10 Terrell 2024 02:41 )  WBC Count : 10.75 K/uL  RBC Count : 3.34 M/uL  Hemoglobin : 10.9 g/dL  Hematocrit : 30.7 %  Platelet Count - Automated : 215 K/uL  Mean Cell Volume : 91.9 fl  Mean Cell Hemoglobin : 32.6 pg  Mean Cell Hemoglobin Concentration : 35.5 gm/dL  Auto Neutrophil # : x  Auto Lymphocyte # : x  Auto Monocyte # : x  Auto Eosinophil # : x  Auto Basophil # : x  Auto Neutrophil % : x  Auto Lymphocyte % : x  Auto Monocyte % : x  Auto Eosinophil % : x  Auto Basophil % : x    01-10    132<L>  |  102  |  6<L>  ----------------------------<  131<H>  3.1<L>   |  22  |  0.35<L>    Ca    8.7      10 Terrell 2024 02:41  Phos  1.4     01-10  Mg     2.0     01-10    TPro  6.6  /  Alb  4.2  /  TBili  1.2  /  DBili  x   /  AST  42<H>  /  ALT  37  /  AlkPhos  82  01-08    PT/INR - ( 08 Jan 2024 13:08 )   PT: 17.0 sec;   INR: 1.51          PTT - ( 10 Terrell 2024 02:06 )  PTT:90.5 sec      Urinalysis Basic - ( 10 Terrell 2024 02:41 )    Color: x / Appearance: x / SG: x / pH: x  Gluc: 131 mg/dL / Ketone: x  / Bili: x / Urobili: x   Blood: x / Protein: x / Nitrite: x   Leuk Esterase: x / RBC: x / WBC x   Sq Epi: x / Non Sq Epi: x / Bacteria: x              RADIOLOGY & ADDITIONAL STUDIES (The following images were personally reviewed):          A/p: 83yo Female pt with PMH depression, hypothyroidism, HLD, Afib on Eliquis (last dose today) and PSH of L hip surgery, cholecystectomy and VHR presents with 1-day history of sudden severe midabdominal/epigastric pain. Febrile to 101.7, tachycardic to 110 improved to 90 with 1L bolus. On exam, soft, mildly distended, moderate to severe midabdominal and epigastric ttp with voluntary guarding and no rebound; NGT placed with immediate return of 75cc bloody output. Labs show WBC 10.36, H/H 13.2/38.8, BUN/Cr 18/0.53, lactate 3.2. CT AP with PO and IV contrast demonstrates perforated diverticulitis of distal duodenum.     N: Pain control w/ IV Tylenol. Avoid narcotics.  CV: Goal maintain MAP>65. NS @ 80cc/hr. History of AFib- , holding Eliquis Cont Heparin gtt with Goal ( 60-80)  Will restart Metoprolol today 25 QD.  Hx of HLD - holding atrovastatin  Pulm: On room air. No pulmonary concerns  GI: Contained perforated ulcer/diverticula. NPO. NGT to LIWS. IV PPI BID. Hypokalemia & Hypophosphatemia- repleted f/u repeat BMP once repletions finished. Cont D5 NS @90.  Will f/u with Surgical team regarding timing of UGI series timing and diet plan.   : Voiding. Strict I/Os. Hyponatremia: IVF changed to D5NS- F/u Urine Lytes with serum osm.   ID: Bld cx's from 1/8: staph epi ( most likely a contaminant) therefore will f/u repeat Cx's from 1/9. Cont: Ceftriaxone (1/9-), Flagyl (1/9-), Fluconazole (1/8-) // D/C Zosyn (1/8-1/9)  Endo: mISS. Hx of Hypothyroidism - Continue synthroid IV.   Heme: Heparin gtt. Trend PTT q6 for goal 60-80  PPx: SCDs Heparin Gtt  Lines: PIVs  PT/OT: Ordered OOB to chair  Dispo: Transfer to SDU today  Interval Events Overnight:  .7 therefore Hep gtt decr by to 7ml/hr. Morning labs sent with PTT showing: Na+ 132 (132). Phos 1.4. K+ 3.1. D5LR changed to NS,  Kphos and  KCL repletions ordered. PTT 90.5. Rate kept at 7ml/hr. Abd pain improved.   Patient seen and examined at bedside.      Allergies    No Known Allergies    Intolerances        Vital Signs Last 24 Hrs  T(C): 36.7 (10 Terrell 2024 05:11), Max: 37.5 (09 Jan 2024 09:00)  T(F): 98.1 (10 Terrell 2024 05:11), Max: 99.5 (09 Jan 2024 09:00)  HR: 92 (10 Terrell 2024 06:00) (76 - 93)  BP: 144/67 (10 Terrell 2024 06:00) (111/56 - 162/72)  BP(mean): 96 (10 Terrell 2024 06:00) (80 - 107)  RR: 20 (10 Terrell 2024 06:00) (17 - 26)  SpO2: 95% (10 Terrell 2024 06:00) (95% - 98%)    Parameters below as of 10 Terrell 2024 07:00  Patient On (Oxygen Delivery Method): room air        01-09 @ 07:01  -  01-10 @ 07:00  --------------------------------------------------------  IN: 3605.6 mL / OUT: 2300 mL / NET: 1305.6 mL      01-09 @ 07:01  -  01-10 @ 07:00  --------------------------------------------------------  IN: 3605.6 mL / OUT: 2300 mL / NET: 1305.6 mL        Physical Exam:     General: Resting in bed, NAD  HEENT: NC/AT, EOMI, neck supple. NGT in place with dark blood tinged output   Pulmonary: Equal chest wall expansion b/l, no respiratory distress. Lungs CTA B/L decreased BS in bases rhonchi cleared upon cough.   Cardiovascular: Sinus rhythm - regular rate. No murmurs appreciated  Abdominal: soft, nondistended, mild to moderate tenderness in epigastrium. No rebound, no guarding  Extremities: WWP, 5/5 strength x 4, no clubbing/cyanosis/edema  Neuro: A/O x3, CNs II-XII grossly intact, normal motor/sensation, no focal deficits  Pulses: palpable distal pulses  Skin: no rashes noted  MSK: No joint swelling  Psych: No signs of anxiety or depression      LABS:      CBC Full  -  ( 10 Terrell 2024 02:41 )  WBC Count : 10.75 K/uL  RBC Count : 3.34 M/uL  Hemoglobin : 10.9 g/dL  Hematocrit : 30.7 %  Platelet Count - Automated : 215 K/uL  Mean Cell Volume : 91.9 fl  Mean Cell Hemoglobin : 32.6 pg  Mean Cell Hemoglobin Concentration : 35.5 gm/dL  Auto Neutrophil # : x  Auto Lymphocyte # : x  Auto Monocyte # : x  Auto Eosinophil # : x  Auto Basophil # : x  Auto Neutrophil % : x  Auto Lymphocyte % : x  Auto Monocyte % : x  Auto Eosinophil % : x  Auto Basophil % : x    01-10    132<L>  |  102  |  6<L>  ----------------------------<  131<H>  3.1<L>   |  22  |  0.35<L>    Ca    8.7      10 Terrell 2024 02:41  Phos  1.4     01-10  Mg     2.0     01-10    TPro  6.6  /  Alb  4.2  /  TBili  1.2  /  DBili  x   /  AST  42<H>  /  ALT  37  /  AlkPhos  82  01-08    PT/INR - ( 08 Jan 2024 13:08 )   PT: 17.0 sec;   INR: 1.51          PTT - ( 10 Terrell 2024 02:06 )  PTT:90.5 sec      Urinalysis Basic - ( 10 Terrell 2024 02:41 )    Color: x / Appearance: x / SG: x / pH: x  Gluc: 131 mg/dL / Ketone: x  / Bili: x / Urobili: x   Blood: x / Protein: x / Nitrite: x   Leuk Esterase: x / RBC: x / WBC x   Sq Epi: x / Non Sq Epi: x / Bacteria: x              RADIOLOGY & ADDITIONAL STUDIES (The following images were personally reviewed):          A/p: 83yo Female pt with PMH depression, hypothyroidism, HLD, Afib on Eliquis (last dose today) and PSH of L hip surgery, cholecystectomy and VHR presents with 1-day history of sudden severe midabdominal/epigastric pain. Febrile to 101.7, tachycardic to 110 improved to 90 with 1L bolus. On exam, soft, mildly distended, moderate to severe midabdominal and epigastric ttp with voluntary guarding and no rebound; NGT placed with immediate return of 75cc bloody output. Labs show WBC 10.36, H/H 13.2/38.8, BUN/Cr 18/0.53, lactate 3.2. CT AP with PO and IV contrast demonstrates perforated diverticulitis of distal duodenum.     N: Pain control w/ IV Tylenol. Avoid narcotics.  CV: Goal maintain MAP>65. NS @ 80cc/hr. History of AFib- , holding Eliquis Cont Heparin gtt with Goal ( 60-80)  Will start Metoprolol 5 q6 standing. Hx of HLD - holding atrovastatin  Pulm: On room air. No pulmonary concerns  GI: Contained perforated ulcer/diverticula. NPO. NGT to LIWS. IV PPI BID. Hypokalemia & Hypophosphatemia- repleted f/u repeat BMP once repletions finished. Cont D5 NS @90.  Will f/u with Surgical team regarding timing of UGI series timing and diet plan.   : Voiding. Strict I/Os. Hyponatremia: IVF changed to D5NS- F/u Urine Lytes with serum osm.   ID: Bld cx's from 1/8: staph epi ( most likely a contaminant) therefore will f/u repeat Cx's from 1/9. Cont: Ceftriaxone (1/9-), Flagyl (1/9-), Fluconazole (1/8-) // D/C Zosyn (1/8-1/9)  Endo: mISS. Hx of Hypothyroidism - Continue synthroid IV.   Heme: Heparin gtt. Trend PTT q6 for goal 60-80  PPx: SCDs Heparin Gtt  Lines: PIVs  PT/OT: Ordered OOB to chair  Dispo: Transfer to SDU today

## 2024-01-10 NOTE — PROGRESS NOTE ADULT - NS ATTEND AMEND GEN_ALL_CORE FT
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

## 2024-01-10 NOTE — CHART NOTE - NSCHARTNOTEFT_GEN_A_CORE
Patient examined while sleeping. Abdomen soft, non-tender, non-distended. Possible improvement in exam from prior. NGT w/ clear output. Complaining of headache. Will give tylenol and continue serial exams.

## 2024-01-10 NOTE — PROGRESS NOTE ADULT - SUBJECTIVE AND OBJECTIVE BOX
SUBJECTIVE: Pt seen and examined at bedside this am by surgery team. Patient is lying comfortably in bed with no complaints. Pain well controlled with current regimen. Patient denies fever, nausea, vomiting, chest pain, and shortness of breath. NGT in place    MEDICATIONS  (STANDING):  cefTRIAXone   IVPB 2000 milliGRAM(s) IV Intermittent every 24 hours  chlorhexidine 2% Cloths 1 Application(s) Topical <User Schedule>  dextrose 5% + sodium chloride 0.9%. 1000 milliLiter(s) (80 mL/Hr) IV Continuous <Continuous>  fluconAZOLE IVPB 400 milliGRAM(s) IV Intermittent every 24 hours  heparin  Infusion 900 Unit(s)/Hr (7 mL/Hr) IV Continuous <Continuous>  influenza  Vaccine (HIGH DOSE) 0.7 milliLiter(s) IntraMuscular once  levothyroxine Injectable 60 MICROGram(s) IV Push at bedtime  metoprolol tartrate Injectable 5 milliGRAM(s) IV Push every 6 hours  metroNIDAZOLE  IVPB 500 milliGRAM(s) IV Intermittent every 8 hours  pantoprazole  Injectable 40 milliGRAM(s) IV Push every 12 hours    MEDICATIONS  (PRN):      Vital Signs Last 24 Hrs  T(C): 36.9 (10 Terrell 2024 20:30), Max: 37.5 (10 Terrell 2024 10:00)  T(F): 98.4 (10 Terrell 2024 20:30), Max: 99.5 (10 Terrell 2024 10:00)  HR: 78 (10 Terrell 2024 20:30) (76 - 110)  BP: 170/81 (10 Terrell 2024 20:30) (121/84 - 170/81)  BP(mean): 108 (10 Terrell 2024 12:00) (91 - 108)  RR: 20 (10 Terrell 2024 20:30) (20 - 26)  SpO2: 98% (10 Terrell 2024 20:30) (94% - 98%)    Parameters below as of 10 Terrell 2024 20:30  Patient On (Oxygen Delivery Method): room air        Physical Exam  General: Patient is doing well and lying in bed comfortably  Constitutional: alert and oriented   Pulm: Nonlabored breathing, no respiratory distress  CV: Regular rate and rhythm, normal sinus rhythm  Abd:  soft, minimally tender in epigastric region, nondistended. No rebound, no guarding.   Extremities: warm, well perfused, no edema  Drains: NGT in place with pink tinged gastric/serous content    I&O's Detail    09 Jan 2024 07:01  -  10 Terrell 2024 07:00  --------------------------------------------------------  IN:    dextrose 5% + lactated ringers: 1350 mL    Heparin: 181 mL    IV PiggyBack: 50 mL    IV PiggyBack: 50 mL    IV PiggyBack: 200 mL    IV PiggyBack: 175 mL    IV PiggyBack: 450 mL    IV PiggyBack: 300 mL    IV PiggyBack: 249.9 mL    Lactated Ringers: 450 mL    sodium chloride 0.9%: 320 mL  Total IN: 3775.9 mL    OUT:    Nasogastric/Oral tube (mL): 250 mL    Voided (mL): 2250 mL  Total OUT: 2500 mL    Total NET: 1275.9 mL      10 Terrell 2024 07:01  -  10 Terrell 2024 21:04  --------------------------------------------------------  IN:    dextrose 5% + sodium chloride 0.9%: 720 mL    Heparin: 84 mL    IV PiggyBack: 83.3 mL    IV PiggyBack: 100 mL    sodium chloride 0.9%: 320 mL  Total IN: 1307.3 mL    OUT:    Nasogastric/Oral tube (mL): 0 mL    Voided (mL): 500 mL  Total OUT: 500 mL    Total NET: 807.3 mL        LABS:                        10.9   10.75 )-----------( 215      ( 10 Terrell 2024 02:41 )             30.7     01-10    132<L>  |  104  |  5<L>  ----------------------------<  135<H>  4.0   |  17<L>  |  0.29<L>    Ca    8.1<L>      10 Terrell 2024 12:32  Phos  3.1     01-10  Mg     1.8     01-10      PTT - ( 10 Terrell 2024 18:46 )  PTT:84.5 sec  Urinalysis Basic - ( 10 Terrell 2024 12:32 )    Color: x / Appearance: x / SG: x / pH: x  Gluc: 135 mg/dL / Ketone: x  / Bili: x / Urobili: x   Blood: x / Protein: x / Nitrite: x   Leuk Esterase: x / RBC: x / WBC x   Sq Epi: x / Non Sq Epi: x / Bacteria: x

## 2024-01-10 NOTE — CHART NOTE - NSCHARTNOTEFT_GEN_A_CORE
Pt seen and evaluated at bedside. Pt endorses an appetite. Denies nausea/vomiting w/ NGT in place. On exam, abdomen is soft, NT, mildly distended, nonperitonitic.

## 2024-01-10 NOTE — PROGRESS NOTE ADULT - ASSESSMENT
The patient is an 82 year old female pt with PMH depression, hypothyroidism, HLD, Afib on Eliquis (last dose today) and PSHx of L hip surgery, cholecystectomy and VHR presents with 1-day history of sudden severe midabdominal/epigastric pain admitted for perforated diverticulitis of distal duodenum seen on CTAP.     Recommendations:   NPO/IVF  NGT to [facility]  CTX/Flagyl/Fluconazole  PPI bid  SCDs/OOBA/IS  SDU

## 2024-01-11 ENCOUNTER — RX RENEWAL (OUTPATIENT)
Age: 84
End: 2024-01-11

## 2024-01-11 LAB
ANION GAP SERPL CALC-SCNC: 8 MMOL/L — SIGNIFICANT CHANGE UP (ref 5–17)
ANION GAP SERPL CALC-SCNC: 8 MMOL/L — SIGNIFICANT CHANGE UP (ref 5–17)
APTT BLD: 81.9 SEC — HIGH (ref 24.5–35.6)
APTT BLD: 81.9 SEC — HIGH (ref 24.5–35.6)
BUN SERPL-MCNC: 3 MG/DL — LOW (ref 7–23)
BUN SERPL-MCNC: 3 MG/DL — LOW (ref 7–23)
CALCIUM SERPL-MCNC: 8.5 MG/DL — SIGNIFICANT CHANGE UP (ref 8.4–10.5)
CALCIUM SERPL-MCNC: 8.5 MG/DL — SIGNIFICANT CHANGE UP (ref 8.4–10.5)
CHLORIDE SERPL-SCNC: 104 MMOL/L — SIGNIFICANT CHANGE UP (ref 96–108)
CHLORIDE SERPL-SCNC: 104 MMOL/L — SIGNIFICANT CHANGE UP (ref 96–108)
CO2 SERPL-SCNC: 24 MMOL/L — SIGNIFICANT CHANGE UP (ref 22–31)
CO2 SERPL-SCNC: 24 MMOL/L — SIGNIFICANT CHANGE UP (ref 22–31)
CREAT SERPL-MCNC: 0.34 MG/DL — LOW (ref 0.5–1.3)
CREAT SERPL-MCNC: 0.34 MG/DL — LOW (ref 0.5–1.3)
EGFR: 103 ML/MIN/1.73M2 — SIGNIFICANT CHANGE UP
EGFR: 103 ML/MIN/1.73M2 — SIGNIFICANT CHANGE UP
GLUCOSE SERPL-MCNC: 140 MG/DL — HIGH (ref 70–99)
GLUCOSE SERPL-MCNC: 140 MG/DL — HIGH (ref 70–99)
HCT VFR BLD CALC: 31.7 % — LOW (ref 34.5–45)
HCT VFR BLD CALC: 31.7 % — LOW (ref 34.5–45)
HGB BLD-MCNC: 11 G/DL — LOW (ref 11.5–15.5)
HGB BLD-MCNC: 11 G/DL — LOW (ref 11.5–15.5)
MAGNESIUM SERPL-MCNC: 2.1 MG/DL — SIGNIFICANT CHANGE UP (ref 1.6–2.6)
MAGNESIUM SERPL-MCNC: 2.1 MG/DL — SIGNIFICANT CHANGE UP (ref 1.6–2.6)
MCHC RBC-ENTMCNC: 32.1 PG — SIGNIFICANT CHANGE UP (ref 27–34)
MCHC RBC-ENTMCNC: 32.1 PG — SIGNIFICANT CHANGE UP (ref 27–34)
MCHC RBC-ENTMCNC: 34.7 GM/DL — SIGNIFICANT CHANGE UP (ref 32–36)
MCHC RBC-ENTMCNC: 34.7 GM/DL — SIGNIFICANT CHANGE UP (ref 32–36)
MCV RBC AUTO: 92.4 FL — SIGNIFICANT CHANGE UP (ref 80–100)
MCV RBC AUTO: 92.4 FL — SIGNIFICANT CHANGE UP (ref 80–100)
NRBC # BLD: 0 /100 WBCS — SIGNIFICANT CHANGE UP (ref 0–0)
NRBC # BLD: 0 /100 WBCS — SIGNIFICANT CHANGE UP (ref 0–0)
PHOSPHATE SERPL-MCNC: 1.7 MG/DL — LOW (ref 2.5–4.5)
PHOSPHATE SERPL-MCNC: 1.7 MG/DL — LOW (ref 2.5–4.5)
PLATELET # BLD AUTO: 287 K/UL — SIGNIFICANT CHANGE UP (ref 150–400)
PLATELET # BLD AUTO: 287 K/UL — SIGNIFICANT CHANGE UP (ref 150–400)
POTASSIUM SERPL-MCNC: 3.1 MMOL/L — LOW (ref 3.5–5.3)
POTASSIUM SERPL-MCNC: 3.1 MMOL/L — LOW (ref 3.5–5.3)
POTASSIUM SERPL-SCNC: 3.1 MMOL/L — LOW (ref 3.5–5.3)
POTASSIUM SERPL-SCNC: 3.1 MMOL/L — LOW (ref 3.5–5.3)
RBC # BLD: 3.43 M/UL — LOW (ref 3.8–5.2)
RBC # BLD: 3.43 M/UL — LOW (ref 3.8–5.2)
RBC # FLD: 13.1 % — SIGNIFICANT CHANGE UP (ref 10.3–14.5)
RBC # FLD: 13.1 % — SIGNIFICANT CHANGE UP (ref 10.3–14.5)
SODIUM SERPL-SCNC: 136 MMOL/L — SIGNIFICANT CHANGE UP (ref 135–145)
SODIUM SERPL-SCNC: 136 MMOL/L — SIGNIFICANT CHANGE UP (ref 135–145)
WBC # BLD: 11.91 K/UL — HIGH (ref 3.8–10.5)
WBC # BLD: 11.91 K/UL — HIGH (ref 3.8–10.5)
WBC # FLD AUTO: 11.91 K/UL — HIGH (ref 3.8–10.5)
WBC # FLD AUTO: 11.91 K/UL — HIGH (ref 3.8–10.5)

## 2024-01-11 PROCEDURE — 99232 SBSQ HOSP IP/OBS MODERATE 35: CPT

## 2024-01-11 PROCEDURE — 99223 1ST HOSP IP/OBS HIGH 75: CPT

## 2024-01-11 PROCEDURE — 74240 X-RAY XM UPR GI TRC 1CNTRST: CPT | Mod: 26

## 2024-01-11 RX ORDER — METOPROLOL TARTRATE 50 MG
5 TABLET ORAL ONCE
Refills: 0 | Status: COMPLETED | OUTPATIENT
Start: 2024-01-11 | End: 2024-01-11

## 2024-01-11 RX ORDER — ACETAMINOPHEN 500 MG
750 TABLET ORAL ONCE
Refills: 0 | Status: COMPLETED | OUTPATIENT
Start: 2024-01-11 | End: 2024-01-11

## 2024-01-11 RX ORDER — ATORVASTATIN CALCIUM 80 MG/1
80 TABLET, FILM COATED ORAL
Qty: 90 | Refills: 0 | Status: ACTIVE | COMMUNITY
Start: 2018-05-31 | End: 1900-01-01

## 2024-01-11 RX ORDER — POTASSIUM PHOSPHATE, MONOBASIC POTASSIUM PHOSPHATE, DIBASIC 236; 224 MG/ML; MG/ML
15 INJECTION, SOLUTION INTRAVENOUS ONCE
Refills: 0 | Status: COMPLETED | OUTPATIENT
Start: 2024-01-11 | End: 2024-01-11

## 2024-01-11 RX ORDER — ACETAMINOPHEN 500 MG
650 TABLET ORAL EVERY 6 HOURS
Refills: 0 | Status: DISCONTINUED | OUTPATIENT
Start: 2024-01-11 | End: 2024-01-24

## 2024-01-11 RX ORDER — POTASSIUM CHLORIDE 20 MEQ
10 PACKET (EA) ORAL
Refills: 0 | Status: COMPLETED | OUTPATIENT
Start: 2024-01-11 | End: 2024-01-11

## 2024-01-11 RX ORDER — LANOLIN ALCOHOL/MO/W.PET/CERES
5 CREAM (GRAM) TOPICAL AT BEDTIME
Refills: 0 | Status: DISCONTINUED | OUTPATIENT
Start: 2024-01-11 | End: 2024-01-24

## 2024-01-11 RX ADMIN — Medication 5 MILLIGRAM(S): at 23:50

## 2024-01-11 RX ADMIN — Medication 300 MILLIGRAM(S): at 02:50

## 2024-01-11 RX ADMIN — Medication 100 MILLIGRAM(S): at 05:46

## 2024-01-11 RX ADMIN — SODIUM CHLORIDE 80 MILLILITER(S): 9 INJECTION, SOLUTION INTRAVENOUS at 21:12

## 2024-01-11 RX ADMIN — HEPARIN SODIUM 7 UNIT(S)/HR: 5000 INJECTION INTRAVENOUS; SUBCUTANEOUS at 05:46

## 2024-01-11 RX ADMIN — Medication 750 MILLIGRAM(S): at 03:20

## 2024-01-11 RX ADMIN — Medication 100 MILLIEQUIVALENT(S): at 13:14

## 2024-01-11 RX ADMIN — POTASSIUM PHOSPHATE, MONOBASIC POTASSIUM PHOSPHATE, DIBASIC 62.5 MILLIMOLE(S): 236; 224 INJECTION, SOLUTION INTRAVENOUS at 13:15

## 2024-01-11 RX ADMIN — Medication 100 MILLIGRAM(S): at 18:12

## 2024-01-11 RX ADMIN — Medication 100 MILLIEQUIVALENT(S): at 11:31

## 2024-01-11 RX ADMIN — Medication 5 MILLIGRAM(S): at 22:32

## 2024-01-11 RX ADMIN — Medication 5 MILLIGRAM(S): at 11:31

## 2024-01-11 RX ADMIN — PANTOPRAZOLE SODIUM 40 MILLIGRAM(S): 20 TABLET, DELAYED RELEASE ORAL at 07:09

## 2024-01-11 RX ADMIN — Medication 60 MICROGRAM(S): at 21:13

## 2024-01-11 RX ADMIN — Medication 5 MILLIGRAM(S): at 18:12

## 2024-01-11 RX ADMIN — Medication 100 MILLIEQUIVALENT(S): at 18:11

## 2024-01-11 RX ADMIN — PANTOPRAZOLE SODIUM 40 MILLIGRAM(S): 20 TABLET, DELAYED RELEASE ORAL at 18:12

## 2024-01-11 RX ADMIN — FLUCONAZOLE 100 MILLIGRAM(S): 150 TABLET ORAL at 21:07

## 2024-01-11 RX ADMIN — Medication 5 MILLIGRAM(S): at 05:47

## 2024-01-11 RX ADMIN — CEFTRIAXONE 100 MILLIGRAM(S): 500 INJECTION, POWDER, FOR SOLUTION INTRAMUSCULAR; INTRAVENOUS at 13:14

## 2024-01-11 RX ADMIN — Medication 5 MILLIGRAM(S): at 21:31

## 2024-01-11 NOTE — PROGRESS NOTE ADULT - SUBJECTIVE AND OBJECTIVE BOX
SUBJECTIVE: Pt seen and examined by chief resident. Pt is doing well, resting comfortably on bed.  No nausea or vomiting. No complaints at this time.    Vital Signs Last 24 Hrs  T(C): 36.7 (11 Jan 2024 05:00), Max: 37.5 (10 Terrell 2024 10:00)  T(F): 98 (11 Jan 2024 05:00), Max: 99.5 (10 Terrell 2024 10:00)  HR: 86 (11 Jan 2024 05:00) (76 - 110)  BP: 154/69 (11 Jan 2024 05:00) (132/65 - 186/77)  BP(mean): 108 (10 Terrell 2024 12:00) (93 - 108)  RR: 20 (11 Jan 2024 05:00) (20 - 20)  SpO2: 96% (11 Jan 2024 05:00) (94% - 98%)    Parameters below as of 11 Jan 2024 05:00  Patient On (Oxygen Delivery Method): room air        I&O's Summary    10 Terrell 2024 07:01  -  11 Jan 2024 07:00  --------------------------------------------------------  IN: 2419.3 mL / OUT: 2450 mL / NET: -30.7 mL        Physical Exam:  General Appearance: Appears well, NAD  Pulmonary: Nonlabored breathing, no respiratory distress  HEENT: minimal NGT output  Abdomen: Soft, nondisteded, nontender  Extremities: WWP, SCD's in place     LABS:                        10.9   10.75 )-----------( 215      ( 10 Terrell 2024 02:41 )             30.7     01-10    132<L>  |  104  |  5<L>  ----------------------------<  135<H>  4.0   |  17<L>  |  0.29<L>    Ca    8.1<L>      10 Terrell 2024 12:32  Phos  3.1     01-10  Mg     1.8     01-10      PTT - ( 10 Terrell 2024 18:46 )  PTT:84.5 sec  Urinalysis Basic - ( 10 Terrell 2024 12:32 )    Color: x / Appearance: x / SG: x / pH: x  Gluc: 135 mg/dL / Ketone: x  / Bili: x / Urobili: x   Blood: x / Protein: x / Nitrite: x   Leuk Esterase: x / RBC: x / WBC x   Sq Epi: x / Non Sq Epi: x / Bacteria: x

## 2024-01-11 NOTE — PROGRESS NOTE ADULT - ASSESSMENT
A/p: 81yo Female pt with PMH depression, hypothyroidism, HLD, Afib on Eliquis (last dose 1/8/24) and PSH of L hip surgery, cholecystectomy and VHR presented with 1-day history of sudden severe midabdominal/epigastric pain. Patient admitted for perforated diverticulitis of distal duodenum.       NPO/IVF  NGT LIWS  Pain & nausea prn  Hep gtt  AM labs  UGIS this AM   A/p: 83yo Female pt with PMH depression, hypothyroidism, HLD, Afib on Eliquis (last dose 1/8/24) and PSH of L hip surgery, cholecystectomy and VHR presented with 1-day history of sudden severe midabdominal/epigastric pain. Patient admitted for perforated diverticulitis of distal duodenum.       NPO/IVF  NGT LIWS  Pain & nausea prn  Hep gtt  AM labs  UGIS this AM

## 2024-01-11 NOTE — CONSULT NOTE ADULT - SUBJECTIVE AND OBJECTIVE BOX
Pt seen and evaluated at bedside. Spoke to pt with assistance of  ID #[id number]  81yo Female pt with PMH depression, hypothyroidism, HLD, Afib on Eliquis (last dose today) and PSH of L hip surgery, cholecystectomy and VHR presents with 1-day history of sudden severe midabdominal/epigastric pain. Patient had a  CT AP with PO and IV contrast demonstrates perforated diverticulitis of distal duodenum.       PAST MEDICAL & SURGICAL HISTORY:  Depression  Hypothyroidism   Afib  Hyperlipidemia      History of hip replacement  R, for degenerative pain  cholecystectomy  Ventral hernia repair     Home Medications:  atorvastatin 80 mg oral tablet: 1 tab(s) orally once a day (08 Jan 2024 19:40)  Eliquis 5 mg oral tablet: 1 tab(s) orally 2 times a day (08 Jan 2024 19:40)  levothyroxine 75 mcg (0.075 mg) oral tablet: 1 tab(s) orally once a day (08 Jan 2024 19:40)  metoprolol succinate 25 mg oral tablet, extended release: 1 tab(s) orally once a day (08 Jan 2024 19:40)      Allergies: No known Allergies    FAMILY HISTORY:  FH: prostate cancer   Denies family hx of IBS, Crohn's, UC, or colon cancer.        VITAL SIGNS, INS/OUTS (last 24 hours):  Vital Signs Last 24 Hrs  T(C): 36.8 (11 Jan 2024 09:30), Max: 36.9 (10 Terrell 2024 20:30)  T(F): 98.2 (11 Jan 2024 09:30), Max: 98.4 (10 Terrell 2024 20:30)  HR: 82 (11 Jan 2024 11:25) (78 - 87)  BP: 132/57 (11 Jan 2024 11:25) (132/57 - 186/77)  RR: 18 (11 Jan 2024 11:25) (18 - 20)  SpO2: 94% (11 Jan 2024 11:25) (94% - 98%)    PHYSICAL EXAM:  NAD laying in bed  HEENT : NGT in place   CTA b/l no wheezing or crackles  NL S1,S2 no mumurs   soft NT/ND + BS no rebound or guarding     BASIC LABS:                        11.0   11.91 )-----------( 287      ( 11 Jan 2024 08:23 )             31.7     01-11    136  |  104  |  3<L>  ----------------------------<  140<H>  3.1<L>   |  24  |  0.34<L>    Ca    8.5      11 Jan 2024 08:23  Phos  1.7     01-11  Mg     2.1     01-11      PTT - ( 11 Jan 2024 08:23 )  PTT:81.9 sec  1/8 Blood cx: Staph hominis   1/9 Blood cx: NTD x 2 days       CURRENT MEDICATIONS:   cefTRIAXone   IVPB 2000 milliGRAM(s) IV Intermittent every 24 hours  dextrose 5% + sodium chloride 0.9%. 1000 milliLiter(s) IV Continuous <Continuous>  fluconAZOLE IVPB 400 milliGRAM(s) IV Intermittent every 24 hours  heparin  Infusion 900 Unit(s)/Hr IV Continuous <Continuous>  levothyroxine Injectable 60 MICROGram(s) IV Push at bedtime  metoprolol tartrate Injectable 5 milliGRAM(s) IV Push every 6 hours  metroNIDAZOLE  IVPB 500 milliGRAM(s) IV Intermittent every 8 hours  pantoprazole  Injectable 40 milliGRAM(s) IV Push every 12 hours  potassium chloride  10 mEq/100 mL IVPB 10 milliEquivalent(s) IV Intermittent every 1 hour        A/P: 81yo Female pt with PMH depression, hypothyroidism, HLD, Afib on Eliquis (last dose today) and PSH of L hip surgery, cholecystectomy and VHR presents with 1-day history of sudden severe midabdominal/epigastric pain. Patient had a  CT AP with PO and IV contrast demonstrates perforated diverticulitis of distal duodenum.     #Abdominal pain secondary to  perforated diverticulitis of distal duodenum  #Leukocytosis   -Management as per primary team  -Pt with increase in WBC from 10->11   - Will continue NPO and IVF  -Continue NGT as per primary team   - Pt for UGIS  -Will continue CTX and Flagyl   -Blood cx sent on 1/8 growing Staph hominis awaiting sensitivities   - Will f/u repeat Blood cx sent on 1/9     #Atrial fibrillation   - Pt's Eliquis held   - Continue Heparin drip and monitor PTT   - Continue Metoprolol     #Hypothyroidism   - Will  continue Levothyroxine     #Hypokalemia  #Hypophosphatemia   - Will monitor electrolytes and replete prn     #DISPO  -As per primary team    Pt seen and evaluated at bedside. Spoke to pt with assistance of  ID #[id number]  83yo Female pt with PMH depression, hypothyroidism, HLD, Afib on Eliquis (last dose today) and PSH of L hip surgery, cholecystectomy and VHR presents with 1-day history of sudden severe midabdominal/epigastric pain. Patient had a  CT AP with PO and IV contrast demonstrates perforated diverticulitis of distal duodenum.       PAST MEDICAL & SURGICAL HISTORY:  Depression  Hypothyroidism   Afib  Hyperlipidemia      History of hip replacement  R, for degenerative pain  cholecystectomy  Ventral hernia repair     Home Medications:  atorvastatin 80 mg oral tablet: 1 tab(s) orally once a day (08 Jan 2024 19:40)  Eliquis 5 mg oral tablet: 1 tab(s) orally 2 times a day (08 Jan 2024 19:40)  levothyroxine 75 mcg (0.075 mg) oral tablet: 1 tab(s) orally once a day (08 Jan 2024 19:40)  metoprolol succinate 25 mg oral tablet, extended release: 1 tab(s) orally once a day (08 Jan 2024 19:40)      Allergies: No known Allergies    FAMILY HISTORY:  FH: prostate cancer   Denies family hx of IBS, Crohn's, UC, or colon cancer.        VITAL SIGNS, INS/OUTS (last 24 hours):  Vital Signs Last 24 Hrs  T(C): 36.8 (11 Jan 2024 09:30), Max: 36.9 (10 Terrell 2024 20:30)  T(F): 98.2 (11 Jan 2024 09:30), Max: 98.4 (10 Terrell 2024 20:30)  HR: 82 (11 Jan 2024 11:25) (78 - 87)  BP: 132/57 (11 Jan 2024 11:25) (132/57 - 186/77)  RR: 18 (11 Jan 2024 11:25) (18 - 20)  SpO2: 94% (11 Jan 2024 11:25) (94% - 98%)    PHYSICAL EXAM:  NAD laying in bed  HEENT : NGT in place   CTA b/l no wheezing or crackles  NL S1,S2 no mumurs   soft NT/ND + BS no rebound or guarding     BASIC LABS:                        11.0   11.91 )-----------( 287      ( 11 Jan 2024 08:23 )             31.7     01-11    136  |  104  |  3<L>  ----------------------------<  140<H>  3.1<L>   |  24  |  0.34<L>    Ca    8.5      11 Jan 2024 08:23  Phos  1.7     01-11  Mg     2.1     01-11      PTT - ( 11 Jan 2024 08:23 )  PTT:81.9 sec  1/8 Blood cx: Staph hominis   1/9 Blood cx: NTD x 2 days       CURRENT MEDICATIONS:   cefTRIAXone   IVPB 2000 milliGRAM(s) IV Intermittent every 24 hours  dextrose 5% + sodium chloride 0.9%. 1000 milliLiter(s) IV Continuous <Continuous>  fluconAZOLE IVPB 400 milliGRAM(s) IV Intermittent every 24 hours  heparin  Infusion 900 Unit(s)/Hr IV Continuous <Continuous>  levothyroxine Injectable 60 MICROGram(s) IV Push at bedtime  metoprolol tartrate Injectable 5 milliGRAM(s) IV Push every 6 hours  metroNIDAZOLE  IVPB 500 milliGRAM(s) IV Intermittent every 8 hours  pantoprazole  Injectable 40 milliGRAM(s) IV Push every 12 hours  potassium chloride  10 mEq/100 mL IVPB 10 milliEquivalent(s) IV Intermittent every 1 hour        A/P: 83yo Female pt with PMH depression, hypothyroidism, HLD, Afib on Eliquis (last dose today) and PSH of L hip surgery, cholecystectomy and VHR presents with 1-day history of sudden severe midabdominal/epigastric pain. Patient had a  CT AP with PO and IV contrast demonstrates perforated diverticulitis of distal duodenum.     #Abdominal pain secondary to  perforated diverticulitis of distal duodenum  #Leukocytosis   -Management as per primary team  -Pt with increase in WBC from 10->11   - Will continue NPO and IVF  -Continue NGT as per primary team   - Pt for UGIS  -Will continue CTX and Flagyl   -Blood cx sent on 1/8 growing Staph hominis awaiting sensitivities   - Will f/u repeat Blood cx sent on 1/9     #Atrial fibrillation   - Pt's Eliquis held   - Continue Heparin drip and monitor PTT   - Continue Metoprolol     #Hypothyroidism   - Will  continue Levothyroxine     #Hypokalemia  #Hypophosphatemia   - Will monitor electrolytes and replete prn     #DISPO  -As per primary team    Pt seen and evaluated at bedside. Spoke to pt with assistance of  ID #[id number]  83yo Female pt with PMH depression, hypothyroidism, HLD, Afib on Eliquis (last dose today) and PSH of L hip surgery, cholecystectomy and VHR presents with 1-day history of sudden severe midabdominal/epigastric pain. Patient had a  CT AP with PO and IV contrast demonstrates perforated diverticulitis of distal duodenum.       PAST MEDICAL & SURGICAL HISTORY:  Depression  Hypothyroidism   Afib  Hyperlipidemia      History of hip replacement  R, for degenerative pain  cholecystectomy  Ventral hernia repair     Home Medications:  atorvastatin 80 mg oral tablet: 1 tab(s) orally once a day (08 Jan 2024 19:40)  Eliquis 5 mg oral tablet: 1 tab(s) orally 2 times a day (08 Jan 2024 19:40)  levothyroxine 75 mcg (0.075 mg) oral tablet: 1 tab(s) orally once a day (08 Jan 2024 19:40)  metoprolol succinate 25 mg oral tablet, extended release: 1 tab(s) orally once a day (08 Jan 2024 19:40)      Allergies: No known Allergies    FAMILY HISTORY:  FH: prostate cancer   Denies family hx of IBS, Crohn's, UC, or colon cancer.        VITAL SIGNS, INS/OUTS (last 24 hours):  Vital Signs Last 24 Hrs  T(C): 36.8 (11 Jan 2024 09:30), Max: 36.9 (10 Terrell 2024 20:30)  T(F): 98.2 (11 Jan 2024 09:30), Max: 98.4 (10 Terrell 2024 20:30)  HR: 82 (11 Jan 2024 11:25) (78 - 87)  BP: 132/57 (11 Jan 2024 11:25) (132/57 - 186/77)  RR: 18 (11 Jan 2024 11:25) (18 - 20)  SpO2: 94% (11 Jan 2024 11:25) (94% - 98%)    PHYSICAL EXAM:  NAD laying in bed  HEENT : NGT in place   CTA b/l no wheezing or crackles  NL S1,S2 no mumurs   soft NT/ND + BS no rebound or guarding     BASIC LABS:                        11.0   11.91 )-----------( 287      ( 11 Jan 2024 08:23 )             31.7     01-11    136  |  104  |  3<L>  ----------------------------<  140<H>  3.1<L>   |  24  |  0.34<L>    Ca    8.5      11 Jan 2024 08:23  Phos  1.7     01-11  Mg     2.1     01-11      PTT - ( 11 Jan 2024 08:23 )  PTT:81.9 sec  1/8 Blood cx: Staph hominis   1/9 Blood cx: NTD x 2 days       CURRENT MEDICATIONS:   cefTRIAXone   IVPB 2000 milliGRAM(s) IV Intermittent every 24 hours  dextrose 5% + sodium chloride 0.9%. 1000 milliLiter(s) IV Continuous <Continuous>  fluconAZOLE IVPB 400 milliGRAM(s) IV Intermittent every 24 hours  heparin  Infusion 900 Unit(s)/Hr IV Continuous <Continuous>  levothyroxine Injectable 60 MICROGram(s) IV Push at bedtime  metoprolol tartrate Injectable 5 milliGRAM(s) IV Push every 6 hours  metroNIDAZOLE  IVPB 500 milliGRAM(s) IV Intermittent every 8 hours  pantoprazole  Injectable 40 milliGRAM(s) IV Push every 12 hours  potassium chloride  10 mEq/100 mL IVPB 10 milliEquivalent(s) IV Intermittent every 1 hour        A/P: 83yo Female pt with PMH depression, hypothyroidism, HLD, Afib on Eliquis (last dose today) and PSH of L hip surgery, cholecystectomy and VHR presents with 1-day history of sudden severe midabdominal/epigastric pain. Patient had a  CT AP with PO and IV contrast demonstrates perforated diverticulitis of distal duodenum.     #Abdominal pain secondary to  perforated diverticulitis of distal duodenum  #Leukocytosis   -Management as per primary team  -Pt with increase in WBC from 10->11   - Will continue NPO and IVF  -Continue NGT as per primary team   - Pt for UGI series   -Will continue CTX and Flagyl   -Blood cx sent on 1/8 growing Staph hominis awaiting sensitivities   - Will f/u repeat Blood cx sent on 1/9     #Atrial fibrillation   - Pt's Eliquis held   - Continue Heparin drip and monitor PTT   - Continue Metoprolol     #Hypothyroidism   - Will  continue Levothyroxine     #Hypokalemia  #Hypophosphatemia   - Will monitor electrolytes and replete prn     #DISPO  -As per primary team    Pt seen and evaluated at bedside. Spoke to pt with assistance of  ID #[id number]  83yo Female pt with PMH depression, hypothyroidism, HLD, Afib on Eliquis (last dose today) and PSH of L hip surgery, cholecystectomy and VHR presents with 1-day history of sudden severe midabdominal/epigastric pain. Patient had a  CT AP with PO and IV contrast demonstrates perforated diverticulitis of distal duodenum.       PAST MEDICAL & SURGICAL HISTORY:  Depression  Hypothyroidism   Afib  Hyperlipidemia      History of hip replacement  R, for degenerative pain  cholecystectomy  Ventral hernia repair     Home Medications:  atorvastatin 80 mg oral tablet: 1 tab(s) orally once a day (08 Jan 2024 19:40)  Eliquis 5 mg oral tablet: 1 tab(s) orally 2 times a day (08 Jan 2024 19:40)  levothyroxine 75 mcg (0.075 mg) oral tablet: 1 tab(s) orally once a day (08 Jan 2024 19:40)  metoprolol succinate 25 mg oral tablet, extended release: 1 tab(s) orally once a day (08 Jan 2024 19:40)      Allergies: No known Allergies    FAMILY HISTORY:  FH: prostate cancer   Denies family hx of IBS, Crohn's, UC, or colon cancer.        VITAL SIGNS, INS/OUTS (last 24 hours):  Vital Signs Last 24 Hrs  T(C): 36.8 (11 Jan 2024 09:30), Max: 36.9 (10 Terrell 2024 20:30)  T(F): 98.2 (11 Jan 2024 09:30), Max: 98.4 (10 Terrell 2024 20:30)  HR: 82 (11 Jan 2024 11:25) (78 - 87)  BP: 132/57 (11 Jan 2024 11:25) (132/57 - 186/77)  RR: 18 (11 Jan 2024 11:25) (18 - 20)  SpO2: 94% (11 Jan 2024 11:25) (94% - 98%)    PHYSICAL EXAM:  NAD laying in bed  HEENT : NGT in place   CTA b/l no wheezing or crackles  NL S1,S2 no mumurs   soft NT/ND + BS no rebound or guarding     BASIC LABS:                        11.0   11.91 )-----------( 287      ( 11 Jan 2024 08:23 )             31.7     01-11    136  |  104  |  3<L>  ----------------------------<  140<H>  3.1<L>   |  24  |  0.34<L>    Ca    8.5      11 Jan 2024 08:23  Phos  1.7     01-11  Mg     2.1     01-11      PTT - ( 11 Jan 2024 08:23 )  PTT:81.9 sec  1/8 Blood cx: Staph hominis   1/9 Blood cx: NTD x 2 days       CURRENT MEDICATIONS:   cefTRIAXone   IVPB 2000 milliGRAM(s) IV Intermittent every 24 hours  dextrose 5% + sodium chloride 0.9%. 1000 milliLiter(s) IV Continuous <Continuous>  fluconAZOLE IVPB 400 milliGRAM(s) IV Intermittent every 24 hours  heparin  Infusion 900 Unit(s)/Hr IV Continuous <Continuous>  levothyroxine Injectable 60 MICROGram(s) IV Push at bedtime  metoprolol tartrate Injectable 5 milliGRAM(s) IV Push every 6 hours  metroNIDAZOLE  IVPB 500 milliGRAM(s) IV Intermittent every 8 hours  pantoprazole  Injectable 40 milliGRAM(s) IV Push every 12 hours  potassium chloride  10 mEq/100 mL IVPB 10 milliEquivalent(s) IV Intermittent every 1 hour        A/P: 83yo Female pt with PMH depression, hypothyroidism, HLD, Afib on Eliquis (last dose today) and PSH of L hip surgery, cholecystectomy and VHR presents with 1-day history of sudden severe midabdominal/epigastric pain. Patient had a  CT AP with PO and IV contrast demonstrates perforated diverticulitis of distal duodenum.     #Abdominal pain secondary to  perforated diverticulitis of distal duodenum  #Leukocytosis   -Management as per primary team  -Pt with increase in WBC from 10->11   - Will continue NPO and IVF  -Continue NGT as per primary team   - Pt for UGI series   -Will continue CTX and Flagyl   -Blood cx sent on 1/8 growing Staph hominis awaiting sensitivities   - Will f/u repeat Blood cx sent on 1/9     #Atrial fibrillation   - Pt's Eliquis held   - Continue Heparin drip and monitor PTT   - Continue Metoprolol     #Hypothyroidism   - Will  continue Levothyroxine     #Hypokalemia  #Hypophosphatemia   - Will monitor electrolytes and replete prn     #DISPO  -As per primary team

## 2024-01-11 NOTE — PROGRESS NOTE ADULT - ATTENDING COMMENTS
Patient is an 82 year old woman with history of depression, hypothyroidism, HLD, A fib on Eliquis, left hip surgery, cholecystectomy, ventral hernia repair who presents now with clinical, laboratory, and radiographic findings concerning for perforated duodenal diverticulum. She reports one day history of sudden onset epigastric abdominal pain. At time of evaluation, febrile, tachycardic, hemodynamically stable, abdomen soft, mild tenderness in epigastric region, mild distension, no rebound or guarding. Lactic acidosis resolved. CT imaging demonstrates perforated duodenal diverticulum, appears contained. Continue NPO, bowel rest, NG tube decompression, IV fluids, IV antibiotics, antifungal coverage, serial abdominal exams. UGI study today, if negative will D/C NG tube.

## 2024-01-12 LAB
-  CLINDAMYCIN: SIGNIFICANT CHANGE UP
-  ERYTHROMYCIN: SIGNIFICANT CHANGE UP
-  LINEZOLID: SIGNIFICANT CHANGE UP
-  OXACILLIN: SIGNIFICANT CHANGE UP
-  RIFAMPIN: SIGNIFICANT CHANGE UP
-  TRIMETHOPRIM/SULFAMETHOXAZOLE: SIGNIFICANT CHANGE UP
-  VANCOMYCIN: SIGNIFICANT CHANGE UP
ANION GAP SERPL CALC-SCNC: 11 MMOL/L — SIGNIFICANT CHANGE UP (ref 5–17)
ANION GAP SERPL CALC-SCNC: 11 MMOL/L — SIGNIFICANT CHANGE UP (ref 5–17)
ANION GAP SERPL CALC-SCNC: 7 MMOL/L — SIGNIFICANT CHANGE UP (ref 5–17)
ANION GAP SERPL CALC-SCNC: 7 MMOL/L — SIGNIFICANT CHANGE UP (ref 5–17)
APTT BLD: 173.3 SEC — CRITICAL HIGH (ref 24.5–35.6)
APTT BLD: 173.3 SEC — CRITICAL HIGH (ref 24.5–35.6)
APTT BLD: 37.4 SEC — HIGH (ref 24.5–35.6)
APTT BLD: 37.4 SEC — HIGH (ref 24.5–35.6)
APTT BLD: 79.9 SEC — HIGH (ref 24.5–35.6)
APTT BLD: 79.9 SEC — HIGH (ref 24.5–35.6)
BUN SERPL-MCNC: 5 MG/DL — LOW (ref 7–23)
BUN SERPL-MCNC: 5 MG/DL — LOW (ref 7–23)
BUN SERPL-MCNC: 6 MG/DL — LOW (ref 7–23)
BUN SERPL-MCNC: 6 MG/DL — LOW (ref 7–23)
CALCIUM SERPL-MCNC: 8.4 MG/DL — SIGNIFICANT CHANGE UP (ref 8.4–10.5)
CALCIUM SERPL-MCNC: 8.4 MG/DL — SIGNIFICANT CHANGE UP (ref 8.4–10.5)
CALCIUM SERPL-MCNC: 8.9 MG/DL — SIGNIFICANT CHANGE UP (ref 8.4–10.5)
CALCIUM SERPL-MCNC: 8.9 MG/DL — SIGNIFICANT CHANGE UP (ref 8.4–10.5)
CHLORIDE SERPL-SCNC: 101 MMOL/L — SIGNIFICANT CHANGE UP (ref 96–108)
CHLORIDE SERPL-SCNC: 101 MMOL/L — SIGNIFICANT CHANGE UP (ref 96–108)
CHLORIDE SERPL-SCNC: 105 MMOL/L — SIGNIFICANT CHANGE UP (ref 96–108)
CHLORIDE SERPL-SCNC: 105 MMOL/L — SIGNIFICANT CHANGE UP (ref 96–108)
CO2 SERPL-SCNC: 23 MMOL/L — SIGNIFICANT CHANGE UP (ref 22–31)
CO2 SERPL-SCNC: 23 MMOL/L — SIGNIFICANT CHANGE UP (ref 22–31)
CO2 SERPL-SCNC: 24 MMOL/L — SIGNIFICANT CHANGE UP (ref 22–31)
CO2 SERPL-SCNC: 24 MMOL/L — SIGNIFICANT CHANGE UP (ref 22–31)
CREAT SERPL-MCNC: 0.32 MG/DL — LOW (ref 0.5–1.3)
CREAT SERPL-MCNC: 0.32 MG/DL — LOW (ref 0.5–1.3)
CREAT SERPL-MCNC: 0.36 MG/DL — LOW (ref 0.5–1.3)
CREAT SERPL-MCNC: 0.36 MG/DL — LOW (ref 0.5–1.3)
CULTURE RESULTS: ABNORMAL
EGFR: 102 ML/MIN/1.73M2 — SIGNIFICANT CHANGE UP
EGFR: 102 ML/MIN/1.73M2 — SIGNIFICANT CHANGE UP
EGFR: 105 ML/MIN/1.73M2 — SIGNIFICANT CHANGE UP
EGFR: 105 ML/MIN/1.73M2 — SIGNIFICANT CHANGE UP
GLUCOSE SERPL-MCNC: 135 MG/DL — HIGH (ref 70–99)
GLUCOSE SERPL-MCNC: 135 MG/DL — HIGH (ref 70–99)
GLUCOSE SERPL-MCNC: 137 MG/DL — HIGH (ref 70–99)
GLUCOSE SERPL-MCNC: 137 MG/DL — HIGH (ref 70–99)
HCT VFR BLD CALC: 33.5 % — LOW (ref 34.5–45)
HCT VFR BLD CALC: 33.5 % — LOW (ref 34.5–45)
HCT VFR BLD CALC: 33.8 % — LOW (ref 34.5–45)
HCT VFR BLD CALC: 33.8 % — LOW (ref 34.5–45)
HGB BLD-MCNC: 12 G/DL — SIGNIFICANT CHANGE UP (ref 11.5–15.5)
HGB BLD-MCNC: 12 G/DL — SIGNIFICANT CHANGE UP (ref 11.5–15.5)
HGB BLD-MCNC: 12.2 G/DL — SIGNIFICANT CHANGE UP (ref 11.5–15.5)
HGB BLD-MCNC: 12.2 G/DL — SIGNIFICANT CHANGE UP (ref 11.5–15.5)
MAGNESIUM SERPL-MCNC: 1.9 MG/DL — SIGNIFICANT CHANGE UP (ref 1.6–2.6)
MAGNESIUM SERPL-MCNC: 1.9 MG/DL — SIGNIFICANT CHANGE UP (ref 1.6–2.6)
MAGNESIUM SERPL-MCNC: 2 MG/DL — SIGNIFICANT CHANGE UP (ref 1.6–2.6)
MAGNESIUM SERPL-MCNC: 2 MG/DL — SIGNIFICANT CHANGE UP (ref 1.6–2.6)
MCHC RBC-ENTMCNC: 32.4 PG — SIGNIFICANT CHANGE UP (ref 27–34)
MCHC RBC-ENTMCNC: 32.4 PG — SIGNIFICANT CHANGE UP (ref 27–34)
MCHC RBC-ENTMCNC: 34 PG — SIGNIFICANT CHANGE UP (ref 27–34)
MCHC RBC-ENTMCNC: 34 PG — SIGNIFICANT CHANGE UP (ref 27–34)
MCHC RBC-ENTMCNC: 35.8 GM/DL — SIGNIFICANT CHANGE UP (ref 32–36)
MCHC RBC-ENTMCNC: 35.8 GM/DL — SIGNIFICANT CHANGE UP (ref 32–36)
MCHC RBC-ENTMCNC: 36.1 GM/DL — HIGH (ref 32–36)
MCHC RBC-ENTMCNC: 36.1 GM/DL — HIGH (ref 32–36)
MCV RBC AUTO: 90.5 FL — SIGNIFICANT CHANGE UP (ref 80–100)
MCV RBC AUTO: 90.5 FL — SIGNIFICANT CHANGE UP (ref 80–100)
MCV RBC AUTO: 94.2 FL — SIGNIFICANT CHANGE UP (ref 80–100)
MCV RBC AUTO: 94.2 FL — SIGNIFICANT CHANGE UP (ref 80–100)
METHOD TYPE: SIGNIFICANT CHANGE UP
NRBC # BLD: 0 /100 WBCS — SIGNIFICANT CHANGE UP (ref 0–0)
ORGANISM # SPEC MICROSCOPIC CNT: ABNORMAL
ORGANISM # SPEC MICROSCOPIC CNT: SIGNIFICANT CHANGE UP
PHOSPHATE SERPL-MCNC: 1.6 MG/DL — LOW (ref 2.5–4.5)
PHOSPHATE SERPL-MCNC: 1.6 MG/DL — LOW (ref 2.5–4.5)
PHOSPHATE SERPL-MCNC: 2.7 MG/DL — SIGNIFICANT CHANGE UP (ref 2.5–4.5)
PHOSPHATE SERPL-MCNC: 2.7 MG/DL — SIGNIFICANT CHANGE UP (ref 2.5–4.5)
PLATELET # BLD AUTO: 291 K/UL — SIGNIFICANT CHANGE UP (ref 150–400)
PLATELET # BLD AUTO: 291 K/UL — SIGNIFICANT CHANGE UP (ref 150–400)
PLATELET # BLD AUTO: 304 K/UL — SIGNIFICANT CHANGE UP (ref 150–400)
PLATELET # BLD AUTO: 304 K/UL — SIGNIFICANT CHANGE UP (ref 150–400)
POTASSIUM SERPL-MCNC: 3 MMOL/L — LOW (ref 3.5–5.3)
POTASSIUM SERPL-MCNC: 3 MMOL/L — LOW (ref 3.5–5.3)
POTASSIUM SERPL-MCNC: 3.4 MMOL/L — LOW (ref 3.5–5.3)
POTASSIUM SERPL-MCNC: 3.4 MMOL/L — LOW (ref 3.5–5.3)
POTASSIUM SERPL-SCNC: 3 MMOL/L — LOW (ref 3.5–5.3)
POTASSIUM SERPL-SCNC: 3 MMOL/L — LOW (ref 3.5–5.3)
POTASSIUM SERPL-SCNC: 3.4 MMOL/L — LOW (ref 3.5–5.3)
POTASSIUM SERPL-SCNC: 3.4 MMOL/L — LOW (ref 3.5–5.3)
RBC # BLD: 3.59 M/UL — LOW (ref 3.8–5.2)
RBC # BLD: 3.59 M/UL — LOW (ref 3.8–5.2)
RBC # BLD: 3.7 M/UL — LOW (ref 3.8–5.2)
RBC # BLD: 3.7 M/UL — LOW (ref 3.8–5.2)
RBC # FLD: 13.1 % — SIGNIFICANT CHANGE UP (ref 10.3–14.5)
RBC # FLD: 13.1 % — SIGNIFICANT CHANGE UP (ref 10.3–14.5)
RBC # FLD: 13.2 % — SIGNIFICANT CHANGE UP (ref 10.3–14.5)
RBC # FLD: 13.2 % — SIGNIFICANT CHANGE UP (ref 10.3–14.5)
SODIUM SERPL-SCNC: 135 MMOL/L — SIGNIFICANT CHANGE UP (ref 135–145)
SODIUM SERPL-SCNC: 135 MMOL/L — SIGNIFICANT CHANGE UP (ref 135–145)
SODIUM SERPL-SCNC: 136 MMOL/L — SIGNIFICANT CHANGE UP (ref 135–145)
SODIUM SERPL-SCNC: 136 MMOL/L — SIGNIFICANT CHANGE UP (ref 135–145)
SPECIMEN SOURCE: SIGNIFICANT CHANGE UP
T4 FREE SERPL-MCNC: 1.79 NG/DL — HIGH (ref 0.93–1.7)
T4 FREE SERPL-MCNC: 1.79 NG/DL — HIGH (ref 0.93–1.7)
TSH SERPL-MCNC: 1.79 UIU/ML — SIGNIFICANT CHANGE UP (ref 0.27–4.2)
TSH SERPL-MCNC: 1.79 UIU/ML — SIGNIFICANT CHANGE UP (ref 0.27–4.2)
WBC # BLD: 6.11 K/UL — SIGNIFICANT CHANGE UP (ref 3.8–10.5)
WBC # BLD: 6.11 K/UL — SIGNIFICANT CHANGE UP (ref 3.8–10.5)
WBC # BLD: 8.57 K/UL — SIGNIFICANT CHANGE UP (ref 3.8–10.5)
WBC # BLD: 8.57 K/UL — SIGNIFICANT CHANGE UP (ref 3.8–10.5)
WBC # FLD AUTO: 6.11 K/UL — SIGNIFICANT CHANGE UP (ref 3.8–10.5)
WBC # FLD AUTO: 6.11 K/UL — SIGNIFICANT CHANGE UP (ref 3.8–10.5)
WBC # FLD AUTO: 8.57 K/UL — SIGNIFICANT CHANGE UP (ref 3.8–10.5)
WBC # FLD AUTO: 8.57 K/UL — SIGNIFICANT CHANGE UP (ref 3.8–10.5)

## 2024-01-12 PROCEDURE — 99221 1ST HOSP IP/OBS SF/LOW 40: CPT

## 2024-01-12 PROCEDURE — 99232 SBSQ HOSP IP/OBS MODERATE 35: CPT

## 2024-01-12 RX ORDER — HEPARIN SODIUM 5000 [USP'U]/ML
800 INJECTION INTRAVENOUS; SUBCUTANEOUS
Qty: 25000 | Refills: 0 | Status: DISCONTINUED | OUTPATIENT
Start: 2024-01-12 | End: 2024-01-13

## 2024-01-12 RX ORDER — POTASSIUM PHOSPHATE, MONOBASIC POTASSIUM PHOSPHATE, DIBASIC 236; 224 MG/ML; MG/ML
30 INJECTION, SOLUTION INTRAVENOUS ONCE
Refills: 0 | Status: COMPLETED | OUTPATIENT
Start: 2024-01-12 | End: 2024-01-12

## 2024-01-12 RX ORDER — POTASSIUM CHLORIDE 20 MEQ
10 PACKET (EA) ORAL
Refills: 0 | Status: COMPLETED | OUTPATIENT
Start: 2024-01-12 | End: 2024-01-12

## 2024-01-12 RX ORDER — CEFTRIAXONE 500 MG/1
2000 INJECTION, POWDER, FOR SOLUTION INTRAMUSCULAR; INTRAVENOUS EVERY 24 HOURS
Refills: 0 | Status: DISCONTINUED | OUTPATIENT
Start: 2024-01-13 | End: 2024-01-13

## 2024-01-12 RX ORDER — METOPROLOL TARTRATE 50 MG
25 TABLET ORAL DAILY
Refills: 0 | Status: DISCONTINUED | OUTPATIENT
Start: 2024-01-12 | End: 2024-01-17

## 2024-01-12 RX ORDER — SODIUM CHLORIDE 9 MG/ML
1000 INJECTION, SOLUTION INTRAVENOUS
Refills: 0 | Status: DISCONTINUED | OUTPATIENT
Start: 2024-01-12 | End: 2024-01-12

## 2024-01-12 RX ORDER — SODIUM CHLORIDE 9 MG/ML
1000 INJECTION, SOLUTION INTRAVENOUS
Refills: 0 | Status: DISCONTINUED | OUTPATIENT
Start: 2024-01-12 | End: 2024-01-13

## 2024-01-12 RX ADMIN — HEPARIN SODIUM 6 UNIT(S)/HR: 5000 INJECTION INTRAVENOUS; SUBCUTANEOUS at 11:30

## 2024-01-12 RX ADMIN — FLUCONAZOLE 100 MILLIGRAM(S): 150 TABLET ORAL at 19:33

## 2024-01-12 RX ADMIN — Medication 5 MILLIGRAM(S): at 13:00

## 2024-01-12 RX ADMIN — SODIUM CHLORIDE 80 MILLILITER(S): 9 INJECTION, SOLUTION INTRAVENOUS at 14:27

## 2024-01-12 RX ADMIN — PANTOPRAZOLE SODIUM 40 MILLIGRAM(S): 20 TABLET, DELAYED RELEASE ORAL at 06:33

## 2024-01-12 RX ADMIN — Medication 60 MICROGRAM(S): at 22:18

## 2024-01-12 RX ADMIN — Medication 5 MILLIGRAM(S): at 06:30

## 2024-01-12 RX ADMIN — PANTOPRAZOLE SODIUM 40 MILLIGRAM(S): 20 TABLET, DELAYED RELEASE ORAL at 18:14

## 2024-01-12 RX ADMIN — Medication 100 MILLIGRAM(S): at 18:14

## 2024-01-12 RX ADMIN — Medication 100 MILLIEQUIVALENT(S): at 12:01

## 2024-01-12 RX ADMIN — Medication 300 MILLIGRAM(S): at 01:40

## 2024-01-12 RX ADMIN — Medication 100 MILLIEQUIVALENT(S): at 14:00

## 2024-01-12 RX ADMIN — POTASSIUM PHOSPHATE, MONOBASIC POTASSIUM PHOSPHATE, DIBASIC 83.33 MILLIMOLE(S): 236; 224 INJECTION, SOLUTION INTRAVENOUS at 01:55

## 2024-01-12 RX ADMIN — Medication 100 MILLIEQUIVALENT(S): at 11:11

## 2024-01-12 RX ADMIN — Medication 100 MILLIGRAM(S): at 12:00

## 2024-01-12 RX ADMIN — Medication 750 MILLIGRAM(S): at 02:10

## 2024-01-12 RX ADMIN — CEFTRIAXONE 100 MILLIGRAM(S): 500 INJECTION, POWDER, FOR SOLUTION INTRAMUSCULAR; INTRAVENOUS at 13:59

## 2024-01-12 RX ADMIN — Medication 100 MILLIGRAM(S): at 02:00

## 2024-01-12 RX ADMIN — HEPARIN SODIUM 7 UNIT(S)/HR: 5000 INJECTION INTRAVENOUS; SUBCUTANEOUS at 06:25

## 2024-01-12 RX ADMIN — SODIUM CHLORIDE 80 MILLILITER(S): 9 INJECTION, SOLUTION INTRAVENOUS at 03:44

## 2024-01-12 NOTE — CONSULT NOTE ADULT - SUBJECTIVE AND OBJECTIVE BOX
HPI:  81yo Female pt with PMH depression, hypothyroidism, HLD, Afib on Eliquis (last dose today) and PSH of L hip surgery, cholecystectomy and VHR presents with 1-day history of sudden severe midabdominal/epigastric pain. Patient states pain started very suddenly this morning, described as cramping and sharp. Unable to bambi PO. Denies n/v, fevers/chills, last BM yesterday, passing flatus.    Last colonoscopy - 7 years ago normal  EGD - 30 years ago normal    ED: febrile to 101.7, tachycardic to 110 improved to 90 with 1L bolus. On exam, soft, mildly distended, moderate to severe midabdominal and epigastric ttp with voluntary guarding and no rebound; NGT placed with immediate return of 75cc bloody output. Labs show WBC 10.36, H/H 13.2/38.8, BUN/Cr 18/0.53, lactate 3.2. CT AP with PO and IV contrast demonstrates perforated diverticulitis of distal duodenum.        PMH: depression, hypothyroidism, Afib on Eliquis (last dose today)   PSH: L hip surgery, cholecystectomy and VHR (Dr. Torres, 10/2022)   Social Hx: no ETOH, no smoking, no recreational drug use  Family Hx: Denies family hx of IBS, Crohn's, UC, or colon cancer.    Meds: eliquis 5BID, atorvastatin 80, metop succ 25, Levothyroxine 75   (08 Jan 2024 19:35)        Consultant HPI:  Patient denies ever having chest pain, SOB, GARVIN. She can walk 2 blocks limited by fatigue. Denies palpitations. No pain currently.     ROS: A 10-point review of systems was otherwise negative.    PAST MEDICAL & SURGICAL HISTORY:  Depression      Thyroid disorder      Afib      HLD (hyperlipidemia)      History of hip replacement  R, for degenerative pain          SOCIAL HISTORY:  FAMILY HISTORY:  FH: prostate cancer        ALLERGIES: 	  No Known Allergies            MEDICATIONS:  acetaminophen     Tablet .. 650 milliGRAM(s) Oral every 6 hours PRN  dextrose 5% + sodium chloride 0.9% 1000 milliLiter(s) IV Continuous <Continuous>  fluconAZOLE IVPB 400 milliGRAM(s) IV Intermittent every 24 hours  heparin  Infusion 900 Unit(s)/Hr IV Continuous <Continuous>  levothyroxine Injectable 60 MICROGram(s) IV Push at bedtime  melatonin 5 milliGRAM(s) Oral at bedtime PRN  metoprolol succinate ER 25 milliGRAM(s) Oral daily  metroNIDAZOLE  IVPB 500 milliGRAM(s) IV Intermittent every 8 hours  pantoprazole  Injectable 40 milliGRAM(s) IV Push every 12 hours      HOME MEDICATIONS:  atorvastatin 80 mg oral tablet: 1 tab(s) orally once a day  Eliquis 5 mg oral tablet: 1 tab(s) orally 2 times a day  levothyroxine 75 mcg (0.075 mg) oral tablet: 1 tab(s) orally once a day  metoprolol succinate 25 mg oral tablet, extended release: 1 tab(s) orally once a day      PHYSICAL EXAM:      I/O Summary 24H    IN: 2368.8 mL / OUT: 1575 mL / NET: 793.8 mL        T(F): 99.1 (01-12-24 @ 09:16), Max: 99.5 (01-12-24 @ 01:15)  HR: 80 (01-12-24 @ 12:52) (66 - 171)  BP: 143/74 (01-12-24 @ 12:52) (112/71 - 176/84)  BP(mean): --  ABP: --  ABP(mean): --  RR: 18 (01-12-24 @ 12:52) (16 - 20)  SpO2: 96% (01-12-24 @ 12:52) (95% - 98%)    GEN: Awake, comfortable. NAD.   HEENT: NCAT, PERRL, EOMI. Mucosa moist. No JVD.   RESP: CTA b/l  CV: RRR, normal s1/s2. No m/r/g.  ABD: Soft, NTND. BS+  EXT: Warm. No edema, clubbing, or cyanosis.   NEURO: AAOx3. No focal deficits.      	  LABS:	 	    Cardiac Markers             CBC 01-12-24 @ 09:10                        12.2   6.11  )-----------( 291                   33.8       Hgb trend: 12.2 <-- , 12.0 <-- , 11.0 <-- , 10.9 <--   WBC trend: 6.11 <-- , 8.57 <-- , 11.91 <-- , 10.75 <--     CMP 01-12-24 @ 09:10    136  |  105  |  6<L>  ----------------------------<  135<H>  3.4<L>   |  24  |  0.36<L>    Ca    8.4      01-12-24 @ 09:10  Phos  2.7     01-12  Mg     1.9     01-12        Serum Cr trend: 0.36 <-- , 0.32 <-- , 0.34 <-- , 0.29 <-- , 0.35 <--   proBNP:   Lipid Profile:   HgA1c:   TSH: Thyroid Stimulating Hormone, Serum: 1.790 uIU/mL (01-11 @ 23:39)      TELEMETRY: 	    ECG:  	  RADIOLOGY:   ECHO:  STRESS:  CATH:   HPI:  83yo Female pt with PMH depression, hypothyroidism, HLD, Afib on Eliquis (last dose today) and PSH of L hip surgery, cholecystectomy and VHR presents with 1-day history of sudden severe midabdominal/epigastric pain. Patient states pain started very suddenly this morning, described as cramping and sharp. Unable to bambi PO. Denies n/v, fevers/chills, last BM yesterday, passing flatus.    Last colonoscopy - 7 years ago normal  EGD - 30 years ago normal    ED: febrile to 101.7, tachycardic to 110 improved to 90 with 1L bolus. On exam, soft, mildly distended, moderate to severe midabdominal and epigastric ttp with voluntary guarding and no rebound; NGT placed with immediate return of 75cc bloody output. Labs show WBC 10.36, H/H 13.2/38.8, BUN/Cr 18/0.53, lactate 3.2. CT AP with PO and IV contrast demonstrates perforated diverticulitis of distal duodenum.        PMH: depression, hypothyroidism, Afib on Eliquis (last dose today)   PSH: L hip surgery, cholecystectomy and VHR (Dr. Torres, 10/2022)   Social Hx: no ETOH, no smoking, no recreational drug use  Family Hx: Denies family hx of IBS, Crohn's, UC, or colon cancer.    Meds: eliquis 5BID, atorvastatin 80, metop succ 25, Levothyroxine 75   (08 Jan 2024 19:35)        Consultant HPI:  Patient denies ever having chest pain, SOB, GARVIN. She can walk 2 blocks limited by fatigue. Denies palpitations. No pain currently.     ROS: A 10-point review of systems was otherwise negative.    PAST MEDICAL & SURGICAL HISTORY:  Depression      Thyroid disorder      Afib      HLD (hyperlipidemia)      History of hip replacement  R, for degenerative pain          SOCIAL HISTORY:  FAMILY HISTORY:  FH: prostate cancer        ALLERGIES: 	  No Known Allergies            MEDICATIONS:  acetaminophen     Tablet .. 650 milliGRAM(s) Oral every 6 hours PRN  dextrose 5% + sodium chloride 0.9% 1000 milliLiter(s) IV Continuous <Continuous>  fluconAZOLE IVPB 400 milliGRAM(s) IV Intermittent every 24 hours  heparin  Infusion 900 Unit(s)/Hr IV Continuous <Continuous>  levothyroxine Injectable 60 MICROGram(s) IV Push at bedtime  melatonin 5 milliGRAM(s) Oral at bedtime PRN  metoprolol succinate ER 25 milliGRAM(s) Oral daily  metroNIDAZOLE  IVPB 500 milliGRAM(s) IV Intermittent every 8 hours  pantoprazole  Injectable 40 milliGRAM(s) IV Push every 12 hours      HOME MEDICATIONS:  atorvastatin 80 mg oral tablet: 1 tab(s) orally once a day  Eliquis 5 mg oral tablet: 1 tab(s) orally 2 times a day  levothyroxine 75 mcg (0.075 mg) oral tablet: 1 tab(s) orally once a day  metoprolol succinate 25 mg oral tablet, extended release: 1 tab(s) orally once a day      PHYSICAL EXAM:      I/O Summary 24H    IN: 2368.8 mL / OUT: 1575 mL / NET: 793.8 mL        T(F): 99.1 (01-12-24 @ 09:16), Max: 99.5 (01-12-24 @ 01:15)  HR: 80 (01-12-24 @ 12:52) (66 - 171)  BP: 143/74 (01-12-24 @ 12:52) (112/71 - 176/84)  BP(mean): --  ABP: --  ABP(mean): --  RR: 18 (01-12-24 @ 12:52) (16 - 20)  SpO2: 96% (01-12-24 @ 12:52) (95% - 98%)    GEN: Awake, comfortable. NAD.   HEENT: NCAT, PERRL, EOMI. Mucosa moist. No JVD.   RESP: CTA b/l  CV: RRR, normal s1/s2. No m/r/g.  ABD: Soft, NTND. BS+  EXT: Warm. No edema, clubbing, or cyanosis.   NEURO: AAOx3. No focal deficits.      	  LABS:	 	    Cardiac Markers             CBC 01-12-24 @ 09:10                        12.2   6.11  )-----------( 291                   33.8       Hgb trend: 12.2 <-- , 12.0 <-- , 11.0 <-- , 10.9 <--   WBC trend: 6.11 <-- , 8.57 <-- , 11.91 <-- , 10.75 <--     CMP 01-12-24 @ 09:10    136  |  105  |  6<L>  ----------------------------<  135<H>  3.4<L>   |  24  |  0.36<L>    Ca    8.4      01-12-24 @ 09:10  Phos  2.7     01-12  Mg     1.9     01-12        Serum Cr trend: 0.36 <-- , 0.32 <-- , 0.34 <-- , 0.29 <-- , 0.35 <--   proBNP:   Lipid Profile:   HgA1c:   TSH: Thyroid Stimulating Hormone, Serum: 1.790 uIU/mL (01-11 @ 23:39)      TELEMETRY: 	    ECG:  	  RADIOLOGY:   ECHO:  STRESS:  CATH:

## 2024-01-12 NOTE — PROGRESS NOTE ADULT - ASSESSMENT
A/p: 81yo Female pt with PMH depression, hypothyroidism, HLD, Afib on Eliquis (last dose 1/8/24) and PSH of L hip surgery, cholecystectomy and VHR presented with 1-day history of sudden severe midabdominal/epigastric pain. Patient admitted for perforated diverticulitis of distal duodenum. UGI neg leak (1/11)    NPO/IVF  Pain & nausea prn  Hep gtt  AM labs

## 2024-01-12 NOTE — PROVIDER CONTACT NOTE (CRITICAL VALUE NOTIFICATION) - BACKGROUND
Patient was admitted on 1/8/ 2024. with diagnosis of sepsis with perforated diverticulitis of distal duodenum

## 2024-01-12 NOTE — PROGRESS NOTE ADULT - ATTENDING COMMENTS
Patient is an 82 year old woman with history of depression, hypothyroidism, HLD, A fib on Eliquis, left hip surgery, cholecystectomy, ventral hernia repair who is admitted with clinical, laboratory, and radiographic findings concerning for perforated duodenal diverticulum. At time of evaluation, afebrile, hemodynamically stable, abdomen soft, tenderness improved, distension improved, no rebound or guarding. Lactic acidosis resolved. CT imaging demonstrates perforated duodenal diverticulum, appears contained. Continue IV fluids, IV antibiotics, antifungal coverage, serial abdominal exams. UGI negative, will trial clear liquid diet. Late entry, date of service 1/12/24.

## 2024-01-12 NOTE — PROVIDER CONTACT NOTE (CRITICAL VALUE NOTIFICATION) - ACTION/TREATMENT ORDERED:
MD approves recommendation, heparin held.
Heparin drip was on hold for 1 hour from 10:30 AM to 11:30 AM.  Heparin  drip was started at 6 ml/hr.

## 2024-01-12 NOTE — PROGRESS NOTE ADULT - SUBJECTIVE AND OBJECTIVE BOX
Patient is a 83y old  Female who presents with a chief complaint of perforated viscus (12 Jan 2024 07:06)    INTERVAL EVENTS:    SUBJECTIVE:  Patient was seen and examined at bedside. Reports some abdominal discomfort, denies SOB, no chest pain, no N/V, feeling tired. No other complaints or events reported.    Review of systems:  Rest of 12 point Review of systems negative unless otherwise documented elsewhere in note.     Diet, Clear Liquid (01-12-24 @ 07:26) [Active]      MEDICATIONS:  MEDICATIONS  (STANDING):  cefTRIAXone   IVPB 2000 milliGRAM(s) IV Intermittent every 24 hours  dextrose 5% + sodium chloride 0.9% 1000 milliLiter(s) (80 mL/Hr) IV Continuous <Continuous>  fluconAZOLE IVPB 400 milliGRAM(s) IV Intermittent every 24 hours  heparin  Infusion 900 Unit(s)/Hr (6 mL/Hr) IV Continuous <Continuous>  levothyroxine Injectable 60 MICROGram(s) IV Push at bedtime  metoprolol tartrate Injectable 5 milliGRAM(s) IV Push every 6 hours  metroNIDAZOLE  IVPB 500 milliGRAM(s) IV Intermittent every 8 hours  pantoprazole  Injectable 40 milliGRAM(s) IV Push every 12 hours  potassium chloride  10 mEq/100 mL IVPB 10 milliEquivalent(s) IV Intermittent every 1 hour    MEDICATIONS  (PRN):  acetaminophen     Tablet .. 650 milliGRAM(s) Oral every 6 hours PRN Mild Pain (1 - 3), Moderate Pain (4 - 6)  melatonin 5 milliGRAM(s) Oral at bedtime PRN Insomnia      Allergies    No Known Allergies    Intolerances        OBJECTIVE:  Vital Signs Last 24 Hrs  T(C): 37.3 (12 Jan 2024 09:16), Max: 37.5 (12 Jan 2024 01:15)  T(F): 99.1 (12 Jan 2024 09:16), Max: 99.5 (12 Jan 2024 01:15)  HR: 66 (12 Jan 2024 09:16) (66 - 171)  BP: 138/60 (12 Jan 2024 09:16) (112/71 - 176/84)  BP(mean): --  RR: 18 (12 Jan 2024 09:16) (16 - 20)  SpO2: 98% (12 Jan 2024 05:08) (94% - 98%)    Parameters below as of 12 Jan 2024 09:16  Patient On (Oxygen Delivery Method): room air      I&O's Summary    11 Jan 2024 07:01  -  12 Jan 2024 07:00  --------------------------------------------------------  IN: 2368.8 mL / OUT: 1575 mL / NET: 793.8 mL    12 Jan 2024 07:01  -  12 Jan 2024 10:58  --------------------------------------------------------  IN: 654 mL / OUT: 0 mL / NET: 654 mL        PHYSICAL EXAM:  General:  HEENT:  Lungs:  Heart:  Abdomen:  Extremities:    LABS:                        12.2   6.11  )-----------( 291      ( 12 Jan 2024 09:10 )             33.8     01-12    136  |  105  |  6<L>  ----------------------------<  135<H>  3.4<L>   |  24  |  0.36<L>    Ca    8.4      12 Jan 2024 09:10  Phos  2.7     01-12  Mg     1.9     01-12        PTT - ( 12 Jan 2024 09:10 )  PTT:173.3 sec  CAPILLARY BLOOD GLUCOSE        Urinalysis Basic - ( 12 Jan 2024 09:10 )    Color: x / Appearance: x / SG: x / pH: x  Gluc: 135 mg/dL / Ketone: x  / Bili: x / Urobili: x   Blood: x / Protein: x / Nitrite: x   Leuk Esterase: x / RBC: x / WBC x   Sq Epi: x / Non Sq Epi: x / Bacteria: x        MICRODATA:    Culture - Blood (collected 09 Jan 2024 14:23)  Source: .Blood Blood-Peripheral  Preliminary Report (11 Jan 2024 15:00):    No growth at 2 days.    Culture - Blood (collected 09 Jan 2024 14:23)  Source: .Blood Blood-Peripheral  Preliminary Report (11 Jan 2024 15:00):    No growth at 2 days.        RADIOLOGY/OTHER STUDIES:   Patient is a 83y old  Female who presents with a chief complaint of perforated viscus (12 Jan 2024 07:06)    INTERVAL EVENTS:    SUBJECTIVE:  Patient was seen and examined at bedside. Reports some abdominal discomfort, denies SOB, no chest pain, no N/V, feeling tired. No other complaints or events reported. Episode of RVR overnight.     Review of systems:  Rest of 12 point Review of systems negative unless otherwise documented elsewhere in note.     Diet, Clear Liquid (01-12-24 @ 07:26) [Active]      MEDICATIONS:  MEDICATIONS  (STANDING):  cefTRIAXone   IVPB 2000 milliGRAM(s) IV Intermittent every 24 hours  dextrose 5% + sodium chloride 0.9% 1000 milliLiter(s) (80 mL/Hr) IV Continuous <Continuous>  fluconAZOLE IVPB 400 milliGRAM(s) IV Intermittent every 24 hours  heparin  Infusion 900 Unit(s)/Hr (6 mL/Hr) IV Continuous <Continuous>  levothyroxine Injectable 60 MICROGram(s) IV Push at bedtime  metoprolol tartrate Injectable 5 milliGRAM(s) IV Push every 6 hours  metroNIDAZOLE  IVPB 500 milliGRAM(s) IV Intermittent every 8 hours  pantoprazole  Injectable 40 milliGRAM(s) IV Push every 12 hours  potassium chloride  10 mEq/100 mL IVPB 10 milliEquivalent(s) IV Intermittent every 1 hour    MEDICATIONS  (PRN):  acetaminophen     Tablet .. 650 milliGRAM(s) Oral every 6 hours PRN Mild Pain (1 - 3), Moderate Pain (4 - 6)  melatonin 5 milliGRAM(s) Oral at bedtime PRN Insomnia      Allergies    No Known Allergies    Intolerances        OBJECTIVE:  Vital Signs Last 24 Hrs  T(C): 37.3 (12 Jan 2024 09:16), Max: 37.5 (12 Jan 2024 01:15)  T(F): 99.1 (12 Jan 2024 09:16), Max: 99.5 (12 Jan 2024 01:15)  HR: 66 (12 Jan 2024 09:16) (66 - 171)  BP: 138/60 (12 Jan 2024 09:16) (112/71 - 176/84)  BP(mean): --  RR: 18 (12 Jan 2024 09:16) (16 - 20)  SpO2: 98% (12 Jan 2024 05:08) (94% - 98%)    Parameters below as of 12 Jan 2024 09:16  Patient On (Oxygen Delivery Method): room air      I&O's Summary    11 Jan 2024 07:01  -  12 Jan 2024 07:00  --------------------------------------------------------  IN: 2368.8 mL / OUT: 1575 mL / NET: 793.8 mL    12 Jan 2024 07:01  -  12 Jan 2024 10:58  --------------------------------------------------------  IN: 654 mL / OUT: 0 mL / NET: 654 mL        PHYSICAL EXAM:  General: AOX3, NAD, lying flat in bed, speaking in full sentences, no labored  breathing on RA  HEENT: AT/NC, no facial asymmetry  Lungs: poor inspiration, no crackles, no wheezes  Heart: irregular   Abdomen: soft, non-tender  Extremities: warm, no edema, no tenderness, no focal deficit     LABS:                        12.2   6.11  )-----------( 291      ( 12 Jan 2024 09:10 )             33.8     01-12    136  |  105  |  6<L>  ----------------------------<  135<H>  3.4<L>   |  24  |  0.36<L>    Ca    8.4      12 Jan 2024 09:10  Phos  2.7     01-12  Mg     1.9     01-12        PTT - ( 12 Jan 2024 09:10 )  PTT:173.3 sec  CAPILLARY BLOOD GLUCOSE        Urinalysis Basic - ( 12 Jan 2024 09:10 )    Color: x / Appearance: x / SG: x / pH: x  Gluc: 135 mg/dL / Ketone: x  / Bili: x / Urobili: x   Blood: x / Protein: x / Nitrite: x   Leuk Esterase: x / RBC: x / WBC x   Sq Epi: x / Non Sq Epi: x / Bacteria: x        MICRODATA:    Culture - Blood (collected 09 Jan 2024 14:23)  Source: .Blood Blood-Peripheral  Preliminary Report (11 Jan 2024 15:00):    No growth at 2 days.    Culture - Blood (collected 09 Jan 2024 14:23)  Source: .Blood Blood-Peripheral  Preliminary Report (11 Jan 2024 15:00):    No growth at 2 days.        RADIOLOGY/OTHER STUDIES:

## 2024-01-12 NOTE — PROVIDER CONTACT NOTE (CRITICAL VALUE NOTIFICATION) - TEST AND RESULT REPORTED:
aptt 167
Gram positive cocci in clusters from aerobic bottle on blood culture sent on 1/8/24
PTT   173.3

## 2024-01-12 NOTE — CONSULT NOTE ADULT - ASSESSMENT
82F PMH HLD, hypothryoidism, Afib on eliquis who presents with perforated duodenal diverticulitis which was managed medically. Cardiology consulted for Afib RVR.    Review of studies  TTE 10/21/22: Normal biventricular size/function. Mild LVH. G1DD. No significant valve disease. PH, PASP 42. +PFO    TELE: Afib to 150s w/PVCs converted to SR in 60s-70s at 0030    #Afib RVR  Now resolved  -Continue lopressor 5mg IV q6h standing if patient is NPO, or convert to lopressor 25mg BID when tolerating PO. Convert to Toprol 50mg on discharge  -AC per primary team. Consider switching heparin gtt to Eliquis if no longer requires procedures  -Obtain TTE    #HLD  -Resume home lipitor

## 2024-01-12 NOTE — PROGRESS NOTE ADULT - SUBJECTIVE AND OBJECTIVE BOX
SUBJECTIVE:  Pt seen this AM on rounds. Pt had Afib RVR to 140s o/n, pt rate controlled w/ metop 5 IV x3; pt endorses that they were having a BM at the time the RVR started.    MEDICATIONS  (STANDING):  cefTRIAXone   IVPB 2000 milliGRAM(s) IV Intermittent every 24 hours  dextrose 5% + sodium chloride 0.9% 1000 milliLiter(s) (80 mL/Hr) IV Continuous <Continuous>  fluconAZOLE IVPB 400 milliGRAM(s) IV Intermittent every 24 hours  heparin  Infusion 900 Unit(s)/Hr (7 mL/Hr) IV Continuous <Continuous>  levothyroxine Injectable 60 MICROGram(s) IV Push at bedtime  metoprolol tartrate Injectable 5 milliGRAM(s) IV Push every 6 hours  metroNIDAZOLE  IVPB 500 milliGRAM(s) IV Intermittent every 8 hours  pantoprazole  Injectable 40 milliGRAM(s) IV Push every 12 hours    MEDICATIONS  (PRN):  acetaminophen     Tablet .. 650 milliGRAM(s) Oral every 6 hours PRN Mild Pain (1 - 3), Moderate Pain (4 - 6)  melatonin 5 milliGRAM(s) Oral at bedtime PRN Insomnia      Vital Signs Last 24 Hrs  T(C): 36.8 (12 Jan 2024 05:08), Max: 37.5 (12 Jan 2024 01:15)  T(F): 98.2 (12 Jan 2024 05:08), Max: 99.5 (12 Jan 2024 01:15)  HR: 78 (12 Jan 2024 05:08) (78 - 171)  BP: 128/55 (12 Jan 2024 05:08) (112/71 - 176/84)  BP(mean): --  RR: 16 (12 Jan 2024 05:08) (16 - 20)  SpO2: 98% (12 Jan 2024 05:08) (94% - 98%)    Parameters below as of 12 Jan 2024 05:08  Patient On (Oxygen Delivery Method): room air        Physical Exam:  General: NAD, resting comfortably in bed  Pulmonary: Nonlabored breathing, no respiratory distress  Cardiovascular: NSR  Abdominal: soft, NT/ND  Extremities: WWP, normal strength  Neuro: A/O x 3, CNs II-XII grossly intact, no focal deficits    I&O's Summary    11 Jan 2024 07:01  -  12 Jan 2024 07:00  --------------------------------------------------------  IN: 2281.8 mL / OUT: 1575 mL / NET: 706.8 mL        LABS:                        12.0   8.57  )-----------( 304      ( 11 Jan 2024 23:39 )             33.5     01-11    135  |  101  |  5<L>  ----------------------------<  137<H>  3.0<L>   |  23  |  0.32<L>    Ca    8.9      11 Jan 2024 23:39  Phos  1.6     01-11  Mg     2.0     01-11      PTT - ( 11 Jan 2024 08:23 )  PTT:81.9 sec  Urinalysis Basic - ( 11 Jan 2024 23:39 )    Color: x / Appearance: x / SG: x / pH: x  Gluc: 137 mg/dL / Ketone: x  / Bili: x / Urobili: x   Blood: x / Protein: x / Nitrite: x   Leuk Esterase: x / RBC: x / WBC x   Sq Epi: x / Non Sq Epi: x / Bacteria: x      CAPILLARY BLOOD GLUCOSE            RADIOLOGY & ADDITIONAL STUDIES:

## 2024-01-12 NOTE — PROVIDER CONTACT NOTE (OTHER) - ASSESSMENT
Patient was passing liquid stool at such time. Denied any CP/SOB. HR was as high as 160s. Other VS=WNL

## 2024-01-12 NOTE — PROVIDER CONTACT NOTE (OTHER) - ACTION/TREATMENT ORDERED:
Lopressor 5 mg IV x 3 administered total. ECG bedside done and Labs drawn by MD. Patient converted to SR at 00:25 AM. NO treatment ordered R/T several loose BMs this evening about 2-3 episodes

## 2024-01-12 NOTE — PROGRESS NOTE ADULT - ASSESSMENT
81yo Female pt with PMH depression, hypothyroidism, HLD, Afib on Eliquis (last dose today) and PSH of L hip surgery, cholecystectomy and VHR presents with 1-day history of sudden severe midabdominal/epigastric pain. Patient had a  CT AP with PO and IV contrast demonstrates perforated diverticulitis of distal duodenum.     #Abdominal pain secondary to  perforated diverticulitis of distal duodenum  #Leukocytosis   -Management as per primary team  -UGIS without leak   -ATB Per primary team. Bcx noted, consider ID consult.       #Atrial fibrillation with RVR  -Cardiology consulted per primary team, will defer management to Cardiology   - Continue Heparin drip and monitor PTT   -Telemetry monitoring     #Hypothyroidism   - Will continue Levothyroxine     #Hypokalemia  #Hypophosphatemia   - Will monitor electrolytes and replete prn     Discussed with primary team   83yo Female pt with PMH depression, hypothyroidism, HLD, Afib on Eliquis (last dose today) and PSH of L hip surgery, cholecystectomy and VHR presents with 1-day history of sudden severe midabdominal/epigastric pain. Patient had a  CT AP with PO and IV contrast demonstrates perforated diverticulitis of distal duodenum.     #Abdominal pain secondary to  perforated diverticulitis of distal duodenum  #Leukocytosis   -Management as per primary team  -UGIS without leak   -ATB Per primary team. Bcx noted, consider ID consult.       #Atrial fibrillation with RVR  -Cardiology consulted per primary team, will defer management to Cardiology   - Continue Heparin drip and monitor PTT   -Telemetry monitoring     #Hypothyroidism   - Will continue Levothyroxine     #Hypokalemia  #Hypophosphatemia   - Will monitor electrolytes and replete prn     Discussed with primary team

## 2024-01-12 NOTE — CONSULT NOTE ADULT - ATTENDING COMMENTS
Patient is an 81 yo F with PMH of Afib on Eliquis,  HLD, hypothyroidism, admitted with perforated duodenal diverticulitis managed medically with clinical course notable for Afib with RVR for which Cardiology was consulted.    Review of studies  - TTE 10/21/22: Normal biventricular size/function. Mild LVH. G1DD. No significant valve disease. PH, PASP 42. +PFO  - TELE: Afib to 150s w/PVCs converted to SR in 60s-70s at 0030    # Atrial Fibrillation with RVR  - Patient with known Afib on Eliquis, admitted with perforated duodenal diverticulitis with Afib with RVR with rates as high as the 150's  - Review of Emar show patient's home BB has been held 2/2 to her NPO status  - Tele Reviewed showing frequent PVS, Afib with RVR with rate to the 150's with spontaneous cardioversion to NSR. Patient has since remained in NSR with rare PVCS and couplets  - At this time would resume standing BB therapy with lopressor 5mg IV q6h standing as patient remains NPO. Once patient able to tolerate PO would  resume lopressor 25mg BID. Anticipate DC on Toprol 50mg daily  -CHADVASC at least of 3 placing patient at higher thromboembolic risk. Cont Heparin Gtt with goal to resume Eliquis 2.5 mg po BID (Age 82, Weight 49 Kgs)  - Of note patient with PFO on Echo 10/22. Would ensure patient remains on Heparin gtt or DVT prophylaxis to diminish risk of paroxysmal emboli   - Cardiology will continue to follow with you , please call with any questions Patient is an 83 yo F with PMH of Afib on Eliquis,  HLD, hypothyroidism, admitted with perforated duodenal diverticulitis managed medically with clinical course notable for Afib with RVR for which Cardiology was consulted.    Review of studies  - TTE 10/21/22: Normal biventricular size/function. Mild LVH. G1DD. No significant valve disease. PH, PASP 42. +PFO  - TELE: Afib to 150s w/PVCs converted to SR in 60s-70s at 0030    # Atrial Fibrillation with RVR  - Patient with known Afib on Eliquis, admitted with perforated duodenal diverticulitis with Afib with RVR with rates as high as the 150's  - Review of Emar show patient's home BB has been held 2/2 to her NPO status  - Tele Reviewed showing frequent PVS, Afib with RVR with rate to the 150's with spontaneous cardioversion to NSR. Patient has since remained in NSR with rare PVCS and couplets  - At this time would resume standing BB therapy with lopressor 5mg IV q6h standing as patient remains NPO. Once patient able to tolerate PO would  resume lopressor 25mg BID. Anticipate DC on Toprol 50mg daily  -CHADVASC at least of 3 placing patient at higher thromboembolic risk. Cont Heparin Gtt with goal to resume Eliquis 2.5 mg po BID (Age 82, Weight 49 Kgs)  - Of note patient with PFO on Echo 10/22. Would ensure patient remains on Heparin gtt or DVT prophylaxis to diminish risk of paroxysmal emboli   - Cardiology will continue to follow with you , please call with any questions

## 2024-01-13 LAB
ANION GAP SERPL CALC-SCNC: 10 MMOL/L — SIGNIFICANT CHANGE UP (ref 5–17)
ANION GAP SERPL CALC-SCNC: 10 MMOL/L — SIGNIFICANT CHANGE UP (ref 5–17)
APTT BLD: 148.1 SEC — CRITICAL HIGH (ref 24.5–35.6)
APTT BLD: 148.1 SEC — CRITICAL HIGH (ref 24.5–35.6)
BUN SERPL-MCNC: 2 MG/DL — LOW (ref 7–23)
BUN SERPL-MCNC: 2 MG/DL — LOW (ref 7–23)
CALCIUM SERPL-MCNC: 9.1 MG/DL — SIGNIFICANT CHANGE UP (ref 8.4–10.5)
CALCIUM SERPL-MCNC: 9.1 MG/DL — SIGNIFICANT CHANGE UP (ref 8.4–10.5)
CHLORIDE SERPL-SCNC: 102 MMOL/L — SIGNIFICANT CHANGE UP (ref 96–108)
CHLORIDE SERPL-SCNC: 102 MMOL/L — SIGNIFICANT CHANGE UP (ref 96–108)
CO2 SERPL-SCNC: 24 MMOL/L — SIGNIFICANT CHANGE UP (ref 22–31)
CO2 SERPL-SCNC: 24 MMOL/L — SIGNIFICANT CHANGE UP (ref 22–31)
CREAT SERPL-MCNC: 0.35 MG/DL — LOW (ref 0.5–1.3)
CREAT SERPL-MCNC: 0.35 MG/DL — LOW (ref 0.5–1.3)
EGFR: 103 ML/MIN/1.73M2 — SIGNIFICANT CHANGE UP
EGFR: 103 ML/MIN/1.73M2 — SIGNIFICANT CHANGE UP
GLUCOSE SERPL-MCNC: 128 MG/DL — HIGH (ref 70–99)
GLUCOSE SERPL-MCNC: 128 MG/DL — HIGH (ref 70–99)
HCT VFR BLD CALC: 34.3 % — LOW (ref 34.5–45)
HCT VFR BLD CALC: 34.3 % — LOW (ref 34.5–45)
HGB BLD-MCNC: 11.3 G/DL — LOW (ref 11.5–15.5)
HGB BLD-MCNC: 11.3 G/DL — LOW (ref 11.5–15.5)
MAGNESIUM SERPL-MCNC: 1.7 MG/DL — SIGNIFICANT CHANGE UP (ref 1.6–2.6)
MAGNESIUM SERPL-MCNC: 1.7 MG/DL — SIGNIFICANT CHANGE UP (ref 1.6–2.6)
MCHC RBC-ENTMCNC: 31.2 PG — SIGNIFICANT CHANGE UP (ref 27–34)
MCHC RBC-ENTMCNC: 31.2 PG — SIGNIFICANT CHANGE UP (ref 27–34)
MCHC RBC-ENTMCNC: 32.9 GM/DL — SIGNIFICANT CHANGE UP (ref 32–36)
MCHC RBC-ENTMCNC: 32.9 GM/DL — SIGNIFICANT CHANGE UP (ref 32–36)
MCV RBC AUTO: 94.8 FL — SIGNIFICANT CHANGE UP (ref 80–100)
MCV RBC AUTO: 94.8 FL — SIGNIFICANT CHANGE UP (ref 80–100)
NRBC # BLD: 0 /100 WBCS — SIGNIFICANT CHANGE UP (ref 0–0)
NRBC # BLD: 0 /100 WBCS — SIGNIFICANT CHANGE UP (ref 0–0)
PHOSPHATE SERPL-MCNC: 2.8 MG/DL — SIGNIFICANT CHANGE UP (ref 2.5–4.5)
PHOSPHATE SERPL-MCNC: 2.8 MG/DL — SIGNIFICANT CHANGE UP (ref 2.5–4.5)
PLATELET # BLD AUTO: 355 K/UL — SIGNIFICANT CHANGE UP (ref 150–400)
PLATELET # BLD AUTO: 355 K/UL — SIGNIFICANT CHANGE UP (ref 150–400)
POTASSIUM SERPL-MCNC: 3.5 MMOL/L — SIGNIFICANT CHANGE UP (ref 3.5–5.3)
POTASSIUM SERPL-MCNC: 3.5 MMOL/L — SIGNIFICANT CHANGE UP (ref 3.5–5.3)
POTASSIUM SERPL-SCNC: 3.5 MMOL/L — SIGNIFICANT CHANGE UP (ref 3.5–5.3)
POTASSIUM SERPL-SCNC: 3.5 MMOL/L — SIGNIFICANT CHANGE UP (ref 3.5–5.3)
RBC # BLD: 3.62 M/UL — LOW (ref 3.8–5.2)
RBC # BLD: 3.62 M/UL — LOW (ref 3.8–5.2)
RBC # FLD: 13.3 % — SIGNIFICANT CHANGE UP (ref 10.3–14.5)
RBC # FLD: 13.3 % — SIGNIFICANT CHANGE UP (ref 10.3–14.5)
SODIUM SERPL-SCNC: 136 MMOL/L — SIGNIFICANT CHANGE UP (ref 135–145)
SODIUM SERPL-SCNC: 136 MMOL/L — SIGNIFICANT CHANGE UP (ref 135–145)
WBC # BLD: 6.77 K/UL — SIGNIFICANT CHANGE UP (ref 3.8–10.5)
WBC # BLD: 6.77 K/UL — SIGNIFICANT CHANGE UP (ref 3.8–10.5)
WBC # FLD AUTO: 6.77 K/UL — SIGNIFICANT CHANGE UP (ref 3.8–10.5)
WBC # FLD AUTO: 6.77 K/UL — SIGNIFICANT CHANGE UP (ref 3.8–10.5)

## 2024-01-13 PROCEDURE — 99231 SBSQ HOSP IP/OBS SF/LOW 25: CPT

## 2024-01-13 PROCEDURE — 99233 SBSQ HOSP IP/OBS HIGH 50: CPT

## 2024-01-13 RX ORDER — CEFPODOXIME PROXETIL 100 MG
200 TABLET ORAL EVERY 12 HOURS
Refills: 0 | Status: COMPLETED | OUTPATIENT
Start: 2024-01-14 | End: 2024-01-22

## 2024-01-13 RX ORDER — APIXABAN 2.5 MG/1
5 TABLET, FILM COATED ORAL EVERY 12 HOURS
Refills: 0 | Status: DISCONTINUED | OUTPATIENT
Start: 2024-01-13 | End: 2024-01-16

## 2024-01-13 RX ORDER — METRONIDAZOLE 500 MG
500 TABLET ORAL EVERY 12 HOURS
Refills: 0 | Status: DISCONTINUED | OUTPATIENT
Start: 2024-01-13 | End: 2024-01-19

## 2024-01-13 RX ADMIN — CEFTRIAXONE 100 MILLIGRAM(S): 500 INJECTION, POWDER, FOR SOLUTION INTRAMUSCULAR; INTRAVENOUS at 14:02

## 2024-01-13 RX ADMIN — Medication 25 MILLIGRAM(S): at 05:05

## 2024-01-13 RX ADMIN — Medication 650 MILLIGRAM(S): at 15:06

## 2024-01-13 RX ADMIN — Medication 100 MILLIGRAM(S): at 01:00

## 2024-01-13 RX ADMIN — APIXABAN 5 MILLIGRAM(S): 2.5 TABLET, FILM COATED ORAL at 09:27

## 2024-01-13 RX ADMIN — PANTOPRAZOLE SODIUM 40 MILLIGRAM(S): 20 TABLET, DELAYED RELEASE ORAL at 17:06

## 2024-01-13 RX ADMIN — SODIUM CHLORIDE 80 MILLILITER(S): 9 INJECTION, SOLUTION INTRAVENOUS at 03:32

## 2024-01-13 RX ADMIN — Medication 650 MILLIGRAM(S): at 16:06

## 2024-01-13 RX ADMIN — Medication 100 MILLIGRAM(S): at 10:36

## 2024-01-13 RX ADMIN — SODIUM CHLORIDE 80 MILLILITER(S): 9 INJECTION, SOLUTION INTRAVENOUS at 17:05

## 2024-01-13 RX ADMIN — Medication 60 MICROGRAM(S): at 21:58

## 2024-01-13 RX ADMIN — Medication 500 MILLIGRAM(S): at 17:05

## 2024-01-13 RX ADMIN — PANTOPRAZOLE SODIUM 40 MILLIGRAM(S): 20 TABLET, DELAYED RELEASE ORAL at 05:05

## 2024-01-13 RX ADMIN — SODIUM CHLORIDE 80 MILLILITER(S): 9 INJECTION, SOLUTION INTRAVENOUS at 03:34

## 2024-01-13 NOTE — PROGRESS NOTE ADULT - ATTENDING COMMENTS
Patient is an 82 year old woman with history of depression, hypothyroidism, HLD, A fib on Eliquis, left hip surgery, cholecystectomy, ventral hernia repair who is admitted with clinical, laboratory, and radiographic findings concerning for perforated duodenal diverticulum. At time of evaluation, afebrile, hemodynamically stable, abdomen soft, tenderness improved, distension improved, no rebound or guarding. Lactic acidosis resolved. CT imaging demonstrates perforated duodenal diverticulum, appears contained. Tolerating clears, will resume anticoagulation, reduce IVF, continue antibiotics. Late entry, date of service 1/13/24.

## 2024-01-13 NOTE — PROGRESS NOTE ADULT - SUBJECTIVE AND OBJECTIVE BOX
Patient evaluated with chief resident during AM rounds    Overnight Events: 10 PM PTT 37, inc gtt to 8 (6), Otherwise ESDRAS    Denies Chest Pain, Difficulty breathing, Calf Pain, Headache, Dizziness    OBJECTIVE:  Vital Signs Last 24 Hrs  T(C): 36.7 (13 Jan 2024 04:50), Max: 37.3 (12 Jan 2024 09:16)  T(F): 98.1 (13 Jan 2024 04:50), Max: 99.1 (12 Jan 2024 09:16)  HR: 85 (13 Jan 2024 04:50) (66 - 85)  BP: 149/75 (13 Jan 2024 04:50) (137/69 - 155/72)  BP(mean): --  RR: 18 (13 Jan 2024 04:50) (18 - 18)  SpO2: 96% (13 Jan 2024 04:50) (95% - 98%)    Parameters below as of 13 Jan 2024 04:50  Patient On (Oxygen Delivery Method): room air        I&O's Summary    11 Jan 2024 07:01  -  12 Jan 2024 07:00  --------------------------------------------------------  IN: 2368.8 mL / OUT: 1575 mL / NET: 793.8 mL    12 Jan 2024 07:01  -  13 Jan 2024 06:29  --------------------------------------------------------  IN: 2939 mL / OUT: 1300 mL / NET: 1639 mL        Physical Exam:  General Appearance: Appears well, NAD  Pulmonary: Nonlabored breathing, no respiratory distress  Cardiovascular: NSR  Abdomen: Soft, nondistneded, non-tender  Extremities: WWP, SCD's in place     LABS:                        12.2   6.11  )-----------( 291      ( 12 Jan 2024 09:10 )             33.8     01-12    136  |  105  |  6<L>  ----------------------------<  135<H>  3.4<L>   |  24  |  0.36<L>    Ca    8.4      12 Jan 2024 09:10  Phos  2.7     01-12  Mg     1.9     01-12      PTT - ( 12 Jan 2024 22:45 )  PTT:37.4 sec  Urinalysis Basic - ( 12 Jan 2024 09:10 )    Color: x / Appearance: x / SG: x / pH: x  Gluc: 135 mg/dL / Ketone: x  / Bili: x / Urobili: x   Blood: x / Protein: x / Nitrite: x   Leuk Esterase: x / RBC: x / WBC x   Sq Epi: x / Non Sq Epi: x / Bacteria: x

## 2024-01-13 NOTE — PROGRESS NOTE ADULT - SUBJECTIVE AND OBJECTIVE BOX
Patient was seen and examined at bedside. Case discuss with primary team. Spoke to pt with assistance of the  ID # [id number].  Pt with some mild lower abdominal pain this morning.     OBJECTIVE:  Vital Signs Last 24 Hrs  T(C): 36.7 (13 Jan 2024 08:42), Max: 37.3 (12 Jan 2024 20:23)  T(F): 98.1 (13 Jan 2024 08:42), Max: 99.1 (12 Jan 2024 20:23)  HR: 73 (13 Jan 2024 08:42) (70 - 85)  BP: 140/67 (13 Jan 2024 08:42) (137/69 - 155/72  RR: 18 (13 Jan 2024 08:42) (18 - 18)  SpO2: 95% (13 Jan 2024 08:42) (95% - 98%)    Parameters below as of 13 Jan 2024 08:42  Patient On (Oxygen Delivery Method): room air    PHYSICAL EXAM:  Gen: NAD laying in bed  CV: RRR, +S1/S2, no mumur  Pulm: CTA b/l no wheezing or crackles   Abd: soft, NTND + BS no rebound or guarding       LABS:                        11.3   6.77  )-----------( 355      ( 13 Jan 2024 05:30 )             34.3     136  |  102  |  2<L>  ----------------------------<  128<H>  3.5   |  24  |  0.35<L>    Ca    9.1      13 Jan 2024 05:30  Phos  2.8     01-13  Mg     1.7     01-13    PTT - ( 13 Jan 2024 05:30 )  PTT:148.1 sec      acetaminophen     Tablet .. 650 milliGRAM(s) Oral every 6 hours PRN  apixaban 5 milliGRAM(s) Oral every 12 hours  cefTRIAXone   IVPB 2000 milliGRAM(s) IV Intermittent every 24 hours  dextrose 5% + sodium chloride 0.9% 1000 milliLiter(s) IV Continuous <Continuous>  fluconAZOLE IVPB 400 milliGRAM(s) IV Intermittent every 24 hours  levothyroxine Injectable 60 MICROGram(s) IV Push at bedtime  melatonin 5 milliGRAM(s) Oral at bedtime PRN  metoprolol succinate ER 25 milliGRAM(s) Oral daily  metroNIDAZOLE  IVPB 500 milliGRAM(s) IV Intermittent every 8 hours  pantoprazole  Injectable 40 milliGRAM(s) IV Push every 12 hours    A/P:  83yo Female pt with PMH depression, hypothyroidism, HLD, Afib on Eliquis (last dose today) and PSH of L hip surgery, cholecystectomy and VHR presents with 1-day history of sudden severe midabdominal/epigastric pain. Patient had a  CT AP with PO and IV contrast demonstrates perforated diverticulitis of distal duodenum.     #Abdominal pain secondary to  perforated diverticulitis of distal duodenum  -Management as per primary team  -UGIS without leak   -Continue Metronidazole and CTX as per primary team.   - Pt blood cx on 1/8 growing Staph epidermis and Staph hominis   -Will f/u repeat blood cx sent on 1/9 NTD x 3 days   - Consider ID consult     #Atrial fibrillation with RVR  -Cardiology following; cardiology recommended that pt get a TTE will defer study decision to primary team   - Pt was switch to Apixaban   -Continue Toprol XL 50mg PO daily   -Continue Telemetry monitoring     #Hypothyroidism   - Will continue Levothyroxine  -Pt is tolerating PO can change IV Levothyroxine back to home PO dose of Levothyroxine 75mcg daily     #DVT prop  -Pt is on system AC with apixaban  Discussed with primary team                      Patient was seen and examined at bedside. Case discuss with primary team. Spoke to pt with assistance of the  ID # [id number].  Pt with some mild lower abdominal pain this morning.     OBJECTIVE:  Vital Signs Last 24 Hrs  T(C): 36.7 (13 Jan 2024 08:42), Max: 37.3 (12 Jan 2024 20:23)  T(F): 98.1 (13 Jan 2024 08:42), Max: 99.1 (12 Jan 2024 20:23)  HR: 73 (13 Jan 2024 08:42) (70 - 85)  BP: 140/67 (13 Jan 2024 08:42) (137/69 - 155/72  RR: 18 (13 Jan 2024 08:42) (18 - 18)  SpO2: 95% (13 Jan 2024 08:42) (95% - 98%)    Parameters below as of 13 Jan 2024 08:42  Patient On (Oxygen Delivery Method): room air    PHYSICAL EXAM:  Gen: NAD laying in bed  CV: RRR, +S1/S2, no mumur  Pulm: CTA b/l no wheezing or crackles   Abd: soft, NTND + BS no rebound or guarding       LABS:                        11.3   6.77  )-----------( 355      ( 13 Jan 2024 05:30 )             34.3     136  |  102  |  2<L>  ----------------------------<  128<H>  3.5   |  24  |  0.35<L>    Ca    9.1      13 Jan 2024 05:30  Phos  2.8     01-13  Mg     1.7     01-13    PTT - ( 13 Jan 2024 05:30 )  PTT:148.1 sec      acetaminophen     Tablet .. 650 milliGRAM(s) Oral every 6 hours PRN  apixaban 5 milliGRAM(s) Oral every 12 hours  cefTRIAXone   IVPB 2000 milliGRAM(s) IV Intermittent every 24 hours  dextrose 5% + sodium chloride 0.9% 1000 milliLiter(s) IV Continuous <Continuous>  fluconAZOLE IVPB 400 milliGRAM(s) IV Intermittent every 24 hours  levothyroxine Injectable 60 MICROGram(s) IV Push at bedtime  melatonin 5 milliGRAM(s) Oral at bedtime PRN  metoprolol succinate ER 25 milliGRAM(s) Oral daily  metroNIDAZOLE  IVPB 500 milliGRAM(s) IV Intermittent every 8 hours  pantoprazole  Injectable 40 milliGRAM(s) IV Push every 12 hours    A/P:  81yo Female pt with PMH depression, hypothyroidism, HLD, Afib on Eliquis (last dose today) and PSH of L hip surgery, cholecystectomy and VHR presents with 1-day history of sudden severe midabdominal/epigastric pain. Patient had a  CT AP with PO and IV contrast demonstrates perforated diverticulitis of distal duodenum.     #Abdominal pain secondary to  perforated diverticulitis of distal duodenum  -Management as per primary team  -UGIS without leak   -Continue Metronidazole and CTX as per primary team.   - Pt blood cx on 1/8 growing Staph epidermis and Staph hominis   -Will f/u repeat blood cx sent on 1/9 NTD x 3 days   - Consider ID consult     #Atrial fibrillation with RVR  -Cardiology following; cardiology recommended that pt get a TTE will defer study decision to primary team   - Pt was switch to Apixaban   -Continue Toprol XL 50mg PO daily   -Continue Telemetry monitoring     #Hypothyroidism   - Will continue Levothyroxine  -Pt is tolerating PO can change IV Levothyroxine back to home PO dose of Levothyroxine 75mcg daily     #DVT prop  -Pt is on system AC with apixaban  Discussed with primary team

## 2024-01-13 NOTE — PROGRESS NOTE ADULT - ASSESSMENT
A/p: 83yo Female pt with PMH depression, hypothyroidism, HLD, Afib on Eliquis (last dose 1/8/24) and PSH of L hip surgery, cholecystectomy and VHR presented with 1-day history of sudden severe midabdominal/epigastric pain. Patient admitted for perforated diverticulitis of distal duodenum. UGI neg leak (1/11)    CLD/IVF  Pain & nausea prn  Hep gtt, poss eliquis transition today  AM labs     A/p: 81yo Female pt with PMH depression, hypothyroidism, HLD, Afib on Eliquis (last dose 1/8/24) and PSH of L hip surgery, cholecystectomy and VHR presented with 1-day history of sudden severe midabdominal/epigastric pain. Patient admitted for perforated diverticulitis of distal duodenum. UGI neg leak (1/11)    CLD/IVF  Pain & nausea prn  Hep gtt, poss eliquis transition today  AM labs

## 2024-01-14 ENCOUNTER — TRANSCRIPTION ENCOUNTER (OUTPATIENT)
Age: 84
End: 2024-01-14

## 2024-01-14 LAB
ANION GAP SERPL CALC-SCNC: 12 MMOL/L — SIGNIFICANT CHANGE UP (ref 5–17)
ANION GAP SERPL CALC-SCNC: 12 MMOL/L — SIGNIFICANT CHANGE UP (ref 5–17)
BUN SERPL-MCNC: 8 MG/DL — SIGNIFICANT CHANGE UP (ref 7–23)
BUN SERPL-MCNC: 8 MG/DL — SIGNIFICANT CHANGE UP (ref 7–23)
CALCIUM SERPL-MCNC: 9.7 MG/DL — SIGNIFICANT CHANGE UP (ref 8.4–10.5)
CALCIUM SERPL-MCNC: 9.7 MG/DL — SIGNIFICANT CHANGE UP (ref 8.4–10.5)
CHLORIDE SERPL-SCNC: 98 MMOL/L — SIGNIFICANT CHANGE UP (ref 96–108)
CHLORIDE SERPL-SCNC: 98 MMOL/L — SIGNIFICANT CHANGE UP (ref 96–108)
CO2 SERPL-SCNC: 24 MMOL/L — SIGNIFICANT CHANGE UP (ref 22–31)
CO2 SERPL-SCNC: 24 MMOL/L — SIGNIFICANT CHANGE UP (ref 22–31)
CREAT SERPL-MCNC: 0.46 MG/DL — LOW (ref 0.5–1.3)
CREAT SERPL-MCNC: 0.46 MG/DL — LOW (ref 0.5–1.3)
CULTURE RESULTS: SIGNIFICANT CHANGE UP
EGFR: 96 ML/MIN/1.73M2 — SIGNIFICANT CHANGE UP
EGFR: 96 ML/MIN/1.73M2 — SIGNIFICANT CHANGE UP
GLUCOSE SERPL-MCNC: 108 MG/DL — HIGH (ref 70–99)
GLUCOSE SERPL-MCNC: 108 MG/DL — HIGH (ref 70–99)
HCT VFR BLD CALC: 35.9 % — SIGNIFICANT CHANGE UP (ref 34.5–45)
HCT VFR BLD CALC: 35.9 % — SIGNIFICANT CHANGE UP (ref 34.5–45)
HGB BLD-MCNC: 11.9 G/DL — SIGNIFICANT CHANGE UP (ref 11.5–15.5)
HGB BLD-MCNC: 11.9 G/DL — SIGNIFICANT CHANGE UP (ref 11.5–15.5)
MAGNESIUM SERPL-MCNC: 1.8 MG/DL — SIGNIFICANT CHANGE UP (ref 1.6–2.6)
MAGNESIUM SERPL-MCNC: 1.8 MG/DL — SIGNIFICANT CHANGE UP (ref 1.6–2.6)
MCHC RBC-ENTMCNC: 31.2 PG — SIGNIFICANT CHANGE UP (ref 27–34)
MCHC RBC-ENTMCNC: 31.2 PG — SIGNIFICANT CHANGE UP (ref 27–34)
MCHC RBC-ENTMCNC: 33.1 GM/DL — SIGNIFICANT CHANGE UP (ref 32–36)
MCHC RBC-ENTMCNC: 33.1 GM/DL — SIGNIFICANT CHANGE UP (ref 32–36)
MCV RBC AUTO: 94.2 FL — SIGNIFICANT CHANGE UP (ref 80–100)
MCV RBC AUTO: 94.2 FL — SIGNIFICANT CHANGE UP (ref 80–100)
NRBC # BLD: 0 /100 WBCS — SIGNIFICANT CHANGE UP (ref 0–0)
NRBC # BLD: 0 /100 WBCS — SIGNIFICANT CHANGE UP (ref 0–0)
PHOSPHATE SERPL-MCNC: 4.6 MG/DL — HIGH (ref 2.5–4.5)
PHOSPHATE SERPL-MCNC: 4.6 MG/DL — HIGH (ref 2.5–4.5)
PLATELET # BLD AUTO: 391 K/UL — SIGNIFICANT CHANGE UP (ref 150–400)
PLATELET # BLD AUTO: 391 K/UL — SIGNIFICANT CHANGE UP (ref 150–400)
POTASSIUM SERPL-MCNC: 3.4 MMOL/L — LOW (ref 3.5–5.3)
POTASSIUM SERPL-MCNC: 3.4 MMOL/L — LOW (ref 3.5–5.3)
POTASSIUM SERPL-SCNC: 3.4 MMOL/L — LOW (ref 3.5–5.3)
POTASSIUM SERPL-SCNC: 3.4 MMOL/L — LOW (ref 3.5–5.3)
RBC # BLD: 3.81 M/UL — SIGNIFICANT CHANGE UP (ref 3.8–5.2)
RBC # BLD: 3.81 M/UL — SIGNIFICANT CHANGE UP (ref 3.8–5.2)
RBC # FLD: 13.4 % — SIGNIFICANT CHANGE UP (ref 10.3–14.5)
RBC # FLD: 13.4 % — SIGNIFICANT CHANGE UP (ref 10.3–14.5)
SODIUM SERPL-SCNC: 134 MMOL/L — LOW (ref 135–145)
SODIUM SERPL-SCNC: 134 MMOL/L — LOW (ref 135–145)
SPECIMEN SOURCE: SIGNIFICANT CHANGE UP
WBC # BLD: 7.34 K/UL — SIGNIFICANT CHANGE UP (ref 3.8–10.5)
WBC # BLD: 7.34 K/UL — SIGNIFICANT CHANGE UP (ref 3.8–10.5)
WBC # FLD AUTO: 7.34 K/UL — SIGNIFICANT CHANGE UP (ref 3.8–10.5)
WBC # FLD AUTO: 7.34 K/UL — SIGNIFICANT CHANGE UP (ref 3.8–10.5)

## 2024-01-14 PROCEDURE — 99233 SBSQ HOSP IP/OBS HIGH 50: CPT

## 2024-01-14 PROCEDURE — 99231 SBSQ HOSP IP/OBS SF/LOW 25: CPT

## 2024-01-14 RX ORDER — METOPROLOL TARTRATE 50 MG
1 TABLET ORAL
Qty: 0 | Refills: 0 | DISCHARGE

## 2024-01-14 RX ORDER — METOPROLOL TARTRATE 50 MG
1 TABLET ORAL
Qty: 60 | Refills: 0
Start: 2024-01-14 | End: 2024-03-13

## 2024-01-14 RX ORDER — ATORVASTATIN CALCIUM 80 MG/1
80 TABLET, FILM COATED ORAL AT BEDTIME
Refills: 0 | Status: DISCONTINUED | OUTPATIENT
Start: 2024-01-14 | End: 2024-01-24

## 2024-01-14 RX ORDER — MAGNESIUM SULFATE 500 MG/ML
1 VIAL (ML) INJECTION ONCE
Refills: 0 | Status: COMPLETED | OUTPATIENT
Start: 2024-01-14 | End: 2024-01-14

## 2024-01-14 RX ORDER — LEVOTHYROXINE SODIUM 125 MCG
75 TABLET ORAL DAILY
Refills: 0 | Status: DISCONTINUED | OUTPATIENT
Start: 2024-01-14 | End: 2024-01-24

## 2024-01-14 RX ORDER — PANTOPRAZOLE SODIUM 20 MG/1
40 TABLET, DELAYED RELEASE ORAL
Refills: 0 | Status: DISCONTINUED | OUTPATIENT
Start: 2024-01-14 | End: 2024-01-24

## 2024-01-14 RX ORDER — POTASSIUM CHLORIDE 20 MEQ
40 PACKET (EA) ORAL ONCE
Refills: 0 | Status: COMPLETED | OUTPATIENT
Start: 2024-01-14 | End: 2024-01-14

## 2024-01-14 RX ADMIN — Medication 200 MILLIGRAM(S): at 17:10

## 2024-01-14 RX ADMIN — Medication 650 MILLIGRAM(S): at 13:26

## 2024-01-14 RX ADMIN — Medication 200 MILLIGRAM(S): at 05:00

## 2024-01-14 RX ADMIN — APIXABAN 5 MILLIGRAM(S): 2.5 TABLET, FILM COATED ORAL at 05:00

## 2024-01-14 RX ADMIN — ATORVASTATIN CALCIUM 80 MILLIGRAM(S): 80 TABLET, FILM COATED ORAL at 21:27

## 2024-01-14 RX ADMIN — Medication 500 MILLIGRAM(S): at 17:09

## 2024-01-14 RX ADMIN — Medication 40 MILLIEQUIVALENT(S): at 17:09

## 2024-01-14 RX ADMIN — PANTOPRAZOLE SODIUM 40 MILLIGRAM(S): 20 TABLET, DELAYED RELEASE ORAL at 17:09

## 2024-01-14 RX ADMIN — Medication 500 MILLIGRAM(S): at 05:00

## 2024-01-14 RX ADMIN — PANTOPRAZOLE SODIUM 40 MILLIGRAM(S): 20 TABLET, DELAYED RELEASE ORAL at 05:08

## 2024-01-14 RX ADMIN — APIXABAN 5 MILLIGRAM(S): 2.5 TABLET, FILM COATED ORAL at 17:09

## 2024-01-14 RX ADMIN — Medication 100 GRAM(S): at 17:09

## 2024-01-14 RX ADMIN — Medication 25 MILLIGRAM(S): at 05:00

## 2024-01-14 RX ADMIN — Medication 650 MILLIGRAM(S): at 12:26

## 2024-01-14 NOTE — DISCHARGE NOTE PROVIDER - NSDCFUADDINST_GEN_ALL_CORE_FT
General Discharge Instructions:  Please follow up with Dr. Gutierrez in 2-3 weeks. Please call the office at [phone] to schedule your appointment. Please follow up with your PCP at your earliest convenience.  Please resume all regular home medications unless specifically advised not to take a particular medication. Your metoprolol succinate dose has been increased to 50 mg once a day. Please follow up with your PCP for further management of your AFIB.   Please get plenty of rest, continue to ambulate several times per day, and drink adequate amounts of fluids.      General Discharge Instructions:  Please follow up with Dr. Gutierrez in 2-3 weeks. Please call the office at [phone] to schedule your appointment. Please follow up with your PCP at your earliest convenience.  Please resume all regular home medications unless specifically advised not to take a particular medication. Your metoprolol succinate dose has been increased to 50 mg once a day. Please follow up with your PCP for further management of your AFIB.   Please get plenty of rest, continue to ambulate several times per day, and drink adequate amounts of fluids.      General Discharge Instructions:  Please follow up with Dr. Gutierrez in 2-3 weeks. Please call the office at [phone] to schedule your appointment. Please follow up with your PCP at your earliest convenience.  Please resume all regular home medications unless specifically advised not to take a particular medication. Your metoprolol succinate dose has been increased to 50 mg once a day. Please follow up with your PCP for further management of your AFIB.   Please get plenty of rest, continue to ambulate several times per day, and drink adequate amounts of fluids.      General Discharge Instructions:  Please follow up with Dr. Gutierrez in 2-3 weeks. Please call the office at [phone] to schedule your appointment. Please follow up with your PCP at your earliest convenience.  Please resume all regular home medications unless specifically advised not to take a particular medication. Your metoprolol succinate dose has been increased to 50 mg once a day. Please follow up with your PCP for further management of your AFIB.   Please get plenty of rest, continue to ambulate several times per day, and drink adequate amounts of fluids.     Warning Signs:  Please call your doctor or nurse practitioner if you experience the following:  *You experience new chest pain, pressure, squeezing or tightness.  *New or worsening cough, shortness of breath, or wheeze.  *If you are vomiting and cannot keep down fluids or your medications.  *You are getting dehydrated due to continued vomiting, diarrhea, or other reasons. Signs of dehydration include dry mouth, rapid heartbeat, or feeling dizzy or faint when standing.  *You see blood or dark/black material when you vomit or have a bowel movement.  *You experience burning when you urinate, have blood in your urine, or experience a discharge.  *Your pain is not improving within 8-12 hours or is not gone within 24 hours. Call or return immediately if your pain is getting worse, changes location, or moves to your chest or back.  *You have shaking chills, or fever greater than 101.5 degrees Fahrenheit or 38 degrees Celsius.  *Any change in your symptoms, or any new symptoms that concern you.    General Discharge Instructions:  Please follow up with Dr. Gutierrez in 2-3 weeks. Please call the office at [phone] to schedule your appointment. Please follow up with your PCP at your earliest convenience.  Please resume all regular home medications unless specifically advised not to take a particular medication. Your metoprolol succinate dose has been increased to 50 mg once a day. Please follow up with your PCP for further management of your AFIB.   Please get plenty of rest, continue to ambulate several times per day, and drink adequate amounts of fluids.     Warning Signs:  Please call your doctor or nurse practitioner if you experience the following:  *You experience new chest pain, pressure, squeezing or tightness.  *New or worsening cough, shortness of breath, or wheeze.  *If you are vomiting and cannot keep down fluids or your medications.  *You are getting dehydrated due to continued vomiting, diarrhea, or other reasons. Signs of dehydration include dry mouth, rapid heartbeat, or feeling dizzy or faint when standing.  *You see blood or dark/black material when you vomit or have a bowel movement.  *You experience burning when you urinate, have blood in your urine, or experience a discharge.  *Your pain is not improving within 8-12 hours or is not gone within 24 hours. Call or return immediately if your pain is getting worse, changes location, or moves to your chest or back.  *You have shaking chills, or fever greater than 101.5 degrees Fahrenheit or 38 degrees Celsius.  *Any change in your symptoms, or any new symptoms that concern you.    General Discharge Instructions:  Please follow up with Dr. Gutierrez in 2-3 weeks. Please call the office at [phone] to schedule your appointment. Please follow up with your PCP at your earliest convenience.  Please resume all regular home medications unless specifically advised not to take a particular medication. Your metoprolol succinate dose has been increased to 50 mg once a day. Please follow up with your PCP for further management of your AFIB.   Please get plenty of rest, continue to ambulate several times per day, and drink adequate amounts of fluids.     Warning Signs:  Please call your doctor or nurse practitioner if you experience the following:  *You experience new chest pain, pressure, squeezing or tightness.  *New or worsening cough, shortness of breath, or wheeze.  *If you are vomiting and cannot keep down fluids or your medications.  *You are getting dehydrated due to continued vomiting, diarrhea, or other reasons. Signs of dehydration include dry mouth, rapid heartbeat, or feeling dizzy or faint when standing.  *You see blood or dark/black material when you vomit or have a bowel movement.  *You experience burning when you urinate, have blood in your urine, or experience a discharge.  *Your pain is not improving within 8-12 hours or is not gone within 24 hours. Call or return immediately if your pain is getting worse, changes location, or moves to your chest or back.  *You have shaking chills, or fever greater than 101.5 degrees Fahrenheit or 38 degrees Celsius.  *Any change in your symptoms, or any new symptoms that concern you.

## 2024-01-14 NOTE — PROGRESS NOTE ADULT - ASSESSMENT
A/p: 83yo Female pt with PMH depression, hypothyroidism, HLD, Afib on Eliquis (last dose 1/8/24) and PSH of L hip surgery, cholecystectomy and VHR presented with 1-day history of sudden severe midabdominal/epigastric pain. Patient admitted for perforated diverticulitis of distal duodenum. UGI neg leak (1/11)    Softs  Pain & nausea prn  Eliquis  AM labs

## 2024-01-14 NOTE — DISCHARGE NOTE PROVIDER - CARE PROVIDER_API CALL
Derrek Gutierrez  Surgery  [address], [address]  [place] [address]  Phone: [phone]  Fax: [phone]  Follow Up Time: 2 weeks   Derrek Gutierrez  Surgery  [address], [address]  [place] [address]  Phone: [phone]  Fax: [phone]  Follow Up Time: 2 weeks   Derrek Gutierrez  Surgery  [address], [address]  [place] [address]  Phone: [phone]  Fax: [phone]  Follow Up Time: 2 weeks

## 2024-01-14 NOTE — DISCHARGE NOTE PROVIDER - HOSPITAL COURSE
83yo Female pt with PMH depression, hypothyroidism, HLD, Afib on Eliquis (last dose 1/8/24) and PSH of L hip surgery, cholecystectomy and VHR presented with 1-day history of sudden severe midabdominal/epigastric pain. Patient admitted for perforated diverticulitis of distal duodenum. UGI neg leak (1/11)    1/14: K, mag repleated.   ON: MONICA, VSS  1/13: , Switch to eliquis PO, Softs, Cefpod + Flagyl, Fluc DC, Hep Lock, Stomach Ache  ON: 10 PM PTT 37, inc gtt to 8 (6),  1/12: aPTT 173.3. Drip stopped for 1 hour  (10:34), dose/rate adjusted. Repeat aPTT ordered for 4pm 79.9, cards consulted  ON: @ 919 PM patient using bedpan & went into AFib w RVR, asyx, no CP, -170s, /70, O2 96, 5 metop IV given -> HR 130s, 5 more metop given, still 130s when defecating again, 5 more metop given, EKG showed Afib, STAT labs K 3 Phos 1.6 - repleted, coverted to sinus @ 1237  1/11: UGI: wnl, no leak, dc' d NGT  ON: 6 PM PTT 84, therapeutic x3  1/10: 8am PTT 68. D5NS @80. 12pm PTT 78. Restarted loppressor 5q6. SDU. C/o HA , bcx staph hominis  ON: .7. Hep gtt decr by to 7ml/hr. Morning labs sent with PTT. Na+ 132 (132). Phos 1.4. K+ 3.1. D5LR changed to NS. Kphos ordered. KCL ordered. PTT 90.5. Rate kept at 7ml/hr. Abd pain improved.   1/9: BCx - staph epi - likely contaminant. 11am ptt 86. Changed zosyn to cef/flagyl. Changed IVF to D5LR. Survillence bcx sent.  ON: Started IV APAP. Heparin GTT started at 2130. Protonix BID. BCX ngt12h. K 3.0, ptt 167. Hep gtt held x 1 hr, restarted at 8mL / hr. 81yo Female pt with PMH depression, hypothyroidism, HLD, Afib on Eliquis (last dose 1/8/24) and PSH of L hip surgery, cholecystectomy and VHR presented with 1-day history of sudden severe midabdominal/epigastric pain. Patient admitted for perforated diverticulitis of distal duodenum. UGI neg leak (1/11)    1/14: K, mag repleated.   ON: MONICA, VSS  1/13: , Switch to eliquis PO, Softs, Cefpod + Flagyl, Fluc DC, Hep Lock, Stomach Ache  ON: 10 PM PTT 37, inc gtt to 8 (6),  1/12: aPTT 173.3. Drip stopped for 1 hour  (10:34), dose/rate adjusted. Repeat aPTT ordered for 4pm 79.9, cards consulted  ON: @ 919 PM patient using bedpan & went into AFib w RVR, asyx, no CP, -170s, /70, O2 96, 5 metop IV given -> HR 130s, 5 more metop given, still 130s when defecating again, 5 more metop given, EKG showed Afib, STAT labs K 3 Phos 1.6 - repleted, coverted to sinus @ 1237  1/11: UGI: wnl, no leak, dc' d NGT  ON: 6 PM PTT 84, therapeutic x3  1/10: 8am PTT 68. D5NS @80. 12pm PTT 78. Restarted loppressor 5q6. SDU. C/o HA , bcx staph hominis  ON: .7. Hep gtt decr by to 7ml/hr. Morning labs sent with PTT. Na+ 132 (132). Phos 1.4. K+ 3.1. D5LR changed to NS. Kphos ordered. KCL ordered. PTT 90.5. Rate kept at 7ml/hr. Abd pain improved.   1/9: BCx - staph epi - likely contaminant. 11am ptt 86. Changed zosyn to cef/flagyl. Changed IVF to D5LR. Survillence bcx sent.  ON: Started IV APAP. Heparin GTT started at 2130. Protonix BID. BCX ngt12h. K 3.0, ptt 167. Hep gtt held x 1 hr, restarted at 8mL / hr. 81yo Female pt with PMH depression, hypothyroidism, HLD, Afib on Eliquis (last dose 1/8/24) and PSH of L hip surgery, cholecystectomy and VHR presented with 1-day history of sudden severe midabdominal/epigastric pain. Patient admitted for perforated diverticulitis of distal duodenum. Patient was started on zosyn but eventually changed to ceftraizone and flagyl. Patient started on heparin drip in case procedure was necessary. UGI on 1/11 was negative for leak. Patient kept NPO for the day and night and then started on clear liquid diet. Patient eventually transitioned to soft and bite sized diet without issues. On 1/11 evening patient went into AFib with RVR. Patient was asymptomatic and other vital signs stable. Patient converted back to sinus rhythm after 3 doses of metoprolol 5 mg IV. Cardiology consulted and recommended increasing home toprol to 50 mg on discharge. Patient resumed home eliquis. Patient transitioned to cefpodoxime and flagyl PO and antibiotics discontinued on discharge.  The remainder of her hospital course was unremarkable. On day of discharge patient was cleared to go home.      83yo Female pt with PMH depression, hypothyroidism, HLD, Afib on Eliquis (last dose 1/8/24) and PSH of L hip surgery, cholecystectomy and VHR presented with 1-day history of sudden severe midabdominal/epigastric pain. Patient admitted for perforated diverticulitis of distal duodenum. Patient was started on zosyn but eventually changed to ceftraizone and flagyl. Patient started on heparin drip in case procedure was necessary. UGI on 1/11 was negative for leak. Patient kept NPO for the day and night and then started on clear liquid diet. Patient eventually transitioned to soft and bite sized diet without issues. On 1/11 evening patient went into AFib with RVR. Patient was asymptomatic and other vital signs stable. Patient converted back to sinus rhythm after 3 doses of metoprolol 5 mg IV. Cardiology consulted and recommended increasing home toprol to 50 mg on discharge. Patient resumed home eliquis. Patient transitioned to cefpodoxime and flagyl PO and antibiotics discontinued on discharge.  The remainder of her hospital course was unremarkable. On day of discharge patient was cleared to go home.      83yo Female pt with PMH depression, hypothyroidism, HLD, Afib on Eliquis (last dose 1/8/24) and PSH of L hip surgery, cholecystectomy and VHR presented with 1-day history of sudden severe midabdominal/epigastric pain. Patient admitted for perforated diverticulitis of distal duodenum. Patient was started on zosyn but eventually changed to ceftraizone and flagyl. Patient started on heparin drip in case procedure was necessary. UGI on 1/11 was negative for leak. Patient kept NPO for the day and night and then started on clear liquid diet. Patient eventually transitioned to soft and bite sized diet without issues. On 1/11 evening patient went into AFib with RVR. Patient was asymptomatic and other vital signs stable. Patient converted back to sinus rhythm after 3 doses of metoprolol 5 mg IV. Cardiology consulted and recommended increasing home toprol to 50 mg on discharge. Patient resumed home eliquis. 1/16/23 PT evaluated the patient and reported LLE weakness, L sided decreased sensation to the face, and slight L facial droop. Stroke code activated, CTA brain, CTA  neck and CT brain perfusion obtained followed by MRI head for further evaluation. MRI head reports no acute infraction and chronic ischemic changes. Neurology recommends continuing with Eliquis 5mg BID.     Patient transitioned to cefpodoxime and flagyl PO and antibiotics discontinued on discharge.  The remainder of her hospital course was unremarkable. On day of discharge patient was cleared to go home.     **INCOMPLETE 1/17      81yo Female pt with PMH depression, hypothyroidism, HLD, Afib on Eliquis (last dose 1/8/24) and PSH of L hip surgery, cholecystectomy and VHR presented with 1-day history of sudden severe midabdominal/epigastric pain. Patient admitted for perforated diverticulitis of distal duodenum. Patient was started on zosyn but eventually changed to ceftraizone and flagyl. Patient started on heparin drip in case procedure was necessary. UGI on 1/11 was negative for leak. Patient kept NPO for the day and night and then started on clear liquid diet. Patient eventually transitioned to soft and bite sized diet without issues. On 1/11 evening patient went into AFib with RVR. Patient was asymptomatic and other vital signs stable. Patient converted back to sinus rhythm after 3 doses of metoprolol 5 mg IV. Cardiology consulted and recommended increasing home toprol to 50 mg on discharge. Patient resumed home eliquis. 1/16/23 PT evaluated the patient and reported LLE weakness, L sided decreased sensation to the face, and slight L facial droop. Stroke code activated, CTA brain, CTA  neck and CT brain perfusion obtained followed by MRI head for further evaluation. MRI head reports no acute infraction and chronic ischemic changes. Neurology recommends continuing with Eliquis 5mg BID.     Patient transitioned to cefpodoxime and flagyl PO and antibiotics discontinued on discharge.  The remainder of her hospital course was unremarkable. On day of discharge patient was cleared to go home.     **INCOMPLETE 1/17      81yo Female pt with PMH depression, hypothyroidism, HLD, Afib on Eliquis (last dose 1/8/24) and PSH of L hip surgery, cholecystectomy and VHR presented with 1-day history of sudden severe midabdominal/epigastric pain. Patient admitted for perforated diverticulitis of distal duodenum. Patient was started on zosyn but eventually changed to ceftraizone and flagyl. Patient started on heparin drip in case procedure was necessary. UGI on 1/11 was negative for leak. Patient kept NPO for the day and night and then started on clear liquid diet. Patient eventually transitioned to soft and bite sized diet without issues. On 1/11 evening patient went into AFib with RVR. Patient was asymptomatic and other vital signs stable. Patient converted back to sinus rhythm after 3 doses of metoprolol 5 mg IV. Cardiology consulted and recommended increasing home toprol to 50 mg on discharge. Patient resumed home eliquis. 1/16/23 PT evaluated the patient and reported LLE weakness, L sided decreased sensation to the face, and slight L facial droop. Stroke code activated, CTA brain, CTA  neck and CT brain perfusion obtained followed by MRI head for further evaluation. MRI head reports no acute infraction and chronic ischemic changes. Neurology recommends decreasing Eliquis dosing to 2.5mg BID based off her weight and age. Patient transitioned to cefpodoxime and flagyl PO and antibiotics discontinued on discharge.  The remainder of her hospital course was unremarkable. On day of discharge patient was cleared to go to [facility].     **INCOMPLETE 1/17      81yo Female pt with PMH depression, hypothyroidism, HLD, Afib on Eliquis (last dose 1/8/24) and PSH of L hip surgery, cholecystectomy and VHR presented with 1-day history of sudden severe midabdominal/epigastric pain. Patient admitted for perforated diverticulitis of distal duodenum. Patient was started on zosyn but eventually changed to ceftraizone and flagyl. Patient started on heparin drip in case procedure was necessary. UGI on 1/11 was negative for leak. Patient kept NPO for the day and night and then started on clear liquid diet. Patient eventually transitioned to soft and bite sized diet without issues. On 1/11 evening patient went into AFib with RVR. Patient was asymptomatic and other vital signs stable. Patient converted back to sinus rhythm after 3 doses of metoprolol 5 mg IV. Cardiology consulted and recommended increasing home toprol to 50 mg on discharge. Patient resumed home eliquis. 1/16/23 PT evaluated the patient and reported LLE weakness, L sided decreased sensation to the face, and slight L facial droop. Stroke code activated, CTA brain, CTA  neck and CT brain perfusion obtained followed by MRI head for further evaluation. MRI head reports no acute infraction and chronic ischemic changes. Neurology recommends decreasing Eliquis dosing to 2.5mg BID based off her weight and age. Patient transitioned to cefpodoxime and flagyl PO and antibiotics discontinued on discharge.  The remainder of her hospital course was unremarkable. On day of discharge patient was cleared to go to [facility].     **INCOMPLETE 1/17      81yo Female pt with PMH depression, hypothyroidism, HLD, Afib on Eliquis (last dose 1/8/24) and PSH of L hip surgery, cholecystectomy and VHR presented with 1-day history of sudden severe midabdominal/epigastric pain. Patient admitted for perforated diverticulitis of distal duodenum. Patient was started on zosyn but eventually changed to ceftraizone and flagyl. Patient started on heparin drip in case procedure was necessary. UGI on 1/11 was negative for leak. Patient kept NPO for the day and night and then started on clear liquid diet. Patient eventually transitioned to soft and bite sized diet without issues. On 1/11 evening patient went into AFib with RVR. Patient was asymptomatic and other vital signs stable. Patient converted back to sinus rhythm after 3 doses of metoprolol 5 mg IV. Cardiology consulted and recommended increasing home toprol to 50 mg on discharge. Patient resumed home eliquis. 1/16/23 PT evaluated the patient and reported LLE weakness, L sided decreased sensation to the face, and slight L facial droop. Stroke code activated, CTA brain, CTA  neck and CT brain perfusion obtained followed by MRI head for further evaluation. MRI head reports no acute infraction and chronic ischemic changes. Neurology recommends decreasing Eliquis dosing to 2.5mg BID based off her weight and age. Patient transitioned to cefpodoxime and flagyl PO and antibiotics discontinued on discharge.  The remainder of her hospital course was unremarkable. On day of discharge patient was cleared to go to [facility].     **INCOMPLETE 1/17      83yo Female pt with PMH depression, hypothyroidism, HLD, Afib on Eliquis (last dose 1/8/24) and PSH of L hip surgery, cholecystectomy and VHR presented with 1-day history of sudden severe midabdominal/epigastric pain. Patient admitted for perforated diverticulitis of distal duodenum. Patient was started on zosyn but eventually changed to ceftraizone and flagyl. Patient started on heparin drip in case procedure was necessary. UGI on 1/11 was negative for leak. Patient kept NPO for the day and night and then started on clear liquid diet. Patient eventually transitioned to soft and bite sized diet without issues. On 1/11 evening patient went into AFib with RVR. Patient was asymptomatic and other vital signs stable. Patient converted back to sinus rhythm after 3 doses of metoprolol 5 mg IV. Cardiology consulted and recommended increasing home toprol to 50 mg on discharge. Patient resumed home eliquis. 1/16/23 PT evaluated the patient and reported LLE weakness, L sided decreased sensation to the face, and slight L facial droop. Stroke code activated, CTA brain, CTA  neck and CT brain perfusion obtained followed by MRI head for further evaluation. MRI head reports no acute infraction and chronic ischemic changes. Neurology recommends decreasing Eliquis dosing to 2.5mg BID based off her weight and age. Patient transitioned to cefpodoxime and flagyl PO and antibiotics discontinued on discharge.  The remainder of her hospital course was unremarkable. On day of discharge patient was cleared to go to [facility].    83yo Female pt with PMH depression, hypothyroidism, HLD, Afib on Eliquis (last dose 1/8/24) and PSH of L hip surgery, cholecystectomy and VHR presented with 1-day history of sudden severe midabdominal/epigastric pain. Patient admitted for perforated diverticulitis of distal duodenum. Patient was started on zosyn but eventually changed to ceftraizone and flagyl. Patient started on heparin drip in case procedure was necessary. UGI on 1/11 was negative for leak. Patient kept NPO for the day and night and then started on clear liquid diet. Patient eventually transitioned to soft and bite sized diet without issues. On 1/11 evening patient went into AFib with RVR. Patient was asymptomatic and other vital signs stable. Patient converted back to sinus rhythm after 3 doses of metoprolol 5 mg IV. Cardiology consulted and recommended increasing home toprol to 50 mg on discharge. Patient resumed home eliquis. 1/16/23 PT evaluated the patient and reported LLE weakness, L sided decreased sensation to the face, and slight L facial droop. Stroke code activated, CTA brain, CTA  neck and CT brain perfusion obtained followed by MRI head for further evaluation. MRI head reports no acute infraction and chronic ischemic changes. Neurology recommends decreasing Eliquis dosing to 2.5mg BID based off her weight and age. Patient transitioned to cefpodoxime and flagyl PO and antibiotics discontinued on discharge.  The remainder of her hospital course was unremarkable. On day of discharge patient was cleared to go to [facility].    83yo Female pt with PMH depression, hypothyroidism, HLD, Afib on Eliquis (last dose 1/8/24) and PSH of L hip surgery, cholecystectomy and VHR presented with 1-day history of sudden severe midabdominal/epigastric pain. Patient admitted for perforated diverticulitis of distal duodenum. Patient was started on zosyn but eventually changed to ceftraizone and flagyl. Patient started on heparin drip in case procedure was necessary. UGI on 1/11 was negative for leak. Patient kept NPO for the day and night and then started on clear liquid diet. Patient eventually transitioned to soft and bite sized diet without issues. On 1/11 evening patient went into AFib with RVR. Patient was asymptomatic and other vital signs stable. Patient converted back to sinus rhythm after 3 doses of metoprolol 5 mg IV. Cardiology consulted and recommended increasing home toprol to 50 mg on discharge. Patient resumed home eliquis. 1/16/23 PT evaluated the patient and reported LLE weakness, L sided decreased sensation to the face, and slight L facial droop. Stroke code activated, CTA brain, CTA  neck and CT brain perfusion obtained followed by MRI head for further evaluation. MRI head reports no acute infraction and chronic ischemic changes. Neurology recommends decreasing Eliquis dosing to 2.5mg BID based off her weight and age. Patient transitioned to cefpodoxime and flagyl PO and antibiotics discontinued on discharge.  The remainder of her hospital course was unremarkable. On day of discharge patient was cleared to go to [facility].

## 2024-01-14 NOTE — PROGRESS NOTE ADULT - ATTENDING COMMENTS
Patient is an 82 year old woman with history of depression, hypothyroidism, HLD, A fib on Eliquis, left hip surgery, cholecystectomy, ventral hernia repair who is admitted with clinical, laboratory, and radiographic findings concerning for perforated duodenal diverticulum. At time of evaluation, afebrile, hemodynamically stable, abdomen soft, tenderness improved, distension improved, no rebound or guarding. Lactic acidosis resolved. CT imaging demonstrates perforated duodenal diverticulum, appears contained. Advanced diet, resume pertinent home meds, PO anticoagulation, hep lock IVF, complete antibiotics, disposition planning.

## 2024-01-14 NOTE — DISCHARGE NOTE PROVIDER - PROVIDER TOKENS
PROVIDER:[TOKEN:[[id number]],FOLLOWUP:[2 weeks]] PROVIDER:[TOKEN:[[id number]],FOLLOWUP:[2 weeks]] PROVIDER:[TOKEN:[[id number]],FOLLOWUP:[2 weeks]]

## 2024-01-14 NOTE — DISCHARGE NOTE PROVIDER - NSDCFUSCHEDAPPT_GEN_ALL_CORE_FT
[facility]  [facility] [address]  Scheduled Appointment: 02/15/2024     [facility]  [facility] [address]  Scheduled Appointment: 02/15/2024     [facility]  [facility] [address]  Scheduled Appointment: 02/15/2024

## 2024-01-14 NOTE — PROGRESS NOTE ADULT - SUBJECTIVE AND OBJECTIVE BOX
ON: TORIN ANDERSON  1/13: , Switch to eliquis PO, Softs, Cefpod + Flagyl, Fluc DC, Hep Lock, Stomach Ache    SUBJECTIVE: Patient seen and examined bedside by Surgical resident. denies any pain, SOB, CP, n/v this am. +f. bms, tolerating PO softs w/o issue at this time.     apixaban 5 milliGRAM(s) Oral every 12 hours  cefpodoxime 200 milliGRAM(s) Oral every 12 hours  metoprolol succinate ER 25 milliGRAM(s) Oral daily  metroNIDAZOLE    Tablet 500 milliGRAM(s) Oral every 12 hours    MEDICATIONS  (PRN):  acetaminophen     Tablet .. 650 milliGRAM(s) Oral every 6 hours PRN Mild Pain (1 - 3), Moderate Pain (4 - 6)  melatonin 5 milliGRAM(s) Oral at bedtime PRN Insomnia      I&O's Detail    13 Jan 2024 07:01  -  14 Jan 2024 07:00  --------------------------------------------------------  IN:  Total IN: 0 mL    OUT:    Voided (mL): 4600 mL  Total OUT: 4600 mL    Total NET: -4600 mL          Vital Signs Last 24 Hrs  T(C): 37 (14 Jan 2024 04:47), Max: 37.3 (13 Jan 2024 20:00)  T(F): 98.6 (14 Jan 2024 04:47), Max: 99.2 (13 Jan 2024 20:00)  HR: 93 (14 Jan 2024 04:47) (73 - 99)  BP: 156/68 (14 Jan 2024 04:47) (136/79 - 156/68)  BP(mean): --  RR: 18 (14 Jan 2024 04:47) (18 - 18)  SpO2: 93% (14 Jan 2024 04:47) (93% - 99%)    Parameters below as of 14 Jan 2024 04:47  Patient On (Oxygen Delivery Method): room air        General: NAD, resting comfortably in bed  C/V: NSR  Pulm: Nonlabored breathing, no respiratory distress  Abd: soft, NT/ND  Extrem: WWP, no edema, SCDs in place    LABS:                        11.9   7.34  )-----------( 391      ( 14 Jan 2024 05:30 )             35.9     01-14    x   |  98  |  x   ----------------------------<  108<H>  x    |  24  |  x     Ca    9.7      14 Jan 2024 05:30  Phos  2.8     01-13  Mg     1.8     01-14      PTT - ( 13 Jan 2024 05:30 )  PTT:148.1 sec  Urinalysis Basic - ( 14 Jan 2024 05:30 )    Color: x / Appearance: x / SG: x / pH: x  Gluc: 108 mg/dL / Ketone: x  / Bili: x / Urobili: x   Blood: x / Protein: x / Nitrite: x   Leuk Esterase: x / RBC: x / WBC x   Sq Epi: x / Non Sq Epi: x / Bacteria: x        RADIOLOGY & ADDITIONAL STUDIES:

## 2024-01-14 NOTE — DISCHARGE NOTE PROVIDER - NSDCCPCAREPLAN_GEN_ALL_CORE_FT
PRINCIPAL DISCHARGE DIAGNOSIS  Diagnosis: Perforated diverticulum of duodenum  Assessment and Plan of Treatment:      PRINCIPAL DISCHARGE DIAGNOSIS  Diagnosis: Perforated diverticulum of duodenum  Assessment and Plan of Treatment:       SECONDARY DISCHARGE DIAGNOSES  Diagnosis: Sepsis  Assessment and Plan of Treatment:

## 2024-01-14 NOTE — DISCHARGE NOTE PROVIDER - NSDCMRMEDTOKEN_GEN_ALL_CORE_FT
atorvastatin 80 mg oral tablet: 1 tab(s) orally once a day  Eliquis 5 mg oral tablet: 1 tab(s) orally 2 times a day  levothyroxine 75 mcg (0.075 mg) oral tablet: 1 tab(s) orally once a day  metoprolol succinate 25 mg oral tablet, extended release: 1 tab(s) orally once a day   atorvastatin 80 mg oral tablet: 1 tab(s) orally once a day  Eliquis 5 mg oral tablet: 1 tab(s) orally 2 times a day  levothyroxine 75 mcg (0.075 mg) oral tablet: 1 tab(s) orally once a day  Toprol-XL 50 mg oral tablet, extended release: 1 tab(s) orally once a day MDD: 1   apixaban 2.5 mg oral tablet: 1 tab(s) orally every 12 hours  atorvastatin 80 mg oral tablet: 1 tab(s) orally once a day  levothyroxine 75 mcg (0.075 mg) oral tablet: 1 tab(s) orally once a day  Toprol-XL 50 mg oral tablet, extended release: 1 tab(s) orally once a day MDD: 1

## 2024-01-14 NOTE — PROGRESS NOTE ADULT - SUBJECTIVE AND OBJECTIVE BOX
Patient was seen and examined at bedside. Case discuss with primary team. pt with some abdominal discomfort but only w/ palpation.     OBJECTIVE:  Vital Signs Last 24 Hrs  T(C): 36.6 (14 Jan 2024 08:30), Max: 37.3 (13 Jan 2024 20:00)  T(F): 97.9 (14 Jan 2024 08:30), Max: 99.2 (13 Jan 2024 20:00)  HR: 86 (14 Jan 2024 08:30) (77 - 99)  BP: 144/75 (14 Jan 2024 08:30) (136/79 - 156/68)  RR: 16 (14 Jan 2024 08:30) (16 - 18)  SpO2: 99% (14 Jan 2024 08:30) (93% - 99%)    Parameters below as of 14 Jan 2024 08:30  Patient On (Oxygen Delivery Method): room air      PHYSICAL EXAM:  Gen: NAD laying in bed  CV: RRR, +S1/S2, no mumur  Pulm: CTA b/l no wheezing or crackles   Abd: soft, mild tenderness to palpation  + BS no rebound or guarding                11.9   7.34  )-----------( 391      ( 14 Jan 2024 05:30 )             35.9     01-14    134<L>  |  98  |  8   ----------------------------<  108<H>  3.4<L>   |  24  |  0.46<L>    Ca    9.7      14 Jan 2024 05:30  Phos  4.6     01-14  Mg     1.8     01-14    PTT - ( 13 Jan 2024 05:30 )  PTT:148.1 sec  1/8 Blood cx: Staph epidermis and Staph hominis   1/9 Blood cx: NTD x4 days       acetaminophen     Tablet .. 650 milliGRAM(s) Oral every 6 hours PRN  apixaban 5 milliGRAM(s) Oral every 12 hours  cefpodoxime 200 milliGRAM(s) Oral every 12 hours  levothyroxine Injectable 60 MICROGram(s) IV Push at bedtime  magnesium sulfate  IVPB 1 Gram(s) IV Intermittent once  melatonin 5 milliGRAM(s) Oral at bedtime PRN  metoprolol succinate ER 25 milliGRAM(s) Oral daily  metroNIDAZOLE    Tablet 500 milliGRAM(s) Oral every 12 hours  pantoprazole  Injectable 40 milliGRAM(s) IV Push every 12 hours  potassium chloride   Powder 40 milliEquivalent(s) Oral once    A/P:  81yo Female pt with PMH depression, hypothyroidism, HLD, Afib on Eliquis (last dose today) and PSH of L hip surgery, cholecystectomy and VHR presents with 1-day history of sudden severe midabdominal/epigastric pain. Patient had a  CT AP with PO and IV contrast demonstrates perforated diverticulitis of distal duodenum.     #Abdominal pain secondary to perforated diverticulitis of distal duodenum  -Management as per primary team  -Continue Metronidazole and cefpodoxime as per primary team.     #Atrial fibrillation with RVR  -Cardiology following; cardiology recommended that pt get a TTE will defer study decision to primary team   - Pt was switch to Apixaban   -Continue Toprol XL 50mg PO daily     #Hypothyroidism   - Will continue Levothyroxine  -Pt is tolerating PO can change IV Levothyroxine back to home PO dose of Levothyroxine 75mcg daily     #DVT prop  -Pt is on system AC with apixaban    #DISPO  -As per primary team

## 2024-01-15 LAB
ANION GAP SERPL CALC-SCNC: 10 MMOL/L — SIGNIFICANT CHANGE UP (ref 5–17)
APTT BLD: 41.4 SEC — HIGH (ref 24.5–35.6)
BUN SERPL-MCNC: 11 MG/DL — SIGNIFICANT CHANGE UP (ref 7–23)
CALCIUM SERPL-MCNC: 9.8 MG/DL — SIGNIFICANT CHANGE UP (ref 8.4–10.5)
CHLORIDE SERPL-SCNC: 101 MMOL/L — SIGNIFICANT CHANGE UP (ref 96–108)
CO2 SERPL-SCNC: 26 MMOL/L — SIGNIFICANT CHANGE UP (ref 22–31)
CREAT SERPL-MCNC: 0.48 MG/DL — LOW (ref 0.5–1.3)
EGFR: 95 ML/MIN/1.73M2 — SIGNIFICANT CHANGE UP
GLUCOSE SERPL-MCNC: 108 MG/DL — HIGH (ref 70–99)
HCT VFR BLD CALC: 36.6 % — SIGNIFICANT CHANGE UP (ref 34.5–45)
HGB BLD-MCNC: 12.4 G/DL — SIGNIFICANT CHANGE UP (ref 11.5–15.5)
MAGNESIUM SERPL-MCNC: 2.1 MG/DL — SIGNIFICANT CHANGE UP (ref 1.6–2.6)
MCHC RBC-ENTMCNC: 32 PG — SIGNIFICANT CHANGE UP (ref 27–34)
MCHC RBC-ENTMCNC: 33.9 GM/DL — SIGNIFICANT CHANGE UP (ref 32–36)
MCV RBC AUTO: 94.6 FL — SIGNIFICANT CHANGE UP (ref 80–100)
NRBC # BLD: 0 /100 WBCS — SIGNIFICANT CHANGE UP (ref 0–0)
PHOSPHATE SERPL-MCNC: 3.7 MG/DL — SIGNIFICANT CHANGE UP (ref 2.5–4.5)
PLATELET # BLD AUTO: 441 K/UL — HIGH (ref 150–400)
POTASSIUM SERPL-MCNC: 3.6 MMOL/L — SIGNIFICANT CHANGE UP (ref 3.5–5.3)
POTASSIUM SERPL-SCNC: 3.6 MMOL/L — SIGNIFICANT CHANGE UP (ref 3.5–5.3)
RBC # BLD: 3.87 M/UL — SIGNIFICANT CHANGE UP (ref 3.8–5.2)
RBC # FLD: 13.4 % — SIGNIFICANT CHANGE UP (ref 10.3–14.5)
SODIUM SERPL-SCNC: 137 MMOL/L — SIGNIFICANT CHANGE UP (ref 135–145)
WBC # BLD: 10.39 K/UL — SIGNIFICANT CHANGE UP (ref 3.8–10.5)
WBC # FLD AUTO: 10.39 K/UL — SIGNIFICANT CHANGE UP (ref 3.8–10.5)

## 2024-01-15 PROCEDURE — 99233 SBSQ HOSP IP/OBS HIGH 50: CPT

## 2024-01-15 PROCEDURE — 99231 SBSQ HOSP IP/OBS SF/LOW 25: CPT

## 2024-01-15 RX ORDER — POTASSIUM CHLORIDE 20 MEQ
40 PACKET (EA) ORAL ONCE
Refills: 0 | Status: COMPLETED | OUTPATIENT
Start: 2024-01-15 | End: 2024-01-15

## 2024-01-15 RX ORDER — METOPROLOL TARTRATE 50 MG
25 TABLET ORAL ONCE
Refills: 0 | Status: DISCONTINUED | OUTPATIENT
Start: 2024-01-15 | End: 2024-01-15

## 2024-01-15 RX ORDER — METOPROLOL TARTRATE 50 MG
25 TABLET ORAL ONCE
Refills: 0 | Status: DISCONTINUED | OUTPATIENT
Start: 2024-01-15 | End: 2024-01-16

## 2024-01-15 RX ADMIN — APIXABAN 5 MILLIGRAM(S): 2.5 TABLET, FILM COATED ORAL at 05:42

## 2024-01-15 RX ADMIN — Medication 200 MILLIGRAM(S): at 05:42

## 2024-01-15 RX ADMIN — Medication 200 MILLIGRAM(S): at 17:36

## 2024-01-15 RX ADMIN — Medication 650 MILLIGRAM(S): at 12:10

## 2024-01-15 RX ADMIN — APIXABAN 5 MILLIGRAM(S): 2.5 TABLET, FILM COATED ORAL at 17:36

## 2024-01-15 RX ADMIN — Medication 500 MILLIGRAM(S): at 17:37

## 2024-01-15 RX ADMIN — Medication 650 MILLIGRAM(S): at 13:10

## 2024-01-15 RX ADMIN — Medication 500 MILLIGRAM(S): at 05:42

## 2024-01-15 RX ADMIN — Medication 25 MILLIGRAM(S): at 05:42

## 2024-01-15 RX ADMIN — PANTOPRAZOLE SODIUM 40 MILLIGRAM(S): 20 TABLET, DELAYED RELEASE ORAL at 17:37

## 2024-01-15 RX ADMIN — PANTOPRAZOLE SODIUM 40 MILLIGRAM(S): 20 TABLET, DELAYED RELEASE ORAL at 05:42

## 2024-01-15 RX ADMIN — Medication 75 MICROGRAM(S): at 05:42

## 2024-01-15 RX ADMIN — Medication 40 MILLIEQUIVALENT(S): at 20:07

## 2024-01-15 RX ADMIN — ATORVASTATIN CALCIUM 80 MILLIGRAM(S): 80 TABLET, FILM COATED ORAL at 22:21

## 2024-01-15 NOTE — PROGRESS NOTE ADULT - SUBJECTIVE AND OBJECTIVE BOX
Patient was seen and examined at bedside. Case discuss with primary team. Pt with episode  of dizziness this morning when she attempted to stand up.     OBJECTIVE:  Vital Signs Last 24 Hrs  T(C): 36.9 (15 Terrell 2024 08:11), Max: 37.4 (15 Terrell 2024 00:40)  T(F): 98.5 (15 Terrell 2024 08:11), Max: 99.4 (15 Terrell 2024 00:40)  HR: 92 (15 Terrell 2024 08:11) (85 - 96)  BP: 135/74 (15 Terrell 2024 08:11) (135/74 - 147/70)  RR: 18 (15 Terrell 2024 08:11) (16 - 18)  SpO2: 99% (15 Terrell 2024 08:11) (93% - 99%)    Parameters below as of 15 Terrell 2024 08:11  Patient On (Oxygen Delivery Method): room air    PHYSICAL EXAM:  Gen: NAD laying in bed  CV: RRR, +S1/S2, no mumur  Pulm: CTA b/l no wheezing or crackles   Abd: soft, NTND + BS no rebound or guarding                         11.9   7.34  )-----------( 391      ( 14 Jan 2024 05:30 )             35.9   134<L>  |  98  |  8   ----------------------------<  108<H>  3.4<L>   |  24  |  0.46<L>    Ca    9.7      14 Jan 2024 05:30  Phos  4.6     01-14  Mg     1.8     01-14  1/9 Blood cx: NTD x5 days   acetaminophen     Tablet .. 650 milliGRAM(s) Oral every 6 hours PRN  apixaban 5 milliGRAM(s) Oral every 12 hours  atorvastatin 80 milliGRAM(s) Oral at bedtime  cefpodoxime 200 milliGRAM(s) Oral every 12 hours  levothyroxine 75 MICROGram(s) Oral daily  melatonin 5 milliGRAM(s) Oral at bedtime PRN  metoprolol succinate ER 25 milliGRAM(s) Oral once  metoprolol succinate ER 25 milliGRAM(s) Oral daily  metroNIDAZOLE    Tablet 500 milliGRAM(s) Oral every 12 hours  pantoprazole    Tablet 40 milliGRAM(s) Oral two times a day    A/P:  83yo Female pt with PMH depression, hypothyroidism, HLD, Afib on Eliquis (last dose today) and PSH of L hip surgery, cholecystectomy and VHR presents with 1-day history of sudden severe midabdominal/epigastric pain. Patient had a  CT AP with PO and IV contrast demonstrates perforated diverticulitis of distal duodenum.     #Abdominal pain secondary to perforated diverticulitis of distal duodenum  -Management as per primary team  -Continue Metronidazole and cefpodoxime as per primary team.     #Atrial fibrillation with RVR  -Cardiology following; cardiology recommended that pt get a TTE will defer study decision to primary team   - Pt was switch to Apixaban   -Continue Toprol XL 50mg PO daily     #Hypothyroidism   - Will continue Levothyroxine 75mcg daily     #Dizziness  - Will encourage PO intake   - f/u orthostatics     #DVT prop  -Pt is on system AC with apixaban    #DISPO  -As per primary team                  Patient was seen and examined at bedside. Case discuss with primary team. Pt with episode  of dizziness this morning when she attempted to stand up.     OBJECTIVE:  Vital Signs Last 24 Hrs  T(C): 36.9 (15 Terrell 2024 08:11), Max: 37.4 (15 Terrell 2024 00:40)  T(F): 98.5 (15 Terrell 2024 08:11), Max: 99.4 (15 Terrell 2024 00:40)  HR: 92 (15 Terrell 2024 08:11) (85 - 96)  BP: 135/74 (15 Terrell 2024 08:11) (135/74 - 147/70)  RR: 18 (15 Terrell 2024 08:11) (16 - 18)  SpO2: 99% (15 Terrell 2024 08:11) (93% - 99%)    Parameters below as of 15 Terrell 2024 08:11  Patient On (Oxygen Delivery Method): room air    PHYSICAL EXAM:  Gen: NAD laying in bed  CV: RRR, +S1/S2, no mumur  Pulm: CTA b/l no wheezing or crackles   Abd: soft, NTND + BS no rebound or guarding                         11.9   7.34  )-----------( 391      ( 14 Jan 2024 05:30 )             35.9   134<L>  |  98  |  8   ----------------------------<  108<H>  3.4<L>   |  24  |  0.46<L>    Ca    9.7      14 Jan 2024 05:30  Phos  4.6     01-14  Mg     1.8     01-14  1/9 Blood cx: NTD x5 days   acetaminophen     Tablet .. 650 milliGRAM(s) Oral every 6 hours PRN  apixaban 5 milliGRAM(s) Oral every 12 hours  atorvastatin 80 milliGRAM(s) Oral at bedtime  cefpodoxime 200 milliGRAM(s) Oral every 12 hours  levothyroxine 75 MICROGram(s) Oral daily  melatonin 5 milliGRAM(s) Oral at bedtime PRN  metoprolol succinate ER 25 milliGRAM(s) Oral once  metoprolol succinate ER 25 milliGRAM(s) Oral daily  metroNIDAZOLE    Tablet 500 milliGRAM(s) Oral every 12 hours  pantoprazole    Tablet 40 milliGRAM(s) Oral two times a day    A/P:  81yo Female pt with PMH depression, hypothyroidism, HLD, Afib on Eliquis (last dose today) and PSH of L hip surgery, cholecystectomy and VHR presents with 1-day history of sudden severe midabdominal/epigastric pain. Patient had a  CT AP with PO and IV contrast demonstrates perforated diverticulitis of distal duodenum.     #Abdominal pain secondary to perforated diverticulitis of distal duodenum  -Management as per primary team  -Continue Metronidazole and cefpodoxime as per primary team.     #Atrial fibrillation with RVR  -Cardiology following; cardiology recommended that pt get a TTE will defer study decision to primary team   - Pt was switch to Apixaban   -Continue Toprol XL 25 mg PO daily for now and consider increasing if pt can tolerate     #Hypothyroidism   - Will continue Levothyroxine 75mcg daily     #Dizziness  -Pt Toprol dose was increased this morning to 50mg daily. Pt received her usual Toprol 25mg PO daily dose and then a one time dose of Toprol 25mg POx1   -Continue Toprol XL 25 mg PO daily for now and consider increasing if pt can tolerate   - Will encourage PO intake   - f/u orthostatics     #DVT prop  -Pt is on system AC with apixaban    #DISPO  -As per primary team                  Patient was seen and examined at bedside. Case discuss with primary team. Pt with episode  of dizziness this morning when she attempted to stand up.     OBJECTIVE:  Vital Signs Last 24 Hrs  T(C): 36.9 (15 Terrell 2024 08:11), Max: 37.4 (15 Terrell 2024 00:40)  T(F): 98.5 (15 Terrell 2024 08:11), Max: 99.4 (15 Terrell 2024 00:40)  HR: 92 (15 Terrell 2024 08:11) (85 - 96)  BP: 135/74 (15 Terrell 2024 08:11) (135/74 - 147/70)  RR: 18 (15 Terrell 2024 08:11) (16 - 18)  SpO2: 99% (15 Terrell 2024 08:11) (93% - 99%)    Parameters below as of 15 Terrell 2024 08:11  Patient On (Oxygen Delivery Method): room air    PHYSICAL EXAM:  Gen: NAD laying in bed  CV: RRR, +S1/S2, no mumur  Pulm: CTA b/l no wheezing or crackles   Abd: soft, NTND + BS no rebound or guarding                         11.9   7.34  )-----------( 391      ( 14 Jan 2024 05:30 )             35.9   134<L>  |  98  |  8   ----------------------------<  108<H>  3.4<L>   |  24  |  0.46<L>    Ca    9.7      14 Jan 2024 05:30  Phos  4.6     01-14  Mg     1.8     01-14  1/9 Blood cx: NTD x5 days   acetaminophen     Tablet .. 650 milliGRAM(s) Oral every 6 hours PRN  apixaban 5 milliGRAM(s) Oral every 12 hours  atorvastatin 80 milliGRAM(s) Oral at bedtime  cefpodoxime 200 milliGRAM(s) Oral every 12 hours  levothyroxine 75 MICROGram(s) Oral daily  melatonin 5 milliGRAM(s) Oral at bedtime PRN  metoprolol succinate ER 25 milliGRAM(s) Oral once  metoprolol succinate ER 25 milliGRAM(s) Oral daily  metroNIDAZOLE    Tablet 500 milliGRAM(s) Oral every 12 hours  pantoprazole    Tablet 40 milliGRAM(s) Oral two times a day    A/P:  83yo Female pt with PMH depression, hypothyroidism, HLD, Afib on Eliquis (last dose today) and PSH of L hip surgery, cholecystectomy and VHR presents with 1-day history of sudden severe midabdominal/epigastric pain. Patient had a  CT AP with PO and IV contrast demonstrates perforated diverticulitis of distal duodenum.     #Abdominal pain secondary to perforated diverticulitis of distal duodenum  -Management as per primary team  -Continue Metronidazole and cefpodoxime as per primary team.     #Atrial fibrillation with RVR  -Cardiology following; cardiology recommended that pt get a TTE will defer study decision to primary team   - Pt was switch to Apixaban   -Continue Toprol XL 25 mg PO daily for now and consider increasing if pt can tolerate     #Hypothyroidism   - Will continue Levothyroxine 75mcg daily     #Dizziness  -Pt Toprol dose was increased this morning to 50mg daily. Pt received her usual Toprol 25mg PO daily dose and then a one time dose of Toprol 25mg POx1   -Continue Toprol XL 25 mg PO daily for now and consider increasing if pt can tolerate   - Will encourage PO intake   - f/u orthostatics     #DVT prop  -Pt is on system AC with apixaban    #DISPO  -As per primary team                  Patient was seen and examined at bedside. Case discuss with primary team. Pt with episode  of dizziness this morning when she attempted to stand up.     OBJECTIVE:  Vital Signs Last 24 Hrs  T(C): 36.9 (15 Terrell 2024 08:11), Max: 37.4 (15 Terrell 2024 00:40)  T(F): 98.5 (15 Terrell 2024 08:11), Max: 99.4 (15 Terrell 2024 00:40)  HR: 92 (15 Terrell 2024 08:11) (85 - 96)  BP: 135/74 (15 Terrell 2024 08:11) (135/74 - 147/70)  RR: 18 (15 Terrell 2024 08:11) (16 - 18)  SpO2: 99% (15 Terrell 2024 08:11) (93% - 99%)    Parameters below as of 15 Terrell 2024 08:11  Patient On (Oxygen Delivery Method): room air    PHYSICAL EXAM:  Gen: NAD laying in bed  CV: RRR, +S1/S2, no mumur  Pulm: CTA b/l no wheezing or crackles   Abd: soft, NTND + BS no rebound or guarding                         11.9   7.34  )-----------( 391      ( 14 Jan 2024 05:30 )             35.9   134<L>  |  98  |  8   ----------------------------<  108<H>  3.4<L>   |  24  |  0.46<L>    Ca    9.7      14 Jan 2024 05:30  Phos  4.6     01-14  Mg     1.8     01-14  1/9 Blood cx: NTD x5 days   acetaminophen     Tablet .. 650 milliGRAM(s) Oral every 6 hours PRN  apixaban 5 milliGRAM(s) Oral every 12 hours  atorvastatin 80 milliGRAM(s) Oral at bedtime  cefpodoxime 200 milliGRAM(s) Oral every 12 hours  levothyroxine 75 MICROGram(s) Oral daily  melatonin 5 milliGRAM(s) Oral at bedtime PRN  metoprolol succinate ER 25 milliGRAM(s) Oral once  metoprolol succinate ER 25 milliGRAM(s) Oral daily  metroNIDAZOLE    Tablet 500 milliGRAM(s) Oral every 12 hours  pantoprazole    Tablet 40 milliGRAM(s) Oral two times a day    A/P:  83yo Female pt with PMH depression, hypothyroidism, HLD, Afib on Eliquis (last dose today) and PSH of L hip surgery, cholecystectomy and VHR presents with 1-day history of sudden severe midabdominal/epigastric pain. Patient had a  CT AP with PO and IV contrast demonstrates perforated diverticulitis of distal duodenum.     #Abdominal pain secondary to perforated diverticulitis of distal duodenum  -Management as per primary team  -Continue Metronidazole and cefpodoxime as per primary team.     #Atrial fibrillation with RVR  -Cardiology following; cardiology recommended that pt get a TTE will defer study decision to primary team   - Pt was switch to Apixaban   -Continue Toprol XL 25 mg PO daily for now and consider increasing if pt can tolerate     #Hypothyroidism   - Will continue Levothyroxine 75mcg daily     #Dizziness  - Will encourage PO intake   - f/u orthostatics     #DVT prop  -Pt is on system AC with apixaban    #DISPO  -As per primary team                  Patient was seen and examined at bedside. Case discuss with primary team. Pt with episode  of dizziness this morning when she attempted to stand up.     OBJECTIVE:  Vital Signs Last 24 Hrs  T(C): 36.9 (15 Terrell 2024 08:11), Max: 37.4 (15 Terrell 2024 00:40)  T(F): 98.5 (15 Terrell 2024 08:11), Max: 99.4 (15 Terrell 2024 00:40)  HR: 92 (15 Terrell 2024 08:11) (85 - 96)  BP: 135/74 (15 Terrell 2024 08:11) (135/74 - 147/70)  RR: 18 (15 Terrell 2024 08:11) (16 - 18)  SpO2: 99% (15 Terrell 2024 08:11) (93% - 99%)    Parameters below as of 15 Terrell 2024 08:11  Patient On (Oxygen Delivery Method): room air    PHYSICAL EXAM:  Gen: NAD laying in bed  CV: RRR, +S1/S2, no mumur  Pulm: CTA b/l no wheezing or crackles   Abd: soft, NTND + BS no rebound or guarding                         11.9   7.34  )-----------( 391      ( 14 Jan 2024 05:30 )             35.9   134<L>  |  98  |  8   ----------------------------<  108<H>  3.4<L>   |  24  |  0.46<L>    Ca    9.7      14 Jan 2024 05:30  Phos  4.6     01-14  Mg     1.8     01-14  1/9 Blood cx: NTD x5 days   acetaminophen     Tablet .. 650 milliGRAM(s) Oral every 6 hours PRN  apixaban 5 milliGRAM(s) Oral every 12 hours  atorvastatin 80 milliGRAM(s) Oral at bedtime  cefpodoxime 200 milliGRAM(s) Oral every 12 hours  levothyroxine 75 MICROGram(s) Oral daily  melatonin 5 milliGRAM(s) Oral at bedtime PRN  metoprolol succinate ER 25 milliGRAM(s) Oral once  metoprolol succinate ER 25 milliGRAM(s) Oral daily  metroNIDAZOLE    Tablet 500 milliGRAM(s) Oral every 12 hours  pantoprazole    Tablet 40 milliGRAM(s) Oral two times a day    A/P:  81yo Female pt with PMH depression, hypothyroidism, HLD, Afib on Eliquis (last dose today) and PSH of L hip surgery, cholecystectomy and VHR presents with 1-day history of sudden severe midabdominal/epigastric pain. Patient had a  CT AP with PO and IV contrast demonstrates perforated diverticulitis of distal duodenum.     #Abdominal pain secondary to perforated diverticulitis of distal duodenum  -Management as per primary team  -Continue Metronidazole and cefpodoxime as per primary team.     #Atrial fibrillation with RVR  -Cardiology following; cardiology recommended that pt get a TTE will defer study decision to primary team   - Pt was switch to Apixaban   -Continue Toprol XL 25 mg PO daily for now and consider increasing if pt can tolerate     #Hypothyroidism   - Will continue Levothyroxine 75mcg daily     #Dizziness  - Will encourage PO intake   - f/u orthostatics     #DVT prop  -Pt is on system AC with apixaban    #DISPO  -As per primary team

## 2024-01-15 NOTE — PROGRESS NOTE ADULT - SUBJECTIVE AND OBJECTIVE BOX
SUBJECTIVE:  Pt seen on AM rounds. Pt endorses feeling well o/n, denies nausea/vomiting. +f/+bm     MEDICATIONS  (STANDING):  apixaban 5 milliGRAM(s) Oral every 12 hours  atorvastatin 80 milliGRAM(s) Oral at bedtime  cefpodoxime 200 milliGRAM(s) Oral every 12 hours  levothyroxine 75 MICROGram(s) Oral daily  metoprolol succinate ER 25 milliGRAM(s) Oral daily  metoprolol succinate ER 25 milliGRAM(s) Oral once  metroNIDAZOLE    Tablet 500 milliGRAM(s) Oral every 12 hours  pantoprazole    Tablet 40 milliGRAM(s) Oral two times a day    MEDICATIONS  (PRN):  acetaminophen     Tablet .. 650 milliGRAM(s) Oral every 6 hours PRN Mild Pain (1 - 3), Moderate Pain (4 - 6)  melatonin 5 milliGRAM(s) Oral at bedtime PRN Insomnia      Vital Signs Last 24 Hrs  T(C): 36.9 (15 Terrell 2024 08:11), Max: 37.4 (15 Terrell 2024 00:40)  T(F): 98.5 (15 Terrell 2024 08:11), Max: 99.4 (15 Terrell 2024 00:40)  HR: 92 (15 Terrell 2024 08:11) (85 - 96)  BP: 135/74 (15 Terrell 2024 08:11) (125/71 - 147/70)  BP(mean): --  RR: 18 (15 Terrell 2024 08:11) (16 - 18)  SpO2: 99% (15 Terrell 2024 08:11) (93% - 99%)    Parameters below as of 15 Terrell 2024 08:11  Patient On (Oxygen Delivery Method): room air        Physical Exam:  General: NAD, resting comfortably in bed  Pulmonary: Nonlabored breathing, no respiratory distress  Cardiovascular: NSR  Abdominal: soft, NT/ND  Extremities: WWP, normal strength  Neuro: A/O x 3, CNs II-XII grossly intact, no focal deficits    I&O's Summary    14 Jan 2024 07:01  -  15 Terrell 2024 07:00  --------------------------------------------------------  IN: 0 mL / OUT: 1301 mL / NET: -1301 mL        LABS:                        11.9   7.34  )-----------( 391      ( 14 Jan 2024 05:30 )             35.9     01-14    134<L>  |  98  |  8   ----------------------------<  108<H>  3.4<L>   |  24  |  0.46<L>    Ca    9.7      14 Jan 2024 05:30  Phos  4.6     01-14  Mg     1.8     01-14        Urinalysis Basic - ( 14 Jan 2024 05:30 )    Color: x / Appearance: x / SG: x / pH: x  Gluc: 108 mg/dL / Ketone: x  / Bili: x / Urobili: x   Blood: x / Protein: x / Nitrite: x   Leuk Esterase: x / RBC: x / WBC x   Sq Epi: x / Non Sq Epi: x / Bacteria: x      CAPILLARY BLOOD GLUCOSE            RADIOLOGY & ADDITIONAL STUDIES:

## 2024-01-15 NOTE — CHART NOTE - NSCHARTNOTEFT_GEN_A_CORE
Admitting Diagnosis:   Patient is a 83y old  Female who presents with a chief complaint of perforated viscus (15 Terrell 2024 09:28)      PAST MEDICAL & SURGICAL HISTORY:  Depression      Thyroid disorder      Afib      HLD (hyperlipidemia)      History of hip replacement  R, for degenerative pain          Current Nutrition Order:       PO Intake: Good (%) [   ]  Fair (50-75%) [   ] Poor (<25%) [   ]    GI Issues:     Pain:    Skin Integrity:    Labs:   01-14    134<L>  |  98  |  8   ----------------------------<  108<H>  3.4<L>   |  24  |  0.46<L>    Ca    9.7      14 Jan 2024 05:30  Phos  4.6     01-14  Mg     1.8     01-14      CAPILLARY BLOOD GLUCOSE          Medications:  MEDICATIONS  (STANDING):  apixaban 5 milliGRAM(s) Oral every 12 hours  atorvastatin 80 milliGRAM(s) Oral at bedtime  cefpodoxime 200 milliGRAM(s) Oral every 12 hours  levothyroxine 75 MICROGram(s) Oral daily  metoprolol succinate ER 25 milliGRAM(s) Oral once  metoprolol succinate ER 25 milliGRAM(s) Oral daily  metroNIDAZOLE    Tablet 500 milliGRAM(s) Oral every 12 hours  pantoprazole    Tablet 40 milliGRAM(s) Oral two times a day    MEDICATIONS  (PRN):  acetaminophen     Tablet .. 650 milliGRAM(s) Oral every 6 hours PRN Mild Pain (1 - 3), Moderate Pain (4 - 6)  melatonin 5 milliGRAM(s) Oral at bedtime PRN Insomnia      Weight:  Daily     Daily     Weight Change:     Nutrition Focused Physical Exam: Completed [   ]  Not Pertinent [   ]  Muscle Wasting- Temporal [   ]  Clavicle/Pectoral [   ]  Shoulder/Deltoid [   ]  Scapula [   ]  Interosseous [   ]  Quadriceps [   ]  Gastrocnemius [   ]  Fat Wasting- Orbital [   ]  Buccal [   ]  Triceps [   ]  Rib [   ]  Suspect [PCM] 2/2 to physical assessment, [poor intake], and [wt loss]; please see malnutrition chart note.    Estimated energy needs:     Subjective:     Previous Nutrition Diagnosis:    Active [   ]  Resolved [   ]    If resolved, new PES:     Goal:    Recommendations:    Education:     Risk Level: High [   ] Moderate [   ] Low [   ] Admitting Diagnosis:   Patient is a 83y old  Female who presents with a chief complaint of perforated viscus (15 Terrell 2024 09:28)  PAST MEDICAL & SURGICAL HISTORY:  Depression  Thyroid disorder  Afib  HLD (hyperlipidemia)  History of hip replacement  R, for degenerative pain    Current Nutrition Order: soft and bite-sized diet     PO Intake: Good (%) [   ]  Fair (50-75%) [ x ] Poor (<25%) [   ]    GI Issues: no noted nausea or emesis, no diarrhea or constipation; audible and normoactive abdomen; no distention noted; most recent BM on 1/16/24    Pain: denies pain/discomfort    Skin Integrity: no pressure ulcers noted, no surgical incisions; no edema noted; Fermin: 17    Labs:   01-14    134<L>  |  98  |  8   ----------------------------<  108<H>  3.4<L>   |  24  |  0.46<L>    Ca    9.7      14 Jan 2024 05:30  Phos  4.6     01-14  Mg     1.8     01-14    CAPILLARY BLOOD GLUCOSE    Medications:  MEDICATIONS  (STANDING):  apixaban 5 milliGRAM(s) Oral every 12 hours  atorvastatin 80 milliGRAM(s) Oral at bedtime  cefpodoxime 200 milliGRAM(s) Oral every 12 hours  levothyroxine 75 MICROGram(s) Oral daily  metoprolol succinate ER 25 milliGRAM(s) Oral once  metoprolol succinate ER 25 milliGRAM(s) Oral daily  metroNIDAZOLE    Tablet 500 milliGRAM(s) Oral every 12 hours  pantoprazole    Tablet 40 milliGRAM(s) Oral two times a day    MEDICATIONS  (PRN):  acetaminophen     Tablet .. 650 milliGRAM(s) Oral every 6 hours PRN Mild Pain (1 - 3), Moderate Pain (4 - 6)  melatonin 5 milliGRAM(s) Oral at bedtime PRN Insomnia    Dosing Anthropometrics:   Height for BMI (FEET)	4 Feet  Height for BMI (INCHES)	9 Inch(s)  Height for BMI (CM)	144.78 Centimeter(s)  Weight for BMI (lbs)	110 lb  Weight for BMI (kg)	49.9 kg  Body Mass Index	              23.8    Weight Change: no new weights noted in electronic medical records; recommend that nursing obtain updated weights biweekly; RD to monitor trends.     Nutrition Focused Physical Exam: Completed [ x on initial assessment on 1/10/24 ]  Not Pertinent [   ]  Muscle Wasting- Temporal [   ]  Clavicle/Pectoral [   ]  Shoulder/Deltoid [   ]  Scapula [   ]  Interosseous [   ]  Quadriceps [   ]  Gastrocnemius [   ]  Fat Wasting- Orbital [   ]  Buccal [   ]  Triceps [   ]  Rib [   ]  Suspect [PCM] 2/2 to physical assessment, [poor intake], and [wt loss]; please see malnutrition chart note.    Estimated energy needs:   Weight used for calculations	ideal body weight   Estimated Energy Needs Weight (lbs)	94 lb  Estimated Energy Needs Weight (kg)	42.6 kg  Estimated Energy Needs From (kuldeep/kg)	25  Estimated Energy Needs To (kuldeep/kg)	30  Estimated Energy Needs Calculated From (kuldeep/kg)	1065  Estimated Energy Needs Calculated To (kuldeep/kg)	1278  Weight used for calculations	ideal body weight   Estimated Protein Needs Weight (lbs)	94 lb  Estimated Protein Needs Weight (kg)	42.6 kg  Estimated Protein Needs From (g/kg)	1.4  Estimated Protein Needs To (g/kg)	1.6  Estimated Protein Needs Calculated From (g/kg)	59.64  Estimated Protein Needs Calculated To (g/kg)	68.16  Other Calculations	Ideal body weight (42.6kg) used for calculations as pt >100% IBW and BMI <30 per [facility] Standards of Care. Needs estimated for age and adjusted for current clinical status. Fluid needs per team    Subjective: A/p: 81yo Female pt with PMH depression, hypothyroidism, HLD, Afib on Eliquis (last dose 1/8/24) and PSH of L hip surgery, cholecystectomy and VHR presented with 1-day history of sudden severe midabdominal/epigastric pain. Patient admitted for perforated diverticulitis of distal duodenum. UGI neg leak (1/11)    RD spoke to pt at bedside for nutrition follow up evaluation. Pt endorsed she has an appetite, stated she was "hungry" for breakfast. RD observed pt eating breakfast this morning. Pt noted with fair PO intakes. No noted nausea or emesis, no diarrhea or constipation; audible and normoactive abdomen; no distention noted; most recent BM on 1/16/24. Denies pain/discomfort. No pressure ulcers noted, no surgical incisions; no edema noted. Labs reviewed: low creatinine (0.48), elevated serum Glucose (108), RD to continue to monitor trends. RD encouraged pt to increase PO intakes of nutrient-dense foods, adequate macronutrients, micronutrients, fluids, noted pt's elevated nutrient needs; pt was receptive, verbalized understanding. RD to remain available. Additional nutrition recommendations below to follow.     Previous Nutrition Diagnosis: Inadequate Oral Intake related to contained perforated ulcer/diverticula as evidenced by NPO, NGT to LIWS in place    Active [   ]  Resolved [ x ]    If resolved, new PES: Increased nutrient needs related to pt's condition/clinical status as evidenced by sepsis and perforated diverticulitis of duodenum on admission     Goal: Pt will meet at least 75% of protein & energy needs via most appropriate route for nutrition    Recommendations:  1. Continue with current diet order (soft and bite-sized diet)  2. Encourage pt to meet nutritional needs as able  3. Monitor PO intakes, trend weights (weekly), monitor skin integrity, monitor labs (electrolytes, CMP), monitor GI function   4. Encourage adherence to diet education (reinforce as able)   5. Pain and bowel regimen per team   6. Will continue to assess/honor preferences as able   7. Align nutrition interventions with goals of care at all times    Education: RD encouraged pt to increase PO intakes of nutrient-dense foods, adequate macronutrients, micronutrients, fluids, noted pt's elevated nutrient needs; pt was receptive, verbalized understanding.    Risk Level: High [   ] Moderate [ x ] Low [   ] Admitting Diagnosis:   Patient is a 83y old  Female who presents with a chief complaint of perforated viscus (15 Terrell 2024 09:28)  PAST MEDICAL & SURGICAL HISTORY:  Depression  Thyroid disorder  Afib  HLD (hyperlipidemia)  History of hip replacement  R, for degenerative pain    Current Nutrition Order: soft and bite-sized diet     PO Intake: Good (%) [   ]  Fair (50-75%) [ x ] Poor (<25%) [   ]    GI Issues: no noted nausea or emesis, no diarrhea or constipation; audible and normoactive abdomen; no distention noted; most recent BM on 1/16/24    Pain: denies pain/discomfort    Skin Integrity: no pressure ulcers noted, no surgical incisions; no edema noted; Fermin: 17    Labs:   01-14    134<L>  |  98  |  8   ----------------------------<  108<H>  3.4<L>   |  24  |  0.46<L>    Ca    9.7      14 Jan 2024 05:30  Phos  4.6     01-14  Mg     1.8     01-14    CAPILLARY BLOOD GLUCOSE    Medications:  MEDICATIONS  (STANDING):  apixaban 5 milliGRAM(s) Oral every 12 hours  atorvastatin 80 milliGRAM(s) Oral at bedtime  cefpodoxime 200 milliGRAM(s) Oral every 12 hours  levothyroxine 75 MICROGram(s) Oral daily  metoprolol succinate ER 25 milliGRAM(s) Oral once  metoprolol succinate ER 25 milliGRAM(s) Oral daily  metroNIDAZOLE    Tablet 500 milliGRAM(s) Oral every 12 hours  pantoprazole    Tablet 40 milliGRAM(s) Oral two times a day    MEDICATIONS  (PRN):  acetaminophen     Tablet .. 650 milliGRAM(s) Oral every 6 hours PRN Mild Pain (1 - 3), Moderate Pain (4 - 6)  melatonin 5 milliGRAM(s) Oral at bedtime PRN Insomnia    Dosing Anthropometrics:   Height for BMI (FEET)	4 Feet  Height for BMI (INCHES)	9 Inch(s)  Height for BMI (CM)	144.78 Centimeter(s)  Weight for BMI (lbs)	110 lb  Weight for BMI (kg)	49.9 kg  Body Mass Index	              23.8    Weight Change: no new weights noted in electronic medical records; recommend that nursing obtain updated weights biweekly; RD to monitor trends.     Nutrition Focused Physical Exam: Completed [ x on initial assessment on 1/10/24 ]  Not Pertinent [   ]  Muscle Wasting- Temporal [   ]  Clavicle/Pectoral [   ]  Shoulder/Deltoid [   ]  Scapula [   ]  Interosseous [   ]  Quadriceps [   ]  Gastrocnemius [   ]  Fat Wasting- Orbital [   ]  Buccal [   ]  Triceps [   ]  Rib [   ]  Suspect [PCM] 2/2 to physical assessment, [poor intake], and [wt loss]; please see malnutrition chart note.    Estimated energy needs:   Weight used for calculations	ideal body weight   Estimated Energy Needs Weight (lbs)	94 lb  Estimated Energy Needs Weight (kg)	42.6 kg  Estimated Energy Needs From (kuldeep/kg)	25  Estimated Energy Needs To (kuldeep/kg)	30  Estimated Energy Needs Calculated From (kuldeep/kg)	1065  Estimated Energy Needs Calculated To (kuldeep/kg)	1278  Weight used for calculations	ideal body weight   Estimated Protein Needs Weight (lbs)	94 lb  Estimated Protein Needs Weight (kg)	42.6 kg  Estimated Protein Needs From (g/kg)	1.4  Estimated Protein Needs To (g/kg)	1.6  Estimated Protein Needs Calculated From (g/kg)	59.64  Estimated Protein Needs Calculated To (g/kg)	68.16  Other Calculations	Ideal body weight (42.6kg) used for calculations as pt >100% IBW and BMI <30 per [facility] Standards of Care. Needs estimated for age and adjusted for current clinical status. Fluid needs per team    Subjective: A/p: 83yo Female pt with PMH depression, hypothyroidism, HLD, Afib on Eliquis (last dose 1/8/24) and PSH of L hip surgery, cholecystectomy and VHR presented with 1-day history of sudden severe midabdominal/epigastric pain. Patient admitted for perforated diverticulitis of distal duodenum. UGI neg leak (1/11)    RD spoke to pt at bedside for nutrition follow up evaluation. Pt endorsed she has an appetite, stated she was "hungry" for breakfast. RD observed pt eating breakfast this morning. Pt noted with fair PO intakes. No noted nausea or emesis, no diarrhea or constipation; audible and normoactive abdomen; no distention noted; most recent BM on 1/16/24. Denies pain/discomfort. No pressure ulcers noted, no surgical incisions; no edema noted. Labs reviewed: low creatinine (0.48), elevated serum Glucose (108), RD to continue to monitor trends. RD encouraged pt to increase PO intakes of nutrient-dense foods, adequate macronutrients, micronutrients, fluids, noted pt's elevated nutrient needs; pt was receptive, verbalized understanding. RD to remain available. Additional nutrition recommendations below to follow.     Previous Nutrition Diagnosis: Inadequate Oral Intake related to contained perforated ulcer/diverticula as evidenced by NPO, NGT to LIWS in place    Active [   ]  Resolved [ x ]    If resolved, new PES: Increased nutrient needs related to pt's condition/clinical status as evidenced by sepsis and perforated diverticulitis of duodenum on admission     Goal: Pt will meet at least 75% of protein & energy needs via most appropriate route for nutrition    Recommendations:  1. Continue with current diet order (soft and bite-sized diet)  2. Encourage pt to meet nutritional needs as able  3. Monitor PO intakes, trend weights (weekly), monitor skin integrity, monitor labs (electrolytes, CMP), monitor GI function   4. Encourage adherence to diet education (reinforce as able)   5. Pain and bowel regimen per team   6. Will continue to assess/honor preferences as able   7. Align nutrition interventions with goals of care at all times    Education: RD encouraged pt to increase PO intakes of nutrient-dense foods, adequate macronutrients, micronutrients, fluids, noted pt's elevated nutrient needs; pt was receptive, verbalized understanding.    Risk Level: High [   ] Moderate [ x ] Low [   ]

## 2024-01-15 NOTE — PROGRESS NOTE ADULT - ATTENDING COMMENTS
Patient is an 82 year old woman with history of depression, hypothyroidism, HLD, A fib on Eliquis, left hip surgery, cholecystectomy, ventral hernia repair who is admitted with clinical, laboratory, and radiographic findings concerning for perforated duodenal diverticulum. At time of evaluation, afebrile, hemodynamically stable, abdomen soft, tenderness improved, distension improved, no rebound or guarding. Lactic acidosis resolved. CT imaging demonstrates perforated duodenal diverticulum, appears contained. Advanced diet, resume pertinent home meds, PO anticoagulation, hep lock IVF, complete antibiotics, disposition planning. Late entry, date of service 1/15/24.

## 2024-01-15 NOTE — PROGRESS NOTE ADULT - ASSESSMENT
A/p: 83yo Female pt with PMH depression, hypothyroidism, HLD, Afib on Eliquis (last dose 1/8/24) and PSH of L hip surgery, cholecystectomy and VHR presented with 1-day history of sudden severe midabdominal/epigastric pain. Patient admitted for perforated diverticulitis of distal duodenum. UGI neg leak (1/11)    Regular, soft/bite sized diet   Pain & nausea prn  Eliquis  OOBA/OOBTC     A/p: 81yo Female pt with PMH depression, hypothyroidism, HLD, Afib on Eliquis (last dose 1/8/24) and PSH of L hip surgery, cholecystectomy and VHR presented with 1-day history of sudden severe midabdominal/epigastric pain. Patient admitted for perforated diverticulitis of distal duodenum. UGI neg leak (1/11)    Regular, soft/bite sized diet   Pain & nausea prn  Eliquis  OOBA/OOBTC

## 2024-01-16 LAB
ANION GAP SERPL CALC-SCNC: 10 MMOL/L — SIGNIFICANT CHANGE UP (ref 5–17)
APTT BLD: 43.6 SEC — HIGH (ref 24.5–35.6)
BUN SERPL-MCNC: 12 MG/DL — SIGNIFICANT CHANGE UP (ref 7–23)
CALCIUM SERPL-MCNC: 10.2 MG/DL — SIGNIFICANT CHANGE UP (ref 8.4–10.5)
CHLORIDE SERPL-SCNC: 100 MMOL/L — SIGNIFICANT CHANGE UP (ref 96–108)
CK MB CFR SERPL CALC: <1 NG/ML — SIGNIFICANT CHANGE UP (ref 0–6.7)
CK SERPL-CCNC: 13 U/L — LOW (ref 25–170)
CO2 SERPL-SCNC: 24 MMOL/L — SIGNIFICANT CHANGE UP (ref 22–31)
CREAT SERPL-MCNC: 0.49 MG/DL — LOW (ref 0.5–1.3)
EGFR: 95 ML/MIN/1.73M2 — SIGNIFICANT CHANGE UP
GLUCOSE BLDC GLUCOMTR-MCNC: 108 MG/DL — HIGH (ref 70–99)
GLUCOSE SERPL-MCNC: 117 MG/DL — HIGH (ref 70–99)
HCT VFR BLD CALC: 37.4 % — SIGNIFICANT CHANGE UP (ref 34.5–45)
HGB BLD-MCNC: 12.5 G/DL — SIGNIFICANT CHANGE UP (ref 11.5–15.5)
INR BLD: 1.52 — HIGH (ref 0.85–1.18)
LACTATE SERPL-SCNC: 0.9 MMOL/L — SIGNIFICANT CHANGE UP (ref 0.5–2)
MAGNESIUM SERPL-MCNC: 2.3 MG/DL — SIGNIFICANT CHANGE UP (ref 1.6–2.6)
MCHC RBC-ENTMCNC: 31.5 PG — SIGNIFICANT CHANGE UP (ref 27–34)
MCHC RBC-ENTMCNC: 33.4 GM/DL — SIGNIFICANT CHANGE UP (ref 32–36)
MCV RBC AUTO: 94.2 FL — SIGNIFICANT CHANGE UP (ref 80–100)
NRBC # BLD: 0 /100 WBCS — SIGNIFICANT CHANGE UP (ref 0–0)
PHOSPHATE SERPL-MCNC: 3.2 MG/DL — SIGNIFICANT CHANGE UP (ref 2.5–4.5)
PLATELET # BLD AUTO: 505 K/UL — HIGH (ref 150–400)
POTASSIUM SERPL-MCNC: 4.4 MMOL/L — SIGNIFICANT CHANGE UP (ref 3.5–5.3)
POTASSIUM SERPL-SCNC: 4.4 MMOL/L — SIGNIFICANT CHANGE UP (ref 3.5–5.3)
PROTHROM AB SERPL-ACNC: 17.1 SEC — HIGH (ref 9.5–13)
RBC # BLD: 3.97 M/UL — SIGNIFICANT CHANGE UP (ref 3.8–5.2)
RBC # FLD: 13.5 % — SIGNIFICANT CHANGE UP (ref 10.3–14.5)
SODIUM SERPL-SCNC: 134 MMOL/L — LOW (ref 135–145)
TROPONIN T, HIGH SENSITIVITY RESULT: 13 NG/L — SIGNIFICANT CHANGE UP (ref 0–51)
WBC # BLD: 9.18 K/UL — SIGNIFICANT CHANGE UP (ref 3.8–10.5)
WBC # FLD AUTO: 9.18 K/UL — SIGNIFICANT CHANGE UP (ref 3.8–10.5)

## 2024-01-16 PROCEDURE — 70450 CT HEAD/BRAIN W/O DYE: CPT | Mod: 26,59

## 2024-01-16 PROCEDURE — 99231 SBSQ HOSP IP/OBS SF/LOW 25: CPT

## 2024-01-16 PROCEDURE — 70551 MRI BRAIN STEM W/O DYE: CPT | Mod: 26

## 2024-01-16 PROCEDURE — 99232 SBSQ HOSP IP/OBS MODERATE 35: CPT

## 2024-01-16 PROCEDURE — 99233 SBSQ HOSP IP/OBS HIGH 50: CPT | Mod: GC

## 2024-01-16 PROCEDURE — 70498 CT ANGIOGRAPHY NECK: CPT | Mod: 26

## 2024-01-16 PROCEDURE — 0042T: CPT

## 2024-01-16 PROCEDURE — 70496 CT ANGIOGRAPHY HEAD: CPT | Mod: 26

## 2024-01-16 RX ORDER — APIXABAN 2.5 MG/1
5 TABLET, FILM COATED ORAL EVERY 12 HOURS
Refills: 0 | Status: DISCONTINUED | OUTPATIENT
Start: 2024-01-16 | End: 2024-01-17

## 2024-01-16 RX ORDER — APIXABAN 2.5 MG/1
2.5 TABLET, FILM COATED ORAL EVERY 12 HOURS
Refills: 0 | Status: DISCONTINUED | OUTPATIENT
Start: 2024-01-16 | End: 2024-01-16

## 2024-01-16 RX ADMIN — Medication 650 MILLIGRAM(S): at 20:03

## 2024-01-16 RX ADMIN — APIXABAN 5 MILLIGRAM(S): 2.5 TABLET, FILM COATED ORAL at 17:40

## 2024-01-16 RX ADMIN — Medication 650 MILLIGRAM(S): at 21:10

## 2024-01-16 RX ADMIN — APIXABAN 5 MILLIGRAM(S): 2.5 TABLET, FILM COATED ORAL at 06:38

## 2024-01-16 RX ADMIN — Medication 75 MICROGRAM(S): at 05:51

## 2024-01-16 RX ADMIN — Medication 500 MILLIGRAM(S): at 17:41

## 2024-01-16 RX ADMIN — PANTOPRAZOLE SODIUM 40 MILLIGRAM(S): 20 TABLET, DELAYED RELEASE ORAL at 06:38

## 2024-01-16 RX ADMIN — Medication 500 MILLIGRAM(S): at 06:39

## 2024-01-16 RX ADMIN — Medication 200 MILLIGRAM(S): at 06:39

## 2024-01-16 RX ADMIN — ATORVASTATIN CALCIUM 80 MILLIGRAM(S): 80 TABLET, FILM COATED ORAL at 21:14

## 2024-01-16 RX ADMIN — Medication 25 MILLIGRAM(S): at 06:38

## 2024-01-16 RX ADMIN — Medication 200 MILLIGRAM(S): at 17:41

## 2024-01-16 RX ADMIN — PANTOPRAZOLE SODIUM 40 MILLIGRAM(S): 20 TABLET, DELAYED RELEASE ORAL at 17:41

## 2024-01-16 NOTE — PROGRESS NOTE ADULT - SUBJECTIVE AND OBJECTIVE BOX
SUBJECTIVE: Patient seen and examined bedside by chief resident. Patient feels well and has no acute complaints. Denies any recurrent episodes of dizziness. Tolerating diet appropriately. +BF.     apixaban 5 milliGRAM(s) Oral every 12 hours  cefpodoxime 200 milliGRAM(s) Oral every 12 hours  metoprolol succinate ER 25 milliGRAM(s) Oral daily  metoprolol succinate ER 25 milliGRAM(s) Oral once  metroNIDAZOLE    Tablet 500 milliGRAM(s) Oral every 12 hours    MEDICATIONS  (PRN):  acetaminophen     Tablet .. 650 milliGRAM(s) Oral every 6 hours PRN Mild Pain (1 - 3), Moderate Pain (4 - 6)  melatonin 5 milliGRAM(s) Oral at bedtime PRN Insomnia      I&O's Detail    15 Terrell 2024 07:01  -  16 Jan 2024 07:00  --------------------------------------------------------  IN:  Total IN: 0 mL    OUT:    Voided (mL): 1250 mL  Total OUT: 1250 mL    Total NET: -1250 mL          Vital Signs Last 24 Hrs  T(C): 36.7 (16 Jan 2024 06:30), Max: 37.1 (16 Jan 2024 03:00)  T(F): 98.1 (16 Jan 2024 06:30), Max: 98.7 (16 Jan 2024 03:00)  HR: 99 (16 Jan 2024 06:30) (81 - 99)  BP: 112/67 (16 Jan 2024 06:30) (105/61 - 135/74)  BP(mean): --  RR: 18 (16 Jan 2024 06:30) (16 - 20)  SpO2: 93% (16 Jan 2024 06:30) (93% - 99%)    Parameters below as of 16 Jan 2024 06:30  Patient On (Oxygen Delivery Method): room air        General: NAD, resting comfortably in bed  C/V: NSR  Pulm: Nonlabored breathing, no respiratory distress  Abd: soft, NT/ND  Extrem: WWP, no edema, SCDs in place    LABS:                        12.4   10.39 )-----------( 441      ( 15 Terrell 2024 17:07 )             36.6     01-15    137  |  101  |  11  ----------------------------<  108<H>  3.6   |  26  |  0.48<L>    Ca    9.8      15 Terrell 2024 17:07  Phos  3.7     01-15  Mg     2.1     01-15      PTT - ( 15 Terrell 2024 17:07 )  PTT:41.4 sec  Urinalysis Basic - ( 15 Terrell 2024 17:07 )    Color: x / Appearance: x / SG: x / pH: x  Gluc: 108 mg/dL / Ketone: x  / Bili: x / Urobili: x   Blood: x / Protein: x / Nitrite: x   Leuk Esterase: x / RBC: x / WBC x   Sq Epi: x / Non Sq Epi: x / Bacteria: x        RADIOLOGY & ADDITIONAL STUDIES:

## 2024-01-16 NOTE — PROGRESS NOTE ADULT - SUBJECTIVE AND OBJECTIVE BOX
INTERVAL HPI/OVERNIGHT EVENTS:   Code stroke called this am for lightheadedness, blurry vision and some left lower extremity weakness. On examination by code team NIHSS was 2; exam significant for left facial droop and LUE pronation. TPA was not given as pt is on Eliquis.     MEDICATIONS  (STANDING):  apixaban 5 milliGRAM(s) Oral every 12 hours  atorvastatin 80 milliGRAM(s) Oral at bedtime  cefpodoxime 200 milliGRAM(s) Oral every 12 hours  levothyroxine 75 MICROGram(s) Oral daily  metoprolol succinate ER 25 milliGRAM(s) Oral daily  metroNIDAZOLE    Tablet 500 milliGRAM(s) Oral every 12 hours  pantoprazole    Tablet 40 milliGRAM(s) Oral two times a day    MEDICATIONS  (PRN):  acetaminophen     Tablet .. 650 milliGRAM(s) Oral every 6 hours PRN Mild Pain (1 - 3), Moderate Pain (4 - 6)  melatonin 5 milliGRAM(s) Oral at bedtime PRN Insomnia      Vital Signs Last 24 Hrs  T(C): 36.8 (16 Jan 2024 09:15), Max: 37.1 (16 Jan 2024 03:00)  T(F): 98.3 (16 Jan 2024 09:15), Max: 98.7 (16 Jan 2024 03:00)  HR: 98 (16 Jan 2024 09:42) (79 - 99)  BP: 137/79 (16 Jan 2024 09:42) (112/67 - 137/79)  BP(mean): --  RR: 17 (16 Jan 2024 09:15) (16 - 20)  SpO2: 94% (16 Jan 2024 09:15) (93% - 95%)    Parameters below as of 16 Jan 2024 09:15  Patient On (Oxygen Delivery Method): room air        PHYSICAL EXAMINATION:  GENERAL: NAD  HEAD:  Atraumatic, Normocephalic  EYES:  conjunctiva and sclera clear  NECK: Supple, No JVD  CHEST/LUNG: Clear to auscultation.  HEART: Regular rate and rhythm;  ABDOMEN: Soft, Nontender, Nondistended; Bowel sounds present  NERVOUS SYSTEM:  Alert & Oriented X3                            12.5   9.18  )-----------( 505      ( 16 Jan 2024 09:47 )             37.4     01-16    134<L>  |  100  |  12  ----------------------------<  117<H>  4.4   |  24  |  0.49<L>    Ca    10.2      16 Jan 2024 09:52  Phos  3.2     01-16  Mg     2.3     01-16        CARDIAC MARKERS ( 16 Jan 2024 09:52 )  x     / x     / 13 U/L / x     / <1.0 ng/mL      PT/INR - ( 16 Jan 2024 09:54 )   PT: 17.1 sec;   INR: 1.52          PTT - ( 16 Jan 2024 09:54 )  PTT:43.6 sec    CAPILLARY BLOOD GLUCOSE      POCT Blood Glucose.: 108 mg/dL (16 Jan 2024 09:45)          RADIOLOGY & ADDITIONAL TESTS:

## 2024-01-16 NOTE — PROGRESS NOTE ADULT - SUBJECTIVE AND OBJECTIVE BOX
INTERVAL EVENTS: Stroke code called this AM. [facility] with possible right frontal parietal hyperacute infarction.    PAST MEDICAL & SURGICAL HISTORY:  Depression    Thyroid disorder    Afib    HLD (hyperlipidemia)    History of hip replacement  R, for degenerative pain        MEDICATIONS  (STANDING):  apixaban 5 milliGRAM(s) Oral every 12 hours  atorvastatin 80 milliGRAM(s) Oral at bedtime  cefpodoxime 200 milliGRAM(s) Oral every 12 hours  levothyroxine 75 MICROGram(s) Oral daily  metoprolol succinate ER 25 milliGRAM(s) Oral daily  metroNIDAZOLE    Tablet 500 milliGRAM(s) Oral every 12 hours  pantoprazole    Tablet 40 milliGRAM(s) Oral two times a day    MEDICATIONS  (PRN):  acetaminophen     Tablet .. 650 milliGRAM(s) Oral every 6 hours PRN Mild Pain (1 - 3), Moderate Pain (4 - 6)  melatonin 5 milliGRAM(s) Oral at bedtime PRN Insomnia    Vital Signs Last 24 Hrs  T(C): 36.8 (16 Jan 2024 09:15), Max: 37.1 (16 Jan 2024 03:00)  T(F): 98.3 (16 Jan 2024 09:15), Max: 98.7 (16 Jan 2024 03:00)  HR: 98 (16 Jan 2024 09:42) (79 - 99)  BP: 137/79 (16 Jan 2024 09:42) (112/67 - 137/79)  BP(mean): --  RR: 17 (16 Jan 2024 09:15) (16 - 20)  SpO2: 94% (16 Jan 2024 09:15) (93% - 95%)    Parameters below as of 16 Jan 2024 09:15  Patient On (Oxygen Delivery Method): room air        PHYSICAL EXAM:  GEN: Awake, alert. NAD.   HEENT: NCAT, PERRL, EOMI. Mucosa moist. No JVD.  RESP: CTA b/l  CV: RRR. Normal S1/S2. No m/r/g.  ABD: Soft. NT/ND. BS+  EXT: Warm. No edema, clubbing, or cyanosis.       LABS:                        12.5   9.18  )-----------( 505      ( 16 Jan 2024 09:47 )             37.4     01-16    134<L>  |  100  |  12  ----------------------------<  117<H>  4.4   |  24  |  0.49<L>    Ca    10.2      16 Jan 2024 09:52  Phos  3.2     01-16  Mg     2.3     01-16      CARDIAC MARKERS ( 16 Jan 2024 09:52 )  x     / x     / 13 U/L / x     / <1.0 ng/mL      PT/INR - ( 16 Jan 2024 09:54 )   PT: 17.1 sec;   INR: 1.52          PTT - ( 16 Jan 2024 09:54 )  PTT:43.6 sec  Urinalysis Basic - ( 16 Jan 2024 09:52 )    Color: x / Appearance: x / SG: x / pH: x  Gluc: 117 mg/dL / Ketone: x  / Bili: x / Urobili: x   Blood: x / Protein: x / Nitrite: x   Leuk Esterase: x / RBC: x / WBC x   Sq Epi: x / Non Sq Epi: x / Bacteria: x      I&O's Summary    15 Terrell 2024 07:01  -  16 Jan 2024 07:00  --------------------------------------------------------  IN: 0 mL / OUT: 1250 mL / NET: -1250 mL             INTERVAL EVENTS: Stroke code called this AM. [facility] with possible right frontal parietal hyperacute infarction.    PAST MEDICAL & SURGICAL HISTORY:  Depression    Thyroid disorder    Afib    HLD (hyperlipidemia)    History of hip replacement  R, for degenerative pain        MEDICATIONS  (STANDING):  apixaban 5 milliGRAM(s) Oral every 12 hours  atorvastatin 80 milliGRAM(s) Oral at bedtime  cefpodoxime 200 milliGRAM(s) Oral every 12 hours  levothyroxine 75 MICROGram(s) Oral daily  metoprolol succinate ER 25 milliGRAM(s) Oral daily  metroNIDAZOLE    Tablet 500 milliGRAM(s) Oral every 12 hours  pantoprazole    Tablet 40 milliGRAM(s) Oral two times a day    MEDICATIONS  (PRN):  acetaminophen     Tablet .. 650 milliGRAM(s) Oral every 6 hours PRN Mild Pain (1 - 3), Moderate Pain (4 - 6)  melatonin 5 milliGRAM(s) Oral at bedtime PRN Insomnia    Vital Signs Last 24 Hrs  T(C): 36.8 (16 Jan 2024 09:15), Max: 37.1 (16 Jan 2024 03:00)  T(F): 98.3 (16 Jan 2024 09:15), Max: 98.7 (16 Jan 2024 03:00)  HR: 98 (16 Jan 2024 09:42) (79 - 99)  BP: 137/79 (16 Jan 2024 09:42) (112/67 - 137/79)  BP(mean): --  RR: 17 (16 Jan 2024 09:15) (16 - 20)  SpO2: 94% (16 Jan 2024 09:15) (93% - 95%)    Parameters below as of 16 Jan 2024 09:15  Patient On (Oxygen Delivery Method): room air        PHYSICAL EXAM:  GEN: Awake, alert. NAD.   HEENT: NCAT, PERRL, EOMI. Mucosa moist. No JVD.  RESP: CTA b/l  CV: RRR. Normal S1/S2. No m/r/g.  ABD: Soft. NT/ND. BS+  EXT: Warm. No edema, clubbing, or cyanosis.       LABS:                        12.5   9.18  )-----------( 505      ( 16 Jan 2024 09:47 )             37.4     01-16    134<L>  |  100  |  12  ----------------------------<  117<H>  4.4   |  24  |  0.49<L>    Ca    10.2      16 Jan 2024 09:52  Phos  3.2     01-16  Mg     2.3     01-16      CARDIAC MARKERS ( 16 Jan 2024 09:52 )  x     / x     / 13 U/L / x     / <1.0 ng/mL      PT/INR - ( 16 Jan 2024 09:54 )   PT: 17.1 sec;   INR: 1.52          PTT - ( 16 Jan 2024 09:54 )  PTT:43.6 sec  Urinalysis Basic - ( 16 Jan 2024 09:52 )    Color: x / Appearance: x / SG: x / pH: x  Gluc: 117 mg/dL / Ketone: x  / Bili: x / Urobili: x   Blood: x / Protein: x / Nitrite: x   Leuk Esterase: x / RBC: x / WBC x   Sq Epi: x / Non Sq Epi: x / Bacteria: x      I&O's Summary    15 Terrell 2024 07:01  -  16 Jan 2024 07:00  --------------------------------------------------------  IN: 0 mL / OUT: 1250 mL / NET: -1250 mL

## 2024-01-16 NOTE — PHYSICAL THERAPY INITIAL EVALUATION ADULT - ADDITIONAL COMMENTS
pt states that she lives on the first floor of an apartment building w/ no stairs to enter. Ambulates w/ use of rollator. Denies hx of recent falls. states she has a HHA 7 days x5hrs.

## 2024-01-16 NOTE — PROVIDER CONTACT NOTE (CHANGE IN STATUS NOTIFICATION) - ACTION/TREATMENT ORDERED:
CBC, BMP, Lactate, drawn. Pt transported to CT scan on monitor accompanied by Luisito, PA and team. EKG to be done when pt returns to floor.

## 2024-01-16 NOTE — PROVIDER CONTACT NOTE (CHANGE IN STATUS NOTIFICATION) - SITUATION
During PT session, pt sat at edge of bed. Started c/o dizziness, blurry vision, headache, and left sided LUE and LLE weakness. RAYMOND Zacarias notified.

## 2024-01-16 NOTE — PHYSICAL THERAPY INITIAL EVALUATION ADULT - IMPAIRMENTS FOUND, PT EVAL
aerobic capacity/endurance/cranial and peripheral nerve integrity/decreased midline orientation/gait, locomotion, and balance/gross motor/joint integrity and mobility/muscle strength/neuromotor development and sensory integration/poor safety awareness/posture/visual motor

## 2024-01-16 NOTE — PROVIDER CONTACT NOTE (CHANGE IN STATUS NOTIFICATION) - ASSESSMENT
slight L sided droop noted; pt c/o numbness on L side of face more than right. BP in 130's, SR on monitor. Pt aaox4, alert. No signs of confusion, nausea/vomiting, pupils +3 and brisk. Smile symmetrical and no tongue deviation noted.

## 2024-01-16 NOTE — PHYSICAL THERAPY INITIAL EVALUATION ADULT - PHYSICAL ASSIST/NONPHYSICAL ASSIST: SUPINE/SIT, REHAB EVAL
MRI Contrast Discharge Instructions    The IV contrast you received today will pass out of your body in your  urine. This will happen in the next 24 hours. You will not feel this process.  Your urine will not change color.    Drink at least 4 extra glasses of water or juice today (unless your doctor  has restricted your fluids). This reduces the stress on your kidneys.  You may take your regular medicines.    If you are on dialysis: It is best to have dialysis today.    If you have a reaction: Most reactions happen right away. If you have  any new symptoms after leaving the hospital (such as hives or swelling),  call your hospital at the correct number below. Or call your family doctor.  If you have breathing distress or wheezing, call 911.    Special instructions: ***    I have read and understand the above information.    Signature:______________________________________ Date:___________    Staff:__________________________________________ Date:___________     Time:__________    [place] [facility] Departments:    ___[place]: [phone]  ___[facility]: [phone]  ___[place]: [phone] ___[facility]: [phone]  ___[facility]: [phone]  ___[facility]: [phone]  ___[phone]  ___[facility]: [phone]  ___[phone]  
verbal cues/nonverbal cues (demo/gestures)/1 person assist

## 2024-01-16 NOTE — STROKE CODE NOTE - IV ALTEPLASE EXCL REL HIDDEN
Chief complaint:   Chief Complaint   Patient presents with   • Pain     lower abdominal pain       Vitals:  Visit Vitals  /70   Pulse 82   Temp 96.9 °F (36.1 °C) (Temporal)   Resp 14   Ht 5' 6\" (1.676 m)   Wt 71.9 kg   LMP 2019 (Approximate)   SpO2 100%   Breastfeeding Unknown   BMI 25.58 kg/m²       HISTORY OF PRESENT ILLNESS     Lower abdominal pain  Near  scar.  Dull  Not sharp. Feels it all day   can last a few days, self resolve, then recur. Comes normally after period--mid cycle.  Thinking about conceiving again in the new year.  Has to move more carefully--as it feels like it would worsen (like sitting down--has to be gentle in doing this)  Seen by gyn--did internal exam.  No other testing.  Had ultrasound done--cysts on both ovaries--looked like they were bleeding but would resolve on its own  19--US: 1. Bilateral ovarian cystic lesion with internal echoes,  possibly hemorrhagic cysts and/or endometriomas. Consider  follow-up in 2 to 3 cycles to evaluate for resolution.  2. Sonographically normal uterus.    Nausea intermittently--uncertain if related. No reflux or indigestion.  Could not afford linzess.  Only brought bowel every other day--tended to be unpredictable.  IBS2 supplement now--every day to every other day stool.    Last period 19          Other significant problems:  Patient Active Problem List    Diagnosis Date Noted   • Chronic constipation 2019     Priority: Low       PAST MEDICAL, FAMILY AND SOCIAL HISTORY     Medications:  Current Outpatient Medications   Medication   • Cyanocobalamin (VITAMIN B12 PO)   • Probiotic Product (PROBIOTIC DAILY PO)   • linaclotide (LINZESS) 145 MCG capsule   • GIANVI 3-0.02 MG per tablet   • Prenatal Multivit-Min-Fe-FA (PRENATAL VITAMINS PO)     No current facility-administered medications for this visit.        Allergies:  ALLERGIES:   Allergen Reactions   • Benzocaine HIVES   • Sulfamethoxazole-Trimethoprim RASH       Past  Medical  History/Surgeries:  Past Medical History:   Diagnosis Date   • Chronic constipation        Past Surgical History:   Procedure Laterality Date   •  section, low transverse     • Colonoscopy         Family History:  Family History   Problem Relation Age of Onset   • Patient is unaware of any medical problems Mother    • Heart Father        Social History:  Social History     Tobacco Use   • Smoking status: Never Smoker   • Smokeless tobacco: Never Used   Substance Use Topics   • Alcohol use: Not on file       REVIEW OF SYSTEMS     Review of Systems   Constitutional: Negative for activity change, chills, diaphoresis and fever.   HENT: Negative.    Eyes: Negative.    Respiratory: Negative.    Cardiovascular: Negative.    Gastrointestinal: Positive for abdominal pain and nausea.   Endocrine: Negative.    Genitourinary: Negative.    Musculoskeletal: Negative.    Skin: Negative.    Allergic/Immunologic: Negative.    Neurological: Negative.    Hematological: Negative.    Psychiatric/Behavioral: Negative.        PHYSICAL EXAM     Physical Exam  Vitals signs and nursing note reviewed.   Constitutional:       General: She is not in acute distress.     Appearance: She is well-developed. She is not diaphoretic.   HENT:      Head: Normocephalic and atraumatic.      Right Ear: External ear normal.      Left Ear: External ear normal.   Eyes:      General: No scleral icterus.        Right eye: No discharge.         Left eye: No discharge.      Conjunctiva/sclera: Conjunctivae normal.      Pupils: Pupils are equal, round, and reactive to light.   Neck:      Musculoskeletal: Normal range of motion and neck supple.      Thyroid: No thyromegaly.   Cardiovascular:      Rate and Rhythm: Normal rate and regular rhythm.      Heart sounds: Normal heart sounds. No murmur.   Pulmonary:      Effort: Pulmonary effort is normal. No respiratory distress.      Breath sounds: Normal breath sounds. No wheezing.   Abdominal:       General: Bowel sounds are normal. There is no distension.      Palpations: Abdomen is soft.      Tenderness: There is no tenderness. There is no guarding or rebound.   Musculoskeletal: Normal range of motion.   Lymphadenopathy:      Cervical: No cervical adenopathy.   Skin:     General: Skin is warm and dry.      Findings: No erythema.   Neurological:      Mental Status: She is alert and oriented to person, place, and time.      Cranial Nerves: No cranial nerve deficit.      Coordination: Coordination normal.   Psychiatric:         Behavior: Behavior normal.         Thought Content: Thought content normal.         Judgment: Judgment normal.         ASSESSMENT/PLAN     (R10.2) Pelvic pain in female  (primary encounter diagnosis)  Plan: URINALYSIS DIPSTICK ONLY    (R11.0) Nausea    May have endometriosis. Pain coincides with d/c of OCP and start of IBS2 supplement  Retime supplement to pm to see if this reduces nausea  US recommended to repeat in 8-10 weeks.  Discussed pathophysiology of periods, ovarian cysts and endometriosis.  Check urine test for blood or infection  All chronic conditions are stable except where noted above  Erica Martin MD      .       show

## 2024-01-16 NOTE — PROGRESS NOTE ADULT - ASSESSMENT
82F PMH HLD, hypothryoidism, Afib on eliquis who presents with perforated duodenal diverticulitis which was managed medically. Cardiology consulted for Afib RVR.    Review of studies  TTE 10/21/22: Normal biventricular size/function. Mild LVH. G1DD. No significant valve disease. PH, PASP 42. +PFO  TELE: SInus rhythm predominantly with short burst of AF yesterday. Sinus tachycardia this AM     #Afib RVR  Now resolved  - Increase Toprol to 50mg qd   - CHADVASC at least of 3 placing patient at higher thromboembolic risk. Please decrease current Eliquis dose to 2.5 mg po BID (Age 82, Weight 49 Kgs)  - Of note patient with PFO on Echo 10/22    #HLD  - Continue with home lipitor 80mg qd     Cardiology will continue to follow

## 2024-01-16 NOTE — PHYSICAL THERAPY INITIAL EVALUATION ADULT - MODALITIES TREATMENT COMMENTS
noted to have mild L facial droop, mild R nasolabial flattening, pushing significantly to the L in sitting, attempted to perform visual tracking (H test): pt unable to track but able to direct gaze in end range visual fields w/ max verbal/tactile cues. visual field test (quadrant test): WNL however pt looking at PTs fingers

## 2024-01-16 NOTE — PROGRESS NOTE ADULT - ATTENDING COMMENTS
Patient is an 82 year old woman with history of depression, hypothyroidism, HLD, A fib on Eliquis, left hip surgery, cholecystectomy, ventral hernia repair who is admitted with clinical, laboratory, and radiographic findings concerning for perforated duodenal diverticulum. At time of evaluation, afebrile, hemodynamically stable, abdomen soft, tenderness improved, distension improved, no rebound or guarding. Concern for left sided weakness during PT, neurology consulted for futher evaluation, undergoing imaging to rule out concern for TIA/CVA. Otherwise tolerating diet and completed antibiotics. Late entry, date of service 1/16/24.

## 2024-01-16 NOTE — PROGRESS NOTE ADULT - ATTENDING COMMENTS
Patient is an 81 yo F with PMH of Afib on Eliquis,  HLD, hypothyroidism, admitted with perforated duodenal diverticulitis managed medically with clinical course notable for Afib with RVR for which Cardiology was consulted. Today patient Left facial droop and LUE pronation Concerning for acute CVA    Review of studies  - TTE 10/21/22: Normal biventricular size/function. Mild LVH. G1DD. No significant valve disease. PH, PASP 42. +PFO  - TELE: Afib to 150s w/PVCs converted to SR in 60s-70s at 0030    # Atrial Fibrillation with RVR  - Patient with known Afib on Eliquis, admitted with perforated duodenal diverticulitis with Afib with RVR with rates as high as the 150's, currently in NSR  - Tele Reviewed showing NSR with rare PVCS  - Cont with Toprol 25 mg po daily. Can increase to 50 mg po daily starting 1/16 if no permissive HTN window goal  -CHADVASC at least of 3 placing patient at higher thromboembolic risk. Patient should be on Eliquis 2.5 mg po BID (Age 82, Weight 49 Kgs), however given concern for embolic event today, maintained on Eliquis 5 mg po BID per Neuro  - Of note patient with PFO on Echo 10/22. She has been maintained on AC for the duration of the hospitalization (Heparin gtt than NOAC). No concern for DVT with paradoxical emboli at  this time     # Stroke Evaluation  - Patient with Left facial droop and LUE pronation today Concerning for acute CVA  - CTH concerning for subtle effacement of the right frontal parietal sulci that may represent hyper acute infarction. Patient not a candidate for TNK given active use of Eliquis  - Pending MRI  - In the interim, please send TSH, A1c and Lipid Profile  - Cont with High potency statin Patient is an 83 yo F with PMH of Afib on Eliquis,  HLD, hypothyroidism, admitted with perforated duodenal diverticulitis managed medically with clinical course notable for Afib with RVR for which Cardiology was consulted. Today patient Left facial droop and LUE pronation Concerning for acute CVA    Review of studies  - TTE 10/21/22: Normal biventricular size/function. Mild LVH. G1DD. No significant valve disease. PH, PASP 42. +PFO  - TELE: Afib to 150s w/PVCs converted to SR in 60s-70s at 0030    # Atrial Fibrillation with RVR  - Patient with known Afib on Eliquis, admitted with perforated duodenal diverticulitis with Afib with RVR with rates as high as the 150's, currently in NSR  - Tele Reviewed showing NSR with rare PVCS  - Cont with Toprol 25 mg po daily. Can increase to 50 mg po daily starting 1/16 if no permissive HTN window goal  -CHADVASC at least of 3 placing patient at higher thromboembolic risk. Patient should be on Eliquis 2.5 mg po BID (Age 82, Weight 49 Kgs), however given concern for embolic event today, maintained on Eliquis 5 mg po BID per Neuro  - Of note patient with PFO on Echo 10/22. She has been maintained on AC for the duration of the hospitalization (Heparin gtt than NOAC). No concern for DVT with paradoxical emboli at  this time     # Stroke Evaluation  - Patient with Left facial droop and LUE pronation today Concerning for acute CVA  - CTH concerning for subtle effacement of the right frontal parietal sulci that may represent hyper acute infarction. Patient not a candidate for TNK given active use of Eliquis  - Pending MRI  - In the interim, please send TSH, A1c and Lipid Profile  - Cont with High potency statin

## 2024-01-16 NOTE — PROVIDER CONTACT NOTE (CHANGE IN STATUS NOTIFICATION) - BACKGROUND
Pt admitted for perforated diverticulitis; being treated with antibiotic therapy. No surgical intervention at this time.

## 2024-01-16 NOTE — CONSULT NOTE ADULT - SUBJECTIVE AND OBJECTIVE BOX
**STROKE CODE CONSULT NOTE**    Last known well time/Time of onset of symptoms: 1/16/24 @ 0940    HPI: 83y Female with PMHx of depression, hypothyroidism, HLD, Afib on Eliquis and PSH of L hip surgery, cholecystectomy and VHR presented with 1-day history of sudden severe midabdominal/epigastric pain. Patient admitted for perforated diverticulitis of distal duodenum. Patient was placed on a heparin gtt pending any surgical intervention and was restarted on Eliquis 1/13. She had symptoms of orthostasis yesterday while getting out of bed but hospital course has been otherwise unremarkable and was pending home today. Today patient got up to work with PT and began to feel lightheaded, have blurry vision and had some left lower extremity weakness. She then also noted some decreased sensation to the left face.     T(C): 36.8 (01-16-24 @ 09:15), Max: 37.1 (01-16-24 @ 03:00)  HR: 98 (01-16-24 @ 09:42) (79 - 99)  BP: 137/79 (01-16-24 @ 09:42) (105/61 - 137/79)  RR: 17 (01-16-24 @ 09:15) (16 - 20)  SpO2: 94% (01-16-24 @ 09:15) (93% - 98%)    PAST MEDICAL & SURGICAL HISTORY:  Depression      Thyroid disorder      Afib      HLD (hyperlipidemia)      History of hip replacement  R, for degenerative pain          FAMILY HISTORY:  FH: prostate cancer        SOCIAL HISTORY:   Patient lives with *** at ***.   Smoking status:  Drinking:  Drug Use:     ROS: ***  Constitutional: No fever, weight loss or fatigue  Eyes: No eye pain, visual disturbances, or discharge  ENMT:  No difficulty hearing, tinnitus; No sinus or throat pain  Neck: No pain or stiffness  Respiratory: No cough, wheezing, chills or hemoptysis  Cardiovascular: No chest pain, palpitations, shortness of breath, or leg swelling  Gastrointestinal: No abdominal pain. No nausea, vomiting or hematemesis; No diarrhea or constipation. Nohematochezia.  Genitourinary: No dysuria, frequency, hematuria or incontinence  Neurological: As per HPI  Skin: No itching, burning, rashes or lesions   Endocrine: No heat or cold intolerance; No hair loss  Musculoskeletal: No joint pain or swelling; No muscle, back or extremity pain  Heme/Lymph: No easy bruising or bleeding gums    MEDICATIONS  (STANDING):  atorvastatin 80 milliGRAM(s) Oral at bedtime  cefpodoxime 200 milliGRAM(s) Oral every 12 hours  levothyroxine 75 MICROGram(s) Oral daily  metoprolol succinate ER 25 milliGRAM(s) Oral daily  metoprolol succinate ER 25 milliGRAM(s) Oral once  metroNIDAZOLE    Tablet 500 milliGRAM(s) Oral every 12 hours  pantoprazole    Tablet 40 milliGRAM(s) Oral two times a day    MEDICATIONS  (PRN):  acetaminophen     Tablet .. 650 milliGRAM(s) Oral every 6 hours PRN Mild Pain (1 - 3), Moderate Pain (4 - 6)  melatonin 5 milliGRAM(s) Oral at bedtime PRN Insomnia    Allergies    No Known Allergies    Intolerances      Vital Signs Last 24 Hrs  T(C): 36.8 (16 Jan 2024 09:15), Max: 37.1 (16 Jan 2024 03:00)  T(F): 98.3 (16 Jan 2024 09:15), Max: 98.7 (16 Jan 2024 03:00)  HR: 98 (16 Jan 2024 09:42) (79 - 99)  BP: 137/79 (16 Jan 2024 09:42) (105/61 - 137/79)  BP(mean): --  RR: 17 (16 Jan 2024 09:15) (16 - 20)  SpO2: 94% (16 Jan 2024 09:15) (93% - 98%)    Parameters below as of 16 Jan 2024 09:15  Patient On (Oxygen Delivery Method): room air        Physical exam:  Constitutional: No acute distress, conversant  Eyes: Anicteric sclerae, moist conjunctivae, see below for CNs  Neck: trachea midline, FROM, supple, no thyromegaly or lymphadenopathy  Cardiovascular: Regular rate and rhythm, no murmurs, rubs, or gallops. No carotid bruits.   Pulmonary: Anterior breath sounds clear bilaterally, no crackles or wheezing. No use of accessory muscles  GI: Abdomen soft, non-distended, non-tender  Extremities: Radial and DP pulses +2, no edema    Neurologic:  -Mental status: Awake, alert, oriented to person, place, and time. Speech is fluent with intact naming, repetition, and comprehension, no dysarthria. Recent and remote memory intact. Follows commands. Attention/concentration intact. Fund of knowledge appropriate.  -Cranial nerves:   II: Visual fields are full to confrontation.  III, IV, VI: Extraocular movements are intact without nystagmus. Pupils equally round and reactive to light  V:  Facial sensation V1-V3 equal and intact   VII: Face is symmetric with normal eye closure and smile  VIII: Hearing is bilaterally intact to finger rub  IX, X: Uvula is midline and soft palate rises symmetrically  XI: Head turning and shoulder shrug are intact.  XII: Tongue protrudes midline  Motor: Normal bulk and tone. No pronator drift. Strength bilateral upper extremity 5/5, bilateral lower extremities 5/5.  Rapid alternating movements intact and symmetric  Sensation: Intact to light touch bilaterally. No neglect or extinction on double simultaneous testing.  Coordination: No dysmetria on finger-to-nose and heel-to-shin bilaterally  Reflexes: Downgoing toes bilaterally   Gait: Narrow gait and steady    NIHSS: **** ASPECT Score: ***** ICH Score: ****** (GCS)    Fingerstick Blood Glucose: CAPILLARY BLOOD GLUCOSE      POCT Blood Glucose.: 108 mg/dL (16 Jan 2024 09:45)    LABS:                        12.5   9.18  )-----------( 505      ( 16 Jan 2024 09:47 )             37.4     01-15    137  |  101  |  11  ----------------------------<  108<H>  3.6   |  26  |  0.48<L>    Ca    9.8      15 Terrell 2024 17:07  Phos  3.7     01-15  Mg     2.1     01-15      PT/INR - ( 16 Jan 2024 09:54 )   PT: 17.1 sec;   INR: 1.52          PTT - ( 16 Jan 2024 09:54 )  PTT:43.6 sec      Urinalysis Basic - ( 15 Terrell 2024 17:07 )    Color: x / Appearance: x / SG: x / pH: x  Gluc: 108 mg/dL / Ketone: x  / Bili: x / Urobili: x   Blood: x / Protein: x / Nitrite: x   Leuk Esterase: x / RBC: x / WBC x   Sq Epi: x / Non Sq Epi: x / Bacteria: x        RADIOLOGY & ADDITIONAL STUDIES:      -----------------------------------------------------------------------------------------------------------------  IV-tNK (Y/N):    ***                              Bolus time:    Tenecteplase Dose Verification w/ RN:  Reason IV-tNK not given: ***    **STROKE CODE CONSULT NOTE**    Last known well time/Time of onset of symptoms: 1/16/24 @ 0940    HPI: 83y Female with PMHx of depression, hypothyroidism, HLD, Afib on Eliquis and PSH of L hip surgery, cholecystectomy and VHR presented with 1-day history of sudden severe midabdominal/epigastric pain. Patient admitted for perforated diverticulitis of distal duodenum. Patient was placed on a heparin gtt pending any surgical intervention and was restarted on Eliquis 1/13. She had symptoms of orthostasis yesterday while getting out of bed but hospital course has been otherwise unremarkable and was pending home today. Today patient got up to work with PT and began to feel lightheaded, have blurry vision and had some left lower extremity weakness. She then also noted some decreased sensation to the left face. Blood pressure during the event was noted to be in the 130's. Stroke code was then called given symptoms, LKW 0940. NIHSS on arrival 2, exam significant for left facial droop and LUE pronations.     T(C): 36.8 (01-16-24 @ 09:15), Max: 37.1 (01-16-24 @ 03:00)  HR: 98 (01-16-24 @ 09:42) (79 - 99)  BP: 137/79 (01-16-24 @ 09:42) (105/61 - 137/79)  RR: 17 (01-16-24 @ 09:15) (16 - 20)  SpO2: 94% (01-16-24 @ 09:15) (93% - 98%)    PAST MEDICAL & SURGICAL HISTORY:  Depression      Thyroid disorder      Afib      HLD (hyperlipidemia)      History of hip replacement  R, for degenerative pain          FAMILY HISTORY:  FH: prostate cancer        SOCIAL HISTORY:   Smoking status: Denies  Drinking: Denies  Drug Use: Denies    ROS:   Constitutional: No fever, weight loss or fatigue  Eyes: No eye pain, visual disturbances, or discharge  ENMT:  No difficulty hearing, tinnitus; No sinus or throat pain  Neck: No pain or stiffness  Respiratory: No cough, wheezing, chills or hemoptysis  Cardiovascular: No chest pain, palpitations, shortness of breath, or leg swelling  Gastrointestinal: No abdominal pain. No nausea, vomiting or hematemesis; No diarrhea or constipation. Nohematochezia.  Genitourinary: No dysuria, frequency, hematuria or incontinence  Neurological: As per HPI  Skin: No itching, burning, rashes or lesions   Endocrine: No heat or cold intolerance; No hair loss  Musculoskeletal: No joint pain or swelling; No muscle, back or extremity pain  Heme/Lymph: No easy bruising or bleeding gums    MEDICATIONS  (STANDING):  atorvastatin 80 milliGRAM(s) Oral at bedtime  cefpodoxime 200 milliGRAM(s) Oral every 12 hours  levothyroxine 75 MICROGram(s) Oral daily  metoprolol succinate ER 25 milliGRAM(s) Oral daily  metoprolol succinate ER 25 milliGRAM(s) Oral once  metroNIDAZOLE    Tablet 500 milliGRAM(s) Oral every 12 hours  pantoprazole    Tablet 40 milliGRAM(s) Oral two times a day    MEDICATIONS  (PRN):  acetaminophen     Tablet .. 650 milliGRAM(s) Oral every 6 hours PRN Mild Pain (1 - 3), Moderate Pain (4 - 6)  melatonin 5 milliGRAM(s) Oral at bedtime PRN Insomnia    Allergies    No Known Allergies    Intolerances      Vital Signs Last 24 Hrs  T(C): 36.8 (16 Jan 2024 09:15), Max: 37.1 (16 Jan 2024 03:00)  T(F): 98.3 (16 Jan 2024 09:15), Max: 98.7 (16 Jan 2024 03:00)  HR: 98 (16 Jan 2024 09:42) (79 - 99)  BP: 137/79 (16 Jan 2024 09:42) (105/61 - 137/79)  BP(mean): --  RR: 17 (16 Jan 2024 09:15) (16 - 20)  SpO2: 94% (16 Jan 2024 09:15) (93% - 98%)    Parameters below as of 16 Jan 2024 09:15  Patient On (Oxygen Delivery Method): room air        Physical exam:  Constitutional: No acute distress, conversant    Neurologic:  -Mental status: Awake, alert, oriented to person, place, and time. Speech is fluent with intact naming, repetition, and comprehension, no dysarthria. Recent and remote memory intact. Follows commands. Attention/concentration intact. Fund of knowledge appropriate.  -Cranial nerves:   II: Visual fields are full to confrontation.  III, IV, VI: Extraocular movements are intact without nystagmus. Pupils equally round and reactive to light  V:  Facial sensation V1-V3 equal and intact   VII: Left facial droop  Motor: Normal bulk and tone. LUE pronation. Strength bilateral upper extremity 4+/5, bilateral lower extremities 4/5.  Sensation: Intact to light touch bilaterally. No neglect or extinction on double simultaneous testing.  Coordination: No dysmetria on finger-to-nose bilaterally      NIHSS: 2    Fingerstick Blood Glucose: CAPILLARY BLOOD GLUCOSE      POCT Blood Glucose.: 108 mg/dL (16 Jan 2024 09:45)    LABS:                        12.5   9.18  )-----------( 505      ( 16 Jan 2024 09:47 )             37.4     01-15    137  |  101  |  11  ----------------------------<  108<H>  3.6   |  26  |  0.48<L>    Ca    9.8      15 Terrell 2024 17:07  Phos  3.7     01-15  Mg     2.1     01-15      PT/INR - ( 16 Jan 2024 09:54 )   PT: 17.1 sec;   INR: 1.52          PTT - ( 16 Jan 2024 09:54 )  PTT:43.6 sec      Urinalysis Basic - ( 15 Terrell 2024 17:07 )    Color: x / Appearance: x / SG: x / pH: x  Gluc: 108 mg/dL / Ketone: x  / Bili: x / Urobili: x   Blood: x / Protein: x / Nitrite: x   Leuk Esterase: x / RBC: x / WBC x   Sq Epi: x / Non Sq Epi: x / Bacteria: x        RADIOLOGY & ADDITIONAL STUDIES:      -----------------------------------------------------------------------------------------------------------------  IV-tNK (Y/N):    ***                              Bolus time:    Tenecteplase Dose Verification w/ RN:  Reason IV-tNK not given: ***    **STROKE CODE CONSULT NOTE**    Last known well time/Time of onset of symptoms: 1/16/24 @ 0940    HPI: 83y Female with PMHx of depression, hypothyroidism, HLD, Afib on Eliquis and PSH of L hip surgery, cholecystectomy and VHR presented with 1-day history of sudden severe midabdominal/epigastric pain. Patient admitted for perforated diverticulitis of distal duodenum. Patient was placed on a heparin gtt pending any surgical intervention (PTT appears therapeutic to supratherapeutic throughout heparin infusion) and was restarted on Eliquis 1/13. She had symptoms of orthostasis yesterday while getting out of bed but hospital course has been otherwise unremarkable and was pending home today. Today patient got up to work with PT and began to feel lightheaded, have blurry vision and had some left lower extremity weakness. She then also noted some decreased sensation to the left face. Blood pressure during the event was noted to be in the 130's. Stroke code was then called given symptoms, LKW 0940. NIHSS on arrival 2, exam significant for left facial droop and LUE pronation. CTH concerning for subtle effacement of the right frontal parietal sulci to that may represent hyper acute infarction. There are posterior temporal small chronic infarcts. There are a few tiny bilateral cerebellar chronic infarctions. There is a left small occipital infarction. There are patchy areas of low density throughout the white matter consistent with moderate to severe microvascular disease. CTA negative. CTP negative. Patient not a candidate for TNK given mild symptoms and abnormal coags on Eliquis. Not a candidate for any other intervention. On reassessment patient complaining that left sided weakness may have started a few days ago prior to the patient arriving to the hospital.     T(C): 36.8 (01-16-24 @ 09:15), Max: 37.1 (01-16-24 @ 03:00)  HR: 98 (01-16-24 @ 09:42) (79 - 99)  BP: 137/79 (01-16-24 @ 09:42) (105/61 - 137/79)  RR: 17 (01-16-24 @ 09:15) (16 - 20)  SpO2: 94% (01-16-24 @ 09:15) (93% - 98%)    PAST MEDICAL & SURGICAL HISTORY:  Depression      Thyroid disorder      Afib      HLD (hyperlipidemia)      History of hip replacement  R, for degenerative pain          FAMILY HISTORY:  FH: prostate cancer        SOCIAL HISTORY:   Smoking status: Denies  Drinking: Denies  Drug Use: Denies    ROS:   Constitutional: No fever, weight loss or fatigue  Eyes: No eye pain, visual disturbances, or discharge  ENMT:  No difficulty hearing, tinnitus; No sinus or throat pain  Neck: No pain or stiffness  Respiratory: No cough, wheezing, chills or hemoptysis  Cardiovascular: No chest pain, palpitations, shortness of breath, or leg swelling  Gastrointestinal: No abdominal pain. No nausea, vomiting or hematemesis; No diarrhea or constipation. Nohematochezia.  Genitourinary: No dysuria, frequency, hematuria or incontinence  Neurological: As per HPI  Skin: No itching, burning, rashes or lesions   Endocrine: No heat or cold intolerance; No hair loss  Musculoskeletal: No joint pain or swelling; No muscle, back or extremity pain  Heme/Lymph: No easy bruising or bleeding gums    MEDICATIONS  (STANDING):  atorvastatin 80 milliGRAM(s) Oral at bedtime  cefpodoxime 200 milliGRAM(s) Oral every 12 hours  levothyroxine 75 MICROGram(s) Oral daily  metoprolol succinate ER 25 milliGRAM(s) Oral daily  metoprolol succinate ER 25 milliGRAM(s) Oral once  metroNIDAZOLE    Tablet 500 milliGRAM(s) Oral every 12 hours  pantoprazole    Tablet 40 milliGRAM(s) Oral two times a day    MEDICATIONS  (PRN):  acetaminophen     Tablet .. 650 milliGRAM(s) Oral every 6 hours PRN Mild Pain (1 - 3), Moderate Pain (4 - 6)  melatonin 5 milliGRAM(s) Oral at bedtime PRN Insomnia    Allergies    No Known Allergies    Intolerances      Vital Signs Last 24 Hrs  T(C): 36.8 (16 Jan 2024 09:15), Max: 37.1 (16 Jan 2024 03:00)  T(F): 98.3 (16 Jan 2024 09:15), Max: 98.7 (16 Jan 2024 03:00)  HR: 98 (16 Jan 2024 09:42) (79 - 99)  BP: 137/79 (16 Jan 2024 09:42) (105/61 - 137/79)  BP(mean): --  RR: 17 (16 Jan 2024 09:15) (16 - 20)  SpO2: 94% (16 Jan 2024 09:15) (93% - 98%)    Parameters below as of 16 Jan 2024 09:15  Patient On (Oxygen Delivery Method): room air        Physical exam:  Constitutional: No acute distress, conversant    Neurologic:  -Mental status: Awake, alert, oriented to person, place, and time. Speech is fluent with intact naming, repetition, and comprehension, no dysarthria. Recent and remote memory intact. Follows commands. Attention/concentration intact. Fund of knowledge appropriate.  -Cranial nerves:   II: Visual fields are full to confrontation.  III, IV, VI: Extraocular movements are intact without nystagmus. Pupils equally round and reactive to light  V:  Facial sensation V1-V3 equal and intact   VII: Left facial droop  Motor: Normal bulk and tone. LUE pronation. Strength bilateral upper extremity 4+/5, bilateral lower extremities 4/5.  Sensation: Intact to light touch bilaterally. No neglect or extinction on double simultaneous testing.  Coordination: No dysmetria on finger-to-nose bilaterally      NIHSS: 2    Fingerstick Blood Glucose: CAPILLARY BLOOD GLUCOSE      POCT Blood Glucose.: 108 mg/dL (16 Jan 2024 09:45)    LABS:                        12.5   9.18  )-----------( 505      ( 16 Jan 2024 09:47 )             37.4     01-15    137  |  101  |  11  ----------------------------<  108<H>  3.6   |  26  |  0.48<L>    Ca    9.8      15 Terrell 2024 17:07  Phos  3.7     01-15  Mg     2.1     01-15      PT/INR - ( 16 Jan 2024 09:54 )   PT: 17.1 sec;   INR: 1.52          PTT - ( 16 Jan 2024 09:54 )  PTT:43.6 sec      Urinalysis Basic - ( 15 Terrell 2024 17:07 )    Color: x / Appearance: x / SG: x / pH: x  Gluc: 108 mg/dL / Ketone: x  / Bili: x / Urobili: x   Blood: x / Protein: x / Nitrite: x   Leuk Esterase: x / RBC: x / WBC x   Sq Epi: x / Non Sq Epi: x / Bacteria: x        RADIOLOGY & ADDITIONAL STUDIES:    CT Brain Stroke Protocol (01.16.24 @ 10:32) >  Impression: Possible right frontal parietal hyperacute infarction.    CT Angio Neck Stroke Protocol w/ IV Cont (01.16.24 @ 10:34) >  Impression: Normal CTA of the extracranial circulation. No evidence of   carotid stenosis.    CT Angio Brain Stroke Protocol  w/ IV Cont (01.16.24 @ 10:33) >  Impression: Normal CTA of the intracranial circulation.    CT Brain Perfusion Maps Stroke (01.16.24 @ 10:35) >  IMPRESSION:   Tmax greater than 6 seconds: 7 mL.  Mismatch volume: 7 mL.    Mismatch ratio: Infinite                  -----------------------------------------------------------------------------------------------------------------  IV-tNK (Y/N):    N                             Bolus time:    Tenecteplase Dose Verification w/ RN:  Reason IV-tNK not given: On Eliquis w/ ashley hector

## 2024-01-16 NOTE — PHYSICAL THERAPY INITIAL EVALUATION ADULT - MANUAL MUSCLE TESTING RESULTS, REHAB EVAL
unable to formally assess 2/2 pt w/ decreased command following. pt states that L side feels weaker, also noted to be listing significantly to the L and unable to lift LLE from bed when in supine

## 2024-01-16 NOTE — PROGRESS NOTE ADULT - ASSESSMENT
82 yo Female pt with PMH depression, hypothyroidism, HLD, Afib on Eliquis and PSH of L hip surgery, cholecystectomy and VHR presents with 1-day history of sudden severe midabdominal/epigastric pain. Patient had a  CT AP with PO and IV contrast demonstrates perforated diverticulitis of distal duodenum.     #LE weakness and blurry vision   -r/o stroke.   -2 stroke prevention w/ Atorvastatin 80mg PO daily, eliquis. SBP goal <180. Neuro checks  -Follow MRI    #Abdominal pain secondary to perforated diverticulitis of distal duodenum  -Management as per primary team  -Continue Metronidazole and cefpodoxime as per primary team.     #Atrial fibrillation with RVR  -Cardiology following; cardiology recommended that pt get a TTE will defer study decision to primary team. PFO on previous echo    -Continue eliquis 5mg BID as per neuro recs considering today's code stroke.   -increase Toprol XL to 50 mg PO daily as per cards    #Hypothyroidism   - Will continue Levothyroxine 75mcg daily     #Dizziness  - Will encourage PO intake   - f/u orthostatics     #DVT prop  -Pt is on system AC with apixaban    #DISPO  -As per primary team    84 yo Female pt with PMH depression, hypothyroidism, HLD, Afib on Eliquis and PSH of L hip surgery, cholecystectomy and VHR presents with 1-day history of sudden severe midabdominal/epigastric pain. Patient had a  CT AP with PO and IV contrast demonstrates perforated diverticulitis of distal duodenum.     #LE weakness and blurry vision   -r/o stroke.   -2 stroke prevention w/ Atorvastatin 80mg PO daily, eliquis. SBP goal <180. Neuro checks  -Follow MRI    #Abdominal pain secondary to perforated diverticulitis of distal duodenum  -Management as per primary team  -Continue Metronidazole and cefpodoxime as per primary team.     #Atrial fibrillation with RVR  -Cardiology following; cardiology recommended that pt get a TTE will defer study decision to primary team. PFO on previous echo    -Continue eliquis 5mg BID as per neuro recs considering today's code stroke.   -increase Toprol XL to 50 mg PO daily as per cards    #Hypothyroidism   - Will continue Levothyroxine 75mcg daily     #Dizziness  - Will encourage PO intake   - f/u orthostatics     #DVT prop  -Pt is on system AC with apixaban    #DISPO  -As per primary team

## 2024-01-16 NOTE — CONSULT NOTE ADULT - ASSESSMENT
83y Female with PMHx of depression, hypothyroidism, HLD, Afib on Eliquis and PSH of L hip surgery, cholecystectomy and VHR presented with 1-day history of sudden severe midabdominal/epigastric pain. Patient admitted for perforated diverticulitis of distal duodenum. Patient was placed on a heparin gtt pending any surgical intervention (PTT appears therapeutic to supratherapeutic throughout heparin infusion) and was restarted on Eliquis 1/13. Complained of left motor/sensory deficit while working with PT this morning, reports that her left sided weakness began prior to hospitalization. Stroke code called. NIHSS on arrival 2, exam significant for left facial droop and LUE pronation. CTH concerning for subtle effacement of the right frontal parietal sulci to that may represent hyper acute infarction. CTA negative. CTP negative. Patient not a candidate for TNK given mild symptoms and abnormal coags on Eliquis. Not a candidate for any other intervention. Unable to rule out acute stroke on CTH, will need an MRI to further assess acuity.     1)Secondary stroke prevention  - Continue Eliquis 5mg BID (patient would qualify for 2.5mg BID based on weight and age but will hold off on changing dosing for now given event today)  - c/w Atorvastatin 80mg PO daily    2) Stroke risk factors  - Please obtain TSH, LDL, A1C with morning labs  - atrial fibrillation      3) Further management  - obtain MRI brain without contrast  - recommend SBP goal <180  - recommend q4hr stroke neuro checks  - may need outpt neurology follow up  - provide stroke education    DVT prophylaxis   -Lovenox SQ and SCDs    Discussed with Neurology Attending Dr. Saenz 83y Female with PMHx of depression, hypothyroidism, HLD, Afib on Eliquis and PSH of L hip surgery, cholecystectomy and VHR presented with 1-day history of sudden severe midabdominal/epigastric pain. Patient admitted for perforated diverticulitis of distal duodenum. Patient was placed on a heparin gtt pending any surgical intervention (PTT appears therapeutic to supratherapeutic throughout heparin infusion) and was restarted on Eliquis 1/13. Complained of left motor/sensory deficit while working with PT this morning, reports that her left sided weakness began prior to hospitalization. Stroke code called. NIHSS on arrival 2, exam significant for left facial droop and LUE pronation. CTH concerning for subtle effacement of the right frontal parietal sulci to that may represent hyper acute infarction. CTA negative. CTP negative. Patient not a candidate for TNK given mild symptoms and abnormal coags on Eliquis. Not a candidate for any other intervention. Unable to rule out acute stroke on CTH, will need an MRI to further assess acuity.     1)Secondary stroke prevention  - Continue Eliquis 5mg BID (patient would qualify for 2.5mg BID based on weight and age but will hold off on changing dosing for now given event today)  - c/w Atorvastatin 80mg PO daily    2) Stroke risk factors  - Please obtain TSH, LDL, A1C with morning labs  - atrial fibrillation  - Echo 10/2022 revealed PFO    3) Further management  - obtain MRI brain without contrast  - recommend SBP goal <180  - recommend q4hr stroke neuro checks  - may need outpt neurology follow up  - provide stroke education    DVT prophylaxis   -Lovenox SQ and SCDs    Discussed with Neurology Attending Dr. Saenz

## 2024-01-16 NOTE — PHYSICAL THERAPY INITIAL EVALUATION ADULT - PERTINENT HX OF CURRENT PROBLEM, REHAB EVAL
83yo Female pt with PMH depression, hypothyroidism, HLD, Afib on Eliquis (last dose 1/8/24) and PSH of L hip surgery, cholecystectomy and VHR presented with 1-day history of sudden severe midabdominal/epigastric pain. Patient admitted for perforated diverticulitis of distal duodenum. UGI neg leak (1/11) 81yo Female pt with PMH depression, hypothyroidism, HLD, Afib on Eliquis (last dose 1/8/24) and PSH of L hip surgery, cholecystectomy and VHR presented with 1-day history of sudden severe midabdominal/epigastric pain. Patient admitted for perforated diverticulitis of distal duodenum. UGI neg leak (1/11)

## 2024-01-17 LAB
A1C WITH ESTIMATED AVERAGE GLUCOSE RESULT: 5.7 % — HIGH (ref 4–5.6)
ESTIMATED AVERAGE GLUCOSE: 117 MG/DL — HIGH (ref 68–114)
LDLC SERPL DIRECT ASSAY-MCNC: 61 MG/DL — SIGNIFICANT CHANGE UP
TSH SERPL-MCNC: 2.85 UIU/ML — SIGNIFICANT CHANGE UP (ref 0.27–4.2)

## 2024-01-17 PROCEDURE — 99232 SBSQ HOSP IP/OBS MODERATE 35: CPT | Mod: GC

## 2024-01-17 PROCEDURE — 99232 SBSQ HOSP IP/OBS MODERATE 35: CPT

## 2024-01-17 PROCEDURE — 99231 SBSQ HOSP IP/OBS SF/LOW 25: CPT

## 2024-01-17 RX ORDER — METOPROLOL TARTRATE 50 MG
25 TABLET ORAL ONCE
Refills: 0 | Status: COMPLETED | OUTPATIENT
Start: 2024-01-17 | End: 2024-01-17

## 2024-01-17 RX ORDER — METOPROLOL TARTRATE 50 MG
50 TABLET ORAL DAILY
Refills: 0 | Status: DISCONTINUED | OUTPATIENT
Start: 2024-01-18 | End: 2024-01-24

## 2024-01-17 RX ORDER — APIXABAN 2.5 MG/1
2.5 TABLET, FILM COATED ORAL EVERY 12 HOURS
Refills: 0 | Status: DISCONTINUED | OUTPATIENT
Start: 2024-01-17 | End: 2024-01-24

## 2024-01-17 RX ADMIN — Medication 75 MICROGRAM(S): at 05:46

## 2024-01-17 RX ADMIN — APIXABAN 2.5 MILLIGRAM(S): 2.5 TABLET, FILM COATED ORAL at 17:58

## 2024-01-17 RX ADMIN — APIXABAN 5 MILLIGRAM(S): 2.5 TABLET, FILM COATED ORAL at 06:18

## 2024-01-17 RX ADMIN — Medication 650 MILLIGRAM(S): at 12:23

## 2024-01-17 RX ADMIN — Medication 200 MILLIGRAM(S): at 06:18

## 2024-01-17 RX ADMIN — PANTOPRAZOLE SODIUM 40 MILLIGRAM(S): 20 TABLET, DELAYED RELEASE ORAL at 06:18

## 2024-01-17 RX ADMIN — Medication 200 MILLIGRAM(S): at 17:57

## 2024-01-17 RX ADMIN — Medication 25 MILLIGRAM(S): at 17:57

## 2024-01-17 RX ADMIN — Medication 500 MILLIGRAM(S): at 17:58

## 2024-01-17 RX ADMIN — Medication 25 MILLIGRAM(S): at 06:18

## 2024-01-17 RX ADMIN — Medication 500 MILLIGRAM(S): at 06:18

## 2024-01-17 RX ADMIN — ATORVASTATIN CALCIUM 80 MILLIGRAM(S): 80 TABLET, FILM COATED ORAL at 21:31

## 2024-01-17 RX ADMIN — Medication 650 MILLIGRAM(S): at 13:23

## 2024-01-17 RX ADMIN — PANTOPRAZOLE SODIUM 40 MILLIGRAM(S): 20 TABLET, DELAYED RELEASE ORAL at 17:57

## 2024-01-17 NOTE — PROGRESS NOTE ADULT - SUBJECTIVE AND OBJECTIVE BOX
SUBJECTIVE: Patient seen and examined bedside by chief resident. Patient resting on stretcher comfortably and in no acute distress. Denies any nausea/vomiting. L sided weakness and headache resolved.     apixaban 5 milliGRAM(s) Oral every 12 hours  cefpodoxime 200 milliGRAM(s) Oral every 12 hours  metoprolol succinate ER 25 milliGRAM(s) Oral daily  metroNIDAZOLE    Tablet 500 milliGRAM(s) Oral every 12 hours    MEDICATIONS  (PRN):  acetaminophen     Tablet .. 650 milliGRAM(s) Oral every 6 hours PRN Mild Pain (1 - 3), Moderate Pain (4 - 6)  melatonin 5 milliGRAM(s) Oral at bedtime PRN Insomnia      I&O's Detail    16 Jan 2024 07:01  -  17 Jan 2024 07:00  --------------------------------------------------------  IN:  Total IN: 0 mL    OUT:    Voided (mL): 1525 mL  Total OUT: 1525 mL    Total NET: -1525 mL          Vital Signs Last 24 Hrs  T(C): 36.8 (17 Jan 2024 04:41), Max: 36.9 (16 Jan 2024 21:10)  T(F): 98.3 (17 Jan 2024 04:41), Max: 98.5 (17 Jan 2024 00:25)  HR: 93 (17 Jan 2024 06:15) (79 - 99)  BP: 115/72 (17 Jan 2024 06:15) (110/76 - 145/66)  BP(mean): --  RR: 16 (17 Jan 2024 06:15) (16 - 19)  SpO2: 93% (17 Jan 2024 06:15) (93% - 94%)    Parameters below as of 17 Jan 2024 06:15  Patient On (Oxygen Delivery Method): room air        General: NAD, resting comfortably in bed  C/V: NSR  Pulm: Nonlabored breathing, no respiratory distress  Abd: soft, NT/ND  Extrem: WWP, no edema, SCDs in place    LABS:                        12.5   9.18  )-----------( 505      ( 16 Jan 2024 09:47 )             37.4     01-16    134<L>  |  100  |  12  ----------------------------<  117<H>  4.4   |  24  |  0.49<L>    Ca    10.2      16 Jan 2024 09:52  Phos  3.2     01-16  Mg     2.3     01-16      PT/INR - ( 16 Jan 2024 09:54 )   PT: 17.1 sec;   INR: 1.52          PTT - ( 16 Jan 2024 09:54 )  PTT:43.6 sec  Urinalysis Basic - ( 16 Jan 2024 09:52 )    Color: x / Appearance: x / SG: x / pH: x  Gluc: 117 mg/dL / Ketone: x  / Bili: x / Urobili: x   Blood: x / Protein: x / Nitrite: x   Leuk Esterase: x / RBC: x / WBC x   Sq Epi: x / Non Sq Epi: x / Bacteria: x        RADIOLOGY & ADDITIONAL STUDIES:

## 2024-01-17 NOTE — PROGRESS NOTE ADULT - ASSESSMENT
82F PMH HLD, hypothryoidism, Afib on eliquis who presents with perforated duodenal diverticulitis which was managed medically. Cardiology consulted for Afib RVR.    Review of studies  TTE 10/21/22: Normal biventricular size/function. Mild LVH. G1DD. No significant valve disease. PH, PASP 42. +PFO  TELE:     Atrial Fibrillation with RVR  - Patient with known Afib on Eliquis, admitted with perforated duodenal diverticulitis with Afib with RVR with rates as high as the 150's, currently in NSR  - Cont with Toprol 25 mg po daily. Can increase to 50 mg po daily as tolerated   -CHADVASC at least of 3 placing patient at higher thromboembolic risk. Patient should be on Eliquis 2.5 mg po BID (Age 82, Weight 49 Kgs), however given concern for embolic event  maintained on Eliquis 5 mg po BID per Neuro  - Of note patient with PFO on Echo 10/22. She has been maintained on AC for the duration of the hospitalization (Heparin gtt than NOAC). No concern for DVT with paradoxical emboli at  this time     INCOMPLETE      82F PMH HLD, hypothryoidism, Afib on eliquis who presents with perforated duodenal diverticulitis which was managed medically. Cardiology consulted for Afib RVR.    Review of studies  TTE 10/21/22: Normal biventricular size/function. Mild LVH. G1DD. No significant valve disease. PH, PASP 42. +PFO  TELE: Predominantly sinus rhythm with brief burst of AFib     #Atrial Fibrillation with RVR  - Patient with known Afib on Eliquis, admitted with perforated duodenal diverticulitis with Afib with RVR with rates as high as the 150's, currently in NSR  - Cont with Toprol 25 mg po daily. Can increase to 50 mg po daily as tolerated   -CHADVASC at least of 3 placing patient at higher thromboembolic risk. Patient should be on Eliquis 2.5 mg po BID (Age 82, Weight 49 Kgs), however given concern for embolic event  maintained on Eliquis 5 mg po BID per Neuro  - Of note patient with PFO on Echo 10/22. She has been maintained on AC for the duration of the hospitalization (Heparin gtt than NOAC). No concern for DVT with paradoxical emboli at  this time     #Cardiovascular optimization  LDL 61, rest of lipid panel pending. A1C 5.7% . TSH pending   - c/w high intensity Lipitor  - MRI with no acute infarct     Cardiology will continue to follow

## 2024-01-17 NOTE — CHART NOTE - NSCHARTNOTEFT_GEN_A_CORE
83y Female with PMHx of depression, hypothyroidism, HLD, Afib on Eliquis and PSH of L hip surgery, cholecystectomy and VHR presented with 1-day history of sudden severe midabdominal/epigastric pain. Patient admitted for perforated diverticulitis of distal duodenum. Patient was placed on a heparin gtt pending any surgical intervention (PTT appears therapeutic to supratherapeutic throughout heparin infusion) and was restarted on Eliquis 1/13. Complained of left motor/sensory deficit while working with PT this morning, reports that her left sided weakness began prior to hospitalization. Stroke code called. NIHSS on arrival 2, exam significant for left facial droop and LUE pronation. CTH concerning for subtle effacement of the right frontal parietal sulci to that may represent hyper acute infarction. CTA negative. CTP negative. Patient not a candidate for TNK given mild symptoms and abnormal coags on Eliquis. Not a candidate for any other intervention. MRI brain non contrast negative for acute infarction.     Likely acute exacerbation of chronic weakness d/t underlying pathology and deconditioning.     Would recommend decrease dosing of Eliquis to 2.5mg BID as patient meets criteria.     No further stroke workup warranted at this time. Stroke to sign off.    Case discussed with Stroke Fellow Dr. Flanagan, Neurology Attending Dr. Saenz

## 2024-01-17 NOTE — PROGRESS NOTE ADULT - ASSESSMENT
A/p: 83yo Female pt with PMH depression, hypothyroidism, HLD, Afib on Eliquis (last dose 1/8/24) and PSH of L hip surgery, cholecystectomy and VHR presented with 1-day history of sudden severe midabdominal/epigastric pain. Patient admitted for perforated diverticulitis of distal duodenum. UGI neg leak (1/11)    Regular, soft/bite sized diet   Pain & nausea prn  Eliquis  OOBA/OOBTC

## 2024-01-17 NOTE — PROGRESS NOTE ADULT - SUBJECTIVE AND OBJECTIVE BOX
INTERVAL EVENTS: MRI with no acute infarct     PAST MEDICAL & SURGICAL HISTORY:  Depression    Thyroid disorder    Afib    HLD (hyperlipidemia)    History of hip replacement  R, for degenerative pain        MEDICATIONS  (STANDING):  apixaban 5 milliGRAM(s) Oral every 12 hours  atorvastatin 80 milliGRAM(s) Oral at bedtime  cefpodoxime 200 milliGRAM(s) Oral every 12 hours  levothyroxine 75 MICROGram(s) Oral daily  metoprolol succinate ER 25 milliGRAM(s) Oral daily  metroNIDAZOLE    Tablet 500 milliGRAM(s) Oral every 12 hours  pantoprazole    Tablet 40 milliGRAM(s) Oral two times a day    MEDICATIONS  (PRN):  acetaminophen     Tablet .. 650 milliGRAM(s) Oral every 6 hours PRN Mild Pain (1 - 3), Moderate Pain (4 - 6)  melatonin 5 milliGRAM(s) Oral at bedtime PRN Insomnia    Vital Signs Last 24 Hrs  T(C): 36.8 (17 Jan 2024 04:41), Max: 36.9 (16 Jan 2024 21:10)  T(F): 98.3 (17 Jan 2024 04:41), Max: 98.5 (17 Jan 2024 00:25)  HR: 93 (17 Jan 2024 06:15) (79 - 99)  BP: 115/72 (17 Jan 2024 06:15) (110/76 - 145/66)  BP(mean): --  RR: 16 (17 Jan 2024 06:15) (16 - 19)  SpO2: 93% (17 Jan 2024 06:15) (93% - 94%)    Parameters below as of 17 Jan 2024 06:15  Patient On (Oxygen Delivery Method): room air        PHYSICAL EXAM:  GEN: Awake, alert. NAD.   HEENT: NCAT, PERRL, EOMI. Mucosa moist. No JVD.  RESP: CTA b/l  CV: RRR. Normal S1/S2. No m/r/g.  ABD: Soft. NT/ND. BS+  EXT: Warm. No edema, clubbing, or cyanosis.   NEURO: AAOx3. No focal deficits.     LABS:                        12.5   9.18  )-----------( 505      ( 16 Jan 2024 09:47 )             37.4     01-16    134<L>  |  100  |  12  ----------------------------<  117<H>  4.4   |  24  |  0.49<L>    Ca    10.2      16 Jan 2024 09:52  Phos  3.2     01-16  Mg     2.3     01-16      CARDIAC MARKERS ( 16 Jan 2024 09:52 )  x     / x     / 13 U/L / x     / <1.0 ng/mL      PT/INR - ( 16 Jan 2024 09:54 )   PT: 17.1 sec;   INR: 1.52          PTT - ( 16 Jan 2024 09:54 )  PTT:43.6 sec  Urinalysis Basic - ( 16 Jan 2024 09:52 )    Color: x / Appearance: x / SG: x / pH: x  Gluc: 117 mg/dL / Ketone: x  / Bili: x / Urobili: x   Blood: x / Protein: x / Nitrite: x   Leuk Esterase: x / RBC: x / WBC x   Sq Epi: x / Non Sq Epi: x / Bacteria: x      I&O's Summary    16 Jan 2024 07:01  -  17 Jan 2024 07:00  --------------------------------------------------------  IN: 0 mL / OUT: 1525 mL / NET: -1525 mL             INTERVAL EVENTS: MRI with no acute infarct. She feels okay, just tired     PAST MEDICAL & SURGICAL HISTORY:  Depression    Thyroid disorder    Afib    HLD (hyperlipidemia)    History of hip replacement  R, for degenerative pain        MEDICATIONS  (STANDING):  apixaban 5 milliGRAM(s) Oral every 12 hours  atorvastatin 80 milliGRAM(s) Oral at bedtime  cefpodoxime 200 milliGRAM(s) Oral every 12 hours  levothyroxine 75 MICROGram(s) Oral daily  metoprolol succinate ER 25 milliGRAM(s) Oral daily  metroNIDAZOLE    Tablet 500 milliGRAM(s) Oral every 12 hours  pantoprazole    Tablet 40 milliGRAM(s) Oral two times a day    MEDICATIONS  (PRN):  acetaminophen     Tablet .. 650 milliGRAM(s) Oral every 6 hours PRN Mild Pain (1 - 3), Moderate Pain (4 - 6)  melatonin 5 milliGRAM(s) Oral at bedtime PRN Insomnia    Vital Signs Last 24 Hrs  T(C): 36.8 (17 Jan 2024 04:41), Max: 36.9 (16 Jan 2024 21:10)  T(F): 98.3 (17 Jan 2024 04:41), Max: 98.5 (17 Jan 2024 00:25)  HR: 93 (17 Jan 2024 06:15) (79 - 99)  BP: 115/72 (17 Jan 2024 06:15) (110/76 - 145/66)  BP(mean): --  RR: 16 (17 Jan 2024 06:15) (16 - 19)  SpO2: 93% (17 Jan 2024 06:15) (93% - 94%)    Parameters below as of 17 Jan 2024 06:15  Patient On (Oxygen Delivery Method): room air        PHYSICAL EXAM:  GEN: Awake, alert. NAD.   HEENT: NCAT, PERRL, EOMI. Mucosa moist. No JVD.  RESP: CTA b/l  CV: RRR. Normal S1/S2. No m/r/g.  ABD: Soft. NT/ND. BS+  EXT: Warm. No edema, clubbing, or cyanosis.       LABS:                        12.5   9.18  )-----------( 505      ( 16 Jan 2024 09:47 )             37.4     01-16    134<L>  |  100  |  12  ----------------------------<  117<H>  4.4   |  24  |  0.49<L>    Ca    10.2      16 Jan 2024 09:52  Phos  3.2     01-16  Mg     2.3     01-16      CARDIAC MARKERS ( 16 Jan 2024 09:52 )  x     / x     / 13 U/L / x     / <1.0 ng/mL      PT/INR - ( 16 Jan 2024 09:54 )   PT: 17.1 sec;   INR: 1.52          PTT - ( 16 Jan 2024 09:54 )  PTT:43.6 sec  Urinalysis Basic - ( 16 Jan 2024 09:52 )    Color: x / Appearance: x / SG: x / pH: x  Gluc: 117 mg/dL / Ketone: x  / Bili: x / Urobili: x   Blood: x / Protein: x / Nitrite: x   Leuk Esterase: x / RBC: x / WBC x   Sq Epi: x / Non Sq Epi: x / Bacteria: x      I&O's Summary    16 Jan 2024 07:01  -  17 Jan 2024 07:00  --------------------------------------------------------  IN: 0 mL / OUT: 1525 mL / NET: -1525 mL

## 2024-01-17 NOTE — PROGRESS NOTE ADULT - ATTENDING COMMENTS
Patient is an 82 year old woman with history of depression, hypothyroidism, HLD, A fib on Eliquis, left hip surgery, cholecystectomy, ventral hernia repair who is admitted with clinical, laboratory, and radiographic findings concerning for perforated duodenal diverticulum. At time of evaluation, afebrile, hemodynamically stable, abdomen soft, tenderness improved, distension improved, no rebound or guarding. Concern for left sided weakness during PT, neurology consulted for further evaluation, MRI obtained demonstrates no evidence of stroke. Continue diet, antibiotics completed, PT/OT, resume disposition planning. Late entry, date of service 1/17/24.

## 2024-01-17 NOTE — PROGRESS NOTE ADULT - ATTENDING COMMENTS
Patient is an 81 yo F with PMH of Afib on Eliquis,  HLD, hypothyroidism, admitted with perforated duodenal diverticulitis managed medically with clinical course notable for Afib with RVR with hospital course further complicated by Left facial droop and LUE pronation R/O for acute infarct by MRT. Cardiology following for AF optimization    Review of studies  - TTE 10/21/22: Normal biventricular size/function. Mild LVH. G1DD. No significant valve disease. PH, PASP 42. +PFO  - TELE 1/09/2024: Afib to 150s w/PVCs converted to SR in 60s-70s at 0030    # Atrial Fibrillation with RVR  - Patient with known Afib on Eliquis, admitted with perforated duodenal diverticulitis with Afib with RVR with rates as high as the 150's, currently in NSR  - Tele Reviewed showing NSR with rare PVCS  - Cont with Toprol 50 mg po Daily  -CHADVASC at least of 3 placing patient at higher thromboembolic risk. Cont with Eliquis 2.5 mg po BID (Age 82, Weight 49 Kgs),   - Of note patient with PFO on Echo 10/22. She has been maintained on AC for the duration of the hospitalization (Heparin gtt than NOAC). No concern for DVT with paradoxical emboli at  this time   - Please call Cardiology with any questions

## 2024-01-17 NOTE — PROGRESS NOTE ADULT - ASSESSMENT
84 yo Female pt with PMH depression, hypothyroidism, HLD, Afib on Eliquis and PSH of L hip surgery, cholecystectomy and VHR presents with 1-day history of sudden severe midabdominal/epigastric pain. Patient had a  CT AP with PO and IV contrast demonstrates perforated diverticulitis of distal duodenum.     #LE weakness likely 2/2 deconditioning   - stroke ruled out by MRI   - Neuro following     #Abdominal pain secondary to perforated diverticulitis of distal duodenum  -Management as per primary team  -Continue Metronidazole and cefpodoxime as per primary team.     #Atrial fibrillation with RVR  -Cardiology following; cardiology recommended that pt get a TTE will defer study decision to primary team. PFO on previous echo    -Decrease dose of eliquis to 2.5mg BID  -increase Toprol XL to 50 mg PO daily as per cards    #Hypothyroidism   - Will continue Levothyroxine 75mcg daily     #DVT prop  -Pt is on system AC with apixaban    #DISPO  -As per primary team

## 2024-01-17 NOTE — PROGRESS NOTE ADULT - SUBJECTIVE AND OBJECTIVE BOX
INTERVAL HPI/OVERNIGHT EVENTS:   No acute overnight events     MEDICATIONS  (STANDING):  apixaban 2.5 milliGRAM(s) Oral every 12 hours  atorvastatin 80 milliGRAM(s) Oral at bedtime  cefpodoxime 200 milliGRAM(s) Oral every 12 hours  levothyroxine 75 MICROGram(s) Oral daily  metoprolol succinate ER 25 milliGRAM(s) Oral daily  metroNIDAZOLE    Tablet 500 milliGRAM(s) Oral every 12 hours  pantoprazole    Tablet 40 milliGRAM(s) Oral two times a day    MEDICATIONS  (PRN):  acetaminophen     Tablet .. 650 milliGRAM(s) Oral every 6 hours PRN Mild Pain (1 - 3), Moderate Pain (4 - 6)  melatonin 5 milliGRAM(s) Oral at bedtime PRN Insomnia    Vital Signs Last 24 Hrs  T(C): 36.9 (17 Jan 2024 09:09), Max: 36.9 (16 Jan 2024 21:10)  T(F): 98.4 (17 Jan 2024 09:09), Max: 98.5 (17 Jan 2024 00:25)  HR: 91 (17 Jan 2024 09:09) (89 - 99)  BP: 118/62 (17 Jan 2024 09:09) (110/76 - 145/66)  BP(mean): --  RR: 17 (17 Jan 2024 09:09) (16 - 19)  SpO2: 93% (17 Jan 2024 09:09) (93% - 94%)    Parameters below as of 17 Jan 2024 09:09  Patient On (Oxygen Delivery Method): room air        PHYSICAL EXAMINATION:  GENERAL: NAD  HEAD:  Atraumatic, Normocephalic  EYES:  conjunctiva and sclera clear  NECK: Supple, No JVD  CHEST/LUNG: Clear to auscultation.  HEART: Regular rate and rhythm;  ABDOMEN: Soft, Nontender, Nondistended; Bowel sounds present  NERVOUS SYSTEM:  Alert & Oriented dry                          12.5   9.18  )-----------( 505      ( 16 Jan 2024 09:47 )             37.4     01-16    134<L>  |  100  |  12  ----------------------------<  117<H>  4.4   |  24  |  0.49<L>    Ca    10.2      16 Jan 2024 09:52  Phos  3.2     01-16  Mg     2.3     01-16        CARDIAC MARKERS ( 16 Jan 2024 09:52 )  x     / x     / 13 U/L / x     / <1.0 ng/mL      PT/INR - ( 16 Jan 2024 09:54 )   PT: 17.1 sec;   INR: 1.52          PTT - ( 16 Jan 2024 09:54 )  PTT:43.6 sec    CAPILLARY BLOOD GLUCOSE      POCT Blood Glucose.: 108 mg/dL (16 Jan 2024 09:45)          RADIOLOGY & ADDITIONAL TESTS:

## 2024-01-18 PROCEDURE — 99233 SBSQ HOSP IP/OBS HIGH 50: CPT | Mod: GC

## 2024-01-18 PROCEDURE — 99231 SBSQ HOSP IP/OBS SF/LOW 25: CPT

## 2024-01-18 RX ORDER — APIXABAN 2.5 MG/1
1 TABLET, FILM COATED ORAL
Qty: 0 | Refills: 0 | DISCHARGE

## 2024-01-18 RX ORDER — APIXABAN 2.5 MG/1
1 TABLET, FILM COATED ORAL
Qty: 0 | Refills: 0 | DISCHARGE
Start: 2024-01-18

## 2024-01-18 RX ADMIN — Medication 75 MICROGRAM(S): at 05:28

## 2024-01-18 RX ADMIN — Medication 500 MILLIGRAM(S): at 17:21

## 2024-01-18 RX ADMIN — ATORVASTATIN CALCIUM 80 MILLIGRAM(S): 80 TABLET, FILM COATED ORAL at 21:44

## 2024-01-18 RX ADMIN — Medication 200 MILLIGRAM(S): at 17:23

## 2024-01-18 RX ADMIN — PANTOPRAZOLE SODIUM 40 MILLIGRAM(S): 20 TABLET, DELAYED RELEASE ORAL at 05:29

## 2024-01-18 RX ADMIN — APIXABAN 2.5 MILLIGRAM(S): 2.5 TABLET, FILM COATED ORAL at 17:21

## 2024-01-18 RX ADMIN — Medication 200 MILLIGRAM(S): at 05:29

## 2024-01-18 RX ADMIN — Medication 50 MILLIGRAM(S): at 05:29

## 2024-01-18 RX ADMIN — Medication 500 MILLIGRAM(S): at 05:29

## 2024-01-18 RX ADMIN — PANTOPRAZOLE SODIUM 40 MILLIGRAM(S): 20 TABLET, DELAYED RELEASE ORAL at 17:21

## 2024-01-18 RX ADMIN — APIXABAN 2.5 MILLIGRAM(S): 2.5 TABLET, FILM COATED ORAL at 05:30

## 2024-01-18 NOTE — PROGRESS NOTE ADULT - ATTENDING COMMENTS
Patient is an 82 year old woman with history of depression, hypothyroidism, HLD, A fib on Eliquis, left hip surgery, cholecystectomy, ventral hernia repair who is admitted with clinical, laboratory, and radiographic findings concerning for perforated duodenal diverticulum. At time of evaluation, afebrile, hemodynamically stable, abdomen soft, tenderness improved, distension improved, no rebound or guarding. Concern for left sided weakness during PT, neurology consulted for further evaluation, MRI obtained demonstrates no evidence of stroke. Continue diet, antibiotics completed, PT/OT, resume disposition planning.

## 2024-01-18 NOTE — PROGRESS NOTE ADULT - ASSESSMENT
A/p: 83yo Female pt with PMH depression, hypothyroidism, HLD, Afib on Eliquis (last dose 1/8/24) and PSH of L hip surgery, cholecystectomy and VHR presented with 1-day history of sudden severe midabdominal/epigastric pain. Patient admitted for perforated diverticulitis of distal duodenum. UGI neg leak (1/11)    Regular, soft/bite sized diet   Pain & nausea prn  Eliquis  OOBA/OOBTC  Dispo: [facility]

## 2024-01-18 NOTE — PROGRESS NOTE ADULT - SUBJECTIVE AND OBJECTIVE BOX
Patient is a 83y old  Female who presents with a chief complaint of perforated viscus (18 Jan 2024 07:47)      INTERVAL HPI/OVERNIGHT EVENTS: offers no new complaints; current symptoms resolving    MEDICATIONS  (STANDING):  apixaban 2.5 milliGRAM(s) Oral every 12 hours  atorvastatin 80 milliGRAM(s) Oral at bedtime  cefpodoxime 200 milliGRAM(s) Oral every 12 hours  levothyroxine 75 MICROGram(s) Oral daily  metoprolol succinate ER 50 milliGRAM(s) Oral daily  metroNIDAZOLE    Tablet 500 milliGRAM(s) Oral every 12 hours  pantoprazole    Tablet 40 milliGRAM(s) Oral two times a day    MEDICATIONS  (PRN):  acetaminophen     Tablet .. 650 milliGRAM(s) Oral every 6 hours PRN Mild Pain (1 - 3), Moderate Pain (4 - 6)  melatonin 5 milliGRAM(s) Oral at bedtime PRN Insomnia      __________________________________________________      Vital Signs Last 24 Hrs  T(C): 36.4 (18 Jan 2024 04:49), Max: 36.6 (17 Jan 2024 17:58)  T(F): 97.5 (18 Jan 2024 04:49), Max: 97.9 (17 Jan 2024 17:58)  HR: 75 (18 Jan 2024 04:49) (72 - 97)  BP: 131/59 (18 Jan 2024 04:49) (121/67 - 135/72)  BP(mean): --  RR: 16 (18 Jan 2024 04:49) (16 - 19)  SpO2: 91% (18 Jan 2024 04:49) (91% - 95%)    Parameters below as of 18 Jan 2024 04:49  Patient On (Oxygen Delivery Method): room air        ________________________________________________  PHYSICAL EXAM:  GENERAL: NAD  HEAD:  Atraumatic, Normocephalic  EYES:  conjunctiva and sclera clear  NECK: Supple, No JVD  CHEST/LUNG: Clear to auscultation.  HEART: Regular rate and rhythm;  ABDOMEN: Soft, Nontender, Nondistended; Bowel sounds present  NERVOUS SYSTEM:  Alert & Oriented dry    _________________________________________________  LABS:              CAPILLARY BLOOD GLUCOSE            RADIOLOGY & ADDITIONAL TESTS:      Plan of care was discussed with patient and /or primary care giver; all questions and concerns were addressed and care was aligned with patient's wishes.

## 2024-01-18 NOTE — PROGRESS NOTE ADULT - SUBJECTIVE AND OBJECTIVE BOX
SUBJECTIVE: Pt seen and examined by chief resident. Pt is doing well, resting comfortably on bed. Feeling better. Diet tolerated. +F/+BM. No nausea or vomiting. No complaints at this time.    Vital Signs Last 24 Hrs  T(C): 36.4 (18 Jan 2024 04:49), Max: 36.9 (17 Jan 2024 09:09)  T(F): 97.5 (18 Jan 2024 04:49), Max: 98.4 (17 Jan 2024 09:09)  HR: 75 (18 Jan 2024 04:49) (72 - 97)  BP: 131/59 (18 Jan 2024 04:49) (118/62 - 135/72)  BP(mean): --  RR: 16 (18 Jan 2024 04:49) (16 - 19)  SpO2: 91% (18 Jan 2024 04:49) (91% - 95%)    Parameters below as of 18 Jan 2024 04:49  Patient On (Oxygen Delivery Method): room air        I&O's Summary    17 Jan 2024 07:01  -  18 Jan 2024 07:00  --------------------------------------------------------  IN: 0 mL / OUT: 700 mL / NET: -700 mL        Physical Exam:  General Appearance: Appears well, NAD  Pulmonary: Nonlabored breathing, no respiratory distress  Cardiovascular: NSR  Abdomen: Soft, nondisteded, nontender  Extremities: WWP, SCD's in place     LABS:                        12.5   9.18  )-----------( 505      ( 16 Jan 2024 09:47 )             37.4     01-16    134<L>  |  100  |  12  ----------------------------<  117<H>  4.4   |  24  |  0.49<L>    Ca    10.2      16 Jan 2024 09:52  Phos  3.2     01-16  Mg     2.3     01-16      PT/INR - ( 16 Jan 2024 09:54 )   PT: 17.1 sec;   INR: 1.52          PTT - ( 16 Jan 2024 09:54 )  PTT:43.6 sec  Urinalysis Basic - ( 16 Jan 2024 09:52 )    Color: x / Appearance: x / SG: x / pH: x  Gluc: 117 mg/dL / Ketone: x  / Bili: x / Urobili: x   Blood: x / Protein: x / Nitrite: x   Leuk Esterase: x / RBC: x / WBC x   Sq Epi: x / Non Sq Epi: x / Bacteria: x

## 2024-01-18 NOTE — PROGRESS NOTE ADULT - ASSESSMENT
84 yo Female pt with PMH depression, hypothyroidism, HLD, Afib on Eliquis and PSH of L hip surgery, cholecystectomy and VHR presents with 1-day history of sudden severe midabdominal/epigastric pain. Patient had a  CT AP with PO and IV contrast demonstrates perforated diverticulitis of distal duodenum.     #LE weakness likely 2/2 deconditioning   - stroke ruled out by MRI   - Neuro following     #Abdominal pain secondary to perforated diverticulitis of distal duodenum  -Management as per primary team  -Continue Metronidazole and cefpodoxime as per primary team.     #Atrial fibrillation with RVR  -Cardiology following; cardiology recommended that pt get a TTE will defer study decision to primary team. PFO on previous echo    -Decrease dose of eliquis to 2.5mg BID  -increase Toprol XL to 50 mg PO daily as per cards    #Hypothyroidism   - Will continue Levothyroxine 75mcg daily     #DVT prop  -Pt is on system AC with apixaban    #DISPO  -DAMARIS placement     Discussed with primary team

## 2024-01-19 PROCEDURE — 99233 SBSQ HOSP IP/OBS HIGH 50: CPT

## 2024-01-19 PROCEDURE — 99231 SBSQ HOSP IP/OBS SF/LOW 25: CPT

## 2024-01-19 RX ORDER — METRONIDAZOLE 500 MG
500 TABLET ORAL EVERY 12 HOURS
Refills: 0 | Status: COMPLETED | OUTPATIENT
Start: 2024-01-19 | End: 2024-01-22

## 2024-01-19 RX ADMIN — ATORVASTATIN CALCIUM 80 MILLIGRAM(S): 80 TABLET, FILM COATED ORAL at 21:14

## 2024-01-19 RX ADMIN — Medication 500 MILLIGRAM(S): at 06:18

## 2024-01-19 RX ADMIN — Medication 650 MILLIGRAM(S): at 22:00

## 2024-01-19 RX ADMIN — Medication 500 MILLIGRAM(S): at 18:19

## 2024-01-19 RX ADMIN — Medication 200 MILLIGRAM(S): at 18:12

## 2024-01-19 RX ADMIN — Medication 200 MILLIGRAM(S): at 06:16

## 2024-01-19 RX ADMIN — Medication 75 MICROGRAM(S): at 06:19

## 2024-01-19 RX ADMIN — Medication 50 MILLIGRAM(S): at 06:16

## 2024-01-19 RX ADMIN — Medication 650 MILLIGRAM(S): at 21:14

## 2024-01-19 RX ADMIN — PANTOPRAZOLE SODIUM 40 MILLIGRAM(S): 20 TABLET, DELAYED RELEASE ORAL at 06:16

## 2024-01-19 RX ADMIN — APIXABAN 2.5 MILLIGRAM(S): 2.5 TABLET, FILM COATED ORAL at 18:19

## 2024-01-19 RX ADMIN — PANTOPRAZOLE SODIUM 40 MILLIGRAM(S): 20 TABLET, DELAYED RELEASE ORAL at 18:19

## 2024-01-19 RX ADMIN — APIXABAN 2.5 MILLIGRAM(S): 2.5 TABLET, FILM COATED ORAL at 06:19

## 2024-01-19 NOTE — PROGRESS NOTE ADULT - SUBJECTIVE AND OBJECTIVE BOX
Patient is a 83y old  Female who presents with a chief complaint of perforated viscus (19 Jan 2024 08:12)      INTERVAL HPI/OVERNIGHT EVENTS: offers no new complaints; current symptoms resolving    MEDICATIONS  (STANDING):  apixaban 2.5 milliGRAM(s) Oral every 12 hours  atorvastatin 80 milliGRAM(s) Oral at bedtime  cefpodoxime 200 milliGRAM(s) Oral every 12 hours  levothyroxine 75 MICROGram(s) Oral daily  metoprolol succinate ER 50 milliGRAM(s) Oral daily  metroNIDAZOLE    Tablet 500 milliGRAM(s) Oral every 12 hours  pantoprazole    Tablet 40 milliGRAM(s) Oral two times a day    MEDICATIONS  (PRN):  acetaminophen     Tablet .. 650 milliGRAM(s) Oral every 6 hours PRN Mild Pain (1 - 3), Moderate Pain (4 - 6)  melatonin 5 milliGRAM(s) Oral at bedtime PRN Insomnia      __________________________________________________  REVIEW OF SYSTEMS:    CONSTITUTIONAL: No fever,   EYES: no acute visual disturbances  NECK: No pain or stiffness  RESPIRATORY: No cough; No shortness of breath  CARDIOVASCULAR: No chest pain, no palpitations  GASTROINTESTINAL: No pain. No nausea or vomiting; No diarrhea   NEUROLOGICAL: No headache or numbness, no tremors  MUSCULOSKELETAL: No joint pain, no muscle pain  GENITOURINARY: no dysuria, no frequency, no hesitancy  PSYCHIATRY: no depression , no anxiety  ALL OTHER  ROS negative        Vital Signs Last 24 Hrs  T(C): 37.3 (19 Jan 2024 08:36), Max: 37.3 (19 Jan 2024 08:36)  T(F): 99.1 (19 Jan 2024 08:36), Max: 99.1 (19 Jan 2024 08:36)  HR: 75 (19 Jan 2024 08:36) (74 - 92)  BP: 111/64 (19 Jan 2024 08:36) (107/64 - 133/72)  BP(mean): --  RR: 18 (19 Jan 2024 08:36) (18 - 20)  SpO2: 94% (19 Jan 2024 08:36) (94% - 95%)    Parameters below as of 19 Jan 2024 08:36  Patient On (Oxygen Delivery Method): room air        ________________________________________________  PHYSICAL EXAM:  GENERAL: NAD  HEENT: Normocephalic;  conjunctivae and sclerae clear; moist mucous membranes;   NECK : supple  CHEST/LUNG: Clear to auscultation bilaterally with good air entry   HEART: S1 S2  regular; no murmurs, gallops or rubs  ABDOMEN: Soft, Nontender, Nondistended; Bowel sounds present  EXTREMITIES: no cyanosis; no edema; no calf tenderness  SKIN: warm and dry; no rash  NERVOUS SYSTEM:  Awake and alert; Oriented  to place, person and time ; no new deficits    _________________________________________________  LABS:              CAPILLARY BLOOD GLUCOSE            RADIOLOGY & ADDITIONAL TESTS:      Plan of care was discussed with patient and /or primary care giver; all questions and concerns were addressed and care was aligned with patient's wishes.       Patient is a 83y old  Female who presents with a chief complaint of perforated viscus (19 Jan 2024 08:12)      INTERVAL HPI/OVERNIGHT EVENTS: offers no new complaints    MEDICATIONS  (STANDING):  apixaban 2.5 milliGRAM(s) Oral every 12 hours  atorvastatin 80 milliGRAM(s) Oral at bedtime  cefpodoxime 200 milliGRAM(s) Oral every 12 hours  levothyroxine 75 MICROGram(s) Oral daily  metoprolol succinate ER 50 milliGRAM(s) Oral daily  metroNIDAZOLE    Tablet 500 milliGRAM(s) Oral every 12 hours  pantoprazole    Tablet 40 milliGRAM(s) Oral two times a day    MEDICATIONS  (PRN):  acetaminophen     Tablet .. 650 milliGRAM(s) Oral every 6 hours PRN Mild Pain (1 - 3), Moderate Pain (4 - 6)  melatonin 5 milliGRAM(s) Oral at bedtime PRN Insomnia      __________________________________________________      Vital Signs Last 24 Hrs  T(C): 37.3 (19 Jan 2024 08:36), Max: 37.3 (19 Jan 2024 08:36)  T(F): 99.1 (19 Jan 2024 08:36), Max: 99.1 (19 Jan 2024 08:36)  HR: 75 (19 Jan 2024 08:36) (74 - 92)  BP: 111/64 (19 Jan 2024 08:36) (107/64 - 133/72)  BP(mean): --  RR: 18 (19 Jan 2024 08:36) (18 - 20)  SpO2: 94% (19 Jan 2024 08:36) (94% - 95%)    Parameters below as of 19 Jan 2024 08:36  Patient On (Oxygen Delivery Method): room air        ________________________________________________  PHYSICAL EXAM:  GENERAL: NAD  HEENT: Normocephalic;  conjunctivae and sclerae clear; moist mucous membranes;   NECK : supple  CHEST/LUNG: Clear to auscultation bilaterally with good air entry   HEART: S1 S2  regular; no murmurs, gallops or rubs  ABDOMEN: Soft, Nontender, Nondistended; Bowel sounds present  EXTREMITIES: no cyanosis; no edema; no calf tenderness  SKIN: warm and dry; no rash  NERVOUS SYSTEM:  Awake and alert; Oriented  to place, person and time ; no new deficits    _________________________________________________  LABS:              CAPILLARY BLOOD GLUCOSE            RADIOLOGY & ADDITIONAL TESTS:      Plan of care was discussed with patient and /or primary care giver; all questions and concerns were addressed and care was aligned with patient's wishes.

## 2024-01-19 NOTE — PROGRESS NOTE ADULT - ATTENDING COMMENTS
Patient is an 82 year old woman with history of depression, hypothyroidism, HLD, A fib on Eliquis, left hip surgery, cholecystectomy, ventral hernia repair who is admitted with clinical, laboratory, and radiographic findings concerning for perforated duodenal diverticulum. At time of evaluation, afebrile, hemodynamically stable, abdomen soft, tenderness improved, distension improved, no rebound or guarding. Concern for left sided weakness during PT, neurology consulted for further evaluation, MRI obtained demonstrates no evidence of stroke. Continue diet, antibiotics completed, PT/OT, disposition planning. Late entry, date of service 1/19/24

## 2024-01-19 NOTE — PROGRESS NOTE ADULT - SUBJECTIVE AND OBJECTIVE BOX
SUBJECTIVE: Patient seen and examined bedside by chief resident. Patient resting in bed comfortably and in no acute distress. Denies any acute complaints including headache, abdominal pain, nausea or vomiting.     apixaban 2.5 milliGRAM(s) Oral every 12 hours  cefpodoxime 200 milliGRAM(s) Oral every 12 hours  metoprolol succinate ER 50 milliGRAM(s) Oral daily  metroNIDAZOLE    Tablet 500 milliGRAM(s) Oral every 12 hours    MEDICATIONS  (PRN):  acetaminophen     Tablet .. 650 milliGRAM(s) Oral every 6 hours PRN Mild Pain (1 - 3), Moderate Pain (4 - 6)  melatonin 5 milliGRAM(s) Oral at bedtime PRN Insomnia      I&O's Detail    18 Jan 2024 07:01  -  19 Jan 2024 07:00  --------------------------------------------------------  IN:  Total IN: 0 mL    OUT:    Voided (mL): 500 mL  Total OUT: 500 mL    Total NET: -500 mL    Vital Signs Last 24 Hrs  T(C): 36.8 (19 Jan 2024 04:00), Max: 37.2 (18 Jan 2024 21:00)  T(F): 98.2 (19 Jan 2024 04:00), Max: 98.9 (18 Jan 2024 21:00)  HR: 91 (19 Jan 2024 04:00) (72 - 92)  BP: 107/64 (19 Jan 2024 05:00) (107/64 - 133/72)  BP(mean): --  RR: 19 (19 Jan 2024 04:00) (18 - 20)  SpO2: 94% (19 Jan 2024 04:00) (93% - 95%)    Parameters below as of 19 Jan 2024 04:00  Patient On (Oxygen Delivery Method): room air      General: NAD, resting comfortably in bed  Pulm: Nonlabored breathing, no respiratory distress  Abd: soft, NT/ND  Extrem: WWP, no edema, SCDs in place    LABS:                RADIOLOGY & ADDITIONAL STUDIES:

## 2024-01-19 NOTE — PROGRESS NOTE ADULT - ASSESSMENT
82 yo Female pt with PMH depression, hypothyroidism, HLD, Afib on Eliquis and PSH of L hip surgery, cholecystectomy and VHR presents with 1-day history of sudden severe midabdominal/epigastric pain. Patient had a  CT AP with PO and IV contrast demonstrates perforated diverticulitis of distal duodenum.     #LE weakness likely 2/2 deconditioning   - stroke ruled out by MRI   - Neuro following     #Abdominal pain secondary to perforated diverticulitis of distal duodenum  -Management as per primary team  -Continue Metronidazole and cefpodoxime as per primary team.     #Atrial fibrillation with RVR  -Cardiology following; cardiology recommended that pt get a TTE will defer study decision to primary team. PFO on previous echo    -Decrease dose of eliquis to 2.5mg BID  -increase Toprol XL to 50 mg PO daily as per cards    #Hypothyroidism   - Will continue Levothyroxine 75mcg daily     #DVT prop  -Pt is on system AC with apixaban    #DISPO  -DAMARIS placement     Discussed with primary team      84 yo Female pt with PMH depression, hypothyroidism, HLD, Afib on Eliquis and PSH of L hip surgery, cholecystectomy and VHR presents with 1-day history of sudden severe midabdominal/epigastric pain. Patient had a  CT AP with PO and IV contrast demonstrates perforated diverticulitis of distal duodenum.     #LE weakness likely 2/2 deconditioning   - stroke ruled out by MRI   - Neuro following     #Abdominal pain secondary to perforated diverticulitis of distal duodenum  -Management as per primary team  -Continue Metronidazole and cefpodoxime as per primary team.     #Atrial fibrillation with RVR  -Cardiology following; cardiology recommended that pt get a TTE will defer study decision to primary team. PFO on previous echo    - eliquis to 2.5mg BID  -Toprol XL  50 mg PO daily as per cards    #Hypothyroidism   - Will continue Levothyroxine 75mcg daily     #DVT prop  -Pt is on system AC with apixaban    #DISPO  -DAMARIS placement     Discussed with primary team

## 2024-01-19 NOTE — PROGRESS NOTE ADULT - ASSESSMENT
A/p: 83yo Female pt with PMH depression, hypothyroidism, HLD, Afib on Eliquis (last dose 1/8/24) and PSH of L hip surgery, cholecystectomy and VHR presented with 1-day history of sudden severe midabdominal/epigastric pain. Patient admitted for perforated diverticulitis of distal duodenum. UGI neg leak (1/11)    Regular, soft/bite sized diet   Pain & nausea prn  Eliquis  OOBA/OOBTC  Dispo: DAMARIS  No AM labs

## 2024-01-20 PROCEDURE — 99231 SBSQ HOSP IP/OBS SF/LOW 25: CPT

## 2024-01-20 RX ORDER — SODIUM CHLORIDE 9 MG/ML
500 INJECTION, SOLUTION INTRAVENOUS ONCE
Refills: 0 | Status: COMPLETED | OUTPATIENT
Start: 2024-01-20 | End: 2024-01-20

## 2024-01-20 RX ADMIN — APIXABAN 2.5 MILLIGRAM(S): 2.5 TABLET, FILM COATED ORAL at 18:40

## 2024-01-20 RX ADMIN — APIXABAN 2.5 MILLIGRAM(S): 2.5 TABLET, FILM COATED ORAL at 05:27

## 2024-01-20 RX ADMIN — PANTOPRAZOLE SODIUM 40 MILLIGRAM(S): 20 TABLET, DELAYED RELEASE ORAL at 05:26

## 2024-01-20 RX ADMIN — Medication 200 MILLIGRAM(S): at 18:40

## 2024-01-20 RX ADMIN — SODIUM CHLORIDE 500 MILLILITER(S): 9 INJECTION, SOLUTION INTRAVENOUS at 16:33

## 2024-01-20 RX ADMIN — ATORVASTATIN CALCIUM 80 MILLIGRAM(S): 80 TABLET, FILM COATED ORAL at 21:34

## 2024-01-20 RX ADMIN — Medication 500 MILLIGRAM(S): at 18:40

## 2024-01-20 RX ADMIN — Medication 75 MICROGRAM(S): at 05:26

## 2024-01-20 RX ADMIN — Medication 500 MILLIGRAM(S): at 05:26

## 2024-01-20 RX ADMIN — Medication 200 MILLIGRAM(S): at 05:27

## 2024-01-20 RX ADMIN — Medication 50 MILLIGRAM(S): at 05:27

## 2024-01-20 RX ADMIN — PANTOPRAZOLE SODIUM 40 MILLIGRAM(S): 20 TABLET, DELAYED RELEASE ORAL at 18:40

## 2024-01-20 NOTE — PROGRESS NOTE ADULT - ASSESSMENT
82 yo Female pt with PMH depression, hypothyroidism, HLD, Afib on Eliquis and PSH of L hip surgery, cholecystectomy and VHR presents with 1-day history of sudden severe midabdominal/epigastric pain. Patient had a  CT AP with PO and IV contrast demonstrates perforated diverticulitis of distal duodenum.     #LE weakness likely 2/2 deconditioning   - stroke ruled out by MRI   - Neuro following     #Abdominal pain secondary to perforated diverticulitis of distal duodenum  -Management as per primary team  -Continue Metronidazole and cefpodoxime as per primary team.     #Atrial fibrillation with RVR  -Cardiology following; cardiology recommended that pt get a TTE will defer study decision to primary team. PFO on previous echo    - eliquis to 2.5mg BID  -Toprol XL  50 mg PO daily as per cards    #Hypothyroidism   - Will continue Levothyroxine 75mcg daily     #DVT prop  -Pt is on system AC with apixaban    #DISPO  -DAMARIS placement     Discussed with primary team      82 yo Female pt with PMH depression, hypothyroidism, HLD, Afib on Eliquis and PSH of L hip surgery, cholecystectomy and VHR presents with 1-day history of sudden severe midabdominal/epigastric pain. Patient had a  CT AP with PO and IV contrast demonstrates perforated diverticulitis of distal duodenum.     #LE weakness likely 2/2 deconditioning   - stroke ruled out by MRI   - Neuro saw patient and signed off     #Abdominal pain secondary to perforated diverticulitis of distal duodenum --- resolved   -Continue Metronidazole and cefpodoxime as per primary team.     #Atrial fibrillation with RVR  -Cardiology following; cardiology recommended that pt get a TTE will defer study decision to primary team. PFO on previous echo    - eliquis to 2.5mg BID  -Toprol XL  50 mg PO daily as per cards    #Hypothyroidism   - Will continue Levothyroxine 75mcg daily     #DVT prop  -Pt is on system AC with apixaban    #DISPO  -DAMARIS placement     Discussed with primary team

## 2024-01-20 NOTE — PROGRESS NOTE ADULT - ASSESSMENT
A/p: 81yo Female pt with PMH depression, hypothyroidism, HLD, Afib on Eliquis (last dose 1/8/24) and PSH of L hip surgery, cholecystectomy and VHR presented with 1-day history of sudden severe midabdominal/epigastric pain. Patient admitted for perforated diverticulitis of distal duodenum. UGI neg leak (1/11)    Regular, soft/bite sized diet   Pain & nausea prn  Eliquis  OOBA/OOBTC  Dispo: DAMARIS  No AM labs

## 2024-01-20 NOTE — PROGRESS NOTE ADULT - ATTENDING COMMENTS
Patient is an 82 year old woman with history of depression, hypothyroidism, HLD, A fib on Eliquis, left hip surgery, cholecystectomy, ventral hernia repair who is admitted with clinical, laboratory, and radiographic findings concerning for perforated duodenal diverticulum. At time of evaluation, afebrile, hemodynamically stable, abdomen soft, tenderness improved, distension improved, no rebound or guarding. Continue diet, antibiotics completed, PT/OT, disposition planning. Late entry, date of service 1/20/24 .

## 2024-01-20 NOTE — PROGRESS NOTE ADULT - SUBJECTIVE AND OBJECTIVE BOX
OVERNIGHT EVENTS:    SUBJECTIVE / INTERVAL HPI: Patient seen and examined at bedside.     VITAL SIGNS:  Vital Signs Last 24 Hrs  T(C): 36.7 (20 Jan 2024 04:47), Max: 37.3 (19 Jan 2024 20:29)  T(F): 98 (20 Jan 2024 04:47), Max: 99.1 (19 Jan 2024 20:29)  HR: 87 (20 Jan 2024 04:47) (77 - 87)  BP: 110/65 (20 Jan 2024 04:47) (110/65 - 132/71)  BP(mean): --  RR: 18 (20 Jan 2024 04:47) (18 - 19)  SpO2: 93% (20 Jan 2024 04:47) (92% - 98%)    Parameters below as of 20 Jan 2024 04:47  Patient On (Oxygen Delivery Method): room air        01-19-24 @ 07:01  -  01-20-24 @ 07:00  --------------------------------------------------------  IN: 0 mL / OUT: 1150 mL / NET: -1150 mL        PHYSICAL EXAM:    General: WDWN  HEENT: NC/AT; PERRL, anicteric sclera; MMM  Neck: supple  Cardiovascular: +S1/S2; RRR  Respiratory: CTA B/L; no W/R/R  Gastrointestinal: soft, NT/ND; +BSx4  Extremities: WWP; no edema, clubbing or cyanosis  Vascular: 2+ radial, DP/PT pulses B/L  Neurological: AAOx3; no focal deficits    MEDICATIONS:  MEDICATIONS  (STANDING):  apixaban 2.5 milliGRAM(s) Oral every 12 hours  atorvastatin 80 milliGRAM(s) Oral at bedtime  cefpodoxime 200 milliGRAM(s) Oral every 12 hours  levothyroxine 75 MICROGram(s) Oral daily  metoprolol succinate ER 50 milliGRAM(s) Oral daily  metroNIDAZOLE    Tablet 500 milliGRAM(s) Oral every 12 hours  pantoprazole    Tablet 40 milliGRAM(s) Oral two times a day    MEDICATIONS  (PRN):  acetaminophen     Tablet .. 650 milliGRAM(s) Oral every 6 hours PRN Mild Pain (1 - 3), Moderate Pain (4 - 6)  melatonin 5 milliGRAM(s) Oral at bedtime PRN Insomnia      ALLERGIES:  Allergies    No Known Allergies    Intolerances        LABS:              CAPILLARY BLOOD GLUCOSE            RADIOLOGY & ADDITIONAL TESTS: Reviewed.    ASSESSMENT:    PLAN:  OVERNIGHT EVENTS: None    SUBJECTIVE / INTERVAL HPI: Patient seen and examined at bedside. Feeling well, denies any and all complaints.     VITAL SIGNS:  Vital Signs Last 24 Hrs  T(C): 36.7 (20 Jan 2024 04:47), Max: 37.3 (19 Jan 2024 20:29)  T(F): 98 (20 Jan 2024 04:47), Max: 99.1 (19 Jan 2024 20:29)  HR: 87 (20 Jan 2024 04:47) (77 - 87)  BP: 110/65 (20 Jan 2024 04:47) (110/65 - 132/71)  BP(mean): --  RR: 18 (20 Jan 2024 04:47) (18 - 19)  SpO2: 93% (20 Jan 2024 04:47) (92% - 98%)    Parameters below as of 20 Jan 2024 04:47  Patient On (Oxygen Delivery Method): room air        01-19-24 @ 07:01  -  01-20-24 @ 07:00  --------------------------------------------------------  IN: 0 mL / OUT: 1150 mL / NET: -1150 mL        PHYSICAL EXAM:    General: Sitting up in bed, eating oatmeal  HEENT: NC/AT  Neck: supple  Cardiovascular: +S1/S2; RRR  Respiratory: CTA B/L; no W/R/R  Gastrointestinal: soft, NT/ND  Extremities: WWP; no edema, clubbing or cyanosis  Neurological: AAOx3    MEDICATIONS:  MEDICATIONS  (STANDING):  apixaban 2.5 milliGRAM(s) Oral every 12 hours  atorvastatin 80 milliGRAM(s) Oral at bedtime  cefpodoxime 200 milliGRAM(s) Oral every 12 hours  levothyroxine 75 MICROGram(s) Oral daily  metoprolol succinate ER 50 milliGRAM(s) Oral daily  metroNIDAZOLE    Tablet 500 milliGRAM(s) Oral every 12 hours  pantoprazole    Tablet 40 milliGRAM(s) Oral two times a day    MEDICATIONS  (PRN):  acetaminophen     Tablet .. 650 milliGRAM(s) Oral every 6 hours PRN Mild Pain (1 - 3), Moderate Pain (4 - 6)  melatonin 5 milliGRAM(s) Oral at bedtime PRN Insomnia      ALLERGIES:  Allergies    No Known Allergies    Intolerances        LABS:              CAPILLARY BLOOD GLUCOSE            RADIOLOGY & ADDITIONAL TESTS: Reviewed.    ASSESSMENT:    PLAN:

## 2024-01-20 NOTE — PROGRESS NOTE ADULT - SUBJECTIVE AND OBJECTIVE BOX
SUBJECTIVE:      MEDICATIONS  (STANDING):  apixaban 2.5 milliGRAM(s) Oral every 12 hours  atorvastatin 80 milliGRAM(s) Oral at bedtime  cefpodoxime 200 milliGRAM(s) Oral every 12 hours  levothyroxine 75 MICROGram(s) Oral daily  metoprolol succinate ER 50 milliGRAM(s) Oral daily  metroNIDAZOLE    Tablet 500 milliGRAM(s) Oral every 12 hours  pantoprazole    Tablet 40 milliGRAM(s) Oral two times a day    MEDICATIONS  (PRN):  acetaminophen     Tablet .. 650 milliGRAM(s) Oral every 6 hours PRN Mild Pain (1 - 3), Moderate Pain (4 - 6)  melatonin 5 milliGRAM(s) Oral at bedtime PRN Insomnia      Vital Signs Last 24 Hrs  T(C): 36.7 (20 Jan 2024 04:47), Max: 37.3 (19 Jan 2024 08:36)  T(F): 98 (20 Jan 2024 04:47), Max: 99.1 (19 Jan 2024 08:36)  HR: 87 (20 Jan 2024 04:47) (75 - 87)  BP: 110/65 (20 Jan 2024 04:47) (110/65 - 132/71)  BP(mean): --  RR: 18 (20 Jan 2024 04:47) (18 - 19)  SpO2: 93% (20 Jan 2024 04:47) (92% - 98%)    Parameters below as of 20 Jan 2024 04:47  Patient On (Oxygen Delivery Method): room air        Physical Exam:  General: NAD, resting comfortably in bed  Pulmonary: Nonlabored breathing, no respiratory distress  Cardiovascular: NSR  Abdominal: soft, NT/ND  Extremities: WWP, normal strength  Neuro: A/O x 3, CNs II-XII grossly intact, no focal deficits    I&O's Summary    19 Jan 2024 07:01  -  20 Jan 2024 07:00  --------------------------------------------------------  IN: 0 mL / OUT: 1150 mL / NET: -1150 mL        LABS:              CAPILLARY BLOOD GLUCOSE            RADIOLOGY & ADDITIONAL STUDIES:   SUBJECTIVE:  Pt seen this AM on rounds. Pt endorses feeling well o/n w/ no nausea/vomiting. Endorses passing flatus and having BMs.     MEDICATIONS  (STANDING):  apixaban 2.5 milliGRAM(s) Oral every 12 hours  atorvastatin 80 milliGRAM(s) Oral at bedtime  cefpodoxime 200 milliGRAM(s) Oral every 12 hours  levothyroxine 75 MICROGram(s) Oral daily  metoprolol succinate ER 50 milliGRAM(s) Oral daily  metroNIDAZOLE    Tablet 500 milliGRAM(s) Oral every 12 hours  pantoprazole    Tablet 40 milliGRAM(s) Oral two times a day    MEDICATIONS  (PRN):  acetaminophen     Tablet .. 650 milliGRAM(s) Oral every 6 hours PRN Mild Pain (1 - 3), Moderate Pain (4 - 6)  melatonin 5 milliGRAM(s) Oral at bedtime PRN Insomnia      Vital Signs Last 24 Hrs  T(C): 36.7 (20 Jan 2024 04:47), Max: 37.3 (19 Jan 2024 08:36)  T(F): 98 (20 Jan 2024 04:47), Max: 99.1 (19 Jan 2024 08:36)  HR: 87 (20 Jan 2024 04:47) (75 - 87)  BP: 110/65 (20 Jan 2024 04:47) (110/65 - 132/71)  BP(mean): --  RR: 18 (20 Jan 2024 04:47) (18 - 19)  SpO2: 93% (20 Jan 2024 04:47) (92% - 98%)    Parameters below as of 20 Jan 2024 04:47  Patient On (Oxygen Delivery Method): room air        Physical Exam:  General: NAD, resting comfortably in bed  Pulmonary: Nonlabored breathing, no respiratory distress  Cardiovascular: NSR  Abdominal: soft, NT/ND  Extremities: WWP, normal strength  Neuro: A/O x 3, CNs II-XII grossly intact, no focal deficits    I&O's Summary    19 Jan 2024 07:01  -  20 Jan 2024 07:00  --------------------------------------------------------  IN: 0 mL / OUT: 1150 mL / NET: -1150 mL        LABS:              CAPILLARY BLOOD GLUCOSE            RADIOLOGY & ADDITIONAL STUDIES:

## 2024-01-21 PROCEDURE — 99231 SBSQ HOSP IP/OBS SF/LOW 25: CPT

## 2024-01-21 RX ADMIN — Medication 75 MICROGRAM(S): at 06:36

## 2024-01-21 RX ADMIN — APIXABAN 2.5 MILLIGRAM(S): 2.5 TABLET, FILM COATED ORAL at 17:39

## 2024-01-21 RX ADMIN — Medication 500 MILLIGRAM(S): at 06:36

## 2024-01-21 RX ADMIN — Medication 650 MILLIGRAM(S): at 22:30

## 2024-01-21 RX ADMIN — Medication 200 MILLIGRAM(S): at 17:38

## 2024-01-21 RX ADMIN — Medication 50 MILLIGRAM(S): at 06:36

## 2024-01-21 RX ADMIN — Medication 650 MILLIGRAM(S): at 23:30

## 2024-01-21 RX ADMIN — PANTOPRAZOLE SODIUM 40 MILLIGRAM(S): 20 TABLET, DELAYED RELEASE ORAL at 06:37

## 2024-01-21 RX ADMIN — Medication 500 MILLIGRAM(S): at 17:39

## 2024-01-21 RX ADMIN — ATORVASTATIN CALCIUM 80 MILLIGRAM(S): 80 TABLET, FILM COATED ORAL at 22:30

## 2024-01-21 RX ADMIN — APIXABAN 2.5 MILLIGRAM(S): 2.5 TABLET, FILM COATED ORAL at 06:37

## 2024-01-21 RX ADMIN — PANTOPRAZOLE SODIUM 40 MILLIGRAM(S): 20 TABLET, DELAYED RELEASE ORAL at 17:39

## 2024-01-21 RX ADMIN — Medication 200 MILLIGRAM(S): at 06:37

## 2024-01-21 NOTE — PROGRESS NOTE ADULT - SUBJECTIVE AND OBJECTIVE BOX
SUBJECTIVE: Pt seen and examined at the bedside by surgical team. No acute complaints at this time     Vital Signs Last 24 Hrs  T(C): 36.7 (21 Jan 2024 05:00), Max: 36.9 (20 Jan 2024 14:36)  T(F): 98.1 (21 Jan 2024 05:00), Max: 98.5 (20 Jan 2024 14:36)  HR: 92 (21 Jan 2024 05:00) (80 - 105)  BP: 124/63 (21 Jan 2024 05:00) (109/66 - 129/60)  BP(mean): --  RR: 17 (21 Jan 2024 05:00) (17 - 20)  SpO2: 96% (21 Jan 2024 05:00) (92% - 96%)    Parameters below as of 21 Jan 2024 05:00  Patient On (Oxygen Delivery Method): room air    I&O's Summary    20 Jan 2024 07:01  -  21 Jan 2024 07:00  --------------------------------------------------------  IN: 1440 mL / OUT: 400 mL / NET: 1040 mL    Physical Exam:  General: NAD, resting comfortably in bed  Pulmonary: Nonlabored breathing, no respiratory distress  Cardiovascular: NSR  Abdominal: soft, NT/ND  Extremities: WWP, normal strength  Neuro: A/O x 3, CNs II-XII grossly intact, no focal deficits

## 2024-01-21 NOTE — PROGRESS NOTE ADULT - ASSESSMENT
84 yo Female pt with PMH depression, hypothyroidism, HLD, Afib on Eliquis and PSH of L hip surgery, cholecystectomy and VHR presents with 1-day history of sudden severe midabdominal/epigastric pain. Patient had a  CT AP with PO and IV contrast demonstrates perforated diverticulitis of distal duodenum.     #LE weakness likely 2/2 deconditioning   - stroke ruled out by MRI   - Neuro saw patient and signed off     #Abdominal pain secondary to perforated diverticulitis of distal duodenum --- resolved   -Continue Metronidazole and cefpodoxime as per primary team.     #Atrial fibrillation with RVR  -Cardiology following; cardiology recommended that pt get a TTE will defer study decision to primary team. PFO on previous echo    - eliquis to 2.5mg BID  -Toprol XL  50 mg PO daily as per cards    #Hypothyroidism   - Will continue Levothyroxine 75mcg daily     #DVT prop  -Pt is on system AC with apixaban    #DISPO  -[facility] placement     Discussed with primary team      84 yo Female pt with PMH depression, hypothyroidism, HLD, Afib on Eliquis and PSH of L hip surgery, cholecystectomy and VHR presents with 1-day history of sudden severe midabdominal/epigastric pain. Patient had a  CT AP with PO and IV contrast demonstrates perforated diverticulitis of distal duodenum.     #LE weakness likely 2/2 deconditioning   - stroke ruled out by MRI   - Neuro saw patient and signed off     #Abdominal pain secondary to perforated diverticulitis of distal duodenum --- resolved   -Continue Metronidazole and cefpodoxime as per primary team.     #Atrial fibrillation with RVR  -Cardiology following; cardiology recommended that pt get a TTE will defer study decision to primary team. PFO on previous echo    - eliquis to 2.5mg BID  -Toprol XL  50 mg PO daily as per cards    #Hypothyroidism   - Will continue Levothyroxine 75mcg daily     #DVT prop  -Pt is on system AC with apixaban    #DISPO  -[facility] placement

## 2024-01-21 NOTE — PROGRESS NOTE ADULT - ASSESSMENT
A/p: 83yo Female pt with PMH depression, hypothyroidism, HLD, Afib on Eliquis and PSH of L hip surgery, cholecystectomy and VHR presented with 1-day history of sudden severe midabdominal/epigastric pain. Patient admitted for perforated diverticulitis of distal duodenum. UGI neg leak (1/11)    Regular, soft/bite sized diet   Pain & nausea prn  Eliquis  OOBA/OOBTC  Dispo: [facility]  No AM labs

## 2024-01-21 NOTE — PROGRESS NOTE ADULT - ATTENDING COMMENTS
Patient is an 82 year old woman with history of depression, hypothyroidism, HLD, A fib on Eliquis, left hip surgery, cholecystectomy, ventral hernia repair who is admitted with clinical, laboratory, and radiographic findings concerning for perforated duodenal diverticulum. At time of evaluation, afebrile, hemodynamically stable, abdomen soft, tenderness improved, distension improved, no rebound or guarding. Continue diet, antibiotics completed, PT/OT, disposition planning. Late entry, date of service 1/21/24 .

## 2024-01-21 NOTE — PROGRESS NOTE ADULT - SUBJECTIVE AND OBJECTIVE BOX
OVERNIGHT EVENTS:    SUBJECTIVE / INTERVAL HPI: Patient seen and examined at bedside.     VITAL SIGNS:  Vital Signs Last 24 Hrs  T(C): 36.7 (21 Jan 2024 05:00), Max: 36.9 (20 Jan 2024 14:36)  T(F): 98.1 (21 Jan 2024 05:00), Max: 98.5 (20 Jan 2024 14:36)  HR: 92 (21 Jan 2024 05:00) (80 - 105)  BP: 124/63 (21 Jan 2024 05:00) (109/66 - 129/60)  BP(mean): --  RR: 17 (21 Jan 2024 05:00) (17 - 20)  SpO2: 96% (21 Jan 2024 05:00) (92% - 96%)    Parameters below as of 21 Jan 2024 05:00  Patient On (Oxygen Delivery Method): room air        01-20-24 @ 07:01  -  01-21-24 @ 07:00  --------------------------------------------------------  IN: 1440 mL / OUT: 400 mL / NET: 1040 mL        PHYSICAL EXAM:    General: WDWN  HEENT: NC/AT; PERRL, anicteric sclera; MMM  Neck: supple  Cardiovascular: +S1/S2; RRR  Respiratory: CTA B/L; no W/R/R  Gastrointestinal: soft, NT/ND; +BSx4  Extremities: WWP; no edema, clubbing or cyanosis  Vascular: 2+ radial, DP/PT pulses B/L  Neurological: AAOx3; no focal deficits    MEDICATIONS:  MEDICATIONS  (STANDING):  apixaban 2.5 milliGRAM(s) Oral every 12 hours  atorvastatin 80 milliGRAM(s) Oral at bedtime  cefpodoxime 200 milliGRAM(s) Oral every 12 hours  levothyroxine 75 MICROGram(s) Oral daily  metoprolol succinate ER 50 milliGRAM(s) Oral daily  metroNIDAZOLE    Tablet 500 milliGRAM(s) Oral every 12 hours  pantoprazole    Tablet 40 milliGRAM(s) Oral two times a day    MEDICATIONS  (PRN):  acetaminophen     Tablet .. 650 milliGRAM(s) Oral every 6 hours PRN Mild Pain (1 - 3), Moderate Pain (4 - 6)  melatonin 5 milliGRAM(s) Oral at bedtime PRN Insomnia      ALLERGIES:  Allergies    No Known Allergies    Intolerances        LABS:              CAPILLARY BLOOD GLUCOSE            RADIOLOGY & ADDITIONAL TESTS: Reviewed.    ASSESSMENT:    PLAN:  OVERNIGHT EVENTS: None    SUBJECTIVE / INTERVAL HPI: Patient seen and examined at bedside. Feeling well, denies abd pain, n/v. Says last BM yesterday and was normal.     VITAL SIGNS:  Vital Signs Last 24 Hrs  T(C): 36.7 (21 Jan 2024 05:00), Max: 36.9 (20 Jan 2024 14:36)  T(F): 98.1 (21 Jan 2024 05:00), Max: 98.5 (20 Jan 2024 14:36)  HR: 92 (21 Jan 2024 05:00) (80 - 105)  BP: 124/63 (21 Jan 2024 05:00) (109/66 - 129/60)  BP(mean): --  RR: 17 (21 Jan 2024 05:00) (17 - 20)  SpO2: 96% (21 Jan 2024 05:00) (92% - 96%)    Parameters below as of 21 Jan 2024 05:00  Patient On (Oxygen Delivery Method): room air        01-20-24 @ 07:01  -  01-21-24 @ 07:00  --------------------------------------------------------  IN: 1440 mL / OUT: 400 mL / NET: 1040 mL        PHYSICAL EXAM:      General: Sitting up in bed, eating breakfast   HEENT: NC/AT  Neck: supple  Cardiovascular: +S1/S2; RRR  Respiratory: CTA B/L; no W/R/R  Gastrointestinal: soft, NT/ND  Extremities: WWP; no edema, clubbing or cyanosis  Neurological: AAOx3    MEDICATIONS:  MEDICATIONS  (STANDING):  apixaban 2.5 milliGRAM(s) Oral every 12 hours  atorvastatin 80 milliGRAM(s) Oral at bedtime  cefpodoxime 200 milliGRAM(s) Oral every 12 hours  levothyroxine 75 MICROGram(s) Oral daily  metoprolol succinate ER 50 milliGRAM(s) Oral daily  metroNIDAZOLE    Tablet 500 milliGRAM(s) Oral every 12 hours  pantoprazole    Tablet 40 milliGRAM(s) Oral two times a day    MEDICATIONS  (PRN):  acetaminophen     Tablet .. 650 milliGRAM(s) Oral every 6 hours PRN Mild Pain (1 - 3), Moderate Pain (4 - 6)  melatonin 5 milliGRAM(s) Oral at bedtime PRN Insomnia      ALLERGIES:  Allergies    No Known Allergies    Intolerances        LABS:              CAPILLARY BLOOD GLUCOSE            RADIOLOGY & ADDITIONAL TESTS: Reviewed.    ASSESSMENT:    PLAN:

## 2024-01-22 PROCEDURE — 99232 SBSQ HOSP IP/OBS MODERATE 35: CPT

## 2024-01-22 PROCEDURE — 99231 SBSQ HOSP IP/OBS SF/LOW 25: CPT

## 2024-01-22 RX ADMIN — APIXABAN 2.5 MILLIGRAM(S): 2.5 TABLET, FILM COATED ORAL at 17:38

## 2024-01-22 RX ADMIN — Medication 200 MILLIGRAM(S): at 17:38

## 2024-01-22 RX ADMIN — APIXABAN 2.5 MILLIGRAM(S): 2.5 TABLET, FILM COATED ORAL at 06:26

## 2024-01-22 RX ADMIN — Medication 75 MICROGRAM(S): at 05:31

## 2024-01-22 RX ADMIN — PANTOPRAZOLE SODIUM 40 MILLIGRAM(S): 20 TABLET, DELAYED RELEASE ORAL at 17:37

## 2024-01-22 RX ADMIN — ATORVASTATIN CALCIUM 80 MILLIGRAM(S): 80 TABLET, FILM COATED ORAL at 21:28

## 2024-01-22 RX ADMIN — Medication 500 MILLIGRAM(S): at 17:38

## 2024-01-22 RX ADMIN — Medication 200 MILLIGRAM(S): at 06:25

## 2024-01-22 RX ADMIN — Medication 500 MILLIGRAM(S): at 06:25

## 2024-01-22 RX ADMIN — PANTOPRAZOLE SODIUM 40 MILLIGRAM(S): 20 TABLET, DELAYED RELEASE ORAL at 06:25

## 2024-01-22 NOTE — PROGRESS NOTE ADULT - ASSESSMENT
81yo Female pt with PMH depression, hypothyroidism, HLD, Afib on Eliquis and PSH of L hip surgery, cholecystectomy and VHR presented with 1-day history of sudden severe midabdominal/epigastric pain. Patient admitted for perforated diverticulitis of distal duodenum. UGI neg leak (1/11)    Regular, soft/bite sized diet   Pain & nausea prn  Eliquis  OOBA/OOBTC  Dispo: [facility]  No AM labs

## 2024-01-22 NOTE — PROGRESS NOTE ADULT - SUBJECTIVE AND OBJECTIVE BOX
Feeling okay this morning.  Just about to eat breakfast.  No issues to report.      OVERNIGHT EVENTS: NAEO    Remaining ROS negative       PHYSICAL EXAM:    General: sitting up in bed, about to eat breakfast  HEENT: NC/AT; MMM  Cardiovascular: +S1/S2, RRR  Respiratory: CTA B/L; no W/R/R  Gastrointestinal: soft, NT/ND; +BSx4  Extremities: WWP; no edema  Neurological: no focal deficits, speech is fluent, follows commands  Psychiatric: pleasant mood and affect  Dermatologic: no appreciable wounds or damage to the skin    VITAL SIGNS:  Vital Signs Last 24 Hrs  T(C): 36.8 (22 Jan 2024 13:04), Max: 37.2 (21 Jan 2024 22:28)  T(F): 98.2 (22 Jan 2024 13:04), Max: 99 (21 Jan 2024 22:28)  HR: 102 (22 Jan 2024 13:04) (70 - 102)  BP: 107/72 (22 Jan 2024 13:04) (107/72 - 122/58)  BP(mean): --  RR: 18 (22 Jan 2024 13:04) (17 - 21)  SpO2: 94% (22 Jan 2024 13:04) (94% - 97%)    Parameters below as of 22 Jan 2024 09:06  Patient On (Oxygen Delivery Method): room air    MEDICATIONS:  MEDICATIONS  (STANDING):  apixaban 2.5 milliGRAM(s) Oral every 12 hours  atorvastatin 80 milliGRAM(s) Oral at bedtime  cefpodoxime 200 milliGRAM(s) Oral every 12 hours  levothyroxine 75 MICROGram(s) Oral daily  metoprolol succinate ER 50 milliGRAM(s) Oral daily  metroNIDAZOLE    Tablet 500 milliGRAM(s) Oral every 12 hours  pantoprazole    Tablet 40 milliGRAM(s) Oral two times a day    MEDICATIONS  (PRN):  acetaminophen     Tablet .. 650 milliGRAM(s) Oral every 6 hours PRN Mild Pain (1 - 3), Moderate Pain (4 - 6)  melatonin 5 milliGRAM(s) Oral at bedtime PRN Insomnia      ALLERGIES:  Allergies    No Known Allergies    Intolerances        LABS:              CAPILLARY BLOOD GLUCOSE          RADIOLOGY & ADDITIONAL TESTS: Reviewed.

## 2024-01-22 NOTE — CHART NOTE - NSCHARTNOTESELECT_GEN_ALL_CORE
DAVID
DAVID
Nutrition Services
Serial Exam/Event Note
DAVID
Nutrition Services
Off Service Note
Serial Abdominal Exam/Event Note

## 2024-01-22 NOTE — PROGRESS NOTE ADULT - SUBJECTIVE AND OBJECTIVE BOX
SUBJECTIVE:  Pt seen this AM on rounds. Pt endorses feeling well. Denies nausea/vomiting. Endorses passing flatus and having BMs.  Pt se    MEDICATIONS  (STANDING):  apixaban 2.5 milliGRAM(s) Oral every 12 hours  atorvastatin 80 milliGRAM(s) Oral at bedtime  cefpodoxime 200 milliGRAM(s) Oral every 12 hours  levothyroxine 75 MICROGram(s) Oral daily  metoprolol succinate ER 50 milliGRAM(s) Oral daily  metroNIDAZOLE    Tablet 500 milliGRAM(s) Oral every 12 hours  pantoprazole    Tablet 40 milliGRAM(s) Oral two times a day    MEDICATIONS  (PRN):  acetaminophen     Tablet .. 650 milliGRAM(s) Oral every 6 hours PRN Mild Pain (1 - 3), Moderate Pain (4 - 6)  melatonin 5 milliGRAM(s) Oral at bedtime PRN Insomnia      Vital Signs Last 24 Hrs  T(C): 36.6 (22 Jan 2024 09:06), Max: 37.2 (21 Jan 2024 22:28)  T(F): 97.9 (22 Jan 2024 09:06), Max: 99 (21 Jan 2024 22:28)  HR: 85 (22 Jan 2024 09:06) (70 - 85)  BP: 116/67 (22 Jan 2024 09:06) (108/60 - 122/58)  BP(mean): --  RR: 18 (22 Jan 2024 09:06) (17 - 21)  SpO2: 96% (22 Jan 2024 09:06) (94% - 97%)    Parameters below as of 22 Jan 2024 09:06  Patient On (Oxygen Delivery Method): room air        Physical Exam:  General: NAD, resting comfortably in bed  Pulmonary: Nonlabored breathing, no respiratory distress  Cardiovascular: NSR  Abdominal: soft, NT/ND  Extremities: WWP, normal strength  Neuro: A/O x 3, CNs II-XII grossly intact, no focal deficits    I&O's Summary    21 Jan 2024 07:01  -  22 Jan 2024 07:00  --------------------------------------------------------  IN: 660 mL / OUT: 750 mL / NET: -90 mL        LABS:              CAPILLARY BLOOD GLUCOSE            RADIOLOGY & ADDITIONAL STUDIES:

## 2024-01-22 NOTE — CHART NOTE - NSCHARTNOTEFT_GEN_A_CORE
Admitting Diagnosis:   Patient is a 83y old  Female who presents with a chief complaint of perforated viscus (22 Jan 2024 14:20)  PAST MEDICAL & SURGICAL HISTORY:  Depression  Thyroid disorder  Afib  HLD (hyperlipidemia)  History of hip replacement  R, for degenerative pain    Current Nutrition Order: soft and bite-sized diet      PO Intake: Good (%) [   ]  Fair (50-75%) [   ] Poor (<25%) [   ] *fair to good PO intakes overall, please see additional information below**     GI Issues: no noted nausea or emesis, no diarrhea or constipation, fecal incontinence noted; most recent BM on 1/22/24; no distention noted    Pain: no noted pain/discomfort    Skin Integrity: no pressure ulcers or edema noted; Fermin: 18     Labs:   CAPILLARY BLOOD GLUCOSE    Medications:  MEDICATIONS  (STANDING):  apixaban 2.5 milliGRAM(s) Oral every 12 hours  atorvastatin 80 milliGRAM(s) Oral at bedtime  cefpodoxime 200 milliGRAM(s) Oral every 12 hours  levothyroxine 75 MICROGram(s) Oral daily  metoprolol succinate ER 50 milliGRAM(s) Oral daily  metroNIDAZOLE    Tablet 500 milliGRAM(s) Oral every 12 hours  pantoprazole    Tablet 40 milliGRAM(s) Oral two times a day    MEDICATIONS  (PRN):  acetaminophen     Tablet .. 650 milliGRAM(s) Oral every 6 hours PRN Mild Pain (1 - 3), Moderate Pain (4 - 6)  melatonin 5 milliGRAM(s) Oral at bedtime PRN Insomnia    Dosing Anthropometrics:   Height for BMI (FEET)	4 Feet  Height for BMI (INCHES)	9 Inch(s)  Height for BMI (CM)	144.78 Centimeter(s)  Weight for BMI (lbs)	110 lb  Weight for BMI (kg)	49.9 kg  Body Mass Index	              23.8    Weight Change: no new weights noted in electronic medical records; recommend that nursing obtain updated weights biweekly; RD to monitor trends.     Nutrition Focused Physical Exam: Completed [ x on initial assessment on 1/10/24 ]  Not Pertinent [   ]  Muscle Wasting- Temporal [   ]  Clavicle/Pectoral [   ]  Shoulder/Deltoid [   ]  Scapula [   ]  Interosseous [   ]  Quadriceps [   ]  Gastrocnemius [   ]  Fat Wasting- Orbital [   ]  Buccal [   ]  Triceps [   ]  Rib [   ]  Suspect [PCM] 2/2 to physical assessment, [poor intake], and [wt loss]; please see malnutrition chart note.    Estimated energy needs:   Weight used for calculations	ideal body weight   Estimated Energy Needs Weight (lbs)	94 lb  Estimated Energy Needs Weight (kg)	42.6 kg  Estimated Energy Needs From (kuldeep/kg)	25  Estimated Energy Needs To (kuldeep/kg)	30  Estimated Energy Needs Calculated From (kuldeep/kg)	1065  Estimated Energy Needs Calculated To (kuldeep/kg)	1278  Weight used for calculations	ideal body weight   Estimated Protein Needs Weight (lbs)	94 lb  Estimated Protein Needs Weight (kg)	42.6 kg  Estimated Protein Needs From (g/kg)	1.4  Estimated Protein Needs To (g/kg)	1.6  Estimated Protein Needs Calculated From (g/kg)	59.64  Estimated Protein Needs Calculated To (g/kg)	68.16  Other Calculations	Ideal body weight (42.6kg) used for calculations as pt >100% IBW and BMI <30 per [facility] Standards of Care. Needs estimated for age and adjusted for current clinical status. Fluid needs per team    Subjective: A/p: 81yo Female pt with PMH depression, hypothyroidism, HLD, Afib on Eliquis (last dose 1/8/24) and PSH of L hip surgery, cholecystectomy and VHR presented with 1-day history of sudden severe midabdominal/epigastric pain. Patient admitted for perforated diverticulitis of distal duodenum. UGI neg leak (1/11)    RD spoke to pt at bedside for nutrition follow up evaluation. Pt's friend, Pooja, was present during nutrition evaluation. Pt endorsed she has an appetite, it is fair to good overall. Pt noted with fair to good (~55-85%) PO intakes overall. No noted nausea or emesis, no diarrhea or constipation, fecal incontinence noted; no distention noted; most recent BM on 1/22/24. Denies pain/discomfort. No pressure ulcers noted, no surgical incisions; no edema noted. Labs reviewed: elevated A1c (5.7), RD to continue to monitor trends. RD encouraged pt to increase PO intakes of nutrient-dense foods, adequate macronutrients, micronutrients, fluids, noted pt's elevated nutrient needs; pt was receptive, verbalized understanding. RD to remain available. Additional nutrition recommendations below to follow.     Previous Nutrition Diagnosis: Inadequate Oral Intake related to contained perforated ulcer/diverticula as evidenced by NPO, NGT to LIWS in place    Active [   ]  Resolved [ x ]    If resolved, new PES: Increased nutrient needs related to pt's condition/clinical status as evidenced by sepsis and perforated diverticulitis of duodenum on admission     Goal: Pt will meet at least 75% of protein & energy needs via most appropriate route for nutrition    Recommendations:  1. Continue with current diet order (soft and bite-sized diet)  2. Encourage pt to meet nutritional needs as able  3. Monitor PO intakes, trend weights (weekly), monitor skin integrity, monitor labs (electrolytes, CMP), monitor GI function   4. Encourage adherence to diet education (reinforce as able)   5. Pain and bowel regimen per team   6. Will continue to assess/honor preferences as able   7. Align nutrition interventions with goals of care at all times    Education: RD encouraged pt to increase PO intakes of nutrient-dense foods, adequate macronutrients, micronutrients, fluids, noted pt's elevated nutrient needs; pt was receptive, verbalized understanding.    Risk Level: High [   ] Moderate [ x ] Low [   ].

## 2024-01-22 NOTE — PROGRESS NOTE ADULT - ASSESSMENT
83F with PMH of depression, hypothyroidism, atrial fibrillation, and HLD presenting with perforated diverticulitis of the distal duodenum    #LE weakness likely 2/2 deconditioning   - stroke ruled out by MRI   - seen by neuro, signed off     #Abdominal pain secondary to perforated diverticulitis of distal duodenum --- resolved   -Continue Metronidazole and cefpodoxime per primary team.     #Atrial fibrillation with RVR  -Cardiology following; cardiology recommended that pt get a TTE will defer study decision to primary team. PFO on previous echo    - eliquis to 2.5mg BID  -Toprol XL  50 mg PO daily as per cards    #Hypothyroidism   - continue Levothyroxine 75mcg daily     Pending placement at [facility]  dvt ppx with eliquis      Moderate level of medical decision making required for this case, including the presence of two chronic medical issues (hypothyroidism and atrial fibrillation) and discussion of plan with the surgery team.

## 2024-01-22 NOTE — PROGRESS NOTE ADULT - ATTENDING COMMENTS
Patient is an 82 year old woman with history of depression, hypothyroidism, HLD, A fib on Eliquis, left hip surgery, cholecystectomy, ventral hernia repair who is admitted with clinical, laboratory, and radiographic findings concerning for perforated duodenal diverticulum. At time of evaluation, afebrile, hemodynamically stable, abdomen soft, tenderness improved, distension improved, no rebound or guarding. Continue diet, antibiotics completed, PT/OT, disposition planning. Late entry, date of service 1/22/24.

## 2024-01-23 PROCEDURE — 99232 SBSQ HOSP IP/OBS MODERATE 35: CPT | Mod: GC

## 2024-01-23 PROCEDURE — 99231 SBSQ HOSP IP/OBS SF/LOW 25: CPT

## 2024-01-23 RX ADMIN — Medication 50 MILLIGRAM(S): at 06:10

## 2024-01-23 RX ADMIN — Medication 650 MILLIGRAM(S): at 17:05

## 2024-01-23 RX ADMIN — PANTOPRAZOLE SODIUM 40 MILLIGRAM(S): 20 TABLET, DELAYED RELEASE ORAL at 18:38

## 2024-01-23 RX ADMIN — ATORVASTATIN CALCIUM 80 MILLIGRAM(S): 80 TABLET, FILM COATED ORAL at 21:26

## 2024-01-23 RX ADMIN — PANTOPRAZOLE SODIUM 40 MILLIGRAM(S): 20 TABLET, DELAYED RELEASE ORAL at 06:10

## 2024-01-23 RX ADMIN — Medication 650 MILLIGRAM(S): at 16:32

## 2024-01-23 RX ADMIN — APIXABAN 2.5 MILLIGRAM(S): 2.5 TABLET, FILM COATED ORAL at 18:37

## 2024-01-23 RX ADMIN — APIXABAN 2.5 MILLIGRAM(S): 2.5 TABLET, FILM COATED ORAL at 06:10

## 2024-01-23 RX ADMIN — Medication 75 MICROGRAM(S): at 05:09

## 2024-01-23 NOTE — PROGRESS NOTE ADULT - SUBJECTIVE AND OBJECTIVE BOX
SUBJECTIVE: Pt seen and examined by chief resident. Pt is doing well, resting comfortably on bed. No complaints at this time.    Vital Signs Last 24 Hrs  T(C): 36.6 (23 Jan 2024 05:20), Max: 37 (22 Jan 2024 21:26)  T(F): 97.8 (23 Jan 2024 05:20), Max: 98.6 (22 Jan 2024 21:26)  HR: 80 (23 Jan 2024 06:10) (75 - 102)  BP: 119/60 (23 Jan 2024 06:10) (104/59 - 125/56)  BP(mean): --  RR: 17 (23 Jan 2024 06:10) (16 - 20)  SpO2: 95% (23 Jan 2024 06:10) (94% - 96%)    Parameters below as of 23 Jan 2024 06:10  Patient On (Oxygen Delivery Method): room air        I&O's Summary    22 Jan 2024 07:01  -  23 Jan 2024 07:00  --------------------------------------------------------  IN: 780 mL / OUT: 475 mL / NET: 305 mL        Physical Exam:  General Appearance: Appears well, NAD  Pulmonary: Nonlabored breathing, no respiratory distress  Abdomen: Soft, nondisteded, nontender  Extremities: WWP, SCD's in place

## 2024-01-23 NOTE — PROGRESS NOTE ADULT - SUBJECTIVE AND OBJECTIVE BOX
Patient is a 83y old  Female who presents with a chief complaint of perforated viscus (23 Jan 2024 07:14)      INTERVAL HPI/OVERNIGHT EVENTS: offers no new complaints;    MEDICATIONS  (STANDING):  apixaban 2.5 milliGRAM(s) Oral every 12 hours  atorvastatin 80 milliGRAM(s) Oral at bedtime  levothyroxine 75 MICROGram(s) Oral daily  metoprolol succinate ER 50 milliGRAM(s) Oral daily  pantoprazole    Tablet 40 milliGRAM(s) Oral two times a day    MEDICATIONS  (PRN):  acetaminophen     Tablet .. 650 milliGRAM(s) Oral every 6 hours PRN Mild Pain (1 - 3), Moderate Pain (4 - 6)  melatonin 5 milliGRAM(s) Oral at bedtime PRN Insomnia      __________________________________________________    Vital Signs Last 24 Hrs  T(C): 36.8 (23 Jan 2024 12:51), Max: 37 (22 Jan 2024 21:26)  T(F): 98.3 (23 Jan 2024 12:51), Max: 98.6 (22 Jan 2024 21:26)  HR: 72 (23 Jan 2024 12:51) (72 - 97)  BP: 127/56 (23 Jan 2024 12:51) (104/59 - 127/56)  BP(mean): --  RR: 18 (23 Jan 2024 12:51) (16 - 20)  SpO2: 98% (23 Jan 2024 12:51) (94% - 98%)    Parameters below as of 23 Jan 2024 09:25  Patient On (Oxygen Delivery Method): room air        ________________________________________________  PHYSICAL EXAM:  GENERAL: NAD  HEENT: Normocephalic;  conjunctivae and sclerae clear; moist mucous membranes;   NECK : supple  CHEST/LUNG: Clear to auscultation bilaterally with good air entry   HEART: S1 S2  regular; no murmurs, gallops or rubs  ABDOMEN: Soft, Nontender, Nondistended; Bowel sounds present  EXTREMITIES: no cyanosis; no edema; no calf tenderness  SKIN: warm and dry; no rash  NERVOUS SYSTEM:  Awake and alert; Oriented  to place, person and time ; no new deficits    _________________________________________________  LABS:              CAPILLARY BLOOD GLUCOSE            RADIOLOGY & ADDITIONAL TESTS:      Plan of care was discussed with patient and /or primary care giver; all questions and concerns were addressed and care was aligned with patient's wishes.

## 2024-01-23 NOTE — PROGRESS NOTE ADULT - ATTENDING COMMENTS
Patient is an 82 year old woman with history of depression, hypothyroidism, HLD, A fib on Eliquis, left hip surgery, cholecystectomy, ventral hernia repair who is admitted with clinical, laboratory, and radiographic findings concerning for perforated duodenal diverticulum. At time of evaluation, afebrile, hemodynamically stable, abdomen soft, tenderness improved, distension improved, no rebound or guarding. Continue diet, antibiotics completed, PT/OT, disposition planning. Late entry, date of service 1/23/24.

## 2024-01-23 NOTE — PROGRESS NOTE ADULT - ASSESSMENT
83yo Female pt with PMH depression, hypothyroidism, HLD, Afib on Eliquis and PSH of L hip surgery, cholecystectomy and VHR presented with 1-day history of sudden severe midabdominal/epigastric pain. Patient admitted for perforated diverticulitis of distal duodenum. UGI neg leak (1/11)    Regular, soft/bite sized diet   Pain & nausea prn  Eliquis  OOBA/OOBTC  Dispo: [facility]  No AM labs

## 2024-01-23 NOTE — PROGRESS NOTE ADULT - ASSESSMENT
83F with PMH of depression, hypothyroidism, atrial fibrillation, and HLD presenting with perforated diverticulitis of the distal duodenum    #LE weakness likely 2/2 deconditioning   - stroke ruled out by MRI   - seen by neuro, signed off     #Abdominal pain secondary to perforated diverticulitis of distal duodenum --- resolved   -Continue Metronidazole and cefpodoxime per primary team.     #Atrial fibrillation with RVR  -Cardiology following; cardiology recommended that pt get a TTE will defer study decision to primary team. PFO on previous echo    - eliquis to 2.5mg BID  -Toprol XL  50 mg PO daily as per cards    #Hypothyroidism   - continue Levothyroxine 75mcg daily     Pending placement at [facility]  dvt ppx with eliquis

## 2024-01-24 ENCOUNTER — TRANSCRIPTION ENCOUNTER (OUTPATIENT)
Age: 84
End: 2024-01-24

## 2024-01-24 VITALS
OXYGEN SATURATION: 96 % | TEMPERATURE: 98 F | HEART RATE: 70 BPM | RESPIRATION RATE: 17 BRPM | DIASTOLIC BLOOD PRESSURE: 65 MMHG | SYSTOLIC BLOOD PRESSURE: 106 MMHG

## 2024-01-24 PROCEDURE — 74240 X-RAY XM UPR GI TRC 1CNTRST: CPT

## 2024-01-24 PROCEDURE — 70498 CT ANGIOGRAPHY NECK: CPT

## 2024-01-24 PROCEDURE — 97116 GAIT TRAINING THERAPY: CPT

## 2024-01-24 PROCEDURE — 82550 ASSAY OF CK (CPK): CPT

## 2024-01-24 PROCEDURE — 82962 GLUCOSE BLOOD TEST: CPT

## 2024-01-24 PROCEDURE — 84156 ASSAY OF PROTEIN URINE: CPT

## 2024-01-24 PROCEDURE — 99231 SBSQ HOSP IP/OBS SF/LOW 25: CPT

## 2024-01-24 PROCEDURE — 71045 X-RAY EXAM CHEST 1 VIEW: CPT

## 2024-01-24 PROCEDURE — 97161 PT EVAL LOW COMPLEX 20 MIN: CPT

## 2024-01-24 PROCEDURE — 83690 ASSAY OF LIPASE: CPT

## 2024-01-24 PROCEDURE — 80048 BASIC METABOLIC PNL TOTAL CA: CPT

## 2024-01-24 PROCEDURE — 84439 ASSAY OF FREE THYROXINE: CPT

## 2024-01-24 PROCEDURE — 85025 COMPLETE CBC W/AUTO DIFF WBC: CPT

## 2024-01-24 PROCEDURE — 83721 ASSAY OF BLOOD LIPOPROTEIN: CPT

## 2024-01-24 PROCEDURE — 83036 HEMOGLOBIN GLYCOSYLATED A1C: CPT

## 2024-01-24 PROCEDURE — 0225U NFCT DS DNA&RNA 21 SARSCOV2: CPT

## 2024-01-24 PROCEDURE — 83930 ASSAY OF BLOOD OSMOLALITY: CPT

## 2024-01-24 PROCEDURE — 70496 CT ANGIOGRAPHY HEAD: CPT

## 2024-01-24 PROCEDURE — 83605 ASSAY OF LACTIC ACID: CPT

## 2024-01-24 PROCEDURE — 96375 TX/PRO/DX INJ NEW DRUG ADDON: CPT

## 2024-01-24 PROCEDURE — 74177 CT ABD & PELVIS W/CONTRAST: CPT | Mod: MA

## 2024-01-24 PROCEDURE — 83935 ASSAY OF URINE OSMOLALITY: CPT

## 2024-01-24 PROCEDURE — 84540 ASSAY OF URINE/UREA-N: CPT

## 2024-01-24 PROCEDURE — 36415 COLL VENOUS BLD VENIPUNCTURE: CPT

## 2024-01-24 PROCEDURE — 96374 THER/PROPH/DIAG INJ IV PUSH: CPT

## 2024-01-24 PROCEDURE — 84484 ASSAY OF TROPONIN QUANT: CPT

## 2024-01-24 PROCEDURE — 85610 PROTHROMBIN TIME: CPT

## 2024-01-24 PROCEDURE — 85027 COMPLETE CBC AUTOMATED: CPT

## 2024-01-24 PROCEDURE — 87040 BLOOD CULTURE FOR BACTERIA: CPT

## 2024-01-24 PROCEDURE — 84443 ASSAY THYROID STIM HORMONE: CPT

## 2024-01-24 PROCEDURE — 99285 EMERGENCY DEPT VISIT HI MDM: CPT

## 2024-01-24 PROCEDURE — 70551 MRI BRAIN STEM W/O DYE: CPT

## 2024-01-24 PROCEDURE — 84300 ASSAY OF URINE SODIUM: CPT

## 2024-01-24 PROCEDURE — 0042T: CPT

## 2024-01-24 PROCEDURE — 83735 ASSAY OF MAGNESIUM: CPT

## 2024-01-24 PROCEDURE — 82553 CREATINE MB FRACTION: CPT

## 2024-01-24 PROCEDURE — 87150 DNA/RNA AMPLIFIED PROBE: CPT

## 2024-01-24 PROCEDURE — 85730 THROMBOPLASTIN TIME PARTIAL: CPT

## 2024-01-24 PROCEDURE — 81001 URINALYSIS AUTO W/SCOPE: CPT

## 2024-01-24 PROCEDURE — 87186 SC STD MICRODIL/AGAR DIL: CPT

## 2024-01-24 PROCEDURE — 84100 ASSAY OF PHOSPHORUS: CPT

## 2024-01-24 PROCEDURE — 99232 SBSQ HOSP IP/OBS MODERATE 35: CPT | Mod: GC

## 2024-01-24 PROCEDURE — 70450 CT HEAD/BRAIN W/O DYE: CPT

## 2024-01-24 PROCEDURE — 97110 THERAPEUTIC EXERCISES: CPT

## 2024-01-24 PROCEDURE — 80053 COMPREHEN METABOLIC PANEL: CPT

## 2024-01-24 PROCEDURE — 84133 ASSAY OF URINE POTASSIUM: CPT

## 2024-01-24 PROCEDURE — 82570 ASSAY OF URINE CREATININE: CPT

## 2024-01-24 RX ADMIN — Medication 75 MICROGRAM(S): at 05:25

## 2024-01-24 RX ADMIN — Medication 50 MILLIGRAM(S): at 06:27

## 2024-01-24 RX ADMIN — APIXABAN 2.5 MILLIGRAM(S): 2.5 TABLET, FILM COATED ORAL at 06:26

## 2024-01-24 RX ADMIN — PANTOPRAZOLE SODIUM 40 MILLIGRAM(S): 20 TABLET, DELAYED RELEASE ORAL at 06:26

## 2024-01-24 NOTE — DISCHARGE NOTE NURSING/CASE MANAGEMENT/SOCIAL WORK - PATIENT PORTAL LINK FT
You can access the FollowMyHealth Patient Portal offered by [facility] by registering at the following website: [email or web address]. By joining [employer]’s FollowMyHealth portal, you will also be able to view your health information using other applications (apps) compatible with our system.

## 2024-01-24 NOTE — PROGRESS NOTE ADULT - REASON FOR ADMISSION
perforated viscus

## 2024-01-24 NOTE — PROGRESS NOTE ADULT - ASSESSMENT
83F with PMH of depression, hypothyroidism, atrial fibrillation, and HLD presenting with perforated diverticulitis of the distal duodenum    #LE weakness likely 2/2 deconditioning   - stroke ruled out by MRI   - seen by neuro, signed off     #Abdominal pain secondary to perforated diverticulitis of distal duodenum --- resolved   -Continue Metronidazole and cefpodoxime per primary team.     #Atrial fibrillation with RVR  -Cardiology following; cardiology recommended that pt get a TTE will defer study decision to primary team. PFO on previous echo    - eliquis to 2.5mg BID  -Toprol XL  50 mg PO daily as per cards    #Hypothyroidism   - continue Levothyroxine 75mcg daily     Pending placement at [facility]  dvt ppx with eliquis

## 2024-01-24 NOTE — PROGRESS NOTE ADULT - ATTENDING COMMENTS
Patient is an 82 year old woman with history of depression, hypothyroidism, HLD, A fib on Eliquis, left hip surgery, cholecystectomy, ventral hernia repair who is admitted with clinical, laboratory, and radiographic findings concerning for perforated duodenal diverticulum. At time of evaluation, afebrile, hemodynamically stable, abdomen soft, tenderness improved, distension improved, no rebound or guarding. Continue diet, antibiotics completed, PT/OT, disposition planning

## 2024-01-24 NOTE — DISCHARGE NOTE NURSING/CASE MANAGEMENT/SOCIAL WORK - NSDCPEFALRISK_GEN_ALL_CORE
For information on Fall & Injury Prevention, visit: [email or web address] OR  [email or web address] OR  https://www.cdc.gov/steadi/patient.html

## 2024-01-24 NOTE — PROGRESS NOTE ADULT - SUBJECTIVE AND OBJECTIVE BOX
SUBJECTIVE: Pt seen and examined by chief resident. Pt is doing well, resting comfortably on bed. Denies pain.  Diet tolerated. Passing gas. No nausea or vomiting. No complaints at this time.    Vital Signs Last 24 Hrs  T(C): 36.3 (24 Jan 2024 05:34), Max: 36.8 (23 Jan 2024 09:25)  T(F): 97.4 (24 Jan 2024 05:34), Max: 98.3 (23 Jan 2024 12:51)  HR: 80 (24 Jan 2024 05:34) (72 - 80)  BP: 122/77 (24 Jan 2024 05:34) (117/55 - 127/56)  BP(mean): --  RR: 17 (24 Jan 2024 05:34) (17 - 18)  SpO2: 96% (24 Jan 2024 05:34) (95% - 99%)    Parameters below as of 24 Jan 2024 05:34  Patient On (Oxygen Delivery Method): room air        I&O's Summary    23 Jan 2024 07:01  -  24 Jan 2024 07:00  --------------------------------------------------------  IN: 0 mL / OUT: 400 mL / NET: -400 mL        Physical Exam:  General Appearance: Appears well, NAD  Pulmonary: Nonlabored breathing, no respiratory distress  Abdomen: Soft, nondisteded, nontender  Extremities: WWP, SCD's in place

## 2024-01-24 NOTE — PROGRESS NOTE ADULT - SUBJECTIVE AND OBJECTIVE BOX
Patient is a 83y old  Female who presents with a chief complaint of perforated viscus (24 Jan 2024 07:09)      INTERVAL HPI/OVERNIGHT EVENTS: offers no new complaints;     MEDICATIONS  (STANDING):  apixaban 2.5 milliGRAM(s) Oral every 12 hours  atorvastatin 80 milliGRAM(s) Oral at bedtime  levothyroxine 75 MICROGram(s) Oral daily  metoprolol succinate ER 50 milliGRAM(s) Oral daily  pantoprazole    Tablet 40 milliGRAM(s) Oral two times a day    MEDICATIONS  (PRN):  acetaminophen     Tablet .. 650 milliGRAM(s) Oral every 6 hours PRN Mild Pain (1 - 3), Moderate Pain (4 - 6)  melatonin 5 milliGRAM(s) Oral at bedtime PRN Insomnia      __________________________________________________      Vital Signs Last 24 Hrs  T(C): 36.8 (24 Jan 2024 08:52), Max: 36.8 (23 Jan 2024 12:51)  T(F): 98.2 (24 Jan 2024 08:52), Max: 98.3 (23 Jan 2024 12:51)  HR: 70 (24 Jan 2024 08:52) (70 - 80)  BP: 106/65 (24 Jan 2024 08:52) (106/65 - 127/56)  BP(mean): --  RR: 17 (24 Jan 2024 08:52) (17 - 18)  SpO2: 96% (24 Jan 2024 08:52) (96% - 99%)    Parameters below as of 24 Jan 2024 08:52  Patient On (Oxygen Delivery Method): room air        ________________________________________________  PHYSICAL EXAM:  GENERAL: NAD  HEENT: Normocephalic;  conjunctivae and sclerae clear; moist mucous membranes;   NECK : supple  CHEST/LUNG: Clear to auscultation bilaterally with good air entry   HEART: S1 S2  regular; no murmurs, gallops or rubs  ABDOMEN: Soft, Nontender, Nondistended; Bowel sounds present  EXTREMITIES: no cyanosis; no edema; no calf tenderness  SKIN: warm and dry; no rash  NERVOUS SYSTEM:  Awake and alert; Oriented  to place, person and time ; no new deficits    _________________________________________________  LABS:              CAPILLARY BLOOD GLUCOSE            RADIOLOGY & ADDITIONAL TESTS:      Plan of care was discussed with patient and /or primary care giver; all questions and concerns were addressed and care was aligned with patient's wishes.

## 2024-01-24 NOTE — PROGRESS NOTE ADULT - PROVIDER SPECIALTY LIST ADULT
Hospitalist
Hospitalist
Internal Medicine
Surgery
Cardiology
Hospitalist
Surgery
Surgery
Cardiology
Hospitalist
Hospitalist
SICU
Surgery
Surgery
Hospitalist
Internal Medicine
Internal Medicine
Surgery
SICU
Surgery
Internal Medicine
Internal Medicine
SICU
Surgery
Surgery

## 2024-01-24 NOTE — PROGRESS NOTE ADULT - ATTENDING SUPERVISION STATEMENT
Resident
Fellow
Resident
Fellow
Resident

## 2024-01-26 DIAGNOSIS — Z90.49 ACQUIRED ABSENCE OF OTHER SPECIFIED PARTS OF DIGESTIVE TRACT: ICD-10-CM

## 2024-01-26 DIAGNOSIS — A41.9 SEPSIS, UNSPECIFIED ORGANISM: ICD-10-CM

## 2024-01-26 DIAGNOSIS — I48.20 CHRONIC ATRIAL FIBRILLATION, UNSPECIFIED: ICD-10-CM

## 2024-01-26 DIAGNOSIS — K57.20 DIVERTICULITIS OF LARGE INTESTINE WITH PERFORATION AND ABSCESS WITHOUT BLEEDING: ICD-10-CM

## 2024-01-26 DIAGNOSIS — Z79.01 LONG TERM (CURRENT) USE OF ANTICOAGULANTS: ICD-10-CM

## 2024-01-26 DIAGNOSIS — K65.9 PERITONITIS, UNSPECIFIED: ICD-10-CM

## 2024-01-26 DIAGNOSIS — E87.20 ACIDOSIS, UNSPECIFIED: ICD-10-CM

## 2024-01-26 DIAGNOSIS — E78.5 HYPERLIPIDEMIA, UNSPECIFIED: ICD-10-CM

## 2024-01-26 DIAGNOSIS — K57.00 DIVERTICULITIS OF SMALL INTESTINE WITH PERFORATION AND ABSCESS WITHOUT BLEEDING: ICD-10-CM

## 2024-01-26 DIAGNOSIS — J39.0 RETROPHARYNGEAL AND PARAPHARYNGEAL ABSCESS: ICD-10-CM

## 2024-01-26 DIAGNOSIS — Q21.12 PATENT FORAMEN OVALE: ICD-10-CM

## 2024-01-26 DIAGNOSIS — Z75.1 PERSON AWAITING ADMISSION TO ADEQUATE FACILITY ELSEWHERE: ICD-10-CM

## 2024-01-26 DIAGNOSIS — E87.6 HYPOKALEMIA: ICD-10-CM

## 2024-01-26 DIAGNOSIS — I48.91 UNSPECIFIED ATRIAL FIBRILLATION: ICD-10-CM

## 2024-01-26 DIAGNOSIS — Z79.890 HORMONE REPLACEMENT THERAPY: ICD-10-CM

## 2024-01-26 DIAGNOSIS — Z79.899 OTHER LONG TERM (CURRENT) DRUG THERAPY: ICD-10-CM

## 2024-01-26 DIAGNOSIS — E03.9 HYPOTHYROIDISM, UNSPECIFIED: ICD-10-CM

## 2024-01-26 DIAGNOSIS — E83.39 OTHER DISORDERS OF PHOSPHORUS METABOLISM: ICD-10-CM

## 2024-01-26 DIAGNOSIS — E87.1 HYPO-OSMOLALITY AND HYPONATREMIA: ICD-10-CM

## 2024-01-29 ENCOUNTER — RX RENEWAL (OUTPATIENT)
Age: 84
End: 2024-01-29

## 2024-01-29 RX ORDER — METOPROLOL SUCCINATE 25 MG/1
25 TABLET, EXTENDED RELEASE ORAL
Qty: 30 | Refills: 3 | Status: ACTIVE | COMMUNITY
Start: 2021-10-05 | End: 1900-01-01

## 2024-02-05 ENCOUNTER — APPOINTMENT (OUTPATIENT)
Dept: INTERNAL MEDICINE | Facility: CLINIC | Age: 84
End: 2024-02-05

## 2024-02-14 ENCOUNTER — APPOINTMENT (OUTPATIENT)
Dept: BARIATRICS | Facility: CLINIC | Age: 84
End: 2024-02-14

## 2024-02-15 ENCOUNTER — APPOINTMENT (OUTPATIENT)
Dept: PHYSICAL MEDICINE AND REHAB | Facility: CLINIC | Age: 84
End: 2024-02-15

## 2024-03-13 ENCOUNTER — APPOINTMENT (OUTPATIENT)
Dept: COLORECTAL SURGERY | Facility: CLINIC | Age: 84
End: 2024-03-13

## 2024-04-15 ENCOUNTER — RX RENEWAL (OUTPATIENT)
Age: 84
End: 2024-04-15

## 2024-04-23 ENCOUNTER — NON-APPOINTMENT (OUTPATIENT)
Age: 84
End: 2024-04-23

## 2024-04-23 ENCOUNTER — APPOINTMENT (OUTPATIENT)
Dept: PHYSICAL MEDICINE AND REHAB | Facility: CLINIC | Age: 84
End: 2024-04-23
Payer: MEDICARE

## 2024-04-23 ENCOUNTER — APPOINTMENT (OUTPATIENT)
Dept: INTERNAL MEDICINE | Facility: CLINIC | Age: 84
End: 2024-04-23
Payer: MEDICARE

## 2024-04-23 VITALS
TEMPERATURE: 97.2 F | BODY MASS INDEX: 27.4 KG/M2 | DIASTOLIC BLOOD PRESSURE: 71 MMHG | WEIGHT: 127 LBS | SYSTOLIC BLOOD PRESSURE: 123 MMHG | OXYGEN SATURATION: 97 % | HEIGHT: 57 IN | HEART RATE: 76 BPM

## 2024-04-23 DIAGNOSIS — Z92.89 PERSONAL HISTORY OF OTHER MEDICAL TREATMENT: ICD-10-CM

## 2024-04-23 DIAGNOSIS — R29.898 OTHER SYMPTOMS AND SIGNS INVOLVING THE MUSCULOSKELETAL SYSTEM: ICD-10-CM

## 2024-04-23 DIAGNOSIS — M76.02 GLUTEAL TENDINITIS, RIGHT HIP: ICD-10-CM

## 2024-04-23 DIAGNOSIS — M79.604 PAIN IN RIGHT LEG: ICD-10-CM

## 2024-04-23 DIAGNOSIS — M76.01 GLUTEAL TENDINITIS, RIGHT HIP: ICD-10-CM

## 2024-04-23 DIAGNOSIS — I48.0 PAROXYSMAL ATRIAL FIBRILLATION: ICD-10-CM

## 2024-04-23 DIAGNOSIS — M47.817 SPONDYLOSIS W/OUT MYELOPATHY OR RADICULOPATHY, LUMBOSACRAL REGION: ICD-10-CM

## 2024-04-23 DIAGNOSIS — K57.00 DIVERTICULITIS OF SMALL INTESTINE WITH PERFORATION AND ABSCESS W/OUT BLEEDING: ICD-10-CM

## 2024-04-23 PROCEDURE — G2211 COMPLEX E/M VISIT ADD ON: CPT | Mod: NC

## 2024-04-23 PROCEDURE — G2211 COMPLEX E/M VISIT ADD ON: CPT

## 2024-04-23 PROCEDURE — 99443: CPT | Mod: 93

## 2024-04-23 PROCEDURE — 36415 COLL VENOUS BLD VENIPUNCTURE: CPT

## 2024-04-23 PROCEDURE — 99215 OFFICE O/P EST HI 40 MIN: CPT

## 2024-04-23 RX ORDER — GABAPENTIN 100 MG/1
100 CAPSULE ORAL
Qty: 60 | Refills: 3 | Status: DISCONTINUED | COMMUNITY
Start: 2021-10-05 | End: 2024-04-23

## 2024-04-23 RX ORDER — GABAPENTIN 100 MG/1
100 CAPSULE ORAL
Qty: 60 | Refills: 1 | Status: DISCONTINUED | COMMUNITY
Start: 2022-12-28 | End: 2024-04-23

## 2024-04-23 RX ORDER — METHYLPREDNISOLONE 4 MG/1
4 TABLET ORAL
Qty: 1 | Refills: 0 | Status: DISCONTINUED | COMMUNITY
Start: 2023-08-29 | End: 2024-04-23

## 2024-04-23 RX ORDER — PREGABALIN 75 MG/1
75 CAPSULE ORAL
Qty: 60 | Refills: 2 | Status: DISCONTINUED | COMMUNITY
Start: 2019-10-21 | End: 2024-04-23

## 2024-04-23 RX ORDER — PREGABALIN 25 MG/1
25 CAPSULE ORAL DAILY
Qty: 90 | Refills: 1 | Status: DISCONTINUED | COMMUNITY
Start: 2023-01-23 | End: 2024-04-23

## 2024-04-23 RX ORDER — MELOXICAM 7.5 MG/1
7.5 TABLET ORAL DAILY
Qty: 1 | Refills: 1 | Status: DISCONTINUED | COMMUNITY
Start: 2023-05-25 | End: 2024-04-23

## 2024-04-23 NOTE — HISTORY OF PRESENT ILLNESS
[Post-hospitalization from ___ Hospital] : Post-hospitalization from [unfilled] Hospital [Discharged on ___] : discharged on [unfilled] [Admitted on: ___] : The patient was admitted on [unfilled] [Discharge Summary] : discharge summary [Pertinent Labs] : pertinent labs [Discharge Med List] : discharge medication list [Med Reconciliation] : medication reconciliation has been completed [Patient Contacted By: ____] : and contacted by [unfilled] [FreeTextEntry2] : 83F w/HPL, depression, hld, hypothyroid, afib on eliquis, cholecystitis, chronic back pain, hospitalized in Jan for perforated diverticulitis of distal duodenums/p IV abx c/b AFib with RVR, seen by cards, Toprol XL changed to 50mg, resumed eliquis 2.5mg per age/weight. Course c/b stroke code for LLE weakness, decreased sensation, facial droop. Stroke code activated, CTA brain, CTA neck and CT brain perfusion, MRI head neg for acute infarct, chronic ischemic changes. Pt discharged to [facility], just released on 4/15/24.  Accompanied by HHA today, pt reports ongoing R hip pain w/radiation down leg. Has seen PMR before, was prescribed pregabelin 25mg in AM and 50mg qhs, recommended to resume regimen along with Tylenol 1g TID standing and schedule f/u with Dr. Gilbert for ?trigger point injections. Tasked him as well.

## 2024-04-23 NOTE — REVIEW OF SYSTEMS
[Joint Pain] : joint pain [Joint Stiffness] : joint stiffness [Joint Swelling] : no joint swelling [Muscle Weakness] : no muscle weakness [Back Pain] : no back pain [Negative] : Psychiatric

## 2024-04-23 NOTE — ASSESSMENT
[FreeTextEntry1] : 83F w/HPL, depression, hld, hypothyroid, afib on eliquis, cholecystitis, chronic back pain, hospitalized in Jan for perforated diverticulitis of distal duodenum. R hip pain - recommend c/w pregabalin which pt was not taking, add Tylenol 1g TID, f/u Dr. Gilbert, also tasked to see if pt can have expedited appt.  afib - rate controlled, c/w toprol, eliquis, HHA to bring paperwork today or heydi from [facility], but suspect pt should be on 2.5mg BID for age/wt.  Hypothyroidism - c/w Synthroid

## 2024-04-23 NOTE — PHYSICAL EXAM
[Well-Appearing] : well-appearing [EOMI] : extraocular movements intact [Supple] : supple [No Respiratory Distress] : no respiratory distress  [Clear to Auscultation] : lungs were clear to auscultation bilaterally [Normal Rate] : normal rate  [Normal S1, S2] : normal S1 and S2 [Soft] : abdomen soft [Non Tender] : non-tender [No Joint Swelling] : no joint swelling [No Rash] : no rash [No Focal Deficits] : no focal deficits [Normal Affect] : the affect was normal [Normal Insight/Judgement] : insight and judgment were intact [de-identified] : antalgic gait

## 2024-04-24 PROBLEM — R29.898 PROXIMAL LEG WEAKNESS: Status: ACTIVE | Noted: 2023-11-08

## 2024-04-24 PROBLEM — M76.01 GLUTEAL TENDINITIS OF BOTH BUTTOCKS: Status: ACTIVE | Noted: 2024-01-04

## 2024-04-24 PROBLEM — M47.817 FACET DEGENERATION OF LUMBOSACRAL REGION: Status: ACTIVE | Noted: 2019-06-10

## 2024-04-24 PROBLEM — Z92.89 HISTORY OF RECENT HOSPITALIZATION: Status: ACTIVE | Noted: 2024-04-23

## 2024-04-24 RX ORDER — PREGABALIN 25 MG/1
25 CAPSULE ORAL
Qty: 60 | Refills: 1 | Status: ACTIVE | COMMUNITY
Start: 2024-04-24 | End: 1900-01-01

## 2024-04-26 LAB
ALBUMIN SERPL ELPH-MCNC: 4.4 G/DL
ALP BLD-CCNC: 140 U/L
ALT SERPL-CCNC: 25 U/L
ANION GAP SERPL CALC-SCNC: 12 MMOL/L
AST SERPL-CCNC: 26 U/L
BILIRUB SERPL-MCNC: 0.4 MG/DL
BUN SERPL-MCNC: 11 MG/DL
CALCIUM SERPL-MCNC: 10.1 MG/DL
CHLORIDE SERPL-SCNC: 103 MMOL/L
CHOLEST SERPL-MCNC: 125 MG/DL
CO2 SERPL-SCNC: 24 MMOL/L
CREAT SERPL-MCNC: 0.49 MG/DL
EGFR: 95 ML/MIN/1.73M2
ESTIMATED AVERAGE GLUCOSE: 114 MG/DL
GLUCOSE SERPL-MCNC: 176 MG/DL
HBA1C MFR BLD HPLC: 5.6 %
HCT VFR BLD CALC: 38 %
HDLC SERPL-MCNC: 53 MG/DL
HGB BLD-MCNC: 11.7 G/DL
LDLC SERPL CALC-MCNC: 53 MG/DL
MCHC RBC-ENTMCNC: 30.7 PG
MCHC RBC-ENTMCNC: 30.8 GM/DL
MCV RBC AUTO: 99.7 FL
NONHDLC SERPL-MCNC: 72 MG/DL
PLATELET # BLD AUTO: 334 K/UL
POTASSIUM SERPL-SCNC: 4.1 MMOL/L
PROT SERPL-MCNC: 6.4 G/DL
RBC # BLD: 3.81 M/UL
RBC # FLD: 13.9 %
SODIUM SERPL-SCNC: 139 MMOL/L
TRIGL SERPL-MCNC: 101 MG/DL
TSH SERPL-ACNC: 0.83 UIU/ML
WBC # FLD AUTO: 5.08 K/UL

## 2024-04-26 RX ORDER — APIXABAN 2.5 MG/1
2.5 TABLET, FILM COATED ORAL
Qty: 180 | Refills: 1 | Status: ACTIVE | COMMUNITY
Start: 2021-10-05 | End: 1900-01-01

## 2024-06-20 ENCOUNTER — OUTPATIENT (OUTPATIENT)
Dept: OUTPATIENT SERVICES | Facility: HOSPITAL | Age: 84
LOS: 1 days | End: 2024-06-20
Payer: MEDICARE

## 2024-06-20 ENCOUNTER — APPOINTMENT (OUTPATIENT)
Dept: PHYSICAL MEDICINE AND REHAB | Facility: CLINIC | Age: 84
End: 2024-06-20
Payer: MEDICARE

## 2024-06-20 VITALS
HEIGHT: 57 IN | WEIGHT: 127 LBS | TEMPERATURE: 97.2 F | BODY MASS INDEX: 27.4 KG/M2 | OXYGEN SATURATION: 96 % | SYSTOLIC BLOOD PRESSURE: 115 MMHG | DIASTOLIC BLOOD PRESSURE: 58 MMHG | HEART RATE: 80 BPM

## 2024-06-20 DIAGNOSIS — M54.10 RADICULOPATHY, SITE UNSPECIFIED: ICD-10-CM

## 2024-06-20 DIAGNOSIS — M48.062 SPINAL STENOSIS, LUMBAR REGION WITH NEUROGENIC CLAUDICATION: ICD-10-CM

## 2024-06-20 DIAGNOSIS — M79.2 NEURALGIA AND NEURITIS, UNSPECIFIED: ICD-10-CM

## 2024-06-20 DIAGNOSIS — M67.959 UNSPECIFIED DISORDER OF SYNOVIUM AND TENDON, UNSPECIFIED THIGH: ICD-10-CM

## 2024-06-20 DIAGNOSIS — R26.9 UNSPECIFIED ABNORMALITIES OF GAIT AND MOBILITY: ICD-10-CM

## 2024-06-20 DIAGNOSIS — Z96.649 PRESENCE OF UNSPECIFIED ARTIFICIAL HIP JOINT: Chronic | ICD-10-CM

## 2024-06-20 DIAGNOSIS — G89.29 NEURALGIA AND NEURITIS, UNSPECIFIED: ICD-10-CM

## 2024-06-20 DIAGNOSIS — Z96.641 PRESENCE OF RIGHT ARTIFICIAL HIP JOINT: ICD-10-CM

## 2024-06-20 DIAGNOSIS — Z87.39 PERSONAL HISTORY OF OTHER DISEASES OF THE MUSCULOSKELETAL SYSTEM AND CONNECTIVE TISSUE: ICD-10-CM

## 2024-06-20 PROCEDURE — 99215 OFFICE O/P EST HI 40 MIN: CPT

## 2024-06-20 PROCEDURE — 72170 X-RAY EXAM OF PELVIS: CPT

## 2024-06-20 PROCEDURE — G2211 COMPLEX E/M VISIT ADD ON: CPT

## 2024-06-20 PROCEDURE — 72170 X-RAY EXAM OF PELVIS: CPT | Mod: 26

## 2024-06-20 RX ORDER — PREGABALIN 25 MG/1
25 CAPSULE ORAL TWICE DAILY
Qty: 120 | Refills: 2 | Status: ACTIVE | COMMUNITY
Start: 2024-06-20 | End: 1900-01-01

## 2024-06-20 NOTE — PHYSICAL EXAM
[FreeTextEntry1] : Gen: A+O x 3 in NAD Psych: Normal mood and affect. Responds appropriately to commands Eyes: Anicteric. No discharge. EOMI. Resp: Breathing unlabored CV: DP pulses 2+ and equal. No varicosities noted Ext: No c/c/e Skin: No lesions noted  Gait: persistent 01/04/2024 ++ thoracic kyphosis neg antalgic  +  reciprocating heel to toe able to stand on toes and heels WITH hand holding Tandem gait intact WITH hand holding Poor single leg standing balance R or L Romberg deferred  Trendelenburg present with R>L stance leg  Inspection: Spine alignment is midline. Palpation: There is + tenderness over the midline spinous processes, paravertebral muscles of the thoracolumbar region  + TTP over the left GTB + TTP over the left TFL + TTP over the left GLuteal tendons  Lumbar ROM: Flexion, extension, side-bending, rotation, limited in most planes now 01/04/2024 minimal +with lateral flexion now 01/04/2024 minimal +with oblique extension now 01/04/2024 minimal +pain with lateral rotation   Hip ROM: Jun 20, 2024 ++ pain at terminal ROM RIGHT FAIR, FABERE + RIGHT .  	Hip Flex       Knee Ext      Ankle Dorsi           EHL        Ankle Plantar Right	4-/5	        5/5	                 5/5	          4/5	             5/5                            Left	4/5	        5/5	                 5/5	          4/5	             5/5                             Hip abduction R 3/5 L4 /5 Hip adduction R 3/5 L 4/5 Hip extension R 3/5 L 4/5 Knee Flexion R 3/5 L 4/5  Tone: Normal. No clonus. Sensation: Grossly intact to light touch bilateral lower limbs. Proprioception: Intact at big toes bilaterally. Reflexes: 2+ symmetric knee jerk, ankle jerk.  Plantars downgoing bilaterally.  SLR negative bilaterally Crossed SLR negative bilaterally. Slump Test + right S1

## 2024-06-20 NOTE — PROCEDURE
[de-identified] : PROCEDURE #1  Greater trochanteric bursa , LEFT 25 gauge, 1.5 inch Injectate lidocaine 1% 2mL Patient positioned in a lateral recumbent position with painful hip exposed and prepped with chlorhexidine wipes. The greater trochanter was identified by palpating the femur from the mid-shaft proximally until the area of bony protrusion is reached.  The injection site was the point of maximal tenderness or swelling, closest to the superoposterior aspect of the Greater Trochanter. At the area most tender in the region of the greater trochanter, a 25-gauge, one and one-half-inch needle was inserted perpendicular to the skin. The needle was inserted directly down to bone and then withdrawn two to three millimeters before injecting.   PROCEDURE #2 01/04/2024  Trigger point injections  20[address] one or two muscle group   Pre/Post Procedure Diagnosis: Myofascial Pain   Procedure Performed by:  Dr. Renny Gilbert Consent: Verbal and written informed consent was obtained/reviewed with the patient.  Risks, benefits, alternatives discussed in detail. All questions were answered.   Site was then marked.   Position: lateral recumbent, right   Anesthesia: none   The skin was cleaned with alcohol   Time out was then performed as per protocol.   Needle used: 25 G 1.5 inch   Injectate: 1.0% lidocaine without epinephrine, 6 mL    The following trigger points were then identified:  right TFL x1 right gluteus medius x1 right gluteus adria x1   After negative aspiration each of these trigger points were then injected. Total volume of the injectate administered was 4 ml into each of these trigger points.   EBL: less than 1 ml   Complications: None   Specimen: None   Fluids: None   Post Procedure: NRS: 1/10   Patient was observed in the room. Patient was stable upon discharge. Detailed post procedure instructions were provided.   Patient to call in the event of worsening pain, fever, weakness or numbness   Plan:  Further treatment will be based upon the response to the injection performed today and if found beneficial may be repeated as indicated.   The patient has local pain symptoms that have persisted for more than 3 months causing tenderness and/or weakness, restricting motion and/or causing referred pain when compressed; and a taut band is palpable in an accessible muscle with exquisite tenderness at one point along its length; and the patient has been refractory or intolerant of conservative therapies such as bed rest, active exercises, ultrasound, range of motion, heating or cooling modalities, massage, and pharmacotherapies (e.g. NSAIDS), muscle relaxants, non-narcotic analgesics, and anti-depressants for a period of at least 1 month; and the TPIs are being given as part of an overall management plan.  The patient reports a response to prior trigger point injections with improvement in pain and functional status with a greater than 50% pain relief for 6 weeks.  Repeat injection is being completed secondary to return of pain and deterioration in functional status. The injection is being completed to facilitate mobilization by providing pain relief and assist in application of non-invasive modalities.

## 2024-06-20 NOTE — ASSESSMENT
[FreeTextEntry1] : Assessment/Plan:  ESTRADA DIAZ is a 83 year female with back pain s/p > 10 lifetimes LESI (last in 2022) here for follow up  Greater trochanteric pain syndrome, Left > right Gluteal tendinitis, left Tensor Fascia Amrita Syndrome, Left Myofascial pain syndrome, lumbar  Facet degeneration of lumbosacral region Neurogenic claudication due to lumbar spinal stenosis Gait difficulty Chronic lumbar radiculopathy, L4, Right Tendinopathy of the gluteus medius Weakness of the hip, right  - Tiers of treatment and management of above diagnosis(es) were discussed with patient - Optimal diet, weight, sleep, and lifestyle management to minimize stress and maximize well being counseling provided - Imaging reviewed and discussed with patient - Reviewed previous encounter notes from 3/29/2023 Dr. JOSEMANUEL Anglin (Internal Medicine), 9/14/2022 Dr. JOE Nevarez (Pain) - Patient was advised to RESUME their structured, targeted therapy program 2-3x/wk for 8 wks with goal toward HEP, new script 1/4/24 - Patient was educated on an appropriate home exercise program, provided with exercise recommendations, all questions answered - Patient may trial acetaminophen 1000mg up to TID PRN moderate to severe pain and to decrease average consumption of NSAIDs - Patient may continue to trial topical compound cream to the right low back no more than twice per day as needed for next 2-3 weeks, Rx entered via portal, written information provided to the patient in addition to verbal explanation regarding the medication, new script 10/24/2023  - Patient may continue pregabalin 25mg in the AM and 50mg qHS. All questions were answered and the patient displayed a clear understanding of the plan of care, including titration of pregabalin. The patient was informed about not taking this medication at the same time as any sleeping or muscle relaxant pills. Pt was also notified to stop, and contact our office, if it is too sedating, ankle swelling occurs and/or they experience suicidal thinking  - provided with info about access a ride, due to patient's multiple medical conditions, they are unable to walk greater than 10-15 feet - patient relates h/o decreased bone mineral density and fall with multiple rib fractures, repeat DEXA, order re-placed  - 5/12/2023: placed order for lumbar corset (LSO) - 5/12/2023: Ordered lab testing for renal function, has been taking gabapentin/pregabalin > 6 months, GFR wnl, slightly decreased SCr - placed referral to Audiology, noted consultation that claimed no indication for hearing aid device - Patient was advised to apply cool compresses or warm heat to affected regions PRN - Given h/o > 10 lifetimes LESI, sent for consultation with [facility] - Regarding orthopedic shoes on 10/24/2023: provided referral for orthopedic shoes with wide toe box, velcro strap closure, and durable rubber outsole; medically necessary given observed functional gait impairments, unsteady gait, and neurogenic claudication, necessary for safety, ease of transferring, easy closure of proper fitting shoes, prevent further injury, morbidity from fall due to improper footwear - 10/24/23: provided with referral for  to help access resources including additional health insurance plans  - Educated about red flag symptoms including (but not limited to) new, worsened, or persistent: fever greater than 100F, bowel or bladder incontinence, bowel obstipation, inability to void urine, urinary leakage, Severe nausea or vomiting, Worsening numbness, worsening tingling/paresthesias, and/or new or progressive motor weakness; advised to seek immediate medical attention at his nearest Emergency department should they experience any of the above  - 06/20/2024:  STA MRI bony pelvis without contrast is indicated given that the pt has not improved with tylenol, ibuprofen, naproxen, meloxicam, they obtained non-diagnostic radiographs, worsening right hip pain, refractory to multiple pharmacologic treatments notable prolonged hospital and [facility] stay during which pain has worsened, r/o compromise of hardware in right hip, h/o osteoporosis r/o bony abnormality, and physical therapy/home exercise program>6 weeks. Patient's imaging is medically necessary to outline targets for locally (interventional) directed treatments and/or guide surgical management.  - Follow up in person in 2 months to assess their progress with resuming PT, assess effectiveness of the left hip injections 1/4/24, assess need for further adjustments to their rollator (lowered handles 1/4/2024)  I have personally spent a total of at least 45 minutes preparing, reviewing internal and external records, explaining, counseling, and coordinating care for this patient encounter.  Thank you, CANDIDO ANGLIN, for allowing me to participate in the care of your patient. Please do not hesitate to contact me with questions/concerns.  Renny Gilbert M.D. Sports and Interventional Spine Department of Physical Medicine and Rehabilitation [facility] Physician [facility] [address] [place], [place] [address]  Appointments: [phone] Fax: [phone]

## 2024-06-20 NOTE — DATA REVIEWED
[FreeTextEntry1] :  SITE PHONE: [phone] Patient: ESTRADA DIAZ YOB: 1940 Phone: [phone] MRN: [id number] Acc: [id number] Date of Exam: 09-   EXAM:  MRI LUMBAR SPINE WITHOUT CONTRAST  HISTORY: Lower back pain.  TECHNIQUE: Multiplanar, multi-sequential MRI of the lumbar spine was obtained on a 3T scanner using a standard protocol.  COMPARISON:  MRI lumbar spine 3/27/2020, CT lumbar spine 6/5/2023.  FINDINGS:  For purposes of this dictation, the last well-formed disc space will be labeled L5-S1.  OSSEOUS STRUCTURES: Vertebral body heights are preserved. Anterior marginal osteophytes, endplate degenerative changes and Schmorl's nodes throughout the lumbar spine. No marrow edema.  ALIGNMENT: Levoscoliosis of the lumbar spine. Mild retrolisthesis of L1-2. Grade 1 anterolisthesis of L2-3, L4-5 and L5-S1.  SPINAL CORD AND CONUS MEDULLARIS: The conus medullaris terminates at L1.  PARASPINAL AND INTRA-ABDOMINAL SOFT TISSUES: Asymmetric atrophy of the right psoas musculature compared to the left. Moderate symmetric fatty atrophy of the paravertebral musculature. Fibroid uterus.  DISCS: Moderate to severe disc height loss at T10-11 to L4-5. Multilevel disc desiccation.  The following axial levels are imaged and detailed below: T12-L1: Disc osteophyte complex and facet hypertrophy with mild spinal canal stenosis and moderate left neuroforaminal stenosis.  L1-L2: Mild retrolisthesis, disc osteophyte complex, ligamentum flavum thickening and facet hypertrophy with mild spinal canal stenosis, severe left and mild right neuroforaminal stenosis.  L2-L3: Grade 1 anterolisthesis, disc osteophyte complex, ligamentum flavum thickening and facet hypertrophy with mild spinal canal stenosis and moderate bilateral neuroforaminal stenosis.  L3-L4: Disc osteophyte complex, ligamentum flavum thickening and facet hypertrophy with mild spinal canal stenosis, moderate/severe right and mild left neuroforaminal stenosis.  L4-L5: Grade 1 anterolisthesis, disc osteophyte complex and facet hypertrophy with moderate spinal canal stenosis and moderate/severe right and moderate left neuroforaminal stenosis.  L5-S1: Grade 1 anterolisthesis of L5-S1, disc uncovering and facet hypertrophy with moderate right and mild left neuroforaminal stenosis. No spinal canal stenosis.  IMPRESSION:    Multilevel lumbar spondylosis appears to have progressed since the MRI lumbar spine of 3/27/2020. Multilevel mild to moderate spinal canal stenosis and mild to severe neuroforaminal stenosis, as described above.  Stable mild retrolisthesis of L1-2. Stable grade 1 anterolisthesis of L2-3, L4-5 and L5-S1.  Levoscoliosis of the lumbar spine.

## 2024-06-20 NOTE — HISTORY OF PRESENT ILLNESS
[FreeTextEntry1] : Renny Gilbert M.D. Sports Medicine and Interventional Spine Department of Physical Medicine and Rehabilitation  [facility] [address] [facility], [address] [place] [address]  [facility] [place] [facility] [address] [place] [address]  [facility] at  [facility] [address], [address]th [place] [address]  For [place] Appointments Phone: [phone] Fax: [phone]  ----------------------------------------------------------------------------------------------------------------------------------------  PATIENT: ESTRADA DIAZ  MRN: [id number]  YOB: 1940  DATE OF SERVICE: Jun 20, 2024  Referred by CANDIDO JEAN-BAPTISTE ADDRESS:  Jun 20, 2024   Dear    Thank you for referring DIVA EMILY to my Sports and Interventional Spine practice and office. Enclosed is a copy of the patient's consultation/progress note, which includes my complete assessment and recent studies completed during the patient's evaluation.  If you have questions or have any patients who require nonsurgical, non-opiate management of any sports, spine, or musculoskeletal conditions, please do not hesitate to contact my , Salena Alarcon at [phone].  I look forward to taking care of your patients along with you.  Sincerely,  Renny Gilbert MD Sports, Interventional Spine, & Regenerative Musculoskeletal Medicine Orthopaedic [facility]                                                     Follow up Visit CC: back pain, leg pain, cannot walk  HPI:  This is a follow up visit to [facility] [place] at [facility].    ESTRADA DIAZ presents with the chief complaint as above.    Interval Hx on Jun 20, 2024: presents for follow up. Since last visit, patient has continued their pregabalin as prescribed. continues to have right leg pain. she notes that for the past few months, her right leg pain has progressively worsened. she believes that the medication may not be as effective over time, has also been taking tylenol most nights of the week due to pain. patient notes radicular L5 dermatome pain.  There have been no significant changes to their aggravating or alleviating factors since the last visit. Since the last visit, they relate significant improvements in pain previously associated with known exacerbating, aggravating factors and situations. Pharmacologic treatments now include OTC analgesics PRN, but otherwise pharmacologic treatments are minimal. Given dearth of symptoms, pharmacologic treatments are now minimal. Denies new or worsened numbness, tingling, or focal motor deficit. Denies interval fall, accident, or injury. Denies change in bowel or bladder functioning.  Interval Hx on Apr 23, 2024: presents for follow up. Since last visit, patient was hospitalized and had stay in [facility]. patient was at home and suffered fall, brought to the ED and was hospitalized in Jan for perforated diverticulitis of distal duodenums/p IV abx course complicated by AFib with RVR, course further complicated by stroke code for LLE weakness, decreased sensation, facial droop with negative full inpatient workup. patient has been taking tylenol q4 hours. patient interested in resuming pregabalin, she completed her medication and was not able to continue. Since the last visit, they relate significant improvements in pain previously associated with known exacerbating, aggravating factors and situations. Pharmacologic treatments now include OTC analgesics PRN, but otherwise pharmacologic treatments are minimal. Denies new or worsened numbness, tingling, or focal motor deficit. Denies interval fall, accident, or injury. Denies change in bowel or bladder functioning. Patient advised to resume pregabalin 25 mg for the next week, then 50mg at bedtime after one week. Patient will follow up with our practice as planned following last visit. I have personally spent a total of at least 25 minutes preparing, reviewing internal and external records, explaining, counseling, and coordinating care for this patient encounter. In order to reach the patient, we attempted to call them several times. I have spent 16 minutes with the patient on the telephone.  Interval Hx on Jan 04, 2024: presents for follow up. Since last visit, patient has developed left hip pain, right continues to be less than left since trigger point injections. patient has trialed the topical compound spray is not helping much so she discontinued. At the last visit, they were advised to continue topical compound, obtain orthopedic shoes, discuss their situation with a , and follow up for possible trigger point injections to the right hip as we did in August 2023. patient has been walking 2-3 blocks. continues to taking tylenol 1-3 times per day for her hips. patient does not currently plan on follow up with [facility]. Since the last visit, they relate improvements in pain previously associated with known exacerbating, aggravating factors and situations. Pharmacologic treatments now include OTC analgesics PRN, but otherwise pharmacologic treatments are minimal. Denies new or worsened numbness, tingling, or focal motor deficit. Denies interval fall, accident, or injury. Denies change in bowel or bladder functioning.   Meds: denies regular PO pain medications Therapy Program: no recent structured targeted therapy program HEP: doing HEP regularly  Assoc Sx: Otherwise denies numbness, Tingling Denies Focal motor weakness in the upper or lower limbs Denies New or worsened bowel or bladder incontinence Denies Saddle anesthesia Denies Buckling Denies Using Orthotic(s)/Supportive devices Denies Swelling in the upper/lower extremities Denies Clicking They also deny frequent tripping, falling  ROS: A 14 point review of systems was completed. Positive findings are pain as described above. The remaining systems negative.  Breast Cancer Surveillance: up to date COVID HX: reviewed  Interval Hx on Oct 24, 2023: presents for follow up. Since last visit, she continues to have right posterior hip, right gluteal pain. patient relates having marked relief with writer directed treatments (trigger point injections) at their last visit. patient had evaluation with [facility] Santill[facility], Dr. JOSEMANUEL Lo[place]ace (Pain), was set to have epidural treatments, then decided to cancel the visit due to lack of support at home, she lives alone with help from home health aide. Patient has been otherwise managing their pain with home exercises, OTC analgesics. Overall, compared to their last visit pain is somewhat better. Since the last visit, they relate moderate improvements in pain previously associated with known exacerbating, aggravating factors and situations. Pharmacologic treatments now include OTC analgesics PRN, but otherwise pharmacologic treatments are minimal. Denies new or worsened numbness, tingling, or focal motor deficit. Denies interval fall, accident, or injury. Denies change in bowel or bladder functioning. Patient's nearest relative is in [place], writer suggested they accompany the patient after local treatment to the spine, patient states that is not an option at this time. patient's topical compound had been helping but was too costly. patient observed to be wearing overly large fitting sneaker at today's visit.  Interval Hx on Aug 29, 2023: presents for follow up. Since last visit, they have persistent right posterior hip pain, right lumbar paraspinous pain, right gluteal pain. Patient has been limited with ambulation, persistent difficulty climbing stairs.  Since the last visit, they relate improvements in pain previously associated with known exacerbating, aggravating factors and situations.  Pain has been more localized to the gluteal muscles, alleviated slightly with oral medications. has been taking lyrica with moderate relief. Pharmacologic treatments now include OTC analgesics PRN, but otherwise pharmacologic treatments are minimal. Denies new or worsened numbness, tingling, or focal motor deficit. Denies interval fall, accident, or injury. Denies change in bowel or bladder functioning.  Interval Hx on May 12, 2023: presents for follow up. Since last visit, they underwent further diagnostic workup including additional imaging studies. Patient informed of all relevant imaging findings, all questions were answered including multi-level lumbar degenerative changes, she continues to have osteoporosis. Patient relates that the pregabalin is helping somewhat. Patient has been using compound cream to the middle (thoracic) back as directed, asked for a refill, we called [facility] to confirm they would send her another refill. patient has been taking gabapentin/pregabalin for close to 6 months, no recent lab work. She explains that during her worst pain days she has improvement with brace. There have been no significant changes to their aggravating or alleviating factors since the last visit. Pharmacologic treatments now include OTC analgesics PRN, otherwise pharmacologic treatments are minimal. Denies new or worsened numbness, tingling, or focal motor deficit. Denies interval fall, accident, or injury. Denies change in bowel or bladder functioning.  Interval Hx on Mar 27, 2023: presents for follow up. Since last visit, they continued their therapy program, now at week 6. patient has relief from PT but intermittent severe axial > radicular pain persists. Pain can be localized to the midline low back. patient will then utilize their compound cream, OTC pharmacologic treatments for their pain. Patient open to trial of alternative neuropathic medication. There have been no significant changes to their aggravating or alleviating factors since the last visit. Pharmacologic treatments now include OTC analgesics PRN, but otherwise pharmacologic treatments are minimal. Denies new or worsened numbness, tingling, or focal motor deficit. Denies interval fall, accident, or injury. Denies change in bowel or bladder functioning.  Initial Hx on 01/23/2023:Presents in [facility] to [facility]. patient with extensive low back pain. able to walk with her rollator, about 2 blocks in [place]. patient reports fall recently and weakness in the BLE. fell several times at home and outside.The patient's difficulties began worsening over the past few years. The pain is graded as 9/10. The pain is described as persistent, burning pain in the right anterolateral thigh. The pain is constant. The pain does not radiate, located in the b/l lumbar region and over the right anterolateral hip. The patient feels that the pain is overall persistent. Patient denies other recent fall, MVA, injury, trauma, or accident besides presenting history above.  Aggravating: ambulation, prolonged sitting, prolonged standing, navigating stairs, sit to stand transitional movements, turning in bed, getting out of bed. Alleviating: rest, activity modification, avoiding prolonged walking, pharmacologic treatments  Assoc Hx: Ambulates with assistive device, rollator Injection Hx: several LESI, ZACARIAS in the past five years Imaging Hx: reviewed  Level of functioning: AD for ambulation, assistance for most ADLs Living Situation: elevator accessible apartment dwelling with steps to enter

## 2024-07-08 ENCOUNTER — RX RENEWAL (OUTPATIENT)
Age: 84
End: 2024-07-08

## 2024-07-09 ENCOUNTER — RX RENEWAL (OUTPATIENT)
Age: 84
End: 2024-07-09

## 2024-07-19 ENCOUNTER — APPOINTMENT (OUTPATIENT)
Dept: PHYSICAL MEDICINE AND REHAB | Facility: CLINIC | Age: 84
End: 2024-07-19

## 2024-07-19 DIAGNOSIS — M79.2 NEURALGIA AND NEURITIS, UNSPECIFIED: ICD-10-CM

## 2024-07-19 DIAGNOSIS — Z87.39 PERSONAL HISTORY OF OTHER DISEASES OF THE MUSCULOSKELETAL SYSTEM AND CONNECTIVE TISSUE: ICD-10-CM

## 2024-07-19 DIAGNOSIS — Z96.641 PRESENCE OF RIGHT ARTIFICIAL HIP JOINT: ICD-10-CM

## 2024-07-19 DIAGNOSIS — R26.9 UNSPECIFIED ABNORMALITIES OF GAIT AND MOBILITY: ICD-10-CM

## 2024-07-19 DIAGNOSIS — G89.29 NEURALGIA AND NEURITIS, UNSPECIFIED: ICD-10-CM

## 2024-07-19 PROCEDURE — 99443: CPT | Mod: 93

## 2024-07-22 ENCOUNTER — RX RENEWAL (OUTPATIENT)
Age: 84
End: 2024-07-22

## 2024-08-06 ENCOUNTER — RX RENEWAL (OUTPATIENT)
Age: 84
End: 2024-08-06

## 2024-08-26 ENCOUNTER — APPOINTMENT (OUTPATIENT)
Dept: INTERNAL MEDICINE | Facility: CLINIC | Age: 84
End: 2024-08-26

## 2024-09-09 ENCOUNTER — APPOINTMENT (OUTPATIENT)
Dept: INTERNAL MEDICINE | Facility: CLINIC | Age: 84
End: 2024-09-09
Payer: MEDICARE

## 2024-09-09 ENCOUNTER — EMERGENCY (EMERGENCY)
Facility: HOSPITAL | Age: 84
LOS: 1 days | Discharge: HOME | End: 2024-09-09
Attending: EMERGENCY MEDICINE | Admitting: EMERGENCY MEDICINE
Payer: MEDICARE

## 2024-09-09 VITALS
HEIGHT: 57 IN | TEMPERATURE: 97.6 F | BODY MASS INDEX: 27.4 KG/M2 | SYSTOLIC BLOOD PRESSURE: 121 MMHG | OXYGEN SATURATION: 97 % | WEIGHT: 127 LBS | HEART RATE: 68 BPM | DIASTOLIC BLOOD PRESSURE: 60 MMHG

## 2024-09-09 VITALS
SYSTOLIC BLOOD PRESSURE: 143 MMHG | HEART RATE: 110 BPM | RESPIRATION RATE: 20 BRPM | TEMPERATURE: 98 F | WEIGHT: 125 LBS | OXYGEN SATURATION: 97 % | HEIGHT: 57 IN | DIASTOLIC BLOOD PRESSURE: 83 MMHG

## 2024-09-09 DIAGNOSIS — Z00.00 ENCOUNTER FOR GENERAL ADULT MEDICAL EXAMINATION W/OUT ABNORMAL FINDINGS: ICD-10-CM

## 2024-09-09 DIAGNOSIS — E78.5 HYPERLIPIDEMIA, UNSPECIFIED: ICD-10-CM

## 2024-09-09 DIAGNOSIS — Z96.649 PRESENCE OF UNSPECIFIED ARTIFICIAL HIP JOINT: Chronic | ICD-10-CM

## 2024-09-09 DIAGNOSIS — R10.813 RIGHT LOWER QUADRANT ABDOMINAL TENDERNESS: ICD-10-CM

## 2024-09-09 DIAGNOSIS — I10 ESSENTIAL (PRIMARY) HYPERTENSION: ICD-10-CM

## 2024-09-09 DIAGNOSIS — R11.10 VOMITING, UNSPECIFIED: ICD-10-CM

## 2024-09-09 DIAGNOSIS — I48.91 UNSPECIFIED ATRIAL FIBRILLATION: ICD-10-CM

## 2024-09-09 DIAGNOSIS — R10.9 UNSPECIFIED ABDOMINAL PAIN: ICD-10-CM

## 2024-09-09 PROCEDURE — 99285 EMERGENCY DEPT VISIT HI MDM: CPT

## 2024-09-09 PROCEDURE — G0439: CPT

## 2024-09-09 RX ORDER — ACETAMINOPHEN 325 MG/1
1000 TABLET ORAL ONCE
Refills: 0 | Status: COMPLETED | OUTPATIENT
Start: 2024-09-09 | End: 2024-09-09

## 2024-09-09 RX ORDER — METOCLOPRAMIDE HCL 5 MG
10 TABLET ORAL ONCE
Refills: 0 | Status: COMPLETED | OUTPATIENT
Start: 2024-09-09 | End: 2024-09-09

## 2024-09-09 RX ORDER — SODIUM CHLORIDE 9 MG/ML
1000 INJECTION INTRAMUSCULAR; INTRAVENOUS; SUBCUTANEOUS ONCE
Refills: 0 | Status: COMPLETED | OUTPATIENT
Start: 2024-09-09 | End: 2024-09-09

## 2024-09-09 RX ADMIN — ACETAMINOPHEN 400 MILLIGRAM(S): 325 TABLET ORAL at 23:36

## 2024-09-09 RX ADMIN — Medication 104 MILLIGRAM(S): at 23:36

## 2024-09-09 RX ADMIN — SODIUM CHLORIDE 1000 MILLILITER(S): 9 INJECTION INTRAMUSCULAR; INTRAVENOUS; SUBCUTANEOUS at 23:36

## 2024-09-09 NOTE — ED ADULT NURSE NOTE - OBJECTIVE STATEMENT
Pt is an 83 yo F c/o generalized abd pain, n/v and headache starting today.  Pt in NAD, respirations even and unlabored. Abd soft, nondistended. Ambulatory with steady gait.

## 2024-09-09 NOTE — ED ADULT NURSE NOTE - CAS ELECT INFOMATION PROVIDED
RN provided and reviewed DC papers with pt and pt daughter. Pt and pt daughter verbalized understanding and reported no further questions. IV removed. Pt wheeled out of ED./DC instructions

## 2024-09-10 VITALS
TEMPERATURE: 98 F | OXYGEN SATURATION: 97 % | SYSTOLIC BLOOD PRESSURE: 110 MMHG | HEART RATE: 82 BPM | DIASTOLIC BLOOD PRESSURE: 69 MMHG | RESPIRATION RATE: 16 BRPM

## 2024-09-10 PROCEDURE — 96374 THER/PROPH/DIAG INJ IV PUSH: CPT

## 2024-09-10 PROCEDURE — 83690 ASSAY OF LIPASE: CPT

## 2024-09-10 PROCEDURE — 80048 BASIC METABOLIC PNL TOTAL CA: CPT

## 2024-09-10 PROCEDURE — 74177 CT ABD & PELVIS W/CONTRAST: CPT | Mod: MC

## 2024-09-10 PROCEDURE — 36415 COLL VENOUS BLD VENIPUNCTURE: CPT

## 2024-09-10 PROCEDURE — 85025 COMPLETE CBC W/AUTO DIFF WBC: CPT

## 2024-09-10 PROCEDURE — 99284 EMERGENCY DEPT VISIT MOD MDM: CPT | Mod: 25

## 2024-09-10 PROCEDURE — 96375 TX/PRO/DX INJ NEW DRUG ADDON: CPT

## 2024-09-10 PROCEDURE — 74177 CT ABD & PELVIS W/CONTRAST: CPT | Mod: 26,MC

## 2024-09-10 RX ORDER — KETOROLAC TROMETHAMINE 30 MG/ML
15 INJECTION, SOLUTION INTRAMUSCULAR ONCE
Refills: 0 | Status: DISCONTINUED | OUTPATIENT
Start: 2024-09-10 | End: 2024-09-10

## 2024-09-10 RX ADMIN — KETOROLAC TROMETHAMINE 15 MILLIGRAM(S): 30 INJECTION, SOLUTION INTRAMUSCULAR at 00:43

## 2024-09-10 NOTE — ED ADULT NURSE REASSESSMENT NOTE - NS ED NURSE REASSESS COMMENT FT1
Report taken from RAYMOND Raymundo. Pt awake and alert, AOx4. Pt breathing spontaneously and unlabored. Pt updated on plan of care that we are awaiting her daughter to pick her up. VSS.

## 2024-09-10 NOTE — ED PROVIDER NOTE - PHYSICAL EXAMINATION
General:  Uncomfortable   HEENT:  No conjunctival injection, neck supple, no congestion   Chest:  Non-tender, no crepitance  Lungs:  Clear to auscultation bilaterally   Heart:  s1s2 normal, no murmur  Abdomen:  soft, +bowel sounds, moderate diffuse tenderness greatest in RLQ   :  Deferred  Rectal:  Deferred  Extremities: Full rom   Neuro:  Alert, conversant, motor/sensory grossly intact

## 2024-09-10 NOTE — ED PROVIDER NOTE - PROGRESS NOTE DETAILS
tawny - received on sign out pending CT imaging. thus far labs ok  CT w/o acute findings.   pt resting comfortably. stable vitals. rpt abd exam benign and tolerating po.   ok for dc w supportive care and outpt follow up. discussed w niece over phone.  daughter had to leave to put kids on bus but will return around 8am to pick her up    All results reviewed with the patient verbally. Discharge plan and return precautions d/w pt who verbalized understanding and agrees with plan. All questions answered. Vitals WNL. Ready for d/c.

## 2024-09-10 NOTE — ED PROVIDER NOTE - PATIENT PORTAL LINK FT
You can access the FollowMyHealth Patient Portal offered by [facility] by registering at the following website: [email or web address]. By joining [facility]’s FollowMyHealth portal, you will also be able to view your health information using other applications (apps) compatible with our system.

## 2024-09-10 NOTE — ED PROVIDER NOTE - OBJECTIVE STATEMENT
83 yo F 83 yo F PMH HTN, HLD, afib presenting with abdominal pain  Patient ate rice mixed with meat and vegetables this afternoon and became ill 2 hours later   She cooked the food yesterday  No one else at the same food  2 hrs after eating the meal, the patient vomited multiple times and developed diffuse abd pain, greater to the blq  No diarrhea  Chills, but not sure if she had a fever

## 2024-09-10 NOTE — ED PROVIDER NOTE - CLINICAL SUMMARY MEDICAL DECISION MAKING FREE TEXT BOX
85 yo F PMH HTN, HLD, afib presenting with vomiting/abd pain 2 hrs after eating home cooked rice/veg/meat.  Food-borne illness possible, although she does not have expected diarrhea.  Other possibilities include gastritis, appy, colitis.  Will check labs, ct,, give meds for symptom relief.     12:05:  Patient still with pain.  Will give additional meds.

## 2024-09-10 NOTE — ED PROVIDER NOTE - CARE PROVIDER_API CALL
Kalen Atwood  Gastroenterology  [address], [address]  [place] [address]  Phone: [phone]  Fax: [phone]  Follow Up Time:

## 2024-09-10 NOTE — ED PROVIDER NOTE - NSFOLLOWUPINSTRUCTIONS_ED_ALL_ED_FT
Shanti líquidos, manténgase hidratado  Consuma shwetha dieta blanda y evite las comidas grasosas y picantes, la cafeína y el alcohol.   Java los medicamentos que se describen a continuación según las indicaciones.   Karli un seguimiento con donovan médico de atención primaria dentro de 1 semana para shwetha evaluación continua.   También debes realizar un seguimiento con un GASTROENTEROLOGO si tus síntomas mejoran. Consulte la información de la oficina que figura aquí.     Regrese a rj Departamento de Emergencia si elmira síntomas persisten, empeoran o surgen nuevos síntomas, tamar dolor en el pecho, dificultad para respirar, náuseas/vómitos incontrolables, vómitos con erica, dolor abdominal intenso, erica en las heces o heces negras alquitranadas, o cualquier otra inquietud.      TODOS LOS SIGUIENTES MEDICAMENTOS NO REQUIEREN RECETA Y SE PUEDEN ADQUIRIR EN CUALQUIER FARMACIA:    1) FAMOTIDINA 20 mg shwetha pastilla 2 veces al día (mañana y noche) james 1 mes    2) MYLANTA según sea necesario según las indicaciones del frasco: otro medicamento gastrointestinal para recubrir el estómago y tratar los síntomas, no disminuye la cantidad de ácido en el estómago, sino que simplemente recubre el estómago y ayudará con el alivio inmediato de los síntomas.    ____    Drink fluids, stay hydrated  Eat a bland diet and Avoid greasy and spicy foods, caffeine and alcohol.   Take the medications described below as directed.   Follow up with your primary care doctor within 1 week for continued evaluation.   You should also follow up with a GASTROENTEROLOGIST if your symptoms improve. See office information listed here.     Return to this Emergency Department if your symptoms are persistent, worsening or new symptoms arise such as chest pain, shortness of breath, uncontrollable nausea/vomiting, vomiting blood, severe abdominal pain, blood in stool or black tarry stools, or any other concerns.      ALL OF THE FOLLOWING MEDICATIONS DO NOT REQUIRE A PRESCRIPTION AND CAN BE PURCHASED IN ANY PHARMACY:    1) FAMOTIDINE 20mg one tab 2 times a day (morning and night) for 1 month    2) MYLANTA as needed as directed on bottle - another GI medication to coat the stomach and treat the symptoms, does not decrease the amount of acid in the stomach but rather just coats the stomach and will help with immediate symptom relief.

## 2024-09-24 ENCOUNTER — APPOINTMENT (OUTPATIENT)
Dept: PHYSICAL MEDICINE AND REHAB | Facility: CLINIC | Age: 84
End: 2024-09-24
Payer: MEDICARE

## 2024-09-24 VITALS
WEIGHT: 127 LBS | HEIGHT: 57 IN | OXYGEN SATURATION: 97 % | DIASTOLIC BLOOD PRESSURE: 77 MMHG | BODY MASS INDEX: 27.4 KG/M2 | SYSTOLIC BLOOD PRESSURE: 141 MMHG | HEART RATE: 77 BPM

## 2024-09-24 DIAGNOSIS — M46.1 SACROILIITIS, NOT ELSEWHERE CLASSIFIED: ICD-10-CM

## 2024-09-24 DIAGNOSIS — Z87.39 PERSONAL HISTORY OF OTHER DISEASES OF THE MUSCULOSKELETAL SYSTEM AND CONNECTIVE TISSUE: ICD-10-CM

## 2024-09-24 DIAGNOSIS — K52.3 INDETERMINATE COLITIS: ICD-10-CM

## 2024-09-24 DIAGNOSIS — R26.9 UNSPECIFIED ABNORMALITIES OF GAIT AND MOBILITY: ICD-10-CM

## 2024-09-24 DIAGNOSIS — M47.817 SPONDYLOSIS W/OUT MYELOPATHY OR RADICULOPATHY, LUMBOSACRAL REGION: ICD-10-CM

## 2024-09-24 DIAGNOSIS — Z96.641 PRESENCE OF RIGHT ARTIFICIAL HIP JOINT: ICD-10-CM

## 2024-09-24 DIAGNOSIS — M48.062 SPINAL STENOSIS, LUMBAR REGION WITH NEUROGENIC CLAUDICATION: ICD-10-CM

## 2024-09-24 DIAGNOSIS — M67.959 UNSPECIFIED DISORDER OF SYNOVIUM AND TENDON, UNSPECIFIED THIGH: ICD-10-CM

## 2024-09-24 DIAGNOSIS — M80.88XS: ICD-10-CM

## 2024-09-24 PROCEDURE — 99215 OFFICE O/P EST HI 40 MIN: CPT

## 2024-09-24 PROCEDURE — G2211 COMPLEX E/M VISIT ADD ON: CPT

## 2024-09-24 NOTE — HISTORY OF PRESENT ILLNESS
[FreeTextEntry1] : Renny Gilbert M.D. Sports Medicine and Spine Department of Physical Medicine and Rehabilitation  [facility] Orthopa[facility] [address] [place] [address]  [facility] [place] at [facility] [address], [address] [place] [address]  For [place] Appointments Phone: [phone] Fax: [phone]  ----------------------------------------------------------------------------------------------------------------------------------------  PATIENT: ESTRADA DIAZ  MRN: [id number]  YOB: 1940  DATE OF SERVICE: Sep 24, 2024   Referred by CANDIDO AWAD PCP ADDRESS:  Sep 24, 2024   Dear    Thank you for referring DIVA EMILY to my Sports and Interventional Spine practice and office. Enclosed is a copy of the patient's consultation/progress note, which includes my complete assessment and recent studies completed during the patient's evaluation.  If you have questions or have any patients who require nonsurgical, non-opiate management of any sports, spine, or musculoskeletal conditions, please do not hesitate to contact my , Salena Alarcon at [phone].  I look forward to taking care of your patients along with you.  Sincerely,  Renny Gilbert MD Sports, Spine, & Regenerative Musculoskeletal Medicine Orthopaedic [place] [facility]                                                     Follow up Visit CC: back pain, leg pain, cannot walk  HPI:  This is a follow up visit to [facility] [place] at [facility].    ESTRADA DIAZ presents with the chief complaint as above.    Interval Hx on Sep 24, 2024: presents for follow up. At the last visit, we advised Since the last visit, patient has There have been no significant changes to their aggravating or alleviating factors since the last visit. Since the last visit, they relate significant improvements in pain previously associated with known exacerbating, aggravating factors and situations. Pharmacologic treatments now include OTC analgesics PRN, but otherwise pharmacologic treatments are minimal. Given dearth of symptoms, pharmacologic treatments are now minimal. Denies new or worsened numbness, tingling, or focal motor deficit. Denies interval fall, accident, or injury. Denies change in bowel or bladder functioning.  Interval Hx on Jun 20, 2024: presents for follow up. Since last visit, patient has continued their pregabalin as prescribed. continues to have right leg pain. she notes that for the past few months, her right leg pain has progressively worsened. she believes that the medication may not be as effective over time, has also been taking tylenol most nights of the week due to pain. patient notes radicular L5 dermatome pain. There have been no significant changes to their aggravating or alleviating factors since the last visit. Since the last visit, they relate significant improvements in pain previously associated with known exacerbating, aggravating factors and situations. Pharmacologic treatments now include OTC analgesics PRN, but otherwise pharmacologic treatments are minimal. Denies new or worsened numbness, tingling, or focal motor deficit. Denies interval fall, accident, or injury. Denies change in bowel or bladder functioning.  Interval Hx on Apr 23, 2024: presents for follow up. Since last visit, patient was hospitalized and had stay in [facility]. patient was at home and suffered fall, brought to the ED and was hospitalized in Jan for perforated diverticulitis of distal duodenums/p IV abx course complicated by AFib with RVR, course further complicated by stroke code for LLE weakness, decreased sensation, facial droop with negative full inpatient workup. patient has been taking tylenol q4 hours. patient interested in resuming pregabalin, she completed her medication and was not able to continue. Since the last visit, they relate significant improvements in pain previously associated with known exacerbating, aggravating factors and situations. Pharmacologic treatments now include OTC analgesics PRN, but otherwise pharmacologic treatments are minimal. Denies new or worsened numbness, tingling, or focal motor deficit. Denies interval fall, accident, or injury. Denies change in bowel or bladder functioning. Patient advised to resume pregabalin 25 mg for the next week, then 50mg at bedtime after one week. Patient will follow up with our practice as planned following last visit. I have personally spent a total of at least 25 minutes preparing, reviewing internal and external records, explaining, counseling, and coordinating care for this patient encounter. In order to reach the patient, we attempted to call them several times. I have spent 16 minutes with the patient on the telephone.  Interval Hx on Jan 04, 2024: presents for follow up. Since last visit, patient has developed left hip pain, right continues to be less than left since trigger point injections. patient has trialed the topical compound spray is not helping much so she discontinued. At the last visit, they were advised to continue topical compound, obtain orthopedic shoes, discuss their situation with a , and follow up for possible trigger point injections to the right hip as we did in August 2023. patient has been walking 2-3 blocks. continues to taking tylenol 1-3 times per day for her hips. patient does not currently plan on follow up with [facility]. Since the last visit, they relate improvements in pain previously associated with known exacerbating, aggravating factors and situations. Pharmacologic treatments now include OTC analgesics PRN, but otherwise pharmacologic treatments are minimal. Denies new or worsened numbness, tingling, or focal motor deficit. Denies interval fall, accident, or injury. Denies change in bowel or bladder functioning.   Meds: denies regular PO pain medications Therapy Program: no recent structured targeted therapy program HEP: doing HEP regularly  Assoc Sx: Otherwise denies numbness, Tingling Denies Focal motor weakness in the upper or lower limbs Denies New or worsened bowel or bladder incontinence Denies Saddle anesthesia Denies Buckling Denies Using Orthotic(s)/Supportive devices Denies Swelling in the upper/lower extremities Denies Clicking They also deny frequent tripping, falling  ROS: A 14 point review of systems was completed. Positive findings are pain as described above. The remaining systems negative.  Breast Cancer Surveillance: up to date COVID HX: reviewed  Interval Hx on Oct 24, 2023: presents for follow up. Since last visit, she continues to have right posterior hip, right gluteal pain. patient relates having marked relief with writer directed treatments (trigger point injections) at their last visit. patient had evaluation with [facility], Dr. JOSEMANUEL Lovelace (Pain), was set to have epidural treatments, then decided to cancel the visit due to lack of support at home, she lives alone with help from home health aide. Patient has been otherwise managing their pain with home exercises, OTC analgesics. Overall, compared to their last visit pain is somewhat better. Since the last visit, they relate moderate improvements in pain previously associated with known exacerbating, aggravating factors and situations. Pharmacologic treatments now include OTC analgesics PRN, but otherwise pharmacologic treatments are minimal. Denies new or worsened numbness, tingling, or focal motor deficit. Denies interval fall, accident, or injury. Denies change in bowel or bladder functioning. Patient's nearest relative is in [place], writer suggested they accompany the patient after local treatment to the spine, patient states that is not an option at this time. patient's topical compound had been helping but was too costly. patient observed to be wearing overly large fitting sneaker at today's visit.  Interval Hx on Aug 29, 2023: presents for follow up. Since last visit, they have persistent right posterior hip pain, right lumbar paraspinous pain, right gluteal pain. Patient has been limited with ambulation, persistent difficulty climbing stairs.  Since the last visit, they relate improvements in pain previously associated with known exacerbating, aggravating factors and situations.  Pain has been more localized to the gluteal muscles, alleviated slightly with oral medications. has been taking lyrica with moderate relief. Pharmacologic treatments now include OTC analgesics PRN, but otherwise pharmacologic treatments are minimal. Denies new or worsened numbness, tingling, or focal motor deficit. Denies interval fall, accident, or injury. Denies change in bowel or bladder functioning.  Interval Hx on May 12, 2023: presents for follow up. Since last visit, they underwent further diagnostic workup including additional imaging studies. Patient informed of all relevant imaging findings, all questions were answered including multi-level lumbar degenerative changes, she continues to have osteoporosis. Patient relates that the pregabalin is helping somewhat. Patient has been using compound cream to the middle (thoracic) back as directed, asked for a refill, we called [facility] to confirm they would send her another refill. patient has been taking gabapentin/pregabalin for close to 6 months, no recent lab work. She explains that during her worst pain days she has improvement with brace. There have been no significant changes to their aggravating or alleviating factors since the last visit. Pharmacologic treatments now include OTC analgesics PRN, otherwise pharmacologic treatments are minimal. Denies new or worsened numbness, tingling, or focal motor deficit. Denies interval fall, accident, or injury. Denies change in bowel or bladder functioning.  Interval Hx on Mar 27, 2023: presents for follow up. Since last visit, they continued their therapy program, now at week 6. patient has relief from PT but intermittent severe axial > radicular pain persists. Pain can be localized to the midline low back. patient will then utilize their compound cream, OTC pharmacologic treatments for their pain. Patient open to trial of alternative neuropathic medication. There have been no significant changes to their aggravating or alleviating factors since the last visit. Pharmacologic treatments now include OTC analgesics PRN, but otherwise pharmacologic treatments are minimal. Denies new or worsened numbness, tingling, or focal motor deficit. Denies interval fall, accident, or injury. Denies change in bowel or bladder functioning.  Initial Hx on 01/23/2023:Presents in person to [facility]. patient with extensive low back pain. able to walk with her rollator, about 2 blocks in UES. patient reports fall recently and weakness in the BLE. fell several times at home and outside.The patient's difficulties began worsening over the past few years. The pain is graded as 9/10. The pain is described as persistent, burning pain in the right anterolateral thigh. The pain is constant. The pain does not radiate, located in the b/l lumbar region and over the right anterolateral hip. The patient feels that the pain is overall persistent. Patient denies other recent fall, MVA, injury, trauma, or accident besides presenting history above.  Aggravating: ambulation, prolonged sitting, prolonged standing, navigating stairs, sit to stand transitional movements, turning in bed, getting out of bed. Alleviating: rest, activity modification, avoiding prolonged walking, pharmacologic treatments  Assoc Hx: Ambulates with assistive device, rollator Injection Hx: several LESI, ZACARIAS in the past five years Imaging Hx: reviewed  Level of functioning: AD for ambulation, assistance for most ADLs Living Situation: elevator accessible apartment dwelling with steps to enter

## 2024-09-24 NOTE — ASSESSMENT
[FreeTextEntry1] : Assessment/Plan:  ESTRADA DIAZ is a 84 year female with back pain s/p > 10 lifetimes LESI (last in 2022) here for follow up  Greater trochanteric pain syndrome, Left > right Gluteal tendinitis, left Tensor Fascia Amrita Syndrome, Left Myofascial pain syndrome, lumbar  Facet degeneration of lumbosacral region Neurogenic claudication due to lumbar spinal stenosis Gait difficulty Chronic lumbar radiculopathy, L4, Right Tendinopathy of the gluteus medius Weakness of the hip, right  - Tiers of treatment and management of above diagnosis(es) were discussed with patient - Optimal diet, weight, sleep, and lifestyle management to minimize stress and maximize well being counseling provided - Imaging reviewed and discussed with patient - Reviewed previous encounter notes from 3/29/2023 Dr. JOSEMANUEL Anglin (Internal Medicine), 9/14/2022 Dr. JOE Nevarez (Pain) - Patient was advised to RESUME their structured, targeted therapy program 2-3x/wk for 8 wks with goal toward HEP, new script 1/4/24 & Sep 24, 2024 - Patient was educated on an appropriate home exercise program, provided with exercise recommendations, all questions answered - Patient may trial acetaminophen 1000mg up to TID PRN moderate to severe pain and to decrease average consumption of NSAIDs - Patient may continue to trial topical compound cream to the right low back no more than twice per day as needed for next 2-3 weeks, Rx entered via portal, written information provided to the patient in addition to verbal explanation regarding the medication, new script 10/24/2023 - Sep 24, 2024 advised to follow up with her PCP regarding colitis on their MRI bony pelvis, interesting possibility of related inflammatory pain  - Sep 24, 2024 patient may continue pregabalin 50mg in the AM and 75mg qHS. All questions were answered and the patient displayed a clear understanding of the plan of care, including titration of pregabalin. The patient was informed about not taking this medication at the same time as any sleeping or muscle relaxant pills. Pt was also notified to stop, and contact our office, if it is too sedating, ankle swelling occurs and/or they experience suicidal thinking  - provided with info about access a ride, due to patient's multiple medical conditions, they are unable to walk greater than 10-15 feet - patient relates h/o decreased bone mineral density and fall with multiple rib fractures, repeat DEXA, order re-placed  - 5/12/2023: placed order for lumbar corset (LSO) - 5/12/2023: Ordered lab testing for renal function, has been taking gabapentin/pregabalin > 6 months, GFR wnl, slightly decreased SCr - placed referral to Audiology, noted consultation that claimed no indication for hearing aid device - Patient was advised to apply cool compresses or warm heat to affected regions PRN - Given h/o > 10 lifetimes LESI, sent for consultation with [facility] - Regarding orthopedic shoes on 10/24/2023: provided referral for orthopedic shoes with wide toe box, velcro strap closure, and durable rubber outsole; medically necessary given observed functional gait impairments, unsteady gait, and neurogenic claudication, necessary for safety, ease of transferring, easy closure of proper fitting shoes, prevent further injury, morbidity from fall due to improper footwear - 10/24/23: provided with referral for  to help access resources including additional health insurance plans  - Educated about red flag symptoms including (but not limited to) new, worsened, or persistent: fever greater than 100F, bowel or bladder incontinence, bowel obstipation, inability to void urine, urinary leakage, Severe nausea or vomiting, Worsening numbness, worsening tingling/paresthesias, and/or new or progressive motor weakness; advised to seek immediate medical attention at his nearest Emergency department should they experience any of the above  - 09/24/2024:  MRI lumbar without contrast is indicated given that the pt has not improved with tylenol, ibuprofen, naproxen, meloxicam, they obtained non-diagnostic MRI bony pelvis 7/2024, now requires follow up study of lumbar spine due to advanced lumbosacral spondylosis and compression deformities incompletely observed on MRI bony pelvis, and physical therapy/home exercise program>6 weeks. Patient's imaging is medically necessary to outline targets for locally (interventional) directed treatments and/or guide surgical management.  - Follow up in person in 2 months to assess their progress with resuming PT, assess effectiveness of the left hip injections 1/4/24, assess need for further adjustments to their rollator (lowered handles 1/4/2024)  I have personally spent a total of at least 45 minutes preparing, reviewing internal and external records, explaining, counseling, and coordinating care for this patient encounter.  Thank you, CANDIDO ANGLIN, for allowing me to participate in the care of your patient. Please do not hesitate to contact me with questions/concerns.  Renny Gilbert M.D. Sports and Spine Department of Physical Medicine and Rehabilitation [facility] Orthopaedic [address] [place] [address]  Appointments: [phone] Fax: [phone]

## 2024-09-24 NOTE — PROCEDURE
[de-identified] : PROCEDURE #1  Greater trochanteric bursa , LEFT 25 gauge, 1.5 inch Injectate lidocaine 1% 2mL Patient positioned in a lateral recumbent position with painful hip exposed and prepped with chlorhexidine wipes. The greater trochanter was identified by palpating the femur from the mid-shaft proximally until the area of bony protrusion is reached.  The injection site was the point of maximal tenderness or swelling, closest to the superoposterior aspect of the Greater Trochanter. At the area most tender in the region of the greater trochanter, a 25-gauge, one and one-half-inch needle was inserted perpendicular to the skin. The needle was inserted directly down to bone and then withdrawn two to three millimeters before injecting.   PROCEDURE #2 01/04/2024  Trigger point injections  20[address] one or two muscle group   Pre/Post Procedure Diagnosis: Myofascial Pain   Procedure Performed by:  Dr. Renny Gilbert Consent: Verbal and written informed consent was obtained/reviewed with the patient.  Risks, benefits, alternatives discussed in detail. All questions were answered.   Site was then marked.   Position: lateral recumbent, right   Anesthesia: none   The skin was cleaned with alcohol   Time out was then performed as per protocol.   Needle used: 25 G 1.5 inch   Injectate: 1.0% lidocaine without epinephrine, 6 mL    The following trigger points were then identified:  right TFL x1 right gluteus medius x1 right gluteus adria x1   After negative aspiration each of these trigger points were then injected. Total volume of the injectate administered was 4 ml into each of these trigger points.   EBL: less than 1 ml   Complications: None   Specimen: None   Fluids: None   Post Procedure: NRS: 1/10   Patient was observed in the room. Patient was stable upon discharge. Detailed post procedure instructions were provided.   Patient to call in the event of worsening pain, fever, weakness or numbness   Plan:  Further treatment will be based upon the response to the injection performed today and if found beneficial may be repeated as indicated.   The patient has local pain symptoms that have persisted for more than 3 months causing tenderness and/or weakness, restricting motion and/or causing referred pain when compressed; and a taut band is palpable in an accessible muscle with exquisite tenderness at one point along its length; and the patient has been refractory or intolerant of conservative therapies such as bed rest, active exercises, ultrasound, range of motion, heating or cooling modalities, massage, and pharmacotherapies (e.g. NSAIDS), muscle relaxants, non-narcotic analgesics, and anti-depressants for a period of at least 1 month; and the TPIs are being given as part of an overall management plan.  The patient reports a response to prior trigger point injections with improvement in pain and functional status with a greater than 50% pain relief for 6 weeks.  Repeat injection is being completed secondary to return of pain and deterioration in functional status. The injection is being completed to facilitate mobilization by providing pain relief and assist in application of non-invasive modalities.

## 2024-09-24 NOTE — DATA REVIEWED
[FreeTextEntry1] : SITE PERFORMED: [facility] SITE PHONE: [phone] Patient: ESTRADA DIAZ YOB: 1940 Phone: [phone] MRN: [id number] Acc: [id number] Date of Exam: 07- EXAM:  MRI PELVIS WITHOUT CONTRAST HISTORY:  History of right total hip arthroplasty with worsening right hip pain; concern for osseous and/or hardware abnormality. TECHNIQUE: Multiplanar, multi-sequential MRI of the bony pelvis was obtained on a 3T scanner according to standard protocol.  COMPARISON:  None available. FINDINGS:    Evaluation is limited by technique with large field-of-view pelvis protocol performed, limited field-of-view on sagittal sequences and marked susceptibility artifact from the right hip hardware on this 3T study. Within these limitations, findings are as follows Osseous structures/joints:  Marked susceptibility artifact from the right hip hardware during evaluation. No acute fracture identified. No osteonecrosis. Limited evaluation of the left hip joint demonstrates moderate osteoarthrosis without focal high-grade articular cartilage defect or underlying subchondral marrow signal abnormality. No joint effusion or erosion. Degenerative labral tearing. Partially imaged degenerative changes of the sacroiliac joints without effusion or erosion. Mild degenerative changes of the symphysis pubis. Moderate to severe degenerative changes of the visualized lower lumbar spine with grade 1 anterolisthesis of presumed L5 on S1. Moderate posterior disc bulge at presumed L4-L5 resulting in partially imaged central spinal canal narrowing. Partially imaged neural foraminal narrowing.  Muscles/tendons:  Diffuse fatty muscle atrophy without edema. The right hip tendons are obscured by artifact. Intact left hip flexor tendons. Partially evaluated tendinosis of the left hip abductor tendons. Moderate to severe left hamstring origin tendinosis with superimposed tearing at the ischial tuberosity. No left trochanteric bursitis. The adductor tendons are grossly intact. Neurovascular structures:  Unremarkable. Pelvic cavity:  Moderate stool burden within the rectal vault without surrounding edema to suggest or cortical colitis. Sigmoid diverticulosis without evidence of diverticulitis. Leiomyomatous uterus. Subcutaneous tissues: Subcutaneous edema along the inferomedial gluteal clefts bilaterally without drainable fluid collection IMPRESSION: MRI of the bony pelvis demonstrates: 1.  Limited study due to selected protocol and technique. Given the documented indication for right hip pain and concern for hardware versus osseous abnormality, consider dedicated right hip MRI on the low magnetic field magnet utilizing metal suppression sequences. 2.  No acute osseous abnormality identified. 3.  Moderate left hip osteoarthrosis with degenerative labral tearing. 4.  Moderate to severe degenerative changes of the visualized lower lumbar spine. Consider dedicated lumbar spine imaging to further evaluate as clinically warranted. 5.  Partially evaluated tendinosis of the left hip abductor tendons. 6.  Moderate to severe left hamstring origin tendinosis with superimposed tearing at the ischial tuberosity. 7.  Additional chronic findings as above  SITE PHONE: [phone] Patient: ESTRADA DIAZ YOB: 1940 Phone: [phone] MRN: [id number] Acc: [id number] Date of Exam: 09- EXAM:  MRI LUMBAR SPINE WITHOUT CONTRAST HISTORY: Lower back pain. TECHNIQUE: Multiplanar, multi-sequential MRI of the lumbar spine was obtained on a 3T scanner using a standard protocol. COMPARISON:  MRI lumbar spine 3/27/2020, CT lumbar spine 6/5/2023. FINDINGS: For purposes of this dictation, the last well-formed disc space will be labeled L5-S1. OSSEOUS STRUCTURES: Vertebral body heights are preserved. Anterior marginal osteophytes, endplate degenerative changes and Schmorl's nodes throughout the lumbar spine. No marrow edema. ALIGNMENT: Levoscoliosis of the lumbar spine. Mild retrolisthesis of L1-2. Grade 1 anterolisthesis of L2-3, L4-5 and L5-S1. SPINAL CORD AND CONUS MEDULLARIS: The conus medullaris terminates at L1. PARASPINAL AND INTRA-ABDOMINAL SOFT TISSUES: Asymmetric atrophy of the right psoas musculature compared to the left. Moderate symmetric fatty atrophy of the paravertebral musculature. Fibroid uterus. DISCS: Moderate to severe disc height loss at T10-11 to L4-5. Multilevel disc desiccation. The following axial levels are imaged and detailed below: T12-L1: Disc osteophyte complex and facet hypertrophy with mild spinal canal stenosis and moderate left neuroforaminal stenosis. L1-L2: Mild retrolisthesis, disc osteophyte complex, ligamentum flavum thickening and facet hypertrophy with mild spinal canal stenosis, severe left and mild right neuroforaminal stenosis. L2-L3: Grade 1 anterolisthesis, disc osteophyte complex, ligamentum flavum thickening and facet hypertrophy with mild spinal canal stenosis and moderate bilateral neuroforaminal stenosis. L3-L4: Disc osteophyte complex, ligamentum flavum thickening and facet hypertrophy with mild spinal canal stenosis, moderate/severe right and mild left neuroforaminal stenosis. L4-L5: Grade 1 anterolisthesis, disc osteophyte complex and facet hypertrophy with moderate spinal canal stenosis and moderate/severe right and moderate left neuroforaminal stenosis. L5-S1: Grade 1 anterolisthesis of L5-S1, disc uncovering and facet hypertrophy with moderate right and mild left neuroforaminal stenosis. No spinal canal stenosis. IMPRESSION:   Multilevel lumbar spondylosis appears to have progressed since the MRI lumbar spine of 3/27/2020. Multilevel mild to moderate spinal canal stenosis and mild to severe neuroforaminal stenosis, as described above. Stable mild retrolisthesis of L1-2. Stable grade 1 anterolisthesis of L2-3, L4-5 and L5-S1. Levoscoliosis of the lumbar spine.

## 2024-09-24 NOTE — REVIEW OF SYSTEMS
[de-identified] : 68 y/o male with knee osteoarthritis; R>L.  I discussed the treatment of degenerative arthritis with the patient at length today, as well as the chronic degenerative process and likely future progression of the disease. Pain may be related to the bony degenerative changes seen on XR imaging, or the associated degeneration to the soft tissues within the knee joint, including the cartilage, meniscus, or ligamentous structures. I described the spectrum of treatment options available including nonoperative modalities to total joint arthroplasty. Noninvasive and nonoperative treatment modalities include weight reduction, activity modification with low impact exercise, PRN use of acetaminophen or anti-inflammatory medication, natural anti-inflammatory supplements, glucosamine/chondroitin, and physical therapy (for strengthening and conditioning). More invasive treatments can include injection therapies; including cortisone, hyaluronic acid (visco-supplementation), or platelet-rish-plasma (PRP) injections. Definitive treatment could also include future total joint arthroplasty if symptoms persist and cause prolonged disability or interference with activities of daily living.   The risks and benefits of each treatment options was discussed and all questions were answered. At this time recommendations are for conservative and symptommatic care as detailed above with impact loading activity restriction, low impact exercise and avoidance of strenuous activity. Other simple recommendations include OTC NSAID's or acetaminophen (if tolerated/able), with application of ice to the knee 2-3x daily for 20 minutes or after periods of activity.   Given the degree of arthrosis present on imaging, I discussed potential limitations of significant symptommatic improvement with prolonged conservative treatment as outlined.  Patient is failing conservative management at this time. Patient may benefit from consideration of TKA at this time.   Referral provided for Orthopaedic Arthroplasty consultation.   Follow up as needed. [Patient Intake Form Reviewed] : Patient intake form was reviewed

## 2024-09-24 NOTE — PHYSICAL EXAM
[FreeTextEntry1] : Gen: A+O x 3 in NAD Psych: Normal mood and affect. Responds appropriately to commands Eyes: Anicteric. No discharge. EOMI. Resp: Breathing unlabored CV: DP pulses 2+ and equal. No varicosities noted Ext: No c/c/e Skin: No lesions noted  Gait: persistent 01/04/2024 ++ thoracic kyphosis neg antalgic  +  reciprocating heel to toe able to stand on toes and heels WITH hand holding Tandem gait intact WITH hand holding Poor single leg standing balance R or L Romberg deferred  Trendelenburg present with R>L stance leg  Inspection: Spine alignment is midline. Palpation: There is + tenderness over the midline spinous processes, paravertebral muscles of the thoracolumbar region  persistent 09/24/2024 + TTP over the left GTB persistent 09/24/2024 + TTP over the left TFL persistent 09/24/2024 + TTP over the left GLuteal tendons  Lumbar ROM: Flexion, extension, side-bending, rotation, limited in most planes now 01/04/2024 minimal +with lateral flexion now 01/04/2024 minimal +with oblique extension now 01/04/2024 minimal +pain with lateral rotation   Hip ROM: Jun 20, 2024 ++ pain at terminal ROM RIGHT FAIR, FABERE + RIGHT .  	Hip Flex       Knee Ext      Ankle Dorsi           EHL        Ankle Plantar Right	4-/5	        5/5	                 5/5	          4/5	             5/5                            Left	4/5	        5/5	                 5/5	          4/5	             5/5                             Hip abduction R 3/5 L4 /5 Hip adduction R 3/5 L 4/5 Hip extension R 3/5 L 4/5 Knee Flexion R 3/5 L 4/5  Tone: Normal. No clonus. Sensation: Grossly intact to light touch bilateral lower limbs. Proprioception: Intact at big toes bilaterally. Reflexes: 2+ symmetric knee jerk, ankle jerk.  Plantars downgoing bilaterally.  SLR negative bilaterally Crossed SLR negative bilaterally. Slump Test + right S1

## 2024-10-21 ENCOUNTER — RX RENEWAL (OUTPATIENT)
Age: 84
End: 2024-10-21

## 2024-11-04 ENCOUNTER — RX RENEWAL (OUTPATIENT)
Age: 84
End: 2024-11-04

## 2024-11-19 ENCOUNTER — APPOINTMENT (OUTPATIENT)
Dept: GASTROENTEROLOGY | Facility: CLINIC | Age: 84
End: 2024-11-19
Payer: MEDICARE

## 2024-11-19 VITALS
BODY MASS INDEX: 28.91 KG/M2 | DIASTOLIC BLOOD PRESSURE: 70 MMHG | OXYGEN SATURATION: 98 % | HEIGHT: 57 IN | HEART RATE: 63 BPM | SYSTOLIC BLOOD PRESSURE: 125 MMHG | WEIGHT: 134 LBS | TEMPERATURE: 97.7 F | RESPIRATION RATE: 15 BRPM

## 2024-11-19 DIAGNOSIS — R45.89 OTHER SYMPTOMS AND SIGNS INVOLVING EMOTIONAL STATE: ICD-10-CM

## 2024-11-19 DIAGNOSIS — F41.9 ANXIETY DISORDER, UNSPECIFIED: ICD-10-CM

## 2024-11-19 DIAGNOSIS — R11.2 NAUSEA WITH VOMITING, UNSPECIFIED: ICD-10-CM

## 2024-11-19 PROCEDURE — 99214 OFFICE O/P EST MOD 30 MIN: CPT

## 2024-11-19 RX ORDER — PANTOPRAZOLE 40 MG/1
40 TABLET, DELAYED RELEASE ORAL
Qty: 60 | Refills: 0 | Status: ACTIVE | COMMUNITY
Start: 2024-11-19 | End: 1900-01-01

## 2024-11-21 DIAGNOSIS — A04.8 OTHER SPECIFIED BACTERIAL INTESTINAL INFECTIONS: ICD-10-CM

## 2024-11-21 LAB
ALBUMIN SERPL ELPH-MCNC: 4.5 G/DL
ALP BLD-CCNC: 118 U/L
ALT SERPL-CCNC: 19 U/L
ANION GAP SERPL CALC-SCNC: 14 MMOL/L
AST SERPL-CCNC: 24 U/L
BASOPHILS # BLD AUTO: 0.02 K/UL
BASOPHILS NFR BLD AUTO: 0.4 %
BILIRUB SERPL-MCNC: 0.4 MG/DL
BUN SERPL-MCNC: 15 MG/DL
CALCIUM SERPL-MCNC: 10 MG/DL
CHLORIDE SERPL-SCNC: 104 MMOL/L
CO2 SERPL-SCNC: 22 MMOL/L
CREAT SERPL-MCNC: 0.59 MG/DL
EGFR: 90 ML/MIN/1.73M2
EOSINOPHIL # BLD AUTO: 0 K/UL
EOSINOPHIL NFR BLD AUTO: 0 %
FERRITIN SERPL-MCNC: 37 NG/ML
GLUCOSE SERPL-MCNC: 116 MG/DL
HCT VFR BLD CALC: 39.3 %
HGB BLD-MCNC: 12.4 G/DL
IMM GRANULOCYTES NFR BLD AUTO: 0.4 %
LYMPHOCYTES # BLD AUTO: 1.18 K/UL
LYMPHOCYTES NFR BLD AUTO: 22.6 %
MAN DIFF?: NORMAL
MCHC RBC-ENTMCNC: 31.6 G/DL
MCHC RBC-ENTMCNC: 31.6 PG
MCV RBC AUTO: 100.3 FL
MONOCYTES # BLD AUTO: 0.37 K/UL
MONOCYTES NFR BLD AUTO: 7.1 %
NEUTROPHILS # BLD AUTO: 3.63 K/UL
NEUTROPHILS NFR BLD AUTO: 69.5 %
PLATELET # BLD AUTO: 276 K/UL
POTASSIUM SERPL-SCNC: 4.7 MMOL/L
PROT SERPL-MCNC: 6.6 G/DL
RBC # BLD: 3.92 M/UL
RBC # FLD: 13 %
SODIUM SERPL-SCNC: 140 MMOL/L
UREA BREATH TEST QL: POSITIVE
WBC # FLD AUTO: 5.22 K/UL

## 2024-11-21 RX ORDER — BISMUTH SUBSALICYLATE 262 MG/1
262 TABLET, CHEWABLE ORAL 4 TIMES DAILY
Qty: 56 | Refills: 0 | Status: ACTIVE | COMMUNITY
Start: 2024-11-21 | End: 1900-01-01

## 2024-11-21 RX ORDER — METRONIDAZOLE 250 MG/1
250 TABLET ORAL 4 TIMES DAILY
Qty: 56 | Refills: 0 | Status: ACTIVE | COMMUNITY
Start: 2024-11-21 | End: 1900-01-01

## 2024-11-25 RX ORDER — TETRACYCLINE HYDROCHLORIDE 500 MG/1
500 TABLET, FILM COATED ORAL 4 TIMES DAILY
Qty: 56 | Refills: 0 | Status: DISCONTINUED | COMMUNITY
Start: 2024-11-21 | End: 2024-11-25

## 2024-11-25 RX ORDER — DOXYCYCLINE HYCLATE 100 MG/1
100 TABLET ORAL TWICE DAILY
Qty: 28 | Refills: 0 | Status: ACTIVE | COMMUNITY
Start: 2024-11-25 | End: 1900-01-01

## 2024-12-03 ENCOUNTER — NON-APPOINTMENT (OUTPATIENT)
Age: 84
End: 2024-12-03

## 2024-12-17 ENCOUNTER — APPOINTMENT (OUTPATIENT)
Dept: PHYSICAL MEDICINE AND REHAB | Facility: CLINIC | Age: 84
End: 2024-12-17
Payer: MEDICARE

## 2024-12-17 VITALS
WEIGHT: 134 LBS | OXYGEN SATURATION: 98 % | HEIGHT: 57 IN | HEART RATE: 74 BPM | DIASTOLIC BLOOD PRESSURE: 72 MMHG | SYSTOLIC BLOOD PRESSURE: 141 MMHG | BODY MASS INDEX: 28.91 KG/M2

## 2024-12-17 DIAGNOSIS — R26.9 UNSPECIFIED ABNORMALITIES OF GAIT AND MOBILITY: ICD-10-CM

## 2024-12-17 DIAGNOSIS — M54.9 DORSALGIA, UNSPECIFIED: ICD-10-CM

## 2024-12-17 DIAGNOSIS — M48.062 SPINAL STENOSIS, LUMBAR REGION WITH NEUROGENIC CLAUDICATION: ICD-10-CM

## 2024-12-17 DIAGNOSIS — M47.814 SPONDYLOSIS W/OUT MYELOPATHY OR RADICULOPATHY, THORACIC REGION: ICD-10-CM

## 2024-12-17 DIAGNOSIS — M47.817 SPONDYLOSIS W/OUT MYELOPATHY OR RADICULOPATHY, LUMBOSACRAL REGION: ICD-10-CM

## 2024-12-17 DIAGNOSIS — M46.1 SACROILIITIS, NOT ELSEWHERE CLASSIFIED: ICD-10-CM

## 2024-12-17 DIAGNOSIS — M79.18 MYALGIA, OTHER SITE: ICD-10-CM

## 2024-12-17 DIAGNOSIS — M79.2 NEURALGIA AND NEURITIS, UNSPECIFIED: ICD-10-CM

## 2024-12-17 DIAGNOSIS — Z87.39 PERSONAL HISTORY OF OTHER DISEASES OF THE MUSCULOSKELETAL SYSTEM AND CONNECTIVE TISSUE: ICD-10-CM

## 2024-12-17 DIAGNOSIS — R29.898 OTHER SYMPTOMS AND SIGNS INVOLVING THE MUSCULOSKELETAL SYSTEM: ICD-10-CM

## 2024-12-17 PROCEDURE — 20553 NJX 1/MLT TRIGGER POINTS 3/>: CPT

## 2025-01-14 ENCOUNTER — NON-APPOINTMENT (OUTPATIENT)
Age: 85
End: 2025-01-14

## 2025-01-14 ENCOUNTER — APPOINTMENT (OUTPATIENT)
Dept: GASTROENTEROLOGY | Facility: CLINIC | Age: 85
End: 2025-01-14
Payer: MEDICARE

## 2025-01-14 VITALS
RESPIRATION RATE: 16 BRPM | BODY MASS INDEX: 29.47 KG/M2 | HEART RATE: 70 BPM | DIASTOLIC BLOOD PRESSURE: 65 MMHG | HEIGHT: 57 IN | OXYGEN SATURATION: 99 % | WEIGHT: 136.6 LBS | TEMPERATURE: 98 F | SYSTOLIC BLOOD PRESSURE: 148 MMHG

## 2025-01-14 DIAGNOSIS — A04.8 OTHER SPECIFIED BACTERIAL INTESTINAL INFECTIONS: ICD-10-CM

## 2025-01-14 PROCEDURE — 99214 OFFICE O/P EST MOD 30 MIN: CPT

## 2025-02-02 ENCOUNTER — EMERGENCY (EMERGENCY)
Facility: HOSPITAL | Age: 85
LOS: 1 days | Discharge: HOME | End: 2025-02-02
Attending: EMERGENCY MEDICINE | Admitting: STUDENT IN AN ORGANIZED HEALTH CARE EDUCATION/TRAINING PROGRAM
Payer: MEDICARE

## 2025-02-02 VITALS
TEMPERATURE: 98 F | OXYGEN SATURATION: 97 % | DIASTOLIC BLOOD PRESSURE: 75 MMHG | RESPIRATION RATE: 18 BRPM | HEART RATE: 64 BPM | SYSTOLIC BLOOD PRESSURE: 125 MMHG

## 2025-02-02 VITALS
RESPIRATION RATE: 17 BRPM | HEIGHT: 59 IN | TEMPERATURE: 98 F | DIASTOLIC BLOOD PRESSURE: 92 MMHG | SYSTOLIC BLOOD PRESSURE: 149 MMHG | OXYGEN SATURATION: 95 % | HEART RATE: 70 BPM | WEIGHT: 119.93 LBS

## 2025-02-02 DIAGNOSIS — Z96.649 PRESENCE OF UNSPECIFIED ARTIFICIAL HIP JOINT: Chronic | ICD-10-CM

## 2025-02-02 PROCEDURE — 70450 CT HEAD/BRAIN W/O DYE: CPT | Mod: 26

## 2025-02-02 PROCEDURE — 96374 THER/PROPH/DIAG INJ IV PUSH: CPT

## 2025-02-02 PROCEDURE — 72125 CT NECK SPINE W/O DYE: CPT | Mod: MC

## 2025-02-02 PROCEDURE — 72125 CT NECK SPINE W/O DYE: CPT | Mod: 26

## 2025-02-02 PROCEDURE — 99285 EMERGENCY DEPT VISIT HI MDM: CPT | Mod: FS

## 2025-02-02 PROCEDURE — 99284 EMERGENCY DEPT VISIT MOD MDM: CPT | Mod: 25

## 2025-02-02 PROCEDURE — 96375 TX/PRO/DX INJ NEW DRUG ADDON: CPT

## 2025-02-02 PROCEDURE — 70450 CT HEAD/BRAIN W/O DYE: CPT | Mod: MC

## 2025-02-02 RX ORDER — METOCLOPRAMIDE 10 MG/1
10 TABLET ORAL ONCE
Refills: 0 | Status: COMPLETED | OUTPATIENT
Start: 2025-02-02 | End: 2025-02-02

## 2025-02-02 RX ORDER — KETOROLAC TROMETHAMINE 10 MG
15 TABLET ORAL ONCE
Refills: 0 | Status: DISCONTINUED | OUTPATIENT
Start: 2025-02-02 | End: 2025-02-02

## 2025-02-02 RX ORDER — MAGNESIUM SULFATE 0.8 MEQ/ML
1 AMPUL (ML) INJECTION ONCE
Refills: 0 | Status: COMPLETED | OUTPATIENT
Start: 2025-02-02 | End: 2025-02-02

## 2025-02-02 RX ADMIN — METOCLOPRAMIDE 104 MILLIGRAM(S): 10 TABLET ORAL at 18:20

## 2025-02-02 RX ADMIN — Medication 100 GRAM(S): at 18:35

## 2025-02-02 RX ADMIN — Medication 15 MILLIGRAM(S): at 20:51

## 2025-02-02 NOTE — ED PROVIDER NOTE - CLINICAL SUMMARY MEDICAL DECISION MAKING FREE TEXT BOX
Patient complaining of closed head injury 2 weeks ago with subsequent headache for the past 2 weeks.  No neurodeficits on exam.  Patient also notes right sided neck pain without midline tenderness, low concern for neck injury.?  Intracranial injury secondary to fall, concussion, doubt CVA, less concern for tension headache, migraine, atypical location-no concern for temporal arteritis.  Plan ct head, meds for ha; reassess. See progress notes for further mdm related documentation.

## 2025-02-02 NOTE — ED PROVIDER NOTE - PROGRESS NOTE DETAILS
Pt feeling better, ct pending read.  Pt signed out to Dr Manning and ZORA Hsieh. tawny - received on sign out pending CT results.   CT "IMPRESSION:  CT HEAD:  No acute intracranial hemorrhage, mass effect, or midline shift. Dilated   ventricles and sulcal spaces suggest normal pressure hydrocephalus versus   generalized parenchymal volume loss. Correlate clinically.  CT CERVICAL SPINE:  No acute fracture or traumatic subluxation.  Multi-level degenerative changes."    imaging as above. headache improved. resting comfortably. neuro intact. stable for dc.     All results reviewed with the patient verbally. Discharge plan and return precautions d/w pt who verbalized understanding and agrees with plan. All questions answered. Vitals WNL. Ready for d/c.

## 2025-02-02 NOTE — ED ADULT NURSE NOTE - OBJECTIVE STATEMENT
84yr/female presents to ED with c/o headache. Per EMS, patient had a fall this morning, patient states "I think I got up fast, the girl helped me I didn't fall."  Patient also reports fall with head injury 2 weeks ago, denies loss of consciousness. Denies numbness/weakness on one side of body, changes in vision or speech. No facial droop noted. Safety precautions maintained.

## 2025-02-02 NOTE — ED PROVIDER NOTE - PHYSICAL EXAMINATION
VITAL SIGNS: I have reviewed nursing notes and confirm.  CONSTITUTIONAL:  in no acute distress.   SKIN:  warm and dry, no acute rash.   HEAD:  normocephalic, atraumatic.  EYES: PERRL, EOM intact; conjunctiva and sclera clear.  ENT: No nasal discharge; airway clear.   NECK: Supple; non tender.  CARD: S1, S2 normal; no murmurs, gallops, or rubs. Regular rate and rhythm.   RESP:  Clear to auscultation b/l, no wheezes, rales or rhonchi.  ABD: Normal bowel sounds; soft; non-distended; non-tender; no guarding/ rebound.  MSK: Normal ROM. No clubbing, cyanosis or edema. no ttp bilat le, neck and back nontender midline   NEURO: awake, alert, oriented x 3, CN II-XII grossly intact, motor 5/5, no gross sens deficits, speech clear   PSYCH: Cooperative, mood and affect appropriate.

## 2025-02-02 NOTE — ED PROVIDER NOTE - NSFOLLOWUPINSTRUCTIONS_ED_ALL_ED_FT
Headache  Head injury    Use tylenol as needed for pain - use as directed.    Closed Head Injury    A closed head injury is an injury to your head that may or may not involve a traumatic brain injury (TBI). Symptoms of TBI can be short or long lasting and include headache, dizziness, interference with memory or speech, fatigue, confusion, changes in sleep, mood changes, nausea, depression/anxiety, and dulling of senses. Make sure to obtain proper rest which includes getting plenty of sleep, avoiding excessive visual stimulation, and avoiding activities that may cause physical or mental stress. Avoid any situation where there is potential for another head injury, including sports.    SEEK IMMEDIATE MEDICAL CARE IF YOU HAVE ANY OF THE FOLLOWING SYMPTOMS: unusual drowsiness, vomiting, severe dizziness, seizures, lightheadedness, muscular weakness, different pupil sizes, visual changes, or clear or bloody discharge from your ears or nose.    Head Injury, Adult    There are many types of head injuries. Head injuries can be as minor as a bump, or they can be more severe. More severe head injuries include:  A jarring injury to the brain (concussion).  A bruise of the brain (contusion). This means there is bleeding in the brain that can cause swelling.  A cracked skull (skull fracture).  Bleeding in the brain that collects, clots, and forms a bump (hematoma).    After a head injury, you may need to be observed for a while in the emergency department or urgent care. Sometimes admission to the hospital is needed.    After a head injury has happened, most problems occur within the first 24 hours, but side effects may occur up to 7–10 days after the injury. It is important to watch your condition for any changes.    What are the causes?  There are many possible causes of a head injury. A serious head injury may happen to someone who is in a car accident (motor vehicle collision). Other causes of major head injuries include bicycle or motorcycle accidents, sports injuries, and falls.    Risk factors  This condition is more likely to occur in people who:  Drink a lot of alcohol or use drugs.  Are over the age of 65.  Are at risk for falls.    What are the symptoms?  There are many possible symptoms of a head injury. Visible symptoms of a head injury include a bruise, bump, or bleeding at the site of the injury. Other non-visible symptoms include:  Feeling sleepy or not being able to stay awake.  Passing out.  Headache.  Seizures.  Dizziness.  Confusion.  Memory problems.  Nausea or vomiting.  Other possible symptoms that may develop after the head injury include:  Poor attention and concentration.  Fatigue or tiring easily.  Irritability.  Being uncomfortable around bright lights or loud noises.  Anxiety or depression.  Disturbed sleep.    How is this diagnosed?  This condition can usually be diagnosed based on your symptoms, a description of the injury, and a physical exam. You may also have imaging tests done, such as a CT scan or MRI. You will also be closely watched.    How is this treated?  Treatment for this condition depends on the severity and type of injury you have. The main goal of treatment is to prevent complications and allow the brain time to heal.    For mild head injury, you may be sent home and treatment may include:  Observation. A responsible adult should stay with you for 24 hours after your injury and check on you often.  Physical rest.  Brain rest.  Pain medicines.  For severe brain injury, treatment may include:  Close observation. This includes hospitalization with frequent physical exams. You may need to go to a hospital that specializes in head injury.  Pain medicines.  Breathing support. This may include using a ventilator.  Managing the pressure inside the brain (intracranial pressure, or ICP). This may include:  Monitoring the ICP.  Giving medicines to decrease the ICP.  Positioning you to decrease the ICP.  Medicine to prevent seizures.  Surgery to stop bleeding or to remove blood clots (craniotomy).  Surgery to remove part of the skull (decompressive craniectomy). This allows room for the brain to swell.    Follow these instructions at home:  Activity   Rest as much as possible and avoid activities that are physically hard or tiring.  Make sure you get enough sleep.  Limit activities that require a lot of thought or attention, such as:  Watching TV.  Playing memory games and puzzles.  Job-related work or homework.  Working on the computer, social media, and texting.  Avoid activities that could cause another head injury, such as playing sports, until your health care provider approves. Having another head injury, especially before the first one has healed, can be dangerous.    Ask your health care provider when it is safe for you to return to your regular activities, including work or school. Ask your health care provider for a step-by-step plan for gradually returning to activities.  Ask your health care provider when you can drive, ride a bicycle, or use heavy machinery. Your ability to react may be slower after a brain injury. Never do these activities if you are dizzy.    Lifestyle     Do not drink alcohol until your health care provider approves, and avoid drug use. Alcohol and certain drugs may slow your recovery and can put you at risk of further injury.  If it is harder than usual to remember things, write them down.  If you are easily distracted, try to do one thing at a time.  Talk with family members or close friends when making important decisions.  Tell your friends, family, a trusted colleague, and  about your injury, symptoms, and restrictions. Have them watch for any new or worsening problems.  General instructions     Take over-the-counter and prescription medicines only as told by your health care provider.  Have someone stay with you for 24 hours after your head injury. This person should watch you for any changes in your symptoms and be ready to seek medical help, as needed.  Keep all follow-up visits as told by your health care provider. This is important.    How is this prevented?  Work on improving your balance and strength to avoid falls.  Wear a seatbelt when you are in a moving vehicle.  Wear a helmet when riding a bicycle, skiing, or doing any other sport or activity that has a risk of injury.  Drink alcohol only in moderation.  Take safety measures in your home, such as:  Removing clutter and tripping hazards from floors and stairways.  Using grab bars in bathrooms and handrails by stairs.  Placing non-slip mats on floors and in bathtubs.  Improving lighting in dim areas.    Get help right away if:  You have:  A severe headache that is not helped by medicine.  Trouble walking, have weakness in your arms and legs, or lose your balance.  Clear or bloody fluid coming from your nose or ears.  Changes in your vision.  A seizure.  You vomit.  Your symptoms get worse.  Your speech is slurred.  You pass out.  You are sleepier and have trouble staying awake.  Your pupils change size.  These symptoms may represent a serious problem that is an emergency. Do not wait to see if the symptoms will go away. Get medical help right away. Call your local emergency services (911 in the [place] Do not drive yourself to the hospital.     Post-Concussion Syndrome    A concussion is a brain injury from a direct hit (blow) to your head or body. This blow causes your brain to shake quickly back and forth inside your skull. This can damage brain cells and cause chemical changes in your brain. Concussions are usually not life-threatening but can cause several serious symptoms.    Post-concussion syndrome is when symptoms that occur after a concussion last longer than normal. These symptoms can last from weeks to months.    What are the causes?  The cause of this condition is not known. It can happen whether your head injury was mild or severe.    What increases the risk?  You are more likely to develop this condition if:  You are female.  You are a child, teen, or young adult.  You had a past head injury.  You have a history of headaches.  You have depression or anxiety.    What are the signs or symptoms?    Physical symptoms   Headaches.  Tiredness.  Dizziness.  Weakness.  Blurry vision.  Sensitivity to light.  Hearing difficulties.  Mental and emotional symptoms     Memory difficulties.  Difficulty with concentration.  Difficulty sleeping or staying asleep.  Feeling irritable.  Anxiety or depression.  Difficulty learning new things.    How is this diagnosed?  This condition may be diagnosed based on:  Your symptoms.  A description of your injury.  Your medical history.  Your health care provider may order other tests such as:  Brain function tests (neurological testing).  CT scan.    How is this treated?  Treatment for this condition may depend on your symptoms. Symptoms usually go away on their own over time. Treatments may include:  Medicines for headaches.  Resting your brain and body for a few days after your injury.  Rehabilitation therapy, such as:  Physical or occupational therapy. This may include exercises to help with balance and dizziness.  Mental health counseling.  Speech therapy.  Vision therapy. A brain and eye specialist can recommend treatments for vision problems.    Follow these instructions at home:  Medicines     Take over-the-counter and prescription medicines only as told by your health care provider.  Avoid opioid prescription pain medicines when recovering from a concussion.  Activity     Limit your mental activities for the first few days after your injury, such as:  Homework or job-related work.  Complex thinking.  Watching TV, and using a computer or phone.  Playing memory games and puzzles.  Gradually return to your normal activity level. If a certain activity brings on your symptoms, stop or slow down until you can do the activity without it triggering your symptoms.  Limit physical activity, such as exercise or sports, for the first few days after a concussion. Gradually return to normal activity as told by your health care provider.  If a certain activity brings on your symptoms, stop or slow down until you can do the activity without it triggering your symptoms.  Rest. Rest helps your brain heal. Make sure you:  Get plenty of sleep at night. Most adults should get at least 7–9 hours of sleep each night.  Rest during the day. Take naps or rest breaks when you feel tired.  Do not do high-risk activities that could cause a second concussion, such as riding a bike or playing sports. Having another concussion before the first one has healed can be dangerous.    General instructions   Do not drink alcohol until your health care provider says you can.  Keep track of the frequency and the severity of your symptoms. Give this information to your health care provider.  Keep all follow-up visits as directed by your health care provider. This is important.  Contact a health care provider if:  Your symptoms do not improve.  You have another injury.  Get help right away if you:  Have a severe or worsening headache.  Are confused.  Have trouble staying awake.  Pass out.  Vomit.  Have weakness or numbness in any part of your body.  Have a seizure.  Have trouble speaking.  Summary  Post-concussion syndrome is when symptoms that occur after a concussion last longer than normal.  Symptoms usually go away on their own over time. Depending on your symptoms, you may need treatment, such as medicines or rehabilitation therapy.  Rest your brain and body for a few days after your injury. Gradually return to activities, as told by your health care provider.  Get plenty of sleep, and avoid alcohol and opioid pain medicines while recovering from a concussion.

## 2025-02-02 NOTE — ED ADULT NURSE NOTE - NSFALLHARMRISKINTERV_ED_ALL_ED

## 2025-02-02 NOTE — ED PROVIDER NOTE - PATIENT PORTAL LINK FT
You can access the FollowMyHealth Patient Portal offered by [facility] by registering at the following website: [email or web address]. By joining [facility]’s FollowMyHealth portal, you will also be able to view your health information using other applications (apps) compatible with our system.

## 2025-02-02 NOTE — ED PROVIDER NOTE - CARE PLAN
1 Principal Discharge DX:	Headache  Secondary Diagnosis:	CHI (closed head injury), initial encounter

## 2025-02-02 NOTE — ED PROVIDER NOTE - OBJECTIVE STATEMENT
84-year-old female history of hypertension, high cholesterol, A-fib on anticoagulation, depression, thyroid disorder 84-year-old female history of hypertension, high cholesterol, A-fib on anticoagulation, depression, thyroid disorder complain of a mechanical fall 2 weeks ago.  Patient states she fell backwards and hit the back of her head without LOC.  Patient reports a headache since that time minimally relieved with Tylenol, last dose 4 PM.  Patient denies associated change in vision, change in speech, numbness or weakness in her extremities.   patient notes some neck discomfort on the sides of her neck more on the right, denies back pain, denies injury other than to her head from her fall.  Patient has been ambulating with her walker since her fall.  Patient reports she is taking a blood thinner.  No chest pain, palpitations, shortness of breath, abdominal pain, nausea, vomiting, diarrhea, dysuria.

## 2025-02-02 NOTE — ED ADULT TRIAGE NOTE - CHIEF COMPLAINT QUOTE
"two week ago I fell and hit my head and I decided not to go to hospital but today I fell on my butt and I have a headache". Patient c/o neck pain to EMS but refused to wear c-collar and is on Eliquis

## 2025-02-06 DIAGNOSIS — I48.91 UNSPECIFIED ATRIAL FIBRILLATION: ICD-10-CM

## 2025-02-06 DIAGNOSIS — S09.90XA UNSPECIFIED INJURY OF HEAD, INITIAL ENCOUNTER: ICD-10-CM

## 2025-02-06 DIAGNOSIS — F32.A DEPRESSION, UNSPECIFIED: ICD-10-CM
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
## 2025-02-06 DIAGNOSIS — Z79.01 LONG TERM (CURRENT) USE OF ANTICOAGULANTS: ICD-10-CM

## 2025-02-06 DIAGNOSIS — R51.9 HEADACHE, UNSPECIFIED: ICD-10-CM
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
## 2025-02-06 DIAGNOSIS — I10 ESSENTIAL (PRIMARY) HYPERTENSION: ICD-10-CM

## 2025-02-06 DIAGNOSIS — E78.00 PURE HYPERCHOLESTEROLEMIA, UNSPECIFIED: ICD-10-CM

## 2025-02-24 ENCOUNTER — RX RENEWAL (OUTPATIENT)
Age: 85
End: 2025-02-24

## 2025-03-10 ENCOUNTER — APPOINTMENT (OUTPATIENT)
Dept: INTERNAL MEDICINE | Facility: CLINIC | Age: 85
End: 2025-03-10

## 2025-03-31 PROBLEM — K83.8 DILATED BILE DUCT: Status: ACTIVE | Noted: 2025-03-31

## 2025-04-01 ENCOUNTER — APPOINTMENT (OUTPATIENT)
Dept: PHYSICAL MEDICINE AND REHAB | Facility: CLINIC | Age: 85
End: 2025-04-01

## 2025-04-01 DIAGNOSIS — K83.8 OTHER SPECIFIED DISEASES OF BILIARY TRACT: ICD-10-CM

## 2025-04-09 ENCOUNTER — RX RENEWAL (OUTPATIENT)
Age: 85
End: 2025-04-09

## 2025-04-29 ENCOUNTER — APPOINTMENT (OUTPATIENT)
Dept: PHYSICAL MEDICINE AND REHAB | Facility: CLINIC | Age: 85
End: 2025-04-29

## 2025-04-29 VITALS
BODY MASS INDEX: 29.34 KG/M2 | WEIGHT: 136 LBS | HEIGHT: 57 IN | OXYGEN SATURATION: 99 % | TEMPERATURE: 98.4 F | SYSTOLIC BLOOD PRESSURE: 127 MMHG | HEART RATE: 60 BPM | DIASTOLIC BLOOD PRESSURE: 79 MMHG

## 2025-04-29 DIAGNOSIS — M47.817 SPONDYLOSIS W/OUT MYELOPATHY OR RADICULOPATHY, LUMBOSACRAL REGION: ICD-10-CM

## 2025-04-29 DIAGNOSIS — M79.2 NEURALGIA AND NEURITIS, UNSPECIFIED: ICD-10-CM

## 2025-04-29 DIAGNOSIS — M54.17 RADICULOPATHY, LUMBOSACRAL REGION: ICD-10-CM

## 2025-04-29 DIAGNOSIS — M80.88XS: ICD-10-CM

## 2025-04-29 DIAGNOSIS — M46.1 SACROILIITIS, NOT ELSEWHERE CLASSIFIED: ICD-10-CM

## 2025-04-29 DIAGNOSIS — Z87.39 PERSONAL HISTORY OF OTHER DISEASES OF THE MUSCULOSKELETAL SYSTEM AND CONNECTIVE TISSUE: ICD-10-CM

## 2025-04-29 PROCEDURE — G2211 COMPLEX E/M VISIT ADD ON: CPT

## 2025-04-29 PROCEDURE — 99215 OFFICE O/P EST HI 40 MIN: CPT

## 2025-04-29 RX ORDER — METHYLPREDNISOLONE 4 MG/1
4 TABLET ORAL
Qty: 1 | Refills: 0 | Status: ACTIVE | COMMUNITY
Start: 2025-04-29 | End: 1900-01-01

## 2025-04-30 ENCOUNTER — RX RENEWAL (OUTPATIENT)
Age: 85
End: 2025-04-30

## 2025-05-06 ENCOUNTER — RX RENEWAL (OUTPATIENT)
Age: 85
End: 2025-05-06

## 2025-05-09 LAB
ALBUMIN SERPL ELPH-MCNC: 4.3 G/DL
ALDOLASE SERPL-CCNC: 3.9 U/L
ALP BLD-CCNC: 129 U/L
ALT SERPL-CCNC: 25 U/L
ANION GAP SERPL CALC-SCNC: 13 MMOL/L
AST SERPL-CCNC: 24 U/L
BASOPHILS # BLD AUTO: 0.02 K/UL
BASOPHILS NFR BLD AUTO: 0.5 %
BILIRUB SERPL-MCNC: 0.4 MG/DL
BUN SERPL-MCNC: 15 MG/DL
CALCIUM SERPL-MCNC: 9.9 MG/DL
CHLORIDE SERPL-SCNC: 102 MMOL/L
CK SERPL-CCNC: 39 U/L
CO2 SERPL-SCNC: 25 MMOL/L
CREAT SERPL-MCNC: 0.48 MG/DL
CRP SERPL-MCNC: 3 MG/L
CYSTATIN C SERPL-MCNC: 0.82 MG/L
EGFRCR SERPLBLD CKD-EPI 2021: 95 ML/MIN/1.73M2
EOSINOPHIL # BLD AUTO: 0 K/UL
EOSINOPHIL NFR BLD AUTO: 0 %
ERYTHROCYTE [SEDIMENTATION RATE] IN BLOOD BY WESTERGREN METHOD: 18 MM/HR
GFR/BSA.PRED SERPLBLD CYS-BASED-ARV: 86 ML/MIN/1.73M2
GGT SERPL-CCNC: 67 U/L
GLUCOSE SERPL-MCNC: 84 MG/DL
HCT VFR BLD CALC: 38.8 %
HGB BLD-MCNC: 12 G/DL
HOMOCYSTEINE LEVEL: 9.1 UMOL/L
IMM GRANULOCYTES NFR BLD AUTO: 0.2 %
LDH SERPL-CCNC: 175 U/L
LYMPHOCYTES # BLD AUTO: 1.03 K/UL
LYMPHOCYTES NFR BLD AUTO: 23.3 %
MAN DIFF?: NORMAL
MCHC RBC-ENTMCNC: 30.9 G/DL
MCHC RBC-ENTMCNC: 31.3 PG
MCV RBC AUTO: 101 FL
METHYLMALONATE SERPL-SCNC: 178 NMOL/L
MONOCYTES # BLD AUTO: 0.36 K/UL
MONOCYTES NFR BLD AUTO: 8.1 %
NEUTROPHILS # BLD AUTO: 3.01 K/UL
NEUTROPHILS NFR BLD AUTO: 67.9 %
PLATELET # BLD AUTO: 264 K/UL
POTASSIUM SERPL-SCNC: 4.3 MMOL/L
PROT SERPL-MCNC: 6.6 G/DL
RBC # BLD: 3.84 M/UL
RBC # FLD: 13.3 %
SODIUM SERPL-SCNC: 140 MMOL/L
WBC # FLD AUTO: 4.43 K/UL

## 2025-05-13 ENCOUNTER — APPOINTMENT (OUTPATIENT)
Dept: PHYSICAL MEDICINE AND REHAB | Facility: CLINIC | Age: 85
End: 2025-05-13

## 2025-05-13 VITALS
DIASTOLIC BLOOD PRESSURE: 75 MMHG | HEART RATE: 99 BPM | OXYGEN SATURATION: 96 % | BODY MASS INDEX: 29.34 KG/M2 | HEIGHT: 57 IN | SYSTOLIC BLOOD PRESSURE: 145 MMHG | TEMPERATURE: 98.4 F | WEIGHT: 136 LBS

## 2025-05-13 VITALS — DIASTOLIC BLOOD PRESSURE: 82 MMHG | SYSTOLIC BLOOD PRESSURE: 160 MMHG | HEART RATE: 99 BPM

## 2025-05-13 DIAGNOSIS — Z87.39 PERSONAL HISTORY OF OTHER DISEASES OF THE MUSCULOSKELETAL SYSTEM AND CONNECTIVE TISSUE: ICD-10-CM

## 2025-05-13 DIAGNOSIS — M54.17 RADICULOPATHY, LUMBOSACRAL REGION: ICD-10-CM

## 2025-05-13 DIAGNOSIS — M46.1 SACROILIITIS, NOT ELSEWHERE CLASSIFIED: ICD-10-CM

## 2025-05-13 DIAGNOSIS — M79.2 NEURALGIA AND NEURITIS, UNSPECIFIED: ICD-10-CM

## 2025-05-13 DIAGNOSIS — Z96.641 PRESENCE OF RIGHT ARTIFICIAL HIP JOINT: ICD-10-CM

## 2025-05-13 DIAGNOSIS — R26.9 UNSPECIFIED ABNORMALITIES OF GAIT AND MOBILITY: ICD-10-CM

## 2025-05-13 DIAGNOSIS — M47.817 SPONDYLOSIS W/OUT MYELOPATHY OR RADICULOPATHY, LUMBOSACRAL REGION: ICD-10-CM

## 2025-05-13 DIAGNOSIS — M79.18 MYALGIA, OTHER SITE: ICD-10-CM

## 2025-05-13 PROCEDURE — 99215 OFFICE O/P EST HI 40 MIN: CPT | Mod: 25

## 2025-05-13 PROCEDURE — 20553 NJX 1/MLT TRIGGER POINTS 3/>: CPT | Mod: RT

## 2025-06-06 ENCOUNTER — RX RENEWAL (OUTPATIENT)
Age: 85
End: 2025-06-06

## 2025-06-18 ENCOUNTER — APPOINTMENT (OUTPATIENT)
Dept: INTERNAL MEDICINE | Facility: CLINIC | Age: 85
End: 2025-06-18
Payer: MEDICARE

## 2025-06-18 VITALS
TEMPERATURE: 98.7 F | OXYGEN SATURATION: 97 % | HEART RATE: 89 BPM | WEIGHT: 133 LBS | BODY MASS INDEX: 28.69 KG/M2 | SYSTOLIC BLOOD PRESSURE: 140 MMHG | HEIGHT: 57 IN | DIASTOLIC BLOOD PRESSURE: 86 MMHG

## 2025-06-18 PROBLEM — M81.8 OTHER OSTEOPOROSIS: Status: ACTIVE | Noted: 2025-06-18

## 2025-06-18 PROCEDURE — 99214 OFFICE O/P EST MOD 30 MIN: CPT

## 2025-06-18 PROCEDURE — G2211 COMPLEX E/M VISIT ADD ON: CPT

## 2025-06-18 RX ORDER — TRAZODONE HYDROCHLORIDE 50 MG/1
50 TABLET ORAL
Qty: 30 | Refills: 2 | Status: ACTIVE | COMMUNITY
Start: 2025-06-18 | End: 1900-01-01

## 2025-07-15 ENCOUNTER — APPOINTMENT (OUTPATIENT)
Dept: PHYSICAL MEDICINE AND REHAB | Facility: CLINIC | Age: 85
End: 2025-07-15

## 2025-07-28 ENCOUNTER — RX RENEWAL (OUTPATIENT)
Age: 85
End: 2025-07-28

## 2025-08-27 ENCOUNTER — LABORATORY RESULT (OUTPATIENT)
Age: 85
End: 2025-08-27

## 2025-08-27 ENCOUNTER — APPOINTMENT (OUTPATIENT)
Dept: ENDOCRINOLOGY | Facility: CLINIC | Age: 85
End: 2025-08-27
Payer: MEDICARE

## 2025-08-27 VITALS
BODY MASS INDEX: 27.83 KG/M2 | DIASTOLIC BLOOD PRESSURE: 82 MMHG | OXYGEN SATURATION: 95 % | HEIGHT: 57 IN | TEMPERATURE: 97.8 F | SYSTOLIC BLOOD PRESSURE: 152 MMHG | HEART RATE: 78 BPM | WEIGHT: 129 LBS

## 2025-08-27 DIAGNOSIS — M81.0 AGE-RELATED OSTEOPOROSIS W/OUT CURRENT PATHOLOGICAL FRACTURE: ICD-10-CM

## 2025-08-27 DIAGNOSIS — Z87.39 PERSONAL HISTORY OF OTHER DISEASES OF THE MUSCULOSKELETAL SYSTEM AND CONNECTIVE TISSUE: ICD-10-CM

## 2025-08-27 PROCEDURE — 36415 COLL VENOUS BLD VENIPUNCTURE: CPT

## 2025-08-27 PROCEDURE — G2211 COMPLEX E/M VISIT ADD ON: CPT

## 2025-08-27 PROCEDURE — 99204 OFFICE O/P NEW MOD 45 MIN: CPT

## 2025-09-02 PROBLEM — Z87.39 HISTORY OF OSTEOPOROSIS: Status: RESOLVED | Noted: 2023-05-12 | Resolved: 2025-09-02

## 2025-09-02 PROBLEM — M81.0 OSTEOPOROSIS: Status: ACTIVE | Noted: 2025-09-02

## 2025-09-02 LAB
25(OH)D3 SERPL-MCNC: 67.6 NG/ML
ALBUMIN MFR SERPL ELPH: 64.7 %
ALBUMIN SERPL-MCNC: 4.1 G/DL
ALBUMIN/GLOB SERPL: 1.8 RATIO
ALPHA1 GLOB MFR SERPL ELPH: 4.6 %
ALPHA1 GLOB SERPL ELPH-MCNC: 0.3 G/DL
ALPHA2 GLOB MFR SERPL ELPH: 9.2 %
ALPHA2 GLOB SERPL ELPH-MCNC: 0.6 G/DL
ANION GAP SERPL CALC-SCNC: 15 MMOL/L
B-GLOBULIN MFR SERPL ELPH: 13.5 %
B-GLOBULIN SERPL ELPH-MCNC: 0.9 G/DL
BUN SERPL-MCNC: 10 MG/DL
CALCIUM SERPL-MCNC: 10.2 MG/DL
CALCIUM SERPL-MCNC: 10.2 MG/DL
CHLORIDE SERPL-SCNC: 102 MMOL/L
CO2 SERPL-SCNC: 22 MMOL/L
CREAT SERPL-MCNC: 0.47 MG/DL
EGFRCR SERPLBLD CKD-EPI 2021: 94 ML/MIN/1.73M2
GAMMA GLOB FLD ELPH-MCNC: 0.5 G/DL
GAMMA GLOB MFR SERPL ELPH: 8 %
GLUCOSE SERPL-MCNC: 79 MG/DL
INTERPRETATION SERPL IEP-IMP: NORMAL
M PROTEIN MFR SERPL ELPH: 2.6 %
MONOCLON BAND OBS SERPL: 0.2 G/DL
PARATHYROID HORMONE INTACT: 53 PG/ML
POTASSIUM SERPL-SCNC: 3.8 MMOL/L
PROT SERPL-MCNC: 6.4 G/DL
PROT SERPL-MCNC: 6.4 G/DL
SODIUM SERPL-SCNC: 140 MMOL/L
TSH SERPL-ACNC: 0.85 UIU/ML

## 2025-09-03 ENCOUNTER — RX RENEWAL (OUTPATIENT)
Age: 85
End: 2025-09-03

## (undated) DEVICE — TROCAR COVIDIEN VERSAONE FIXATION CANNULA 5MM

## (undated) DEVICE — TUBING STRYKER PNEUMOSURE HI FLOW INSUFFLATOR

## (undated) DEVICE — DRSG DERMABOND 0.7ML

## (undated) DEVICE — TIP METZENBAUM SCISSOR MONOPOLAR ENDOCUT (ORANGE)

## (undated) DEVICE — SUT VICRYL 0 54" TIES

## (undated) DEVICE — SUT MONOCRYL 4-0 18" PS-2

## (undated) DEVICE — TROCAR ETHICON ENDOPATH XCEL UNIVERSAL SLEEVE WITH OPTIVIEW 5MM X 100MM

## (undated) DEVICE — VENODYNE/SCD SLEEVE CALF MEDIUM

## (undated) DEVICE — POSITIONER FOAM EGG CRATE ULNAR 2PCS (PINK)

## (undated) DEVICE — TROCAR COVIDIEN VERSAPORT BLADELESS OPTICAL 12MM STANDARD

## (undated) DEVICE — TROCAR ETHICON ENDOPATH XCEL BLADELESS 5MM X 100MM STABILITY

## (undated) DEVICE — WARMING BLANKET UPPER ADULT

## (undated) DEVICE — SUT VICRYL 0 27" UR-6

## (undated) DEVICE — PACK GENERAL LAPAROSCOPY

## (undated) DEVICE — Device

## (undated) DEVICE — GLV 8 PROTEXIS (WHITE)

## (undated) DEVICE — ENDOCATCH 10MM SPECIMEN POUCH

## (undated) DEVICE — SOL IRR BAG NS 0.9% 3000ML

## (undated) DEVICE — MARKING PEN W RULER